# Patient Record
Sex: FEMALE | Race: AMERICAN INDIAN OR ALASKA NATIVE
[De-identification: names, ages, dates, MRNs, and addresses within clinical notes are randomized per-mention and may not be internally consistent; named-entity substitution may affect disease eponyms.]

---

## 2017-12-28 ENCOUNTER — HOSPITAL ENCOUNTER (INPATIENT)
Dept: HOSPITAL 5 - ED | Age: 37
LOS: 12 days | Discharge: NURSING, REHAB OR LONG-TERM CARE | DRG: 871 | End: 2018-01-09
Attending: INTERNAL MEDICINE | Admitting: INTERNAL MEDICINE
Payer: MEDICARE

## 2017-12-28 DIAGNOSIS — Z79.899: ICD-10-CM

## 2017-12-28 DIAGNOSIS — A41.9: Primary | ICD-10-CM

## 2017-12-28 DIAGNOSIS — Z79.4: ICD-10-CM

## 2017-12-28 DIAGNOSIS — Z79.82: ICD-10-CM

## 2017-12-28 DIAGNOSIS — H70.001: ICD-10-CM

## 2017-12-28 DIAGNOSIS — G40.909: ICD-10-CM

## 2017-12-28 DIAGNOSIS — E11.10: ICD-10-CM

## 2017-12-28 DIAGNOSIS — I63.9: ICD-10-CM

## 2017-12-28 DIAGNOSIS — F32.9: ICD-10-CM

## 2017-12-28 DIAGNOSIS — R13.12: ICD-10-CM

## 2017-12-28 DIAGNOSIS — E44.0: ICD-10-CM

## 2017-12-28 DIAGNOSIS — E87.5: ICD-10-CM

## 2017-12-28 DIAGNOSIS — I10: ICD-10-CM

## 2017-12-28 DIAGNOSIS — E87.0: ICD-10-CM

## 2017-12-28 DIAGNOSIS — G93.41: ICD-10-CM

## 2017-12-28 DIAGNOSIS — R47.01: ICD-10-CM

## 2017-12-28 LAB
ALBUMIN SERPL-MCNC: 3 G/DL (ref 3.9–5)
ALT SERPL-CCNC: 11 UNITS/L (ref 7–56)
BACTERIA #/AREA URNS HPF: (no result) /HPF
BAND NEUTROPHILS # (MANUAL): 0.8 K/MM3
BENZODIAZEPINES SCREEN,URINE: (no result)
BILIRUB DIRECT SERPL-MCNC: 0.2 MG/DL (ref 0–0.2)
BILIRUB UR QL STRIP: (no result)
BLOOD UR QL VISUAL: (no result)
BUN SERPL-MCNC: (no result) MG/DL (ref 7–17)
BUN SERPL-MCNC: 38 MG/DL (ref 7–17)
BUN SERPL-MCNC: 39 MG/DL (ref 7–17)
BUN SERPL-MCNC: 41 MG/DL (ref 7–17)
BUN SERPL-MCNC: 42 MG/DL (ref 7–17)
BUN SERPL-MCNC: 43 MG/DL (ref 7–17)
BUN SERPL-MCNC: 43 MG/DL (ref 7–17)
BUN/CREAT SERPL: (no result) %
BUN/CREAT SERPL: 29 %
BUN/CREAT SERPL: 29 %
BUN/CREAT SERPL: 30 %
BUN/CREAT SERPL: 32 %
BUN/CREAT SERPL: 33 %
BUN/CREAT SERPL: 36 %
CALCIUM SERPL-MCNC: (no result) MG/DL (ref 8.4–10.2)
CALCIUM SERPL-MCNC: 7.7 MG/DL (ref 8.4–10.2)
CALCIUM SERPL-MCNC: 7.7 MG/DL (ref 8.4–10.2)
CALCIUM SERPL-MCNC: 7.8 MG/DL (ref 8.4–10.2)
CALCIUM SERPL-MCNC: 7.9 MG/DL (ref 8.4–10.2)
CALCIUM SERPL-MCNC: 8.1 MG/DL (ref 8.4–10.2)
CALCIUM SERPL-MCNC: 8.3 MG/DL (ref 8.4–10.2)
HCT VFR BLD CALC: 41.3 % (ref 30.3–42.9)
HEMOLYSIS INDEX: 101
HEMOLYSIS INDEX: 102
HEMOLYSIS INDEX: 15
HEMOLYSIS INDEX: 39
HEMOLYSIS INDEX: 44
HEMOLYSIS INDEX: 48
HEMOLYSIS INDEX: 48
HGB BLD-MCNC: 12.4 GM/DL (ref 10.1–14.3)
INR PPP: 1.25 (ref 0.87–1.13)
MAGNESIUM SERPL-MCNC: 2.6 MG/DL (ref 1.7–2.3)
MAGNESIUM SERPL-MCNC: 2.7 MG/DL (ref 1.7–2.3)
MCH RBC QN AUTO: 30 PG (ref 28–32)
MCHC RBC AUTO-ENTMCNC: 30 % (ref 30–34)
MCV RBC AUTO: 101 FL (ref 79–97)
METHADONE SCREEN,URINE: (no result)
MYELOCYTES # (MANUAL): 0 K/MM3
NITRITE UR QL STRIP: (no result)
OPIATE SCREEN,URINE: (no result)
PH UR STRIP: 5 [PH] (ref 5–7)
PLATELET # BLD: 197 K/MM3 (ref 140–440)
PROMYELOCYTES # (MANUAL): 0 K/MM3
RBC # BLD AUTO: 4.1 M/MM3 (ref 3.65–5.03)
RBC #/AREA URNS HPF: < 1 /HPF (ref 0–6)
TOTAL CELLS COUNTED BLD: 100
UROBILINOGEN UR-MCNC: < 2 MG/DL (ref ?–2)
WBC #/AREA URNS HPF: 2 /HPF (ref 0–6)

## 2017-12-28 PROCEDURE — 82140 ASSAY OF AMMONIA: CPT

## 2017-12-28 PROCEDURE — 87040 BLOOD CULTURE FOR BACTERIA: CPT

## 2017-12-28 PROCEDURE — 81001 URINALYSIS AUTO W/SCOPE: CPT

## 2017-12-28 PROCEDURE — 36415 COLL VENOUS BLD VENIPUNCTURE: CPT

## 2017-12-28 PROCEDURE — 83036 HEMOGLOBIN GLYCOSYLATED A1C: CPT

## 2017-12-28 PROCEDURE — 82962 GLUCOSE BLOOD TEST: CPT

## 2017-12-28 PROCEDURE — 85025 COMPLETE CBC W/AUTO DIFF WBC: CPT

## 2017-12-28 PROCEDURE — 83735 ASSAY OF MAGNESIUM: CPT

## 2017-12-28 PROCEDURE — 96361 HYDRATE IV INFUSION ADD-ON: CPT

## 2017-12-28 PROCEDURE — 80074 ACUTE HEPATITIS PANEL: CPT

## 2017-12-28 PROCEDURE — 4A033R1 MEASUREMENT OF ARTERIAL SATURATION, PERIPHERAL, PERCUTANEOUS APPROACH: ICD-10-PCS | Performed by: INTERNAL MEDICINE

## 2017-12-28 PROCEDURE — 84100 ASSAY OF PHOSPHORUS: CPT

## 2017-12-28 PROCEDURE — 93005 ELECTROCARDIOGRAM TRACING: CPT

## 2017-12-28 PROCEDURE — 84703 CHORIONIC GONADOTROPIN ASSAY: CPT

## 2017-12-28 PROCEDURE — 70450 CT HEAD/BRAIN W/O DYE: CPT

## 2017-12-28 PROCEDURE — 96375 TX/PRO/DX INJ NEW DRUG ADDON: CPT

## 2017-12-28 PROCEDURE — 80048 BASIC METABOLIC PNL TOTAL CA: CPT

## 2017-12-28 PROCEDURE — 71010: CPT

## 2017-12-28 PROCEDURE — 85007 BL SMEAR W/DIFF WBC COUNT: CPT

## 2017-12-28 PROCEDURE — 96374 THER/PROPH/DIAG INJ IV PUSH: CPT

## 2017-12-28 PROCEDURE — 74230 X-RAY XM SWLNG FUNCJ C+: CPT

## 2017-12-28 PROCEDURE — 80307 DRUG TEST PRSMV CHEM ANLYZR: CPT

## 2017-12-28 PROCEDURE — 85027 COMPLETE CBC AUTOMATED: CPT

## 2017-12-28 PROCEDURE — 93306 TTE W/DOPPLER COMPLETE: CPT

## 2017-12-28 PROCEDURE — 36600 WITHDRAWAL OF ARTERIAL BLOOD: CPT

## 2017-12-28 PROCEDURE — 80061 LIPID PANEL: CPT

## 2017-12-28 PROCEDURE — 70551 MRI BRAIN STEM W/O DYE: CPT

## 2017-12-28 PROCEDURE — 93010 ELECTROCARDIOGRAM REPORT: CPT

## 2017-12-28 PROCEDURE — 82803 BLOOD GASES ANY COMBINATION: CPT

## 2017-12-28 PROCEDURE — 95819 EEG AWAKE AND ASLEEP: CPT

## 2017-12-28 PROCEDURE — 86140 C-REACTIVE PROTEIN: CPT

## 2017-12-28 PROCEDURE — 85610 PROTHROMBIN TIME: CPT

## 2017-12-28 PROCEDURE — 93880 EXTRACRANIAL BILAT STUDY: CPT

## 2017-12-28 PROCEDURE — 74018 RADEX ABDOMEN 1 VIEW: CPT

## 2017-12-28 RX ADMIN — PIPERACILLIN SODIUM AND TAZOBACTAM SODIUM SCH: 3; .375 INJECTION, POWDER, LYOPHILIZED, FOR SOLUTION INTRAVENOUS at 22:32

## 2017-12-28 RX ADMIN — ASPIRIN SCH: 81 TABLET, COATED ORAL at 18:43

## 2017-12-28 RX ADMIN — CLOPIDOGREL BISULFATE SCH: 75 TABLET ORAL at 18:44

## 2017-12-28 RX ADMIN — NIFEDIPINE SCH: 30 TABLET, FILM COATED, EXTENDED RELEASE ORAL at 18:44

## 2017-12-28 RX ADMIN — INSULIN HUMAN SCH MLS/HR: 100 INJECTION, SOLUTION PARENTERAL at 14:43

## 2017-12-28 NOTE — CAT SCAN REPORT
CT HEAD WITHOUT CONTRAST:



HISTORY: Altered mental status.



TECHNIQUE:

Sequential CT images without contrast.



FINDINGS:

No comparison. Slightly limited examination with motion and poor 

patient positioning by the technologist. The lateral ventricles appear 

prominent, right greater than left. Mild ventriculomegaly could be 

considered. The fourth ventricle is within normal limits.



The brain parenchyma demonstrates normal attenuation. There is no 

evidence for hemorrhage, mass or large or acute ischemia. No extra 

axial fluid collection. The basal cisterns are clear. The posterior 

fossa and contents are within normal limits.



The calvarium is intact. The visualized sinuses and mastoid air cells 

are aerated.



IMPRESSION:

Mild dilatation of the lateral ventricles is suspected. Is there 

concern for hydrocephalus? Consider further evaluation with MRI.

The brain parenchyma is within normal limits.

## 2017-12-28 NOTE — EMERGENCY DEPARTMENT REPORT
ED Altered Mental Status HPI





- General


Stated Complaint: UNRESPONSIVE


Time Seen by Provider: 12/28/17 12:25


Source: family, EMS





- History of Present Illness


Initial Comments: 





Patient is 37 years old female history of insulin-dependent diabetes and 

previous CVAs, presented via EMS after patient was found unresponsive.  Per 

family report, patient was last time seen normal yesterday afternoon.  Unable 

to obtain more history due to patient condition.  On arrival to the ER patient 

is breathing 20 breath/minutes and her oxygen saturation is 98% on 2 L.  Strong 

smell of acetone in the room.


MD Complaint: altered mental status, decreased responsiveness


-: unknown


Severity: moderate


Consistency of Symptoms: constant





- Related Data


 Home Medications











 Medication  Instructions  Recorded  Confirmed  Last Taken


 


Aspirin [Aspirin EC] 81 mg PO DAILY 12/28/17 12/28/17 Unknown


 


AtorvaSTATin [Lipitor] 80 mg PO QHS 12/28/17 12/28/17 Unknown


 


Citalopram [celeXA] 10 mg PO QDAY 12/28/17 12/28/17 Unknown


 


Clopidogrel [Plavix] 75 mg PO QDAY 12/28/17 12/28/17 Unknown


 


Cyclobenzaprine [Flexeril] 10 mg PO TID PRN 12/28/17 12/28/17 Unknown


 


Insulin Aspart Protam & Aspart 0 unit SQ TID 12/28/17 12/28/17 Unknown





[NovoLOG Mix 70-30 Flexpen]    


 


Insulin Glargine,Hum.rec.anlog 20 units SQ QAM 12/28/17 12/28/17 Unknown





[Lantus]    


 


Insulin Glargine,Hum.rec.anlog 50 units SQ QPM 12/28/17 12/28/17 Unknown





[Lantus]    


 


NIFEdipine [Nifedipine ER] 30 mg PO DAILY 12/28/17 12/28/17 Unknown











 Allergies











Allergy/AdvReac Type Severity Reaction Status Date / Time


 


No Known Allergies Allergy   Unverified 01/10/15 03:04














ED Review of Systems


ROS: 


Stated complaint: UNRESPONSIVE


Other details as noted in HPI





Comment: Unobtainable due to pts medical conditions





ED Past Medical Hx





- Past Medical History


Hx Hypertension: Yes


Hx CVA: Yes


Hx Diabetes: Yes


Hx Seizures: Yes





- Surgical History


Additional Surgical History: Spinal surgery, neck/artery surgery





- Social History


Smoking Status: Never Smoker


Substance Use Type: None





- Medications


Home Medications: 


 Home Medications











 Medication  Instructions  Recorded  Confirmed  Last Taken  Type


 


Aspirin [Aspirin EC] 81 mg PO DAILY 12/28/17 12/28/17 Unknown History


 


AtorvaSTATin [Lipitor] 80 mg PO QHS 12/28/17 12/28/17 Unknown History


 


Citalopram [celeXA] 10 mg PO QDAY 12/28/17 12/28/17 Unknown History


 


Clopidogrel [Plavix] 75 mg PO QDAY 12/28/17 12/28/17 Unknown History


 


Cyclobenzaprine [Flexeril] 10 mg PO TID PRN 12/28/17 12/28/17 Unknown History


 


Insulin Aspart Protam & Aspart 0 unit SQ TID 12/28/17 12/28/17 Unknown History





[NovoLOG Mix 70-30 Flexpen]     


 


Insulin Glargine,Hum.rec.anlog 20 units SQ QAM 12/28/17 12/28/17 Unknown History





[Lantus]     


 


Insulin Glargine,Hum.rec.anlog 50 units SQ QPM 12/28/17 12/28/17 Unknown History





[Lantus]     


 


NIFEdipine [Nifedipine ER] 30 mg PO DAILY 12/28/17 12/28/17 Unknown History














ED Physical Exam





- General


Limitations: Altered Mental Status


General appearance: in no apparent distress, obtunded





- Head


Head exam: Present: atraumatic, normocephalic, normal inspection





- Eye


Eye exam: Present: normal appearance


Pupils: Present: irregular, unequal





- ENT


ENT exam: Present: normal exam, mucous membranes dry





- Neck


Neck exam: Present: normal inspection, full ROM.  Absent: tenderness, 

meningismus, lymphadenopathy, thyromegaly





- Respiratory


Respiratory exam: Present: normal lung sounds bilaterally





- Cardiovascular


Cardiovascular Exam: Present: regular rate, normal rhythm, normal heart sounds





- GI/Abdominal


GI/Abdominal exam: Present: soft, normal bowel sounds.  Absent: distended, 

tenderness, guarding, rebound, rigid, organomegaly, mass, bruit, pulsatile mass





- Extremities Exam


Extremities exam: Present: normal inspection, full ROM, normal capillary refill





- Back Exam


Back exam: Present: normal inspection, full ROM.  Absent: tenderness, CVA 

tenderness (R), CVA tenderness (L), muscle spasm, paraspinal tenderness, 

vertebral tenderness





- Neurological Exam


Neurological exam: Present: altered





- Skin


Skin exam: Present: warm, dry, intact





ED Course


 Vital Signs











  12/28/17 12/28/17 12/28/17





  12:18 12:30 12:45


 


Pulse Rate  94 H 89


 


Respiratory  19 18





Rate   


 


Blood Pressure 115/67 111/67 107/62


 


O2 Sat by Pulse  100 100





Oximetry   














  12/28/17 12/28/17 12/28/17





  12:49 13:00 13:30


 


Pulse Rate 91 H 92 H 99 H


 


Respiratory 14 20 19





Rate   


 


Blood Pressure 107/62 113/59 100/53


 


O2 Sat by Pulse 99 100 96





Oximetry   














  12/28/17 12/28/17 12/28/17





  14:00 14:30 15:34


 


Pulse Rate 90 92 H 92 H


 


Respiratory 22 21 21





Rate   


 


Blood Pressure 108/54 108/54 109/62


 


O2 Sat by Pulse 100 100 100





Oximetry   














  12/28/17 12/28/17 12/28/17





  16:00 16:30 16:41


 


Pulse Rate 94 H 94 H 


 


Respiratory 21 20 





Rate   


 


Blood Pressure 117/69 104/62 


 


O2 Sat by Pulse 100 100 100





Oximetry   














  12/28/17 12/28/17 12/28/17





  17:00 17:05 17:30


 


Pulse Rate 92 H 92 H 94 H


 


Respiratory 21 20 22





Rate   


 


Blood Pressure 108/60 110/60 104/62


 


O2 Sat by Pulse 100 100 100





Oximetry   














  12/28/17





  18:00


 


Pulse Rate 97 H


 


Respiratory 22





Rate 


 


Blood Pressure 104/62


 


O2 Sat by Pulse 96





Oximetry 














- Reevaluation(s)


Reevaluation #1: 





12/28/17 13:10 patient still obtunded but started to respond to painful and 

voice stimuli.  She is still maintaining an oxygen saturation of 100% with a 

respiratory rate between 18-22 breaths/minutes.


12/28/17  14:32 patient's clinically looks better.  We'll continue IV fluids 

and insulin drip and plan to admit to the ICU.





12/28/17 18:56








- Lab Data


Result diagrams: 


 12/28/17 12:26





 12/28/17 16:57


 Lab Results











  12/28/17 12/28/17 12/28/17 Range/Units





  12:26 12:26 12:39 


 


WBC  20.1 H    (4.5-11.0)  K/mm3


 


RBC  4.10    (3.65-5.03)  M/mm3


 


Hgb  12.4    (10.1-14.3)  gm/dl


 


Hct  41.3    (30.3-42.9)  %


 


MCV  101 H    (79-97)  fl


 


MCH  30    (28-32)  pg


 


MCHC  30    (30-34)  %


 


RDW  13.7    (13.2-15.2)  %


 


Plt Count  197    (140-440)  K/mm3


 


Add Manual Diff  Complete    


 


Total Counted  100    


 


Seg Neutrophils %  Np    


 


Seg Neuts % (Manual)  90.0 H    (40.0-70.0)  %


 


Band Neutrophils %  4.0    %


 


Lymphocytes % (Manual)  2.0 L    (13.4-35.0)  %


 


Reactive Lymphs % (Man)  0    %


 


Monocytes % (Manual)  4.0    (0.0-7.3)  %


 


Eosinophils % (Manual)  0    (0.0-4.3)  %


 


Basophils % (Manual)  0    (0.0-1.8)  %


 


Metamyelocytes %  0    %


 


Myelocytes %  0    %


 


Promyelocytes %  0    %


 


Blast Cells %  0    %


 


Nucleated RBC %  Not Reportable    


 


Seg Neutrophils # Man  18.1 H    (1.8-7.7)  K/mm3


 


Band Neutrophils #  0.8    K/mm3


 


Lymphocytes # (Manual)  0.4 L    (1.2-5.4)  K/mm3


 


Abs React Lymphs (Man)  0.0    K/mm3


 


Monocytes # (Manual)  0.8    (0.0-0.8)  K/mm3


 


Eosinophils # (Manual)  0.0    (0.0-0.4)  K/mm3


 


Basophils # (Manual)  0.0    (0.0-0.1)  K/mm3


 


Metamyelocytes #  0.0    K/mm3


 


Myelocytes #  0.0    K/mm3


 


Promyelocytes #  0.0    K/mm3


 


Blast Cells #  0.0    K/mm3


 


WBC Morphology  Not Reportable    


 


Hypersegmented Neuts  Not Reportable    


 


Hyposegmented Neuts  Not Reportable    


 


Hypogranular Neuts  Not Reportable    


 


Smudge Cells  Not Reportable    


 


Toxic Granulation  Not Reportable    


 


Toxic Vacuolation  Not Reportable    


 


Dohle Bodies  Not Reportable    


 


Pelger-Huet Anomaly  Not Reportable    


 


Yuly Rods  Not Reportable    


 


Platelet Estimate  Not Reportable    


 


Clumped Platelets  Not Reportable    


 


Plt Clumps, EDTA  Not Reportable    


 


Large Platelets  Not Reportable    


 


Giant Platelets  Not Reportable    


 


Platelet Satelliting  Not Reportable    


 


Plt Morphology Comment  Not Reportable    


 


RBC Morphology  Normal    


 


Dimorphic RBCs  Not Reportable    


 


Polychromasia  Not Reportable    


 


Hypochromasia  Not Reportable    


 


Poikilocytosis  Not Reportable    


 


Anisocytosis  Not Reportable    


 


Microcytosis  Not Reportable    


 


Macrocytosis  Not Reportable    


 


Spherocytes  Not Reportable    


 


Pappenheimer Bodies  Not Reportable    


 


Sickle Cells  Not Reportable    


 


Target Cells  Not Reportable    


 


Tear Drop Cells  Not Reportable    


 


Ovalocytes  Not Reportable    


 


Helmet Cells  Not Reportable    


 


[place] Bodies  Not Reportable    


 


Cabot Rings  Not Reportable    


 


Mar[place] Cells  Not Reportable    


 


Bite Cells  Not Reportable    


 


Crenated Cell  Not Reportable    


 


Elliptocytes  Not Reportable    


 


Acanthocytes (Spur)  Not Reportable    


 


Rouleaux  Not Reportable    


 


Hemoglobin C Crystals  Not Reportable    


 


Schistocytes  Not Reportable    


 


Malaria parasites  Not Reportable    


 


Juliano Bodies  Not Reportable    


 


Hem Pathologist Commnt  No    


 


PT   16.4 H   (12.2-14.9)  Sec.


 


INR   1.25 H   (0.87-1.13)  


 


POC ABG pH    6.856 L  (7.35-7.45)  


 


POC ABG pCO2    12.4 L  (35-45)  


 


POC ABG pO2    288 H  ()  


 


POC ABG HCO3    2.2  


 


POC ABG Total CO2    < 5  


 


POC ABG O2 Sat    100  


 


POC ABG Base Excess    < -30  


 


FiO2    98  %


 


Sodium     


 


Potassium     


 


Chloride     


 


Carbon Dioxide     


 


Anion Gap     


 


BUN     


 


Creatinine     


 


Estimated GFR     


 


BUN/Creatinine Ratio     


 


Glucose     


 


POC Glucose     ()  


 


Lactic Acid     (0.7-2.0)  mmol/L


 


Calcium     


 


Phosphorus     (2.5-4.5)  mg/dL


 


Magnesium     (1.7-2.3)  mg/dL


 


Total Bilirubin     (0.1-1.2)  mg/dL


 


Direct Bilirubin     (0-0.2)  mg/dL


 


Indirect Bilirubin     mg/dL


 


AST     (5-40)  units/L


 


ALT     (7-56)  units/L


 


Alkaline Phosphatase     ()  units/L


 


Total Protein     (6.3-8.2)  g/dL


 


Albumin     (3.9-5)  g/dL


 


Albumin/Globulin Ratio     %


 


HCG, Qual     (Negative)  


 


Urine Color     (Yellow)  


 


Urine Turbidity     (Clear)  


 


Urine pH     (5.0-7.0)  


 


Ur Specific Gravity     (1.003-1.030)  


 


Urine Protein     (Negative)  mg/dL


 


Urine Glucose (UA)     (Negative)  mg/dL


 


Urine Ketones     (Negative)  mg/dL


 


Urine Blood     (Negative)  


 


Urine Nitrite     (Negative)  


 


Urine Bilirubin     (Negative)  


 


Urine Urobilinogen     (<2.0)  mg/dL


 


Ur Leukocyte Esterase     (Negative)  


 


Urine WBC (Auto)     (0.0-6.0)  /HPF


 


Urine RBC (Auto)     (0.0-6.0)  /HPF


 


U Epithel Cells (Auto)     (0-13.0)  /HPF


 


Urine Bacteria (Auto)     (Negative)  /HPF


 


Urine Opiates Screen     


 


Urine Methadone Screen     


 


Ur Barbiturates Screen     


 


Ur Phencyclidine Scrn     


 


Ur Amphetamines Screen     


 


U Benzodiazepines Scrn     


 


Urine Cocaine Screen     


 


U Marijuana (THC) Screen     


 


Drugs of Abuse Note     














  12/28/17 12/28/17 12/28/17 Range/Units





  13:44 13:44 13:44 


 


WBC     (4.5-11.0)  K/mm3


 


RBC     (3.65-5.03)  M/mm3


 


Hgb     (10.1-14.3)  gm/dl


 


Hct     (30.3-42.9)  %


 


MCV     (79-97)  fl


 


MCH     (28-32)  pg


 


MCHC     (30-34)  %


 


RDW     (13.2-15.2)  %


 


Plt Count     (140-440)  K/mm3


 


Add Manual Diff     


 


Total Counted     


 


Seg Neutrophils %     


 


Seg Neuts % (Manual)     (40.0-70.0)  %


 


Band Neutrophils %     %


 


Lymphocytes % (Manual)     (13.4-35.0)  %


 


Reactive Lymphs % (Man)     %


 


Monocytes % (Manual)     (0.0-7.3)  %


 


Eosinophils % (Manual)     (0.0-4.3)  %


 


Basophils % (Manual)     (0.0-1.8)  %


 


Metamyelocytes %     %


 


Myelocytes %     %


 


Promyelocytes %     %


 


Blast Cells %     %


 


Nucleated RBC %     


 


Seg Neutrophils # Man     (1.8-7.7)  K/mm3


 


Band Neutrophils #     K/mm3


 


Lymphocytes # (Manual)     (1.2-5.4)  K/mm3


 


Abs React Lymphs (Man)     K/mm3


 


Monocytes # (Manual)     (0.0-0.8)  K/mm3


 


Eosinophils # (Manual)     (0.0-0.4)  K/mm3


 


Basophils # (Manual)     (0.0-0.1)  K/mm3


 


Metamyelocytes #     K/mm3


 


Myelocytes #     K/mm3


 


Promyelocytes #     K/mm3


 


Blast Cells #     K/mm3


 


WBC Morphology     


 


Hypersegmented Neuts     


 


Hyposegmented Neuts     


 


Hypogranular Neuts     


 


Smudge Cells     


 


Toxic Granulation     


 


Toxic Vacuolation     


 


Dohle Bodies     


 


Pelger-Huet Anomaly     


 


Yuly Rods     


 


Platelet Estimate     


 


Clumped Platelets     


 


Plt Clumps, EDTA     


 


Large Platelets     


 


Giant Platelets     


 


Platelet Satelliting     


 


Plt Morphology Comment     


 


RBC Morphology     


 


Dimorphic RBCs     


 


Polychromasia     


 


Hypochromasia     


 


Poikilocytosis     


 


Anisocytosis     


 


Microcytosis     


 


Macrocytosis     


 


Spherocytes     


 


Pappenheimer Bodies     


 


Sickle Cells     


 


Target Cells     


 


Tear Drop Cells     


 


Ovalocytes     


 


Helmet Cells     


 


[place] Bodies     


 


Cabot Rings     


 


Marco Cells     


 


Bite Cells     


 


Crenated Cell     


 


Elliptocytes     


 


Acanthocytes (Spur)     


 


Rouleaux     


 


Hemoglobin C Crystals     


 


Schistocytes     


 


Malaria parasites     


 


Juliano Bodies     


 


Hem Pathologist Commnt     


 


PT     (12.2-14.9)  Sec.


 


INR     (0.87-1.13)  


 


POC ABG pH     (7.35-7.45)  


 


POC ABG pCO2     (35-45)  


 


POC ABG pO2     ()  


 


POC ABG HCO3     


 


POC ABG Total CO2     


 


POC ABG O2 Sat     


 


POC ABG Base Excess     


 


FiO2     %


 


Sodium  TNR    


 


Potassium  TNR    


 


Chloride  TNR    


 


Carbon Dioxide  TNR    


 


Anion Gap  TNR    


 


BUN  TNR    


 


Creatinine  TNR    


 


Estimated GFR  TNR    


 


BUN/Creatinine Ratio  TNR    


 


Glucose  TNR    


 


POC Glucose     ()  


 


Lactic Acid     (0.7-2.0)  mmol/L


 


Calcium  TNR    


 


Phosphorus    8.10 H  (2.5-4.5)  mg/dL


 


Magnesium    2.60 H  (1.7-2.3)  mg/dL


 


Total Bilirubin  0.30    (0.1-1.2)  mg/dL


 


Direct Bilirubin  0.2    (0-0.2)  mg/dL


 


Indirect Bilirubin  0.1    mg/dL


 


AST  15    (5-40)  units/L


 


ALT  11    (7-56)  units/L


 


Alkaline Phosphatase  89    ()  units/L


 


Total Protein  5.8 L    (6.3-8.2)  g/dL


 


Albumin  3.0 L    (3.9-5)  g/dL


 


Albumin/Globulin Ratio  1.1    %


 


HCG, Qual   Negative   (Negative)  


 


Urine Color     (Yellow)  


 


Urine Turbidity     (Clear)  


 


Urine pH     (5.0-7.0)  


 


Ur Specific Gravity     (1.003-1.030)  


 


Urine Protein     (Negative)  mg/dL


 


Urine Glucose (UA)     (Negative)  mg/dL


 


Urine Ketones     (Negative)  mg/dL


 


Urine Blood     (Negative)  


 


Urine Nitrite     (Negative)  


 


Urine Bilirubin     (Negative)  


 


Urine Urobilinogen     (<2.0)  mg/dL


 


Ur Leukocyte Esterase     (Negative)  


 


Urine WBC (Auto)     (0.0-6.0)  /HPF


 


Urine RBC (Auto)     (0.0-6.0)  /HPF


 


U Epithel Cells (Auto)     (0-13.0)  /HPF


 


Urine Bacteria (Auto)     (Negative)  /HPF


 


Urine Opiates Screen     


 


Urine Methadone Screen     


 


Ur Barbiturates Screen     


 


Ur Phencyclidine Scrn     


 


Ur Amphetamines Screen     


 


U Benzodiazepines Scrn     


 


Urine Cocaine Screen     


 


U Marijuana (THC) Screen     


 


Drugs of Abuse Note     














  12/28/17 12/28/17 12/28/17 Range/Units





  13:44 14:17 14:28 


 


WBC     (4.5-11.0)  K/mm3


 


RBC     (3.65-5.03)  M/mm3


 


Hgb     (10.1-14.3)  gm/dl


 


Hct     (30.3-42.9)  %


 


MCV     (79-97)  fl


 


MCH     (28-32)  pg


 


MCHC     (30-34)  %


 


RDW     (13.2-15.2)  %


 


Plt Count     (140-440)  K/mm3


 


Add Manual Diff     


 


Total Counted     


 


Seg Neutrophils %     


 


Seg Neuts % (Manual)     (40.0-70.0)  %


 


Band Neutrophils %     %


 


Lymphocytes % (Manual)     (13.4-35.0)  %


 


Reactive Lymphs % (Man)     %


 


Monocytes % (Manual)     (0.0-7.3)  %


 


Eosinophils % (Manual)     (0.0-4.3)  %


 


Basophils % (Manual)     (0.0-1.8)  %


 


Metamyelocytes %     %


 


Myelocytes %     %


 


Promyelocytes %     %


 


Blast Cells %     %


 


Nucleated RBC %     


 


Seg Neutrophils # Man     (1.8-7.7)  K/mm3


 


Band Neutrophils #     K/mm3


 


Lymphocytes # (Manual)     (1.2-5.4)  K/mm3


 


Abs React Lymphs (Man)     K/mm3


 


Monocytes # (Manual)     (0.0-0.8)  K/mm3


 


Eosinophils # (Manual)     (0.0-0.4)  K/mm3


 


Basophils # (Manual)     (0.0-0.1)  K/mm3


 


Metamyelocytes #     K/mm3


 


Myelocytes #     K/mm3


 


Promyelocytes #     K/mm3


 


Blast Cells #     K/mm3


 


WBC Morphology     


 


Hypersegmented Neuts     


 


Hyposegmented Neuts     


 


Hypogranular Neuts     


 


Smudge Cells     


 


Toxic Granulation     


 


Toxic Vacuolation     


 


Dohle Bodies     


 


Pelger-Huet Anomaly     


 


Yuly Rods     


 


Platelet Estimate     


 


Clumped Platelets     


 


Plt Clumps, EDTA     


 


Large Platelets     


 


Giant Platelets     


 


Platelet Satelliting     


 


Plt Morphology Comment     


 


RBC Morphology     


 


Dimorphic RBCs     


 


Polychromasia     


 


Hypochromasia     


 


Poikilocytosis     


 


Anisocytosis     


 


Microcytosis     


 


Macrocytosis     


 


Spherocytes     


 


Pappenheimer Bodies     


 


Sickle Cells     


 


Target Cells     


 


Tear Drop Cells     


 


Ovalocytes     


 


Helmet Cells     


 


[place] Bodies     


 


Cabot Rings     


 


[place] Cells     


 


Bite Cells     


 


Crenated Cell     


 


Elliptocytes     


 


Acanthocytes (Spur)     


 


Rouleaux     


 


Hemoglobin C Crystals     


 


Schistocytes     


 


Malaria parasites     


 


Juliano Bodies     


 


Hem Pathologist Commnt     


 


PT     (12.2-14.9)  Sec.


 


INR     (0.87-1.13)  


 


POC ABG pH     (7.35-7.45)  


 


POC ABG pCO2     (35-45)  


 


POC ABG pO2     ()  


 


POC ABG HCO3     


 


POC ABG Total CO2     


 


POC ABG O2 Sat     


 


POC ABG Base Excess     


 


FiO2     %


 


Sodium  141  141   


 


Potassium  6.5 H*  5.8 H   


 


Chloride  94.4 L  99.1   


 


Carbon Dioxide  5 L*  2 L*   


 


Anion Gap  48  46   


 


BUN  43 H  38 H   


 


Creatinine  1.2  1.3 H   


 


Estimated GFR  > 60  55   


 


BUN/Creatinine Ratio  36  29   


 


Glucose  629 H*  610 H*   


 


POC Glucose     ()  


 


Lactic Acid     (0.7-2.0)  mmol/L


 


Calcium  8.3 L  7.7 L   


 


Phosphorus     (2.5-4.5)  mg/dL


 


Magnesium     (1.7-2.3)  mg/dL


 


Total Bilirubin     (0.1-1.2)  mg/dL


 


Direct Bilirubin     (0-0.2)  mg/dL


 


Indirect Bilirubin     mg/dL


 


AST     (5-40)  units/L


 


ALT     (7-56)  units/L


 


Alkaline Phosphatase     ()  units/L


 


Total Protein     (6.3-8.2)  g/dL


 


Albumin     (3.9-5)  g/dL


 


Albumin/Globulin Ratio     %


 


HCG, Qual     (Negative)  


 


Urine Color    Yellow  (Yellow)  


 


Urine Turbidity    Clear  (Clear)  


 


Urine pH    5.0  (5.0-7.0)  


 


Ur Specific Gravity    1.016  (1.003-1.030)  


 


Urine Protein    100 mg/dl  (Negative)  mg/dL


 


Urine Glucose (UA)    >=500  (Negative)  mg/dL


 


Urine Ketones    80  (Negative)  mg/dL


 


Urine Blood    Mod  (Negative)  


 


Urine Nitrite    Neg  (Negative)  


 


Urine Bilirubin    Neg  (Negative)  


 


Urine Urobilinogen    < 2.0  (<2.0)  mg/dL


 


Ur Leukocyte Esterase    Neg  (Negative)  


 


Urine WBC (Auto)    2.0  (0.0-6.0)  /HPF


 


Urine RBC (Auto)    < 1.0  (0.0-6.0)  /HPF


 


U Epithel Cells (Auto)    < 1.0  (0-13.0)  /HPF


 


Urine Bacteria (Auto)    1+  (Negative)  /HPF


 


Urine Opiates Screen     


 


Urine Methadone Screen     


 


Ur Barbiturates Screen     


 


Ur Phencyclidine Scrn     


 


Ur Amphetamines Screen     


 


U Benzodiazepines Scrn     


 


Urine Cocaine Screen     


 


U Marijuana (THC) Screen     


 


Drugs of Abuse Note     














  12/28/17 12/28/17 12/28/17 Range/Units





  14:28 15:17 15:17 


 


WBC     (4.5-11.0)  K/mm3


 


RBC     (3.65-5.03)  M/mm3


 


Hgb     (10.1-14.3)  gm/dl


 


Hct     (30.3-42.9)  %


 


MCV     (79-97)  fl


 


MCH     (28-32)  pg


 


MCHC     (30-34)  %


 


RDW     (13.2-15.2)  %


 


Plt Count     (140-440)  K/mm3


 


Add Manual Diff     


 


Total Counted     


 


Seg Neutrophils %     


 


Seg Neuts % (Manual)     (40.0-70.0)  %


 


Band Neutrophils %     %


 


Lymphocytes % (Manual)     (13.4-35.0)  %


 


Reactive Lymphs % (Man)     %


 


Monocytes % (Manual)     (0.0-7.3)  %


 


Eosinophils % (Manual)     (0.0-4.3)  %


 


Basophils % (Manual)     (0.0-1.8)  %


 


Metamyelocytes %     %


 


Myelocytes %     %


 


Promyelocytes %     %


 


Blast Cells %     %


 


Nucleated RBC %     


 


Seg Neutrophils # Man     (1.8-7.7)  K/mm3


 


Band Neutrophils #     K/mm3


 


Lymphocytes # (Manual)     (1.2-5.4)  K/mm3


 


Abs React Lymphs (Man)     K/mm3


 


Monocytes # (Manual)     (0.0-0.8)  K/mm3


 


Eosinophils # (Manual)     (0.0-0.4)  K/mm3


 


Basophils # (Manual)     (0.0-0.1)  K/mm3


 


Metamyelocytes #     K/mm3


 


Myelocytes #     K/mm3


 


Promyelocytes #     K/mm3


 


Blast Cells #     K/mm3


 


WBC Morphology     


 


Hypersegmented Neuts     


 


Hyposegmented Neuts     


 


Hypogranular Neuts     


 


Smudge Cells     


 


Toxic Granulation     


 


Toxic Vacuolation     


 


Dohle Bodies     


 


Pelger-Huet Anomaly     


 


Yuly Rods     


 


Platelet Estimate     


 


Clumped Platelets     


 


Plt Clumps, EDTA     


 


Large Platelets     


 


Giant Platelets     


 


Platelet Satelliting     


 


Plt Morphology Comment     


 


RBC Morphology     


 


Dimorphic RBCs     


 


Polychromasia     


 


Hypochromasia     


 


Poikilocytosis     


 


Anisocytosis     


 


Microcytosis     


 


Macrocytosis     


 


Spherocytes     


 


Pappenheimer Bodies     


 


Sickle Cells     


 


Target Cells     


 


Tear Drop Cells     


 


Ovalocytes     


 


Helmet Cells     


 


[place] Bodies     


 


Cabot Rings     


 


[place] Cells     


 


Bite Cells     


 


Crenated Cell     


 


Elliptocytes     


 


Acanthocytes (Spur)     


 


Rouleaux     


 


Hemoglobin C Crystals     


 


Schistocytes     


 


Malaria parasites     


 


Juliano Bodies     


 


Hem Pathologist Commnt     


 


PT     (12.2-14.9)  Sec.


 


INR     (0.87-1.13)  


 


POC ABG pH     (7.35-7.45)  


 


POC ABG pCO2     (35-45)  


 


POC ABG pO2     ()  


 


POC ABG HCO3     


 


POC ABG Total CO2     


 


POC ABG O2 Sat     


 


POC ABG Base Excess     


 


FiO2     %


 


Sodium   143   


 


Potassium   5.7 H   


 


Chloride   100.3   


 


Carbon Dioxide   < 2.0 L*   


 


Anion Gap   46   


 


BUN   39 H   


 


Creatinine   1.3 H   


 


Estimated GFR   55   


 


BUN/Creatinine Ratio   30   


 


Glucose   585 H*   


 


POC Glucose     ()  


 


Lactic Acid    2.90 H*  (0.7-2.0)  mmol/L


 


Calcium   7.9 L   


 


Phosphorus     (2.5-4.5)  mg/dL


 


Magnesium     (1.7-2.3)  mg/dL


 


Total Bilirubin     (0.1-1.2)  mg/dL


 


Direct Bilirubin     (0-0.2)  mg/dL


 


Indirect Bilirubin     mg/dL


 


AST     (5-40)  units/L


 


ALT     (7-56)  units/L


 


Alkaline Phosphatase     ()  units/L


 


Total Protein     (6.3-8.2)  g/dL


 


Albumin     (3.9-5)  g/dL


 


Albumin/Globulin Ratio     %


 


HCG, Qual     (Negative)  


 


Urine Color     (Yellow)  


 


Urine Turbidity     (Clear)  


 


Urine pH     (5.0-7.0)  


 


Ur Specific Gravity     (1.003-1.030)  


 


Urine Protein     (Negative)  mg/dL


 


Urine Glucose (UA)     (Negative)  mg/dL


 


Urine Ketones     (Negative)  mg/dL


 


Urine Blood     (Negative)  


 


Urine Nitrite     (Negative)  


 


Urine Bilirubin     (Negative)  


 


Urine Urobilinogen     (<2.0)  mg/dL


 


Ur Leukocyte Esterase     (Negative)  


 


Urine WBC (Auto)     (0.0-6.0)  /HPF


 


Urine RBC (Auto)     (0.0-6.0)  /HPF


 


U Epithel Cells (Auto)     (0-13.0)  /HPF


 


Urine Bacteria (Auto)     (Negative)  /HPF


 


Urine Opiates Screen  Presumptive negative    


 


Urine Methadone Screen  Presumptive negative    


 


Ur Barbiturates Screen  Presumptive negative    


 


Ur Phencyclidine Scrn  Presumptive negative    


 


Ur Amphetamines Screen  Presumptive negative    


 


U Benzodiazepines Scrn  Presumptive negative    


 


Urine Cocaine Screen  Presumptive negative    


 


U Marijuana (THC) Screen  Presumptive negative    


 


Drugs of Abuse Note  Disclamer    














  12/28/17 Range/Units





  15:40 


 


WBC   (4.5-11.0)  K/mm3


 


RBC   (3.65-5.03)  M/mm3


 


Hgb   (10.1-14.3)  gm/dl


 


Hct   (30.3-42.9)  %


 


MCV   (79-97)  fl


 


MCH   (28-32)  pg


 


MCHC   (30-34)  %


 


RDW   (13.2-15.2)  %


 


Plt Count   (140-440)  K/mm3


 


Add Manual Diff   


 


Total Counted   


 


Seg Neutrophils %   


 


Seg Neuts % (Manual)   (40.0-70.0)  %


 


Band Neutrophils %   %


 


Lymphocytes % (Manual)   (13.4-35.0)  %


 


Reactive Lymphs % (Man)   %


 


Monocytes % (Manual)   (0.0-7.3)  %


 


Eosinophils % (Manual)   (0.0-4.3)  %


 


Basophils % (Manual)   (0.0-1.8)  %


 


Metamyelocytes %   %


 


Myelocytes %   %


 


Promyelocytes %   %


 


Blast Cells %   %


 


Nucleated RBC %   


 


Seg Neutrophils # Man   (1.8-7.7)  K/mm3


 


Band Neutrophils #   K/mm3


 


Lymphocytes # (Manual)   (1.2-5.4)  K/mm3


 


Abs React Lymphs (Man)   K/mm3


 


Monocytes # (Manual)   (0.0-0.8)  K/mm3


 


Eosinophils # (Manual)   (0.0-0.4)  K/mm3


 


Basophils # (Manual)   (0.0-0.1)  K/mm3


 


Metamyelocytes #   K/mm3


 


Myelocytes #   K/mm3


 


Promyelocytes #   K/mm3


 


Blast Cells #   K/mm3


 


WBC Morphology   


 


Hypersegmented Neuts   


 


Hyposegmented Neuts   


 


Hypogranular Neuts   


 


Smudge Cells   


 


Toxic Granulation   


 


Toxic Vacuolation   


 


Dohle Bodies   


 


Pelger-Huet Anomaly   


 


Yuly Rods   


 


Platelet Estimate   


 


Clumped Platelets   


 


Plt Clumps, EDTA   


 


Large Platelets   


 


Giant Platelets   


 


Platelet Satelliting   


 


Plt Morphology Comment   


 


RBC Morphology   


 


Dimorphic RBCs   


 


Polychromasia   


 


Hypochromasia   


 


Poikilocytosis   


 


Anisocytosis   


 


Microcytosis   


 


Macrocytosis   


 


Spherocytes   


 


Pappenheimer Bodies   


 


Sickle Cells   


 


Target Cells   


 


Tear Drop Cells   


 


Ovalocytes   


 


Helmet Cells   


 


[place] Bodies   


 


Cabot Rings   


 


[place] Cells   


 


Bite Cells   


 


Crenated Cell   


 


Elliptocytes   


 


Acanthocytes (Spur)   


 


Rouleaux   


 


Hemoglobin C Crystals   


 


Schistocytes   


 


Malaria parasites   


 


Juliano Bodies   


 


Hem Pathologist Commnt   


 


PT   (12.2-14.9)  Sec.


 


INR   (0.87-1.13)  


 


POC ABG pH   (7.35-7.45)  


 


POC ABG pCO2   (35-45)  


 


POC ABG pO2   ()  


 


POC ABG HCO3   


 


POC ABG Total CO2   


 


POC ABG O2 Sat   


 


POC ABG Base Excess   


 


FiO2   %


 


Sodium   


 


Potassium   


 


Chloride   


 


Carbon Dioxide   


 


Anion Gap   


 


BUN   


 


Creatinine   


 


Estimated GFR   


 


BUN/Creatinine Ratio   


 


Glucose   


 


POC Glucose  > 500 H  ()  


 


Lactic Acid   (0.7-2.0)  mmol/L


 


Calcium   


 


Phosphorus   (2.5-4.5)  mg/dL


 


Magnesium   (1.7-2.3)  mg/dL


 


Total Bilirubin   (0.1-1.2)  mg/dL


 


Direct Bilirubin   (0-0.2)  mg/dL


 


Indirect Bilirubin   mg/dL


 


AST   (5-40)  units/L


 


ALT   (7-56)  units/L


 


Alkaline Phosphatase   ()  units/L


 


Total Protein   (6.3-8.2)  g/dL


 


Albumin   (3.9-5)  g/dL


 


Albumin/Globulin Ratio   %


 


HCG, Qual   (Negative)  


 


Urine Color   (Yellow)  


 


Urine Turbidity   (Clear)  


 


Urine pH   (5.0-7.0)  


 


Ur Specific Gravity   (1.003-1.030)  


 


Urine Protein   (Negative)  mg/dL


 


Urine Glucose (UA)   (Negative)  mg/dL


 


Urine Ketones   (Negative)  mg/dL


 


Urine Blood   (Negative)  


 


Urine Nitrite   (Negative)  


 


Urine Bilirubin   (Negative)  


 


Urine Urobilinogen   (<2.0)  mg/dL


 


Ur Leukocyte Esterase   (Negative)  


 


Urine WBC (Auto)   (0.0-6.0)  /HPF


 


Urine RBC (Auto)   (0.0-6.0)  /HPF


 


U Epithel Cells (Auto)   (0-13.0)  /HPF


 


Urine Bacteria (Auto)   (Negative)  /HPF


 


Urine Opiates Screen   


 


Urine Methadone Screen   


 


Ur Barbiturates Screen   


 


Ur Phencyclidine Scrn   


 


Ur Amphetamines Screen   


 


U Benzodiazepines Scrn   


 


Urine Cocaine Screen   


 


U Marijuana (THC) Screen   


 


Drugs of Abuse Note   














- EKG Data


-: EKG Interpreted by Me


EKG shows normal: sinus rhythm


Rate: normal


Interpretation: no acute changes





- Radiology Data


Radiology results: report reviewed


Chest x-ray showed no acute abnormalities.





- Medical Decision Making





Discussed with Dr. Moulton, I presented the patient to him, he agreed to admit the 

patient to his service.


Critical Care Time: Yes


Critical care time in (mins) excluding proc time.: 45


Critical care attestation.: 


If time is entered above; I have spent that time in minutes in the direct care 

of this critically ill patient, excluding procedure time.








ED Disposition


Clinical Impression: 


 DKA (diabetic ketoacidoses), Altered mental status





Disposition: [id number] OP ADMIT IP TO THIS HOSP


Is pt being admited?: Yes


Condition: Stable

## 2017-12-28 NOTE — HISTORY AND PHYSICAL REPORT
History of Present Illness


Date of examination: 12/28/17


Date of admission: 





12/28/17


Chief complaint: 


CC Unresponsive





History of present illness: 


History of Present Illness


Patient is 37 years old female with history of insulin-dependent diabetes and 

previous CVAs, presented via EMS after patient was found unresponsive.  Per 

family report, patient was last time seen normal yesterday afternoon.  Unable 

to obtain more history due to patient condition.  On arrival to the ER patient 

is breathing 20 breath/minutes and her oxygen saturation is 98% on 2 L.  Strong 

smell of acetone in the room.


MD Complaint: altered mental status, decreased responsiveness


-: unknown


Severity: moderate


Consistency of Symptoms: constant








Past Medical History


Hx Hypertension: Yes


Hx CVA: Yes


Hx Diabetes: Yes


Hx Seizures: Yes





 Surgical History


Additional Surgical History: Spinal surgery, neck/artery surgery





-Social History


Smoking Status: Never Smoker


Substance Use Type: None





Medications


Home Medications: 


 Home Medications











 Medication  Instructions  Recorded  Confirmed  Last Taken  Type


 


Aspirin [Aspirin EC] 81 mg PO DAILY 12/28/17 12/28/17 Unknown History


 


AtorvaSTATin [Lipitor] 80 mg PO QHS 12/28/17 12/28/17 Unknown History


 


Citalopram [celeXA] 10 mg PO QDAY 12/28/17 12/28/17 Unknown History


 


Clopidogrel [Plavix] 75 mg PO QDAY 12/28/17 12/28/17 Unknown History


 


Cyclobenzaprine [Flexeril] 10 mg PO TID PRN 12/28/17 12/28/17 Unknown History


 


Insulin Aspart Protam & Aspart 0 unit SQ TID 12/28/17 12/28/17 Unknown History





[NovoLOG Mix 70-30 Flexpen]     


 


Insulin Glargine,Hum.rec.anlog 20 units SQ QAM 12/28/17 12/28/17 Unknown History





[Lantus]     


 


Insulin Glargine,Hum.rec.anlog 50 units SQ QPM 12/28/17 12/28/17 Unknown History





[Lantus]     


 


NIFEdipine [Nifedipine ER] 30 mg PO DAILY 12/28/17 12/28/17 Unknown History











 Review of Systems


ROS: 


Stated complaint: UNRESPONSIVE


Other details as noted in HPI





Comment: Unobtainable due to pts medical conditions


                                                         





Medications and Allergies


 Allergies











Allergy/AdvReac Type Severity Reaction Status Date / Time


 


No Known Allergies Allergy   Unverified 01/10/15 03:04











 Home Medications











 Medication  Instructions  Recorded  Confirmed  Last Taken  Type


 


Aspirin [Aspirin EC] 81 mg PO DAILY 12/28/17 12/28/17 Unknown History


 


AtorvaSTATin [Lipitor] 80 mg PO QHS 12/28/17 12/28/17 Unknown History


 


Citalopram [celeXA] 10 mg PO QDAY 12/28/17 12/28/17 Unknown History


 


Clopidogrel [Plavix] 75 mg PO QDAY 12/28/17 12/28/17 Unknown History


 


Cyclobenzaprine [Flexeril] 10 mg PO TID PRN 12/28/17 12/28/17 Unknown History


 


Insulin Aspart Protam & Aspart 0 unit SQ TID 12/28/17 12/28/17 Unknown History





[NovoLOG Mix 70-30 Flexpen]     


 


Insulin Glargine,Hum.rec.anlog 20 units SQ QAM 12/28/17 12/28/17 Unknown History





[Lantus]     


 


Insulin Glargine,Hum.rec.anlog 50 units SQ QPM 12/28/17 12/28/17 Unknown History





[Lantus]     


 


NIFEdipine [Nifedipine ER] 30 mg PO DAILY 12/28/17 12/28/17 Unknown History











Active Meds: 


Active Medications





Dextrose (D50w (25gm) Syringe)  0 ml IV ONCE PRN


   PRN Reason: Hypoglycemia


Insulin Human Regular 100 (units/ Sodium Chloride)  100 mls @ 1 mls/hr IV TITR 

VANDA; 1 UNITS/HR


   PRN Reason: Protocol


   Last Admin: 12/28/17 14:43 Dose:  4 units/hr, 4 mls/hr


Piperacillin Sod/Tazobactam Sod (Zosyn/Ns 3.375gm/50ml)  3.375 gm in 50 mls @ 

100 mls/hr IV Q6HR VANDA


Sodium Chloride (Nacl 0.9% 1000 Ml)  1,000 mls @ 250 mls/hr IV ONCE ONE


   Stop: 12/28/17 18:43











Exam





- Constitutional


Vitals: 


 











Temp Pulse Resp BP Pulse Ox


 


    92 H  20   113/59   100 


 


    12/28/17 13:00  12/28/17 13:00  12/28/17 13:00  12/28/17 13:00











General appearance: Present: mild distress, well-nourished





- EENT


Eyes: Present: PERRL


ENT: hearing intact, clear oral mucosa





- Neck


Neck: Present: supple, normal ROM





- Respiratory


Respiratory effort: normal


Respiratory: bilateral: CTA





- Cardiovascular


Heart rate: 80


Rhythm: regular


Heart Sounds: Present: S1 & S2.  Absent: rub, click





- Extremities


Extremities: no ischemia, pulses intact, pulses symmetrical, No edema


Peripheral Pulses: within normal limits





- Abdominal


General gastrointestinal: Present: soft, non-tender, non-distended, normal 

bowel sounds


Female genitourinary: Present: normal





- Rectal


Rectal Exam: deferred





- Integumentary


Integumentary: Present: clear, warm, dry





- Musculoskeletal


Musculoskeletal: generalized weakness





- Psychiatric


Psychiatric: appropriate mood/affect, intact judgment & insight





- Neurologic


Neurologic: CNII-XII intact, moves all extremities, other (Decreased 

responsiveness)





- Allied Health


Allied health notes reviewed: nursing, case management





Results





- Labs


CBC & Chem 7: 


 12/28/17 12:26





 12/29/17 04:20


Labs: 


 Laboratory Last Values











WBC  20.1 K/mm3 (4.5-11.0)  H  12/28/17  12:26    


 


RBC  4.10 M/mm3 (3.65-5.03)   12/28/17  12:26    


 


Hgb  12.4 gm/dl (10.1-14.3)   12/28/17  12:26    


 


Hct  41.3 % (30.3-42.9)   12/28/17  12:26    


 


MCV  101 fl (79-97)  H  12/28/17  12:26    


 


MCH  30 pg (28-32)   12/28/17  12:26    


 


MCHC  30 % (30-34)   12/28/17  12:26    


 


RDW  13.7 % (13.2-15.2)   12/28/17  12:26    


 


Plt Count  197 K/mm3 (140-440)   12/28/17  12:26    


 


Add Manual Diff  Complete   12/28/17  12:26    


 


Total Counted  100   12/28/17  12:26    


 


Seg Neutrophils %  Np   12/28/17  12:26    


 


Seg Neuts % (Manual)  90.0 % (40.0-70.0)  H  12/28/17  12:26    


 


Band Neutrophils %  4.0 %  12/28/17  12:26    


 


Lymphocytes % (Manual)  2.0 % (13.4-35.0)  L  12/28/17  12:26    


 


Reactive Lymphs % (Man)  0 %  12/28/17  12:26    


 


Monocytes % (Manual)  4.0 % (0.0-7.3)   12/28/17  12:26    


 


Eosinophils % (Manual)  0 % (0.0-4.3)   12/28/17  12:26    


 


Basophils % (Manual)  0 % (0.0-1.8)   12/28/17  12:26    


 


Metamyelocytes %  0 %  12/28/17  12:26    


 


Myelocytes %  0 %  12/28/17  12:26    


 


Promyelocytes %  0 %  12/28/17  12:26    


 


Blast Cells %  0 %  12/28/17  12:26    


 


Nucleated RBC %  Not Reportable   12/28/17  12:26    


 


Seg Neutrophils # Man  18.1 K/mm3 (1.8-7.7)  H  12/28/17  12:26    


 


Band Neutrophils #  0.8 K/mm3  12/28/17  12:26    


 


Lymphocytes # (Manual)  0.4 K/mm3 (1.2-5.4)  L  12/28/17  12:26    


 


Abs React Lymphs (Man)  0.0 K/mm3  12/28/17  12:26    


 


Monocytes # (Manual)  0.8 K/mm3 (0.0-0.8)   12/28/17  12:26    


 


Eosinophils # (Manual)  0.0 K/mm3 (0.0-0.4)   12/28/17  12:26    


 


Basophils # (Manual)  0.0 K/mm3 (0.0-0.1)   12/28/17  12:26    


 


Metamyelocytes #  0.0 K/mm3  12/28/17  12:26    


 


Myelocytes #  0.0 K/mm3  12/28/17  12:26    


 


Promyelocytes #  0.0 K/mm3  12/28/17  12:26    


 


Blast Cells #  0.0 K/mm3  12/28/17  12:26    


 


WBC Morphology  Not Reportable   12/28/17  12:26    


 


Hypersegmented Neuts  Not Reportable   12/28/17  12:26    


 


Hyposegmented Neuts  Not Reportable   12/28/17  12:26    


 


Hypogranular Neuts  Not Reportable   12/28/17  12:26    


 


Smudge Cells  Not Reportable   12/28/17  12:26    


 


Toxic Granulation  Not Reportable   12/28/17  12:26    


 


Toxic Vacuolation  Not Reportable   12/28/17  12:26    


 


Dohle Bodies  Not Reportable   12/28/17  12:26    


 


Pelger-Huet Anomaly  Not Reportable   12/28/17  12:26    


 


Yuly Rods  Not Reportable   12/28/17  12:26    


 


Platelet Estimate  Not Reportable   12/28/17  12:26    


 


Clumped Platelets  Not Reportable   12/28/17  12:26    


 


Plt Clumps, EDTA  Not Reportable   12/28/17  12:26    


 


Large Platelets  Not Reportable   12/28/17  12:26    


 


Giant Platelets  Not Reportable   12/28/17  12:26    


 


Platelet Satelliting  Not Reportable   12/28/17  12:26    


 


Plt Morphology Comment  Not Reportable   12/28/17  12:26    


 


RBC Morphology  Normal   12/28/17  12:26    


 


Dimorphic RBCs  Not Reportable   12/28/17  12:26    


 


Polychromasia  Not Reportable   12/28/17  12:26    


 


Hypochromasia  Not Reportable   12/28/17  12:26    


 


Poikilocytosis  Not Reportable   12/28/17  12:26    


 


Anisocytosis  Not Reportable   12/28/17  12:26    


 


Microcytosis  Not Reportable   12/28/17  12:26    


 


Macrocytosis  Not Reportable   12/28/17  12:26    


 


Spherocytes  Not Reportable   12/28/17  12:26    


 


Pappenheimer Bodies  Not Reportable   12/28/17  12:26    


 


Sickle Cells  Not Reportable   12/28/17  12:26    


 


Target Cells  Not Reportable   12/28/17  12:26    


 


Tear Drop Cells  Not Reportable   12/28/17  12:26    


 


Ovalocytes  Not Reportable   12/28/17  12:26    


 


Helmet Cells  Not Reportable   12/28/17  12:26    


 


[place] Bodies  Not Reportable   12/28/17  12:26    


 


Cabot Rings  Not Reportable   12/28/17  12:26    


 


[place] Cells  Not Reportable   12/28/17  12:26    


 


Bite Cells  Not Reportable   12/28/17  12:26    


 


Crenated Cell  Not Reportable   12/28/17  12:26    


 


Elliptocytes  Not Reportable   12/28/17  12:26    


 


Acanthocytes (Spur)  Not Reportable   12/28/17  12:26    


 


Rouleaux  Not Reportable   12/28/17  12:26    


 


Hemoglobin C Crystals  Not Reportable   12/28/17  12:26    


 


Schistocytes  Not Reportable   12/28/17  12:26    


 


Malaria parasites  Not Reportable   12/28/17  12:26    


 


Juliano Bodies  Not Reportable   12/28/17  12:26    


 


Hem Pathologist Commnt  No   12/28/17  12:26    


 


PT  16.4 Sec. (12.2-14.9)  H  12/28/17  12:26    


 


INR  1.25  (0.87-1.13)  H  12/28/17  12:26    


 


POC ABG pH  6.856  (7.35-7.45)  L  12/28/17  12:39    


 


POC ABG pCO2  12.4  (35-45)  L  12/28/17  12:39    


 


POC ABG pO2  288  ()  H  12/28/17  12:39    


 


POC ABG HCO3  2.2   12/28/17  12:39    


 


POC ABG Total CO2  < 5   12/28/17  12:39    


 


POC ABG O2 Sat  100   12/28/17  12:39    


 


POC ABG Base Excess  < -30   12/28/17  12:39    


 


FiO2  98 %  12/28/17  12:39    


 


Sodium  141 mmol/L (137-145)   12/28/17  14:17    


 


Potassium  5.8 mmol/L (3.6-5.0)  H  12/28/17  14:17    


 


Chloride  99.1 mmol/L ()   12/28/17  14:17    


 


Carbon Dioxide  2 mmol/L (22-30)  L*  12/28/17  14:17    


 


Anion Gap  46 mmol/L  12/28/17  14:17    


 


BUN  38 mg/dL (7-17)  H  12/28/17  14:17    


 


Creatinine  1.3 mg/dL (0.7-1.2)  H  12/28/17  14:17    


 


Estimated GFR  55 ml/min  12/28/17  14:17    


 


BUN/Creatinine Ratio  29 %  12/28/17  14:17    


 


Glucose  610 mg/dL ()  H*  12/28/17  14:17    


 


POC Glucose  > 500  ()  H  12/28/17  15:40    


 


Lactic Acid  2.90 mmol/L (0.7-2.0)  H*  12/28/17  15:17    


 


Calcium  7.7 mg/dL (8.4-10.2)  L  12/28/17  14:17    


 


Phosphorus  8.10 mg/dL (2.5-4.5)  H  12/28/17  13:44    


 


Magnesium  2.60 mg/dL (1.7-2.3)  H  12/28/17  13:44    


 


Total Bilirubin  0.30 mg/dL (0.1-1.2)   12/28/17  13:44    


 


Direct Bilirubin  0.2 mg/dL (0-0.2)   12/28/17  13:44    


 


Indirect Bilirubin  0.1 mg/dL  12/28/17  13:44    


 


AST  15 units/L (5-40)   12/28/17  13:44    


 


ALT  11 units/L (7-56)   12/28/17  13:44    


 


Alkaline Phosphatase  89 units/L ()   12/28/17  13:44    


 


Total Protein  5.8 g/dL (6.3-8.2)  L  12/28/17  13:44    


 


Albumin  3.0 g/dL (3.9-5)  L  12/28/17  13:44    


 


Albumin/Globulin Ratio  1.1 %  12/28/17  13:44    


 


HCG, Qual  Negative  (Negative)   12/28/17  13:44    


 


Urine Color  Yellow  (Yellow)   12/28/17  14:28    


 


Urine Turbidity  Clear  (Clear)   12/28/17  14:28    


 


Urine pH  5.0  (5.0-7.0)   12/28/17  14:28    


 


Ur Specific Gravity  1.016  (1.003-1.030)   12/28/17  14:28    


 


Urine Protein  100 mg/dl mg/dL (Negative)   12/28/17  14:28    


 


Urine Glucose (UA)  >=500 mg/dL (Negative)   12/28/17  14:28    


 


Urine Ketones  80 mg/dL (Negative)   12/28/17  14:28    


 


Urine Blood  Mod  (Negative)   12/28/17  14:28    


 


Urine Nitrite  Neg  (Negative)   12/28/17  14:28    


 


Urine Bilirubin  Neg  (Negative)   12/28/17  14:28    


 


Urine Urobilinogen  < 2.0 mg/dL (<2.0)   12/28/17  14:28    


 


Ur Leukocyte Esterase  Neg  (Negative)   12/28/17  14:28    


 


Urine WBC (Auto)  2.0 /HPF (0.0-6.0)   12/28/17  14:28    


 


Urine RBC (Auto)  < 1.0 /HPF (0.0-6.0)   12/28/17  14:28    


 


U Epithel Cells (Auto)  < 1.0 /HPF (0-13.0)   12/28/17  14:28    


 


Urine Bacteria (Auto)  1+ /HPF (Negative)   12/28/17  14:28    


 


Urine Opiates Screen  Presumptive negative   12/28/17  14:28    


 


Urine Methadone Screen  Presumptive negative   12/28/17  14:28    


 


Ur Barbiturates Screen  Presumptive negative   12/28/17  14:28    


 


Ur Phencyclidine Scrn  Presumptive negative   12/28/17  14:28    


 


Ur Amphetamines Screen  Presumptive negative   12/28/17  14:28    


 


U Benzodiazepines Scrn  Presumptive negative   12/28/17  14:28    


 


Urine Cocaine Screen  Presumptive negative   12/28/17  14:28    


 


U Marijuana (THC) Screen  Presumptive negative   12/28/17  14:28    


 


Drugs of Abuse Note  Disclamer   12/28/17  14:28    








 Short CBC











  12/28/17 Range/Units





  12:26 


 


WBC  20.1 H  (4.5-11.0)  K/mm3


 


Hgb  12.4  (10.1-14.3)  gm/dl


 


Hct  41.3  (30.3-42.9)  %


 


Plt Count  197  (140-440)  K/mm3








 BMP











  12/28/17 12/28/17 12/28/17





  13:44 13:44 14:17


 


Sodium  TNR  141  141


 


Potassium  TNR  6.5 H*  5.8 H


 


Chloride  TNR  94.4 L  99.1


 


Carbon Dioxide  TNR  5 L*  2 L*


 


BUN  TNR  43 H  38 H


 


Creatinine  TNR  1.2  1.3 H


 


Glucose  TNR  629 H*  610 H*


 


Calcium  TNR  8.3 L  7.7 L














  12/28/17 12/28/17 12/28/17





  15:17 16:57 18:52


 


Sodium  143  145  148 H


 


Potassium  5.7 H  4.9  4.3


 


Chloride  100.3  101.0  106.3


 


Carbon Dioxide  < 2.0 L*  3 L*  < 2.0 L*


 


BUN  39 H  42 H  41 H


 


Creatinine  1.3 H  1.3 H  1.4 H


 


Glucose  585 H*  557 H*  473 H


 


Calcium  7.9 L  8.1 L  7.8 L














  12/28/17 12/29/17 12/29/17





  23:01 04:20 04:20


 


Sodium  148 H  151 H  150 H


 


Potassium  4.2  4.2  4.4


 


Chloride  111.6 H  114.3 H  115.4 H


 


Carbon Dioxide  4 L*  10 L  10 L


 


BUN  43 H  48 H  48 H


 


Creatinine  1.3 H  1.2  1.3 H


 


Glucose  177 H  121 H  118 H


 


Calcium  7.7 L  7.8 L  7.8 L








 Liver Function











  12/28/17 Range/Units





  13:44 


 


Total Bilirubin  0.30  (0.1-1.2)  mg/dL


 


Direct Bilirubin  0.2  (0-0.2)  mg/dL


 


AST  15  (5-40)  units/L


 


ALT  11  (7-56)  units/L


 


Alkaline Phosphatase  89  ()  units/L


 


Albumin  3.0 L  (3.9-5)  g/dL








 Urine











  12/28/17 Range/Units





  14:28 


 


Urine Color  Yellow  (Yellow)  


 


Urine pH  5.0  (5.0-7.0)  


 


Ur Specific Gravity  1.016  (1.003-1.030)  


 


Urine Protein  100 mg/dl  (Negative)  mg/dL


 


Urine Glucose (UA)  >=500  (Negative)  mg/dL














- Imaging and Cardiology


EKG: report reviewed


Chest x-ray: report reviewed (NAF)


CT Scan - head: report reviewed (NAF)





Assessment and Plan


Assessment and plan: 





The high probability of a clinically significant, sudden or life threatening 

deterioration of the [Pulmonary, cadiac, renal] system(s) required my full and 

direct attention, intervention and personal management. The aggregate critical 

care time was [40] minutes. This time is in addition to time spent performing 

reported procedures but includes the following: 





[x] Data Review and interpretation 





[x] Patient assessment and monitoring of vital signs 





[x] Documentation 





[x] Medication orders and management





Advance Directives: Yes (FC)


VTE prophylaxis?: Chemical


Plan of care discussed with patient/family: Yes





- Patient Problems


(1) DKA (diabetic ketoacidoses)


Current Visit: Yes   Status: Acute   


Qualifiers: 


   Diabetes mellitus type: type 2 


Plan to address problem: 


DKA protocol initiated


Insulin drip initiated








(2) Encephalopathy acute


Current Visit: Yes   Status: Acute   


Plan to address problem: 


Sec to DKA.IV Insulin and IV fluids for now








(3) Sepsis


Current Visit: Yes   Status: Acute   


Qualifiers: 


   Sepsis type: sepsis due to unspecified organism   Qualified Code(s): A41.9 - 

Sepsis, unspecified organism   


Plan to address problem: 


High Lactic acid and Leukocytosis


IV abx








(4) Hyperkalemia


Current Visit: Yes   Status: Acute   


Plan to address problem: 


NaHCO3 given


Should correct with IV insulin








(5) HTN (hypertension)


Current Visit: Yes   Status: Chronic   


Qualifiers: 


   Hypertension type: essential hypertension   Qualified Code(s): I10 - 

Essential (primary) hypertension   


Plan to address problem: 


Cont Nifedipine








(6) History of CVA with residual deficit


Current Visit: Yes   Status: Chronic   


Plan to address problem: 


Cont Plavix








(7) DVT prophylaxis


Current Visit: Yes   Status: Acute   


Plan to address problem: 


on Lovenox

## 2017-12-28 NOTE — XRAY REPORT
AP CHEST:



HISTORY: Diabetic ketoacidosis



No comparison. AP view of the chest demonstrates a normal mediastinal 

and

cardiac contour with clear lungs and normal bony and soft tissue

structures.



IMPRESSION:

Unremarkable AP chest.

## 2017-12-29 LAB
BUN SERPL-MCNC: 48 MG/DL (ref 7–17)
BUN SERPL-MCNC: 50 MG/DL (ref 7–17)
BUN SERPL-MCNC: 52 MG/DL (ref 7–17)
BUN/CREAT SERPL: 37 %
BUN/CREAT SERPL: 37 %
BUN/CREAT SERPL: 38 %
BUN/CREAT SERPL: 40 %
BUN/CREAT SERPL: 40 %
CALCIUM SERPL-MCNC: 7.8 MG/DL (ref 8.4–10.2)
CALCIUM SERPL-MCNC: 7.8 MG/DL (ref 8.4–10.2)
CALCIUM SERPL-MCNC: 7.9 MG/DL (ref 8.4–10.2)
CALCIUM SERPL-MCNC: 8.1 MG/DL (ref 8.4–10.2)
CALCIUM SERPL-MCNC: 8.4 MG/DL (ref 8.4–10.2)
HEMOLYSIS INDEX: 10
HEMOLYSIS INDEX: 10
HEMOLYSIS INDEX: 11
HEMOLYSIS INDEX: 17
HEMOLYSIS INDEX: 29

## 2017-12-29 RX ADMIN — PIPERACILLIN SODIUM AND TAZOBACTAM SODIUM SCH MLS/HR: 3; .375 INJECTION, POWDER, LYOPHILIZED, FOR SOLUTION INTRAVENOUS at 06:39

## 2017-12-29 RX ADMIN — PIPERACILLIN SODIUM AND TAZOBACTAM SODIUM SCH MLS/HR: 3; .375 INJECTION, POWDER, LYOPHILIZED, FOR SOLUTION INTRAVENOUS at 00:01

## 2017-12-29 RX ADMIN — PIPERACILLIN SODIUM AND TAZOBACTAM SODIUM SCH MLS/HR: 3; .375 INJECTION, POWDER, LYOPHILIZED, FOR SOLUTION INTRAVENOUS at 17:47

## 2017-12-29 RX ADMIN — PIPERACILLIN SODIUM AND TAZOBACTAM SODIUM SCH MLS/HR: 3; .375 INJECTION, POWDER, LYOPHILIZED, FOR SOLUTION INTRAVENOUS at 15:32

## 2017-12-29 RX ADMIN — NIFEDIPINE SCH: 30 TABLET, FILM COATED, EXTENDED RELEASE ORAL at 19:15

## 2017-12-29 RX ADMIN — CLOPIDOGREL BISULFATE SCH: 75 TABLET ORAL at 15:37

## 2017-12-29 RX ADMIN — INSULIN HUMAN SCH MLS/HR: 100 INJECTION, SOLUTION PARENTERAL at 08:25

## 2017-12-29 RX ADMIN — ASPIRIN SCH: 81 TABLET, COATED ORAL at 10:30

## 2017-12-29 NOTE — CONSULTATION
History of Present Illness


Consult date: 12/29/17


Requesting physician: CARLA RICE


Reason for consult: other (DKA; Acute Encephalopathy)


History of present illness: 





PULMONARY/CCM CONSULT NOTE (Full dictation # [id number])


Please see dictated notes for full details





Medications and Allergies


 Allergies











Allergy/AdvReac Type Severity Reaction Status Date / Time


 


No Known Allergies Allergy   Unverified 01/10/15 03:04











 Home Medications











 Medication  Instructions  Recorded  Confirmed  Last Taken  Type


 


Aspirin [Aspirin EC] 81 mg PO DAILY 12/28/17 12/28/17 Unknown History


 


AtorvaSTATin [Lipitor] 80 mg PO QHS 12/28/17 12/28/17 Unknown History


 


Citalopram [celeXA] 10 mg PO QDAY 12/28/17 12/28/17 Unknown History


 


Clopidogrel [Plavix] 75 mg PO QDAY 12/28/17 12/28/17 Unknown History


 


Cyclobenzaprine [Flexeril] 10 mg PO TID PRN 12/28/17 12/28/17 Unknown History


 


Insulin Aspart Protam & Aspart 0 unit SQ TID 12/28/17 12/28/17 Unknown History





[NovoLOG Mix 70-30 Flexpen]     


 


Insulin Glargine,Hum.rec.anlog 20 units SQ QAM 12/28/17 12/28/17 Unknown History





[Lantus]     


 


Insulin Glargine,Hum.rec.anlog 50 units SQ QPM 12/28/17 12/28/17 Unknown History





[Lantus]     


 


NIFEdipine [Nifedipine ER] 30 mg PO DAILY 12/28/17 12/28/17 Unknown History











Active Meds: 


Active Medications





Acetaminophen (Tylenol)  650 mg PO Q4H PRN


   PRN Reason: Pain MILD(1-3)/Fever >100.5/HA


Aspirin (Halfprin Ec)  81 mg PO DAILY [facility]


   Last Admin: 12/28/17 18:43 Dose:  Not Given


Atorvastatin Calcium (Lipitor)  80 mg PO QHS [facility]


   Last Admin: 12/28/17 22:01 Dose:  Not Given


Bisacodyl (Dulcolax)  10 mg CO QDAY PRN


   PRN Reason: Constipation unrelieved by MOM


Citalopram Hydrobromide (Celexa)  10 mg PO QDAY [facility]


   Last Admin: 12/28/17 18:43 Dose:  Not Given


Clopidogrel Bisulfate (Plavix)  75 mg PO QDAY [facility]


   Last Admin: 12/28/17 18:44 Dose:  Not Given


Cyclobenzaprine HCl (Flexeril)  10 mg PO TID PRN


   PRN Reason: Muscle Spasm


Dextrose (D50w (25gm) Syringe)  0 ml IV ONCE PRN


   PRN Reason: Hypoglycemia


   Last Admin: 12/29/17 03:30 Dose:  20 ml


Dextrose (D50w (25gm) Syringe)  0 ml IV PRN PRN


   PRN Reason: Hypoglycemia


Potassium Chloride (Kcl 10meq/100ml)  10 meq in 100 mls @ 100 mls/hr IV Q1H [facility]


   Stop: 12/28/17 19:59


Potassium Chloride (Kcl 10meq/100ml)  10 meq in 100 mls @ 100 mls/hr IV Q1H [facility]AN[facility]


   Stop: 12/28/17 21:59


Insulin Human Regular 100 (units/ Sodium Chloride)  100 mls @ 1 mls/hr IV TITR 

VANDA; 1 UNITS/HR


   PRN Reason: Protocol


   Last Titration: 12/29/17 06:39 Dose:  3 units/hr, 3 mls/hr


Piperacillin Sod/Tazobactam Sod (Zosyn/Ns 3.375gm/50ml)  3.375 gm in 50 mls @ 

100 mls/hr IV Q6HR [facility]ANDA


   Last Admin: 12/29/17 06:39 Dose:  100 mls/hr


Dextrose/Sodium Chloride (D5/0.45ns)  1,000 mls @ 125 mls/hr IV AS DIRECT [facility]ANDA


   Last Admin: 12/29/17 00:44 Dose:  125 mls/hr


Insulin Human Isoph/Insulin Regular (Novolin 70/30)  45 unit SUB-Q BIDDIAB [facility]


   Last Admin: 12/28/17 18:17 Dose:  45 unit


Magnesium Hydroxide (Milk Of Magnesia)  30 ml PO Q4H PRN


   PRN Reason: Constipation


Nifedipine (Procardia Xl)  30 mg PO DAILY [facility]


   Last Admin: 12/28/17 18:44 Dose:  Not Given


Ondansetron HCl (Zofran)  4 mg IV Q8H PRN


   PRN Reason: N/V unrelieved by Reglan











Physical Examination


Vital signs: 


 Vital Signs











BP


 


 115/67 


 


 12/28/17 12:18














Results





- Laboratory Findings


CBC and BMP: 


 12/28/17 12:26





 12/30/17 07:07


ABG











POC ABG pH  6.856  (7.35-7.45)  L  12/28/17  12:39    


 


POC ABG pCO2  12.4  (35-45)  L  12/28/17  12:39    


 


POC ABG pO2  288  ()  H  12/28/17  12:39    


 


POC ABG HCO3  2.2   12/28/17  12:39    


 


POC ABG Total CO2  < 5   12/28/17  12:39    


 


POC ABG O2 Sat  100   12/28/17  12:39    





PT/INR, D-dimer











PT  16.4 Sec. (12.2-14.9)  H  12/28/17  12:26    


 


INR  1.25  (0.87-1.13)  H  12/28/17  12:26    








Abnormal lab findings: 


 Abnormal Labs











  12/28/17 12/28/17 12/28/17





  12:26 12:26 12:26


 


WBC  20.1 H  


 


MCV  101 H  


 


Seg Neuts % (Manual)  90.0 H  


 


Lymphocytes % (Manual)  2.0 L  


 


Seg Neutrophils # Man  18.1 H  


 


Lymphocytes # (Manual)  0.4 L  


 


PT   16.4 H 


 


INR   1.25 H 


 


POC ABG pH   


 


POC ABG pCO2   


 


POC ABG pO2   


 


Sodium   


 


Potassium   


 


Chloride   


 


Carbon Dioxide   


 


BUN   


 


Creatinine   


 


Glucose   


 


POC Glucose    445 H


 


Hemoglobin A1c   


 


Lactic Acid   


 


Calcium   


 


Phosphorus   


 


Magnesium   


 


Total Protein   


 


Albumin   














  12/28/17 12/28/17 12/28/17





  12:39 13:44 13:44


 


WBC   


 


MCV   


 


Seg Neuts % (Manual)   


 


Lymphocytes % (Manual)   


 


Seg Neutrophils # Man   


 


Lymphocytes # (Manual)   


 


PT   


 


INR   


 


POC ABG pH  6.856 L  


 


POC ABG pCO2  12.4 L  


 


POC ABG pO2  288 H  


 


Sodium   


 


Potassium   


 


Chloride   


 


Carbon Dioxide   


 


BUN   


 


Creatinine   


 


Glucose   


 


POC Glucose   


 


Hemoglobin A1c   


 


Lactic Acid   


 


Calcium   


 


Phosphorus    8.10 H


 


Magnesium    2.60 H


 


Total Protein   5.8 L 


 


Albumin   3.0 L 














  12/28/17 12/28/17 12/28/17





  13:44 14:17 15:17


 


WBC   


 


MCV   


 


Seg Neuts % (Manual)   


 


Lymphocytes % (Manual)   


 


Seg Neutrophils # Man   


 


Lymphocytes # (Manual)   


 


PT   


 


INR   


 


POC ABG pH   


 


POC ABG pCO2   


 


POC ABG pO2   


 


Sodium   


 


Potassium  6.5 H*  5.8 H  5.7 H


 


Chloride  94.4 L  


 


Carbon Dioxide  5 L*  2 L*  < 2.0 L*


 


BUN  43 H  38 H  39 H


 


Creatinine   1.3 H  1.3 H


 


Glucose  629 H*  610 H*  585 H*


 


POC Glucose   


 


Hemoglobin A1c   


 


Lactic Acid   


 


Calcium  8.3 L  7.7 L  7.9 L


 


Phosphorus   


 


Magnesium   


 


Total Protein   


 


Albumin   














  12/28/17 12/28/17 12/28/17





  15:17 15:40 16:57


 


WBC   


 


MCV   


 


Seg Neuts % (Manual)   


 


Lymphocytes % (Manual)   


 


Seg Neutrophils # Man   


 


Lymphocytes # (Manual)   


 


PT   


 


INR   


 


POC ABG pH   


 


POC ABG pCO2   


 


POC ABG pO2   


 


Sodium   


 


Potassium   


 


Chloride   


 


Carbon Dioxide    3 L*


 


BUN    42 H


 


Creatinine    1.3 H


 


Glucose    557 H*


 


POC Glucose   > 500 H 


 


Hemoglobin A1c   


 


Lactic Acid  2.90 H*  


 


Calcium    8.1 L


 


Phosphorus   


 


Magnesium   


 


Total Protein   


 


Albumin   














  12/28/17 12/28/17 12/28/17





  17:02 17:02 18:05


 


WBC   


 


MCV   


 


Seg Neuts % (Manual)   


 


Lymphocytes % (Manual)   


 


Seg Neutrophils # Man   


 


Lymphocytes # (Manual)   


 


PT   


 


INR   


 


POC ABG pH   


 


POC ABG pCO2   


 


POC ABG pO2   


 


Sodium   


 


Potassium   


 


Chloride   


 


Carbon Dioxide   


 


BUN   


 


Creatinine   


 


Glucose   


 


POC Glucose    485 H


 


Hemoglobin A1c  10.0 H  


 


Lactic Acid   


 


Calcium   


 


Phosphorus   6.60 H 


 


Magnesium   2.70 H 


 


Total Protein   


 


Albumin   














  12/28/17 12/28/17 12/28/17





  18:52 20:20 21:23


 


WBC   


 


MCV   


 


Seg Neuts % (Manual)   


 


Lymphocytes % (Manual)   


 


Seg Neutrophils # Man   


 


Lymphocytes # (Manual)   


 


PT   


 


INR   


 


POC ABG pH   


 


POC ABG pCO2   


 


POC ABG pO2   


 


Sodium  148 H  


 


Potassium   


 


Chloride   


 


Carbon Dioxide  < 2.0 L*  


 


BUN  41 H  


 


Creatinine  1.4 H  


 


Glucose  473 H  


 


POC Glucose   356 H  322 H


 


Hemoglobin A1c   


 


Lactic Acid   


 


Calcium  7.8 L  


 


Phosphorus   


 


Magnesium   


 


Total Protein   


 


Albumin   














  12/28/17 12/28/17 12/28/17





  22:28 23:01 23:31


 


WBC   


 


MCV   


 


Seg Neuts % (Manual)   


 


Lymphocytes % (Manual)   


 


Seg Neutrophils # Man   


 


Lymphocytes # (Manual)   


 


PT   


 


INR   


 


POC ABG pH   


 


POC ABG pCO2   


 


POC ABG pO2   


 


Sodium   148 H 


 


Potassium   


 


Chloride   111.6 H 


 


Carbon Dioxide   4 L* 


 


BUN   43 H 


 


Creatinine   1.3 H 


 


Glucose   177 H 


 


POC Glucose  242 H   204 H


 


Hemoglobin A1c   


 


Lactic Acid   


 


Calcium   7.7 L 


 


Phosphorus   


 


Magnesium   


 


Total Protein   


 


Albumin   














  12/29/17 12/29/17 12/29/17





  01:20 03:30 04:01


 


WBC   


 


MCV   


 


Seg Neuts % (Manual)   


 


Lymphocytes % (Manual)   


 


Seg Neutrophils # Man   


 


Lymphocytes # (Manual)   


 


PT   


 


INR   


 


POC ABG pH   


 


POC ABG pCO2   


 


POC ABG pO2   


 


Sodium   


 


Potassium   


 


Chloride   


 


Carbon Dioxide   


 


BUN   


 


Creatinine   


 


Glucose   


 


POC Glucose  120 H  59 L  132 H


 


Hemoglobin A1c   


 


Lactic Acid   


 


Calcium   


 


Phosphorus   


 


Magnesium   


 


Total Protein   


 


Albumin   














  12/29/17 12/29/17 12/29/17





  04:20 04:20 05:15


 


WBC   


 


MCV   


 


Seg Neuts % (Manual)   


 


Lymphocytes % (Manual)   


 


Seg Neutrophils # Man   


 


Lymphocytes # (Manual)   


 


PT   


 


INR   


 


POC ABG pH   


 


POC ABG pCO2   


 


POC ABG pO2   


 


Sodium  151 H  150 H 


 


Potassium   


 


Chloride  114.3 H  115.4 H 


 


Carbon Dioxide  10 L  10 L 


 


BUN  48 H  48 H 


 


Creatinine   1.3 H 


 


Glucose  121 H  118 H 


 


POC Glucose    134 H


 


Hemoglobin A1c   


 


Lactic Acid   


 


Calcium  7.8 L  7.8 L 


 


Phosphorus   


 


Magnesium   


 


Total Protein   


 


Albumin   














  12/29/17 12/29/17 12/29/17





  06:41 07:49 08:12


 


WBC   


 


MCV   


 


Seg Neuts % (Manual)   


 


Lymphocytes % (Manual)   


 


Seg Neutrophils # Man   


 


Lymphocytes # (Manual)   


 


PT   


 


INR   


 


POC ABG pH   


 


POC ABG pCO2   


 


POC ABG pO2   


 


Sodium   151 H 


 


Potassium   


 


Chloride   116.5 H 


 


Carbon Dioxide   13 L 


 


BUN   52 H 


 


Creatinine   1.3 H 


 


Glucose   213 H 


 


POC Glucose  172 H   228 H


 


Hemoglobin A1c   


 


Lactic Acid   


 


Calcium   7.9 L 


 


Phosphorus   


 


Magnesium   


 


Total Protein   


 


Albumin   














  12/29/17 12/29/17 12/29/17





  08:22 09:31 11:04


 


WBC   


 


MCV   


 


Seg Neuts % (Manual)   


 


Lymphocytes % (Manual)   


 


Seg Neutrophils # Man   


 


Lymphocytes # (Manual)   


 


PT   


 


INR   


 


POC ABG pH   


 


POC ABG pCO2   


 


POC ABG pO2   


 


Sodium   


 


Potassium   


 


Chloride   


 


Carbon Dioxide   


 


BUN   


 


Creatinine   


 


Glucose   


 


POC Glucose  140 H  140 H  136 H


 


Hemoglobin A1c   


 


Lactic Acid   


 


Calcium   


 


Phosphorus   


 


Magnesium   


 


Total Protein   


 


Albumin   














  12/29/17





  12:11


 


WBC 


 


MCV 


 


Seg Neuts % (Manual) 


 


Lymphocytes % (Manual) 


 


Seg Neutrophils # Man 


 


Lymphocytes # (Manual) 


 


PT 


 


INR 


 


POC ABG pH 


 


POC ABG pCO2 


 


POC ABG pO2 


 


Sodium 


 


Potassium 


 


Chloride 


 


Carbon Dioxide 


 


BUN 


 


Creatinine 


 


Glucose 


 


POC Glucose  111 H


 


Hemoglobin A1c 


 


Lactic Acid 


 


Calcium 


 


Phosphorus 


 


Magnesium 


 


Total Protein 


 


Albumin

## 2017-12-29 NOTE — PROGRESS NOTE
Assessment and Plan


Assessment and plan: 


Patient is 37-year-old woman with a history of insulin-dependent diabetes 

mellitus, CVA on aspirin and Plavix, hypertension, dyslipidemia and depression 

who presented with altered mental status and was admitted for DKA.  Do not know 

her baseline mental status, so I called person to notify in the chart, who 

happens to be her sister, Cindy Goodson: 





She was discharged from [facility] about 1.5 months ago and went 

to [facility]. Then, she went to her home. She lives with daughter 

Ej and brother, Gonzalo, who moved in with her for more support. Her 

baseline mental function: she walks with a walker, she feeds herself, needs 

help with clothes, left arm weaker than right and speech is usually slurred. "I 

wanna say she wasn't talking her insulin, because this 3rd stoke did alittle 

mentally and she more forgettable, that is what I am thinking, for example, she 

will say, I need to take my insulin but she had just took her insulin." They 

went to [place], on the way back Wednesday night 11pm, she was sleeping off and 

on, BG was 516 and she took her insulin. Her daughter, Ej, said next 

morning she got up Thursday morning to go to the bathroom and she fell and then 

was unresponsive, she was found on the floor in urine by daughter, Ej. 





-DKA with acute metabolic acidosis, high anion gap: Treat with IV fluids, 

insulin drip, serial BMP


-Acute metabolic encephalopathy/semiconscious state: treat the above


-Recurrent ischemic strokes. ?hydrocephalus on Ct head: consulted Neurology


-Leukocytosis with SIRs, non infectious, cxr no acute finding, more likely 

reactive


-Hypernatremia: needs more free water





The high probability of a clinically significant, sudden or life threatening 

deterioration of the [neurologic,cardiac] system(s) required my full and direct 

attention, intervention and personal management. The aggregate critical care 

time was [35 ] minutes. This time is in addition to time spent performing 

reported procedures but includes the following: [x] Data Review and 

interpretation [x] Patient assessment and monitoring of vital signs [x] 

Documentation [x] Medication orders and management








History


Interval history: 


Patient was seen and examined.  Follow-up on current diagnosis/altered mental 

status.  Overnight uneventful.  Patient not given any history; therefore,  

Imaging, nursing note, chart, labs and old chart reviewed.  Do not know her 

baseline mental status








Hospitalist Physical





- Physical exam


Narrative exam: 


GEN: Thin frail woman chronic a bit debilitating appearing, lethargic, nonverbal


HEENT: NCAT, EOMI, PERRL, OP Clear


NECK: supple, no adenopathy, no thyromegaly, no JVD


CVS/HEART: regular tachycardia NORMAL S1S2, NO JVD, pulses present bilaterally


CHEST/LUNGS: CTA B, Symmetrical chest expansion, good air entry bilaterally


GI/Abdomen: soft, NTND, good bowel sounds, no guarding or rebound


/Bladder: no suprapubic tenderness, no CVA or paraspinal tenderness


EXT/Skin: no c/c/e, no obvious rash


MSK: Moving both arms with contractures 


Neuro: CN 2-12 grossly intact except nonverbal, hemiparesis, facial asymmetry, 

no new focal deficits


Psych: calm but confused








- Constitutional


Vitals: 


 











Temp Pulse Resp BP Pulse Ox


 


    111 H  13   118/77   100 


 


    12/29/17 07:00  12/29/17 07:00  12/29/17 07:00  12/29/17 07:00














Results





- Labs


CBC & Chem 7: 


 12/28/17 12:26





 12/29/17 07:49


Labs: 


 Laboratory Last Values











WBC  20.1 K/mm3 (4.5-11.0)  H  12/28/17  12:26    


 


RBC  4.10 M/mm3 (3.65-5.03)   12/28/17  12:26    


 


Hgb  12.4 gm/dl (10.1-14.3)   12/28/17  12:26    


 


Hct  41.3 % (30.3-42.9)   12/28/17  12:26    


 


MCV  101 fl (79-97)  H  12/28/17  12:26    


 


MCH  30 pg (28-32)   12/28/17  12:26    


 


MCHC  30 % (30-34)   12/28/17  12:26    


 


RDW  13.7 % (13.2-15.2)   12/28/17  12:26    


 


Plt Count  197 K/mm3 (140-440)   12/28/17  12:26    


 


Add Manual Diff  Complete   12/28/17  12:26    


 


Total Counted  100   12/28/17  12:26    


 


Seg Neutrophils %  Np   12/28/17  12:26    


 


Seg Neuts % (Manual)  90.0 % (40.0-70.0)  H  12/28/17  12:26    


 


Band Neutrophils %  4.0 %  12/28/17  12:26    


 


Lymphocytes % (Manual)  2.0 % (13.4-35.0)  L  12/28/17  12:26    


 


Reactive Lymphs % (Man)  0 %  12/28/17  12:26    


 


Monocytes % (Manual)  4.0 % (0.0-7.3)   12/28/17  12:26    


 


Eosinophils % (Manual)  0 % (0.0-4.3)   12/28/17  12:26    


 


Basophils % (Manual)  0 % (0.0-1.8)   12/28/17  12:26    


 


Metamyelocytes %  0 %  12/28/17  12:26    


 


Myelocytes %  0 %  12/28/17  12:26    


 


Promyelocytes %  0 %  12/28/17  12:26    


 


Blast Cells %  0 %  12/28/17  12:26    


 


Nucleated RBC %  Not Reportable   12/28/17  12:26    


 


Seg Neutrophils # Man  18.1 K/mm3 (1.8-7.7)  H  12/28/17  12:26    


 


Band Neutrophils #  0.8 K/mm3  12/28/17  12:26    


 


Lymphocytes # (Manual)  0.4 K/mm3 (1.2-5.4)  L  12/28/17  12:26    


 


Abs React Lymphs (Man)  0.0 K/mm3  12/28/17  12:26    


 


Monocytes # (Manual)  0.8 K/mm3 (0.0-0.8)   12/28/17  12:26    


 


Eosinophils # (Manual)  0.0 K/mm3 (0.0-0.4)   12/28/17  12:26    


 


Basophils # (Manual)  0.0 K/mm3 (0.0-0.1)   12/28/17  12:26    


 


Metamyelocytes #  0.0 K/mm3  12/28/17  12:26    


 


Myelocytes #  0.0 K/mm3  12/28/17  12:26    


 


Promyelocytes #  0.0 K/mm3  12/28/17  12:26    


 


Blast Cells #  0.0 K/mm3  12/28/17  12:26    


 


WBC Morphology  Not Reportable   12/28/17  12:26    


 


Hypersegmented Neuts  Not Reportable   12/28/17  12:26    


 


Hyposegmented Neuts  Not Reportable   12/28/17  12:26    


 


Hypogranular Neuts  Not Reportable   12/28/17  12:26    


 


Smudge Cells  Not Reportable   12/28/17  12:26    


 


Toxic Granulation  Not Reportable   12/28/17  12:26    


 


Toxic Vacuolation  Not Reportable   12/28/17  12:26    


 


Dohle Bodies  Not Reportable   12/28/17  12:26    


 


Pelger-Huet Anomaly  Not Reportable   12/28/17  12:26    


 


Yuly Rods  Not Reportable   12/28/17  12:26    


 


Platelet Estimate  Not Reportable   12/28/17  12:26    


 


Clumped Platelets  Not Reportable   12/28/17  12:26    


 


Plt Clumps, EDTA  Not Reportable   12/28/17  12:26    


 


Large Platelets  Not Reportable   12/28/17  12:26    


 


Giant Platelets  Not Reportable   12/28/17  12:26    


 


Platelet Satelliting  Not Reportable   12/28/17  12:26    


 


Plt Morphology Comment  Not Reportable   12/28/17  12:26    


 


RBC Morphology  Normal   12/28/17  12:26    


 


Dimorphic RBCs  Not Reportable   12/28/17  12:26    


 


Polychromasia  Not Reportable   12/28/17  12:26    


 


Hypochromasia  Not Reportable   12/28/17  12:26    


 


Poikilocytosis  Not Reportable   12/28/17  12:26    


 


Anisocytosis  Not Reportable   12/28/17  12:26    


 


Microcytosis  Not Reportable   12/28/17  12:26    


 


Macrocytosis  Not Reportable   12/28/17  12:26    


 


Spherocytes  Not Reportable   12/28/17  12:26    


 


Pappenheimer Bodies  Not Reportable   12/28/17  12:26    


 


Sickle Cells  Not Reportable   12/28/17  12:26    


 


Target Cells  Not Reportable   12/28/17  12:26    


 


Tear Drop Cells  Not Reportable   12/28/17  12:26    


 


Ovalocytes  Not Reportable   12/28/17  12:26    


 


Helmet Cells  Not Reportable   12/28/17  12:26    


 


[place] Bodies  Not Reportable   12/28/17  12:26    


 


Cabot Rings  Not Reportable   12/28/17  12:26    


 


Marco Cells  Not Reportable   12/28/17  12:26    


 


Bite Cells  Not Reportable   12/28/17  12:26    


 


Crenated Cell  Not Reportable   12/28/17  12:26    


 


Elliptocytes  Not Reportable   12/28/17  12:26    


 


Acanthocytes (Spur)  Not Reportable   12/28/17  12:26    


 


Rouleaux  Not Reportable   12/28/17  12:26    


 


Hemoglobin C Crystals  Not Reportable   12/28/17  12:26    


 


Schistocytes  Not Reportable   12/28/17  12:26    


 


Malaria parasites  Not Reportable   12/28/17  12:26    


 


Juliano Bodies  Not Reportable   12/28/17  12:26    


 


Hem Pathologist Commnt  No   12/28/17  12:26    


 


PT  16.4 Sec. (12.2-14.9)  H  12/28/17  12:26    


 


INR  1.25  (0.87-1.13)  H  12/28/17  12:26    


 


POC ABG pH  6.856  (7.35-7.45)  L  12/28/17  12:39    


 


POC ABG pCO2  12.4  (35-45)  L  12/28/17  12:39    


 


POC ABG pO2  288  ()  H  12/28/17  12:39    


 


POC ABG HCO3  2.2   12/28/17  12:39    


 


POC ABG Total CO2  < 5   12/28/17  12:39    


 


POC ABG O2 Sat  100   12/28/17  12:39    


 


POC ABG Base Excess  < -30   12/28/17  12:39    


 


FiO2  98 %  12/28/17  12:39    


 


Sodium  151 mmol/L (137-145)  H  12/29/17  07:49    


 


Potassium  3.8 mmol/L (3.6-5.0)   12/29/17  07:49    


 


Chloride  116.5 mmol/L ()  H  12/29/17  07:49    


 


Carbon Dioxide  13 mmol/L (22-30)  L  12/29/17  07:49    


 


Anion Gap  25 mmol/L  12/29/17  07:49    


 


BUN  52 mg/dL (7-17)  H  12/29/17  07:49    


 


Creatinine  1.3 mg/dL (0.7-1.2)  H  12/29/17  07:49    


 


Estimated GFR  55 ml/min  12/29/17  07:49    


 


BUN/Creatinine Ratio  40 %  12/29/17  07:49    


 


Glucose  213 mg/dL ()  H  12/29/17  07:49    


 


POC Glucose  140  ()  H  12/29/17  08:22    


 


Hemoglobin A1c  10.0 % (4-6)  H  12/28/17  17:02    


 


Lactic Acid  2.90 mmol/L (0.7-2.0)  H*  12/28/17  15:17    


 


Calcium  7.9 mg/dL (8.4-10.2)  L  12/29/17  07:49    


 


Phosphorus  6.60 mg/dL (2.5-4.5)  H  12/28/17  17:02    


 


Magnesium  2.70 mg/dL (1.7-2.3)  H  12/28/17  17:02    


 


Total Bilirubin  0.30 mg/dL (0.1-1.2)   12/28/17  13:44    


 


Direct Bilirubin  0.2 mg/dL (0-0.2)   12/28/17  13:44    


 


Indirect Bilirubin  0.1 mg/dL  12/28/17  13:44    


 


AST  15 units/L (5-40)   12/28/17  13:44    


 


ALT  11 units/L (7-56)   12/28/17  13:44    


 


Alkaline Phosphatase  89 units/L ()   12/28/17  13:44    


 


Total Protein  5.8 g/dL (6.3-8.2)  L  12/28/17  13:44    


 


Albumin  3.0 g/dL (3.9-5)  L  12/28/17  13:44    


 


Albumin/Globulin Ratio  1.1 %  12/28/17  13:44    


 


HCG, Qual  Negative  (Negative)   12/28/17  13:44    


 


Urine Color  Yellow  (Yellow)   12/28/17  14:28    


 


Urine Turbidity  Clear  (Clear)   12/28/17  14:28    


 


Urine pH  5.0  (5.0-7.0)   12/28/17  14:28    


 


Ur Specific Gravity  1.016  (1.003-1.030)   12/28/17  14:28    


 


Urine Protein  100 mg/dl mg/dL (Negative)   12/28/17  14:28    


 


Urine Glucose (UA)  >=500 mg/dL (Negative)   12/28/17  14:28    


 


Urine Ketones  80 mg/dL (Negative)   12/28/17  14:28    


 


Urine Blood  Mod  (Negative)   12/28/17  14:28    


 


Urine Nitrite  Neg  (Negative)   12/28/17  14:28    


 


Urine Bilirubin  Neg  (Negative)   12/28/17  14:28    


 


Urine Urobilinogen  < 2.0 mg/dL (<2.0)   12/28/17  14:28    


 


Ur Leukocyte Esterase  Neg  (Negative)   12/28/17  14:28    


 


Urine WBC (Auto)  2.0 /HPF (0.0-6.0)   12/28/17  14:28    


 


Urine RBC (Auto)  < 1.0 /HPF (0.0-6.0)   12/28/17  14:28    


 


U Epithel Cells (Auto)  < 1.0 /HPF (0-13.0)   12/28/17  14:28    


 


Urine Bacteria (Auto)  1+ /HPF (Negative)   12/28/17  14:28    


 


Urine Opiates Screen  Presumptive negative   12/28/17  14:28    


 


Urine Methadone Screen  Presumptive negative   12/28/17  14:28    


 


Ur Barbiturates Screen  Presumptive negative   12/28/17  14:28    


 


Ur Phencyclidine Scrn  Presumptive negative   12/28/17  14:28    


 


Ur Amphetamines Screen  Presumptive negative   12/28/17  14:28    


 


U Benzodiazepines Scrn  Presumptive negative   12/28/17  14:28    


 


Urine Cocaine Screen  Presumptive negative   12/28/17  14:28    


 


U Marijuana (THC) Screen  Presumptive negative   12/28/17  14:28    


 


Drugs of Abuse Note  Disclamer   12/28/17  14:28

## 2017-12-30 LAB
BUN SERPL-MCNC: 44 MG/DL (ref 7–17)
BUN/CREAT SERPL: 44 %
CALCIUM SERPL-MCNC: 8.4 MG/DL (ref 8.4–10.2)
HEMOLYSIS INDEX: 26
MAGNESIUM SERPL-MCNC: 2 MG/DL (ref 1.7–2.3)

## 2017-12-30 RX ADMIN — INSULIN ASPART SCH: 100 INJECTION, SOLUTION INTRAVENOUS; SUBCUTANEOUS at 10:04

## 2017-12-30 RX ADMIN — INSULIN DETEMIR SCH: 100 INJECTION, SOLUTION SUBCUTANEOUS at 10:55

## 2017-12-30 RX ADMIN — PIPERACILLIN SODIUM AND TAZOBACTAM SODIUM SCH MLS/HR: 3; .375 INJECTION, POWDER, LYOPHILIZED, FOR SOLUTION INTRAVENOUS at 17:46

## 2017-12-30 RX ADMIN — MORPHINE SULFATE PRN MG: 4 INJECTION, SOLUTION INTRAMUSCULAR; INTRAVENOUS at 01:50

## 2017-12-30 RX ADMIN — PIPERACILLIN SODIUM AND TAZOBACTAM SODIUM SCH: 3; .375 INJECTION, POWDER, LYOPHILIZED, FOR SOLUTION INTRAVENOUS at 17:01

## 2017-12-30 RX ADMIN — PIPERACILLIN SODIUM AND TAZOBACTAM SODIUM SCH MLS/HR: 3; .375 INJECTION, POWDER, LYOPHILIZED, FOR SOLUTION INTRAVENOUS at 12:00

## 2017-12-30 RX ADMIN — NIFEDIPINE SCH: 30 TABLET, FILM COATED, EXTENDED RELEASE ORAL at 10:10

## 2017-12-30 RX ADMIN — ASPIRIN SCH: 81 TABLET, COATED ORAL at 10:08

## 2017-12-30 RX ADMIN — INSULIN ASPART SCH UNITS: 100 INJECTION, SOLUTION INTRAVENOUS; SUBCUTANEOUS at 23:13

## 2017-12-30 RX ADMIN — INSULIN ASPART SCH: 100 INJECTION, SOLUTION INTRAVENOUS; SUBCUTANEOUS at 16:09

## 2017-12-30 RX ADMIN — INSULIN ASPART SCH UNITS: 100 INJECTION, SOLUTION INTRAVENOUS; SUBCUTANEOUS at 17:08

## 2017-12-30 RX ADMIN — PIPERACILLIN SODIUM AND TAZOBACTAM SODIUM SCH MLS/HR: 3; .375 INJECTION, POWDER, LYOPHILIZED, FOR SOLUTION INTRAVENOUS at 23:20

## 2017-12-30 RX ADMIN — CLOPIDOGREL BISULFATE SCH: 75 TABLET ORAL at 10:09

## 2017-12-30 RX ADMIN — POTASSIUM CHLORIDE SCH MLS/HR: 2 INJECTION, SOLUTION, CONCENTRATE INTRAVENOUS at 23:35

## 2017-12-30 RX ADMIN — PIPERACILLIN SODIUM AND TAZOBACTAM SODIUM SCH MLS/HR: 3; .375 INJECTION, POWDER, LYOPHILIZED, FOR SOLUTION INTRAVENOUS at 00:38

## 2017-12-30 NOTE — EVENT NOTE
Date: 12/30/17


Event note:





Patient downgraded to medsurg, but Anion gap was 24, corrected 20. I ordered 

stat bmp. Re-start insulin drip, back to ICU, clean up current orders. D/W ED 

nursing, D/w Dr. Dawson, Nocturnist who says she did not downgrade patient, D/W 

3rd floor nursing, D/W ICU charge nurse.

## 2017-12-30 NOTE — CONSULTATION
History of Present Illness


Consult date: 12/30/17


History of present illness: 


I have spoken with the sisters of the patient... she has enlargement of the 

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 

but await the MRI plan further w/u for the AMS disorder





sisters tend to confirm hx of poor diabetic control  I believe she is post 

hypoglycemia





w/u ordered  and full note is dictated








Medications and Allergies


 Allergies











Allergy/AdvReac Type Severity Reaction Status Date / Time


 


No Known Allergies Allergy   Unverified 01/10/15 03:04











 Home Medications











 Medication  Instructions  Recorded  Confirmed  Last Taken  Type


 


Aspirin [Aspirin EC] 81 mg PO DAILY 12/28/17 12/28/17 Unknown History


 


AtorvaSTATin [Lipitor] 80 mg PO QHS 12/28/17 12/28/17 Unknown History


 


Citalopram [celeXA] 10 mg PO QDAY 12/28/17 12/28/17 Unknown History


 


Clopidogrel [Plavix] 75 mg PO QDAY 12/28/17 12/28/17 Unknown History


 


Cyclobenzaprine [Flexeril] 10 mg PO TID PRN 12/28/17 12/28/17 Unknown History


 


Insulin Aspart Protam & Aspart 0 unit SQ TID 12/28/17 12/28/17 Unknown History





[NovoLOG Mix 70-30 Flexpen]     


 


Insulin Glargine,Hum.rec.anlog 20 units SQ QAM 12/28/17 12/28/17 Unknown History





[Lantus]     


 


Insulin Glargine,Hum.rec.anlog 50 units SQ QPM 12/28/17 12/28/17 Unknown History





[Lantus]     


 


NIFEdipine [Nifedipine ER] 30 mg PO DAILY 12/28/17 12/28/17 Unknown History











Active Meds: 


Active Medications





Acetaminophen (Tylenol)  650 mg PO Q4H PRN


   PRN Reason: Pain MILD(1-3)/Fever >100.5/HA


Aspirin (Halfprin Ec)  81 mg PO DAILY [facility]


   Last Admin: 12/30/17 10:08 Dose:  Not Given


Atorvastatin Calcium (Lipitor)  80 mg PO QHS [facility]


   Last Admin: 12/29/17 22:21 Dose:  Not Given


Bisacodyl (Dulcolax)  10 mg CT QDAY PRN


   PRN Reason: Constipation unrelieved by MOM


Citalopram Hydrobromide (Celexa)  10 mg PO QDAY [facility]


   Last Admin: 12/30/17 10:09 Dose:  Not Given


Clopidogrel Bisulfate (Plavix)  75 mg PO QDAY [facility]


   Last Admin: 12/30/17 10:09 Dose:  Not Given


Cyclobenzaprine HCl (Flexeril)  10 mg PO TID PRN


   PRN Reason: Muscle Spasm


Dextrose (D50w (25gm) Syringe)  50 ml IV PRN PRN


   PRN Reason: Hypoglycemia


Piperacillin Sod/Tazobactam Sod (Zosyn/Ns 3.375gm/50ml)  3.375 gm in 50 mls @ 

100 mls/hr IV Q6HR [facility]


   Last Admin: 12/30/17 00:38 Dose:  100 mls/hr


Potassium Chloride 20 meq/ (Dextrose)  1,010 mls @ 100 mls/hr IV AS DIRECT [facility]


   Last Admin: 12/30/17 10:41 Dose:  100 mls/hr


Insulin Aspart (Novolog)  0 units SUB-Q Q4H VANDA


   PRN Reason: Protocol


   Last Admin: 12/30/17 10:04 Dose:  Not Given


Insulin Detemir (Levemir)  10 units SUB-Q QAMDIAB [facility]


   Last Admin: 12/30/17 10:55 Dose:  Not Given


Magnesium Hydroxide (Milk Of Magnesia)  30 ml PO Q4H PRN


   PRN Reason: Constipation


Morphine Sulfate (Morphine)  2 mg IV Q4H PRN


   PRN Reason: Pain , Severe (7-10)


   Last Admin: 12/30/17 01:50 Dose:  2 mg


Nifedipine (Procardia Xl)  30 mg PO DAILY [facility]


   Last Admin: 12/30/17 10:10 Dose:  Not Given


Ondansetron HCl (Zofran)  4 mg IV Q8H PRN


   PRN Reason: N/V unrelieved by [facility]











Physical Examination





- Vital Signs


Vital Signs: 


 Vital Signs











BP


 


 115/67 


 


 12/28/17 12:18














Results





- Laboratory Findings


CBC and BMP: 


 12/28/17 12:26





 12/30/17 07:07


Abnormal Lab Findings: 


 Abnormal Labs











  12/28/17 12/28/17 12/28/17





  12:26 12:26 12:26


 


WBC  20.1 H  


 


MCV  101 H  


 


Seg Neuts % (Manual)  90.0 H  


 


Lymphocytes % (Manual)  2.0 L  


 


Seg Neutrophils # Man  18.1 H  


 


Lymphocytes # (Manual)  0.4 L  


 


PT   16.4 H 


 


INR   1.25 H 


 


POC ABG pH   


 


POC ABG pCO2   


 


POC ABG pO2   


 


Sodium   


 


Potassium   


 


Chloride   


 


Carbon Dioxide   


 


BUN   


 


Creatinine   


 


Glucose   


 


POC Glucose    445 H


 


Hemoglobin A1c   


 


Lactic Acid   


 


Calcium   


 


Phosphorus   


 


Magnesium   


 


Total Protein   


 


Albumin   














  12/28/17 12/28/17 12/28/17





  12:39 13:44 13:44


 


WBC   


 


MCV   


 


Seg Neuts % (Manual)   


 


Lymphocytes % (Manual)   


 


Seg Neutrophils # Man   


 


Lymphocytes # (Manual)   


 


PT   


 


INR   


 


POC ABG pH  6.856 L  


 


POC ABG pCO2  12.4 L  


 


POC ABG pO2  288 H  


 


Sodium   


 


Potassium   


 


Chloride   


 


Carbon Dioxide   


 


BUN   


 


Creatinine   


 


Glucose   


 


POC Glucose   


 


Hemoglobin A1c   


 


Lactic Acid   


 


Calcium   


 


Phosphorus    8.10 H


 


Magnesium    2.60 H


 


Total Protein   5.8 L 


 


Albumin   3.0 L 














  12/28/17 12/28/17 12/28/17





  13:44 14:17 15:17


 


WBC   


 


MCV   


 


Seg Neuts % (Manual)   


 


Lymphocytes % (Manual)   


 


Seg Neutrophils # Man   


 


Lymphocytes # (Manual)   


 


PT   


 


INR   


 


POC ABG pH   


 


POC ABG pCO2   


 


POC ABG pO2   


 


Sodium   


 


Potassium  6.5 H*  5.8 H  5.7 H


 


Chloride  94.4 L  


 


Carbon Dioxide  5 L*  2 L*  < 2.0 L*


 


BUN  43 H  38 H  39 H


 


Creatinine   1.3 H  1.3 H


 


Glucose  629 H*  610 H*  585 H*


 


POC Glucose   


 


Hemoglobin A1c   


 


Lactic Acid   


 


Calcium  8.3 L  7.7 L  7.9 L


 


Phosphorus   


 


Magnesium   


 


Total Protein   


 


Albumin   














  12/28/17 12/28/17 12/28/17





  15:17 15:40 16:57


 


WBC   


 


MCV   


 


Seg Neuts % (Manual)   


 


Lymphocytes % (Manual)   


 


Seg Neutrophils # Man   


 


Lymphocytes # (Manual)   


 


PT   


 


INR   


 


POC ABG pH   


 


POC ABG pCO2   


 


POC ABG pO2   


 


Sodium   


 


Potassium   


 


Chloride   


 


Carbon Dioxide    3 L*


 


BUN    42 H


 


Creatinine    1.3 H


 


Glucose    557 H*


 


POC Glucose   > 500 H 


 


Hemoglobin A1c   


 


Lactic Acid  2.90 H*  


 


Calcium    8.1 L


 


Phosphorus   


 


Magnesium   


 


Total Protein   


 


Albumin   














  12/28/17 12/28/17 12/28/17





  17:02 17:02 18:05


 


WBC   


 


MCV   


 


Seg Neuts % (Manual)   


 


Lymphocytes % (Manual)   


 


Seg Neutrophils # Man   


 


Lymphocytes # (Manual)   


 


PT   


 


INR   


 


POC ABG pH   


 


POC ABG pCO2   


 


POC ABG pO2   


 


Sodium   


 


Potassium   


 


Chloride   


 


Carbon Dioxide   


 


BUN   


 


Creatinine   


 


Glucose   


 


POC Glucose    485 H


 


Hemoglobin A1c  10.0 H  


 


Lactic Acid   


 


Calcium   


 


Phosphorus   6.60 H 


 


Magnesium   2.70 H 


 


Total Protein   


 


Albumin   














  12/28/17 12/28/17 12/28/17





  18:52 20:20 21:23


 


WBC   


 


MCV   


 


Seg Neuts % (Manual)   


 


Lymphocytes % (Manual)   


 


Seg Neutrophils # Man   


 


Lymphocytes # (Manual)   


 


PT   


 


INR   


 


POC ABG pH   


 


POC ABG pCO2   


 


POC ABG pO2   


 


Sodium  148 H  


 


Potassium   


 


Chloride   


 


Carbon Dioxide  < 2.0 L*  


 


BUN  41 H  


 


Creatinine  1.4 H  


 


Glucose  473 H  


 


POC Glucose   356 H  322 H


 


Hemoglobin A1c   


 


Lactic Acid   


 


Calcium  7.8 L  


 


Phosphorus   


 


Magnesium   


 


Total Protein   


 


Albumin   














  12/28/17 12/28/17 12/28/17





  22:28 23:01 23:31


 


WBC   


 


MCV   


 


Seg Neuts % (Manual)   


 


Lymphocytes % (Manual)   


 


Seg Neutrophils # Man   


 


Lymphocytes # (Manual)   


 


PT   


 


INR   


 


POC ABG pH   


 


POC ABG pCO2   


 


POC ABG pO2   


 


Sodium   148 H 


 


Potassium   


 


Chloride   111.6 H 


 


Carbon Dioxide   4 L* 


 


BUN   43 H 


 


Creatinine   1.3 H 


 


Glucose   177 H 


 


POC Glucose  242 H   204 H


 


Hemoglobin A1c   


 


Lactic Acid   


 


Calcium   7.7 L 


 


Phosphorus   


 


Magnesium   


 


Total Protein   


 


Albumin   














  12/29/17 12/29/17 12/29/17





  01:20 03:30 04:01


 


WBC   


 


MCV   


 


Seg Neuts % (Manual)   


 


Lymphocytes % (Manual)   


 


Seg Neutrophils # Man   


 


Lymphocytes # (Manual)   


 


PT   


 


INR   


 


POC ABG pH   


 


POC ABG pCO2   


 


POC ABG pO2   


 


Sodium   


 


Potassium   


 


Chloride   


 


Carbon Dioxide   


 


BUN   


 


Creatinine   


 


Glucose   


 


POC Glucose  120 H  59 L  132 H


 


Hemoglobin A1c   


 


Lactic Acid   


 


Calcium   


 


Phosphorus   


 


Magnesium   


 


Total Protein   


 


Albumin   














  12/29/17 12/29/17 12/29/17





  04:20 04:20 05:15


 


WBC   


 


MCV   


 


Seg Neuts % (Manual)   


 


Lymphocytes % (Manual)   


 


Seg Neutrophils # Man   


 


Lymphocytes # (Manual)   


 


PT   


 


INR   


 


POC ABG pH   


 


POC ABG pCO2   


 


POC ABG pO2   


 


Sodium  151 H  150 H 


 


Potassium   


 


Chloride  114.3 H  115.4 H 


 


Carbon Dioxide  10 L  10 L 


 


BUN  48 H  48 H 


 


Creatinine   1.3 H 


 


Glucose  121 H  118 H 


 


POC Glucose    134 H


 


Hemoglobin A1c   


 


Lactic Acid   


 


Calcium  7.8 L  7.8 L 


 


Phosphorus   


 


Magnesium   


 


Total Protein   


 


Albumin   














  12/29/17 12/29/17 12/29/17





  06:41 07:49 08:12


 


WBC   


 


MCV   


 


Seg Neuts % (Manual)   


 


Lymphocytes % (Manual)   


 


Seg Neutrophils # Man   


 


Lymphocytes # (Manual)   


 


PT   


 


INR   


 


POC ABG pH   


 


POC ABG pCO2   


 


POC ABG pO2   


 


Sodium   151 H 


 


Potassium   


 


Chloride   116.5 H 


 


Carbon Dioxide   13 L 


 


BUN   52 H 


 


Creatinine   1.3 H 


 


Glucose   213 H 


 


POC Glucose  172 H   228 H


 


Hemoglobin A1c   


 


Lactic Acid   


 


Calcium   7.9 L 


 


Phosphorus   


 


Magnesium   


 


Total Protein   


 


Albumin   














  12/29/17 12/29/17 12/29/17





  08:22 09:31 11:04


 


WBC   


 


MCV   


 


Seg Neuts % (Manual)   


 


Lymphocytes % (Manual)   


 


Seg Neutrophils # Man   


 


Lymphocytes # (Manual)   


 


PT   


 


INR   


 


POC ABG pH   


 


POC ABG pCO2   


 


POC ABG pO2   


 


Sodium   


 


Potassium   


 


Chloride   


 


Carbon Dioxide   


 


BUN   


 


Creatinine   


 


Glucose   


 


POC Glucose  140 H  140 H  136 H


 


Hemoglobin A1c   


 


Lactic Acid   


 


Calcium   


 


Phosphorus   


 


Magnesium   


 


Total Protein   


 


Albumin   














  12/29/17 12/29/17 12/29/17





  12:11 13:40 17:21


 


WBC   


 


MCV   


 


Seg Neuts % (Manual)   


 


Lymphocytes % (Manual)   


 


Seg Neutrophils # Man   


 


Lymphocytes # (Manual)   


 


PT   


 


INR   


 


POC ABG pH   


 


POC ABG pCO2   


 


POC ABG pO2   


 


Sodium   152 H 


 


Potassium   


 


Chloride   117.6 H 


 


Carbon Dioxide   15 L 


 


BUN   50 H 


 


Creatinine   1.3 H 


 


Glucose   144 H 


 


POC Glucose  111 H   182 H


 


Hemoglobin A1c   


 


Lactic Acid   


 


Calcium   


 


Phosphorus   


 


Magnesium   


 


Total Protein   


 


Albumin   














  12/29/17 12/29/17 12/30/17





  17:45 23:06 00:39


 


WBC   


 


MCV   


 


Seg Neuts % (Manual)   


 


Lymphocytes % (Manual)   


 


Seg Neutrophils # Man   


 


Lymphocytes # (Manual)   


 


PT   


 


INR   


 


POC ABG pH   


 


POC ABG pCO2   


 


POC ABG pO2   


 


Sodium  149 H  


 


Potassium   


 


Chloride  116.2 H  


 


Carbon Dioxide  13 L  


 


BUN  48 H  


 


Creatinine  1.3 H  


 


Glucose  171 H  


 


POC Glucose   < 40 L  123 H


 


Hemoglobin A1c   


 


Lactic Acid   


 


Calcium  8.1 L  


 


Phosphorus   


 


Magnesium   


 


Total Protein   


 


Albumin   














  12/30/17 12/30/17 12/30/17





  07:07 07:07 11:35


 


WBC   


 


MCV   


 


Seg Neuts % (Manual)   


 


Lymphocytes % (Manual)   


 


Seg Neutrophils # Man   


 


Lymphocytes # (Manual)   


 


PT   


 


INR   


 


POC ABG pH   


 


POC ABG pCO2   


 


POC ABG pO2   


 


Sodium   152 H 


 


Potassium   3.0 L D 


 


Chloride   121.9 H 


 


Carbon Dioxide   18 L 


 


BUN   44 H 


 


Creatinine   


 


Glucose   


 


POC Glucose    118 H


 


Hemoglobin A1c   


 


Lactic Acid   


 


Calcium   


 


Phosphorus  1.50 L  


 


Magnesium   


 


Total Protein   


 


Albumin

## 2017-12-30 NOTE — PROGRESS NOTE
Assessment and Plan





Patient sleeping on room air. No acute respiratory distress. O2 saturation 99% 

on room air.





- Patient Problems


(1) Sepsis


Current Visit: Yes   Status: Acute   


Qualifiers: 


   Sepsis type: sepsis due to unspecified organism   Qualified Code(s): A41.9 - 

Sepsis, unspecified organism   


Plan to address problem: 


Patient radha zosyn








(2) Altered mental status


Current Visit: Yes   Status: Acute   


Plan to address problem: 


Managment as per primary care.








(3) DKA (diabetic ketoacidoses)


Current Visit: Yes   Status: Acute   


Qualifiers: 


   Diabetes mellitus type: type 2 


Plan to address problem: 


Improving. Management as per primary care.








(4) Encephalopathy acute


Current Visit: Yes   Status: Acute   


Plan to address problem: 


Management as per primary care.








(5) HTN (hypertension)


Current Visit: Yes   Status: Chronic   


Qualifiers: 


   Hypertension type: essential hypertension   Qualified Code(s): I10 - 

Essential (primary) hypertension   


Plan to address problem: 


Management as per primary care.








(6) History of CVA with residual deficit


Current Visit: Yes   Status: Chronic   


Plan to address problem: 


Management as per primary care and neurology.








Subjective


Date of service: 12/30/17


Interval history: 





Patient sleeping on room air. No acute respiratory distress. O2 saturation 99% 

on room air.





Objective


 Vital Signs - 12hr











  12/30/17 12/30/17 12/30/17





  08:41 08:51 09:00


 


Temperature   


 


Pulse Rate 95 H 111 H 


 


Respiratory 13 12 





Rate   


 


Blood Pressure   


 


Blood Pressure   





[Left]   


 


O2 Sat by Pulse 100 99 97





Oximetry   














  12/30/17 12/30/17 12/30/17





  09:01 09:11 09:21


 


Temperature   


 


Pulse Rate 90 100 H 97 H


 


Respiratory 13 12 12





Rate   


 


Blood Pressure   


 


Blood Pressure   





[Left]   


 


O2 Sat by Pulse 100 100 100





Oximetry   














  12/30/17 12/30/17 12/30/17





  09:31 09:41 09:51


 


Temperature   


 


Pulse Rate 92 H 95 H 113 H


 


Respiratory 13 12 13





Rate   


 


Blood Pressure  152/66 152/66


 


Blood Pressure   





[Left]   


 


O2 Sat by Pulse 100 100 99





Oximetry   














  12/30/17 12/30/17 12/30/17





  10:00 11:01 15:26


 


Temperature  98 F 99.0 F


 


Pulse Rate 110 H 100 H 105 H


 


Respiratory 15  16





Rate   


 


Blood Pressure 164/101  175/105


 


Blood Pressure  140/80 





[Left]   


 


O2 Sat by Pulse   98





Oximetry   














  12/30/17





  19:59


 


Temperature 99.1 F


 


Pulse Rate 103 H


 


Respiratory 20





Rate 


 


Blood Pressure 174/105


 


Blood Pressure 





[Left] 


 


O2 Sat by Pulse 80 L





Oximetry 











Constitutional: no acute distress, asleep


Eyes: non-icteric


Neck: supple, no lymphadenopathy


Ascultation: Bilateral: clear


Cardiovascular: regular rate and rhythm


Gastrointestinal: normoactive bowel sounds, soft, non-tender


Integumentary: normal


Extremities: no cyanosis, no edema


Neurologic: other (Patient sleeping at this time.)


Psychiatric: other (Patient sleeping at this time.)


CBC and BMP: 


 12/28/17 12:26





 12/30/17 07:07


ABG, PT/INR, D-dimer: 


ABG











POC ABG pH  7.389  (7.35-7.45)   12/30/17  19:49    


 


POC ABG pCO2  28.9  (35-45)  L  12/30/17  19:49    


 


POC ABG pO2  98  ()   12/30/17  19:49    


 


POC ABG HCO3  17.4   12/30/17  19:49    


 


POC ABG Total CO2  18   12/30/17  19:49    


 


POC ABG O2 Sat  98   12/30/17  19:49    





PT/INR, D-dimer











PT  16.4 Sec. (12.2-14.9)  H  12/28/17  12:26    


 


INR  1.25  (0.87-1.13)  H  12/28/17  12:26    








Abnormal lab findings: 


 Abnormal Labs











  12/28/17 12/28/17 12/28/17





  12:26 12:26 12:26


 


WBC  20.1 H  


 


MCV  101 H  


 


Seg Neuts % (Manual)  90.0 H  


 


Lymphocytes % (Manual)  2.0 L  


 


Seg Neutrophils # Man  18.1 H  


 


Lymphocytes # (Manual)  0.4 L  


 


PT   16.4 H 


 


INR   1.25 H 


 


POC ABG pH   


 


POC ABG pCO2   


 


POC ABG pO2   


 


Sodium   


 


Potassium   


 


Chloride   


 


Carbon Dioxide   


 


BUN   


 


Creatinine   


 


Glucose   


 


POC Glucose    445 H


 


Hemoglobin A1c   


 


Lactic Acid   


 


Calcium   


 


Phosphorus   


 


Magnesium   


 


Total Protein   


 


Albumin   














  12/28/17 12/28/17 12/28/17





  12:39 13:44 13:44


 


WBC   


 


MCV   


 


Seg Neuts % (Manual)   


 


Lymphocytes % (Manual)   


 


Seg Neutrophils # Man   


 


Lymphocytes # (Manual)   


 


PT   


 


INR   


 


POC ABG pH  6.856 L  


 


POC ABG pCO2  12.4 L  


 


POC ABG pO2  288 H  


 


Sodium   


 


Potassium   


 


Chloride   


 


Carbon Dioxide   


 


BUN   


 


Creatinine   


 


Glucose   


 


POC Glucose   


 


Hemoglobin A1c   


 


Lactic Acid   


 


Calcium   


 


Phosphorus    8.10 H


 


Magnesium    2.60 H


 


Total Protein   5.8 L 


 


Albumin   3.0 L 














  12/28/17 12/28/17 12/28/17





  13:44 14:17 15:17


 


WBC   


 


MCV   


 


Seg Neuts % (Manual)   


 


Lymphocytes % (Manual)   


 


Seg Neutrophils # Man   


 


Lymphocytes # (Manual)   


 


PT   


 


INR   


 


POC ABG pH   


 


POC ABG pCO2   


 


POC ABG pO2   


 


Sodium   


 


Potassium  6.5 H*  5.8 H  5.7 H


 


Chloride  94.4 L  


 


Carbon Dioxide  5 L*  2 L*  < 2.0 L*


 


BUN  43 H  38 H  39 H


 


Creatinine   1.3 H  1.3 H


 


Glucose  629 H*  610 H*  585 H*


 


POC Glucose   


 


Hemoglobin A1c   


 


Lactic Acid   


 


Calcium  8.3 L  7.7 L  7.9 L


 


Phosphorus   


 


Magnesium   


 


Total Protein   


 


Albumin   














  12/28/17 12/28/17 12/28/17





  15:17 15:40 16:57


 


WBC   


 


MCV   


 


Seg Neuts % (Manual)   


 


Lymphocytes % (Manual)   


 


Seg Neutrophils # Man   


 


Lymphocytes # (Manual)   


 


PT   


 


INR   


 


POC ABG pH   


 


POC ABG pCO2   


 


POC ABG pO2   


 


Sodium   


 


Potassium   


 


Chloride   


 


Carbon Dioxide    3 L*


 


BUN    42 H


 


Creatinine    1.3 H


 


Glucose    557 H*


 


POC Glucose   > 500 H 


 


Hemoglobin A1c   


 


Lactic Acid  2.90 H*  


 


Calcium    8.1 L


 


Phosphorus   


 


Magnesium   


 


Total Protein   


 


Albumin   














  12/28/17 12/28/17 12/28/17





  17:02 17:02 18:05


 


WBC   


 


MCV   


 


Seg Neuts % (Manual)   


 


Lymphocytes % (Manual)   


 


Seg Neutrophils # Man   


 


Lymphocytes # (Manual)   


 


PT   


 


INR   


 


POC ABG pH   


 


POC ABG pCO2   


 


POC ABG pO2   


 


Sodium   


 


Potassium   


 


Chloride   


 


Carbon Dioxide   


 


BUN   


 


Creatinine   


 


Glucose   


 


POC Glucose    485 H


 


Hemoglobin A1c  10.0 H  


 


Lactic Acid   


 


Calcium   


 


Phosphorus   6.60 H 


 


Magnesium   2.70 H 


 


Total Protein   


 


Albumin   














  12/28/17 12/28/17 12/28/17





  18:52 20:20 21:23


 


WBC   


 


MCV   


 


Seg Neuts % (Manual)   


 


Lymphocytes % (Manual)   


 


Seg Neutrophils # Man   


 


Lymphocytes # (Manual)   


 


PT   


 


INR   


 


POC ABG pH   


 


POC ABG pCO2   


 


POC ABG pO2   


 


Sodium  148 H  


 


Potassium   


 


Chloride   


 


Carbon Dioxide  < 2.0 L*  


 


BUN  41 H  


 


Creatinine  1.4 H  


 


Glucose  473 H  


 


POC Glucose   356 H  322 H


 


Hemoglobin A1c   


 


Lactic Acid   


 


Calcium  7.8 L  


 


Phosphorus   


 


Magnesium   


 


Total Protein   


 


Albumin   














  12/28/17 12/28/17 12/28/17





  22:28 23:01 23:31


 


WBC   


 


MCV   


 


Seg Neuts % (Manual)   


 


Lymphocytes % (Manual)   


 


Seg Neutrophils # Man   


 


Lymphocytes # (Manual)   


 


PT   


 


INR   


 


POC ABG pH   


 


POC ABG pCO2   


 


POC ABG pO2   


 


Sodium   148 H 


 


Potassium   


 


Chloride   111.6 H 


 


Carbon Dioxide   4 L* 


 


BUN   43 H 


 


Creatinine   1.3 H 


 


Glucose   177 H 


 


POC Glucose  242 H   204 H


 


Hemoglobin A1c   


 


Lactic Acid   


 


Calcium   7.7 L 


 


Phosphorus   


 


Magnesium   


 


Total Protein   


 


Albumin   














  12/29/17 12/29/17 12/29/17





  01:20 03:30 04:01


 


WBC   


 


MCV   


 


Seg Neuts % (Manual)   


 


Lymphocytes % (Manual)   


 


Seg Neutrophils # Man   


 


Lymphocytes # (Manual)   


 


PT   


 


INR   


 


POC ABG pH   


 


POC ABG pCO2   


 


POC ABG pO2   


 


Sodium   


 


Potassium   


 


Chloride   


 


Carbon Dioxide   


 


BUN   


 


Creatinine   


 


Glucose   


 


POC Glucose  120 H  59 L  132 H


 


Hemoglobin A1c   


 


Lactic Acid   


 


Calcium   


 


Phosphorus   


 


Magnesium   


 


Total Protein   


 


Albumin   














  12/29/17 12/29/17 12/29/17





  04:20 04:20 05:15


 


WBC   


 


MCV   


 


Seg Neuts % (Manual)   


 


Lymphocytes % (Manual)   


 


Seg Neutrophils # Man   


 


Lymphocytes # (Manual)   


 


PT   


 


INR   


 


POC ABG pH   


 


POC ABG pCO2   


 


POC ABG pO2   


 


Sodium  151 H  150 H 


 


Potassium   


 


Chloride  114.3 H  115.4 H 


 


Carbon Dioxide  10 L  10 L 


 


BUN  48 H  48 H 


 


Creatinine   1.3 H 


 


Glucose  121 H  118 H 


 


POC Glucose    134 H


 


Hemoglobin A1c   


 


Lactic Acid   


 


Calcium  7.8 L  7.8 L 


 


Phosphorus   


 


Magnesium   


 


Total Protein   


 


Albumin   














  12/29/17 12/29/17 12/29/17





  06:41 07:49 08:12


 


WBC   


 


MCV   


 


Seg Neuts % (Manual)   


 


Lymphocytes % (Manual)   


 


Seg Neutrophils # Man   


 


Lymphocytes # (Manual)   


 


PT   


 


INR   


 


POC ABG pH   


 


POC ABG pCO2   


 


POC ABG pO2   


 


Sodium   151 H 


 


Potassium   


 


Chloride   116.5 H 


 


Carbon Dioxide   13 L 


 


BUN   52 H 


 


Creatinine   1.3 H 


 


Glucose   213 H 


 


POC Glucose  172 H   228 H


 


Hemoglobin A1c   


 


Lactic Acid   


 


Calcium   7.9 L 


 


Phosphorus   


 


Magnesium   


 


Total Protein   


 


Albumin   














  12/29/17 12/29/17 12/29/17





  08:22 09:31 11:04


 


WBC   


 


MCV   


 


Seg Neuts % (Manual)   


 


Lymphocytes % (Manual)   


 


Seg Neutrophils # Man   


 


Lymphocytes # (Manual)   


 


PT   


 


INR   


 


POC ABG pH   


 


POC ABG pCO2   


 


POC ABG pO2   


 


Sodium   


 


Potassium   


 


Chloride   


 


Carbon Dioxide   


 


BUN   


 


Creatinine   


 


Glucose   


 


POC Glucose  140 H  140 H  136 H


 


Hemoglobin A1c   


 


Lactic Acid   


 


Calcium   


 


Phosphorus   


 


Magnesium   


 


Total Protein   


 


Albumin   














  12/29/17 12/29/17 12/29/17





  12:11 13:40 17:21


 


WBC   


 


MCV   


 


Seg Neuts % (Manual)   


 


Lymphocytes % (Manual)   


 


Seg Neutrophils # Man   


 


Lymphocytes # (Manual)   


 


PT   


 


INR   


 


POC ABG pH   


 


POC ABG pCO2   


 


POC ABG pO2   


 


Sodium   152 H 


 


Potassium   


 


Chloride   117.6 H 


 


Carbon Dioxide   15 L 


 


BUN   50 H 


 


Creatinine   1.3 H 


 


Glucose   144 H 


 


POC Glucose  111 H   182 H


 


Hemoglobin A1c   


 


Lactic Acid   


 


Calcium   


 


Phosphorus   


 


Magnesium   


 


Total Protein   


 


Albumin   














  12/29/17 12/29/17 12/30/17





  17:45 23:06 00:39


 


WBC   


 


MCV   


 


Seg Neuts % (Manual)   


 


Lymphocytes % (Manual)   


 


Seg Neutrophils # Man   


 


Lymphocytes # (Manual)   


 


PT   


 


INR   


 


POC ABG pH   


 


POC ABG pCO2   


 


POC ABG pO2   


 


Sodium  149 H  


 


Potassium   


 


Chloride  116.2 H  


 


Carbon Dioxide  13 L  


 


BUN  48 H  


 


Creatinine  1.3 H  


 


Glucose  171 H  


 


POC Glucose   < 40 L  123 H


 


Hemoglobin A1c   


 


Lactic Acid   


 


Calcium  8.1 L  


 


Phosphorus   


 


Magnesium   


 


Total Protein   


 


Albumin   














  12/30/17 12/30/17 12/30/17





  07:07 07:07 11:35


 


WBC   


 


MCV   


 


Seg Neuts % (Manual)   


 


Lymphocytes % (Manual)   


 


Seg Neutrophils # Man   


 


Lymphocytes # (Manual)   


 


PT   


 


INR   


 


POC ABG pH   


 


POC ABG pCO2   


 


POC ABG pO2   


 


Sodium   152 H 


 


Potassium   3.0 L D 


 


Chloride   121.9 H 


 


Carbon Dioxide   18 L 


 


BUN   44 H 


 


Creatinine   


 


Glucose   


 


POC Glucose    118 H


 


Hemoglobin A1c   


 


Lactic Acid   


 


Calcium   


 


Phosphorus  1.50 L  


 


Magnesium   


 


Total Protein   


 


Albumin   














  12/30/17 12/30/17





  16:54 19:49


 


WBC  


 


MCV  


 


Seg Neuts % (Manual)  


 


Lymphocytes % (Manual)  


 


Seg Neutrophils # Man  


 


Lymphocytes # (Manual)  


 


PT  


 


INR  


 


POC ABG pH  


 


POC ABG pCO2   28.9 L


 


POC ABG pO2  


 


Sodium  


 


Potassium  


 


Chloride  


 


Carbon Dioxide  


 


BUN  


 


Creatinine  


 


Glucose  


 


POC Glucose  231 H 


 


Hemoglobin A1c  


 


Lactic Acid  


 


Calcium  


 


Phosphorus  


 


Magnesium  


 


Total Protein  


 


Albumin  











Chest x-ray: report reviewed (Unremarkable AP chest.Appears skin fold on left 

side.), image reviewed

## 2017-12-30 NOTE — PROGRESS NOTE
Assessment and Plan


Assessment and plan: 


Patient is 37-year-old woman with a history of insulin-dependent diabetes 

mellitus, CVA on aspirin and Plavix, hypertension, dyslipidemia and depression 

who presented with altered mental status and was admitted for DKA.  Do not know 

her baseline mental status, so I called person to notify in the chart, who 

happens to be her sister, Cindy Goodson who reports that patient was 

discharged from [facility] about 1.5 months ago and went to [facility]. Then, she went to her home. She lives with daughter Ej and 

brother, Gonzalo, who moved in with her for more support. Her baseline mental 

function: she walks with a walker, she feeds herself, needs help with clothes, 

left arm weaker than right and speech is usually slurred. "I wanna say she wasn'

t talking her insulin, because this 3rd stoke did alittle mentally and she more 

forgettable, that is what I am thinking, for example, she will say, I need to 

take my insulin but she had just took her insulin." They went to [place], on 

the way back Wednesday night 11pm, she was sleeping off and on, BG was 516 and 

she took her insulin. Her daughter, Ej, said next morning she got up 

Thursday morning to go to the bathroom and she fell and then was unresponsive, 

she was found on the floor in urine by daughter, Ej. 





-DKA with acute metabolic acidosis, high anion gap: Treat with IV fluids, 

insulin drip, serial BMP


-Acute metabolic encephalopathy/semiconscious state: treat the above


-Recurrent ischemic strokes. ?hydrocephalus on Ct head: consulted Neurology


-Leukocytosis with SIRs, non infectious, cxr no acute finding, more likely 

reactive


-Hypernatremia: needs more free water





12/30/17: Insulin drip was stopped because of hypoglycemia and patient was sent 

from ED to medical/surg floor. Anion gap was still high so I sent ICU to start 

insulin drip. I also ordered bmp stat. By the time the stat bmp was resulted, 

patient was in the icu. Anion gap has closed, so i will downgrade back to 

medical surg floor. Replace her potassium, start diet, if she unable to eat 

continue D5W due to hypernatremia and not eating. Await neurology assessment 

because she is not at her mental baseline of slurred speech and ambulation with 

a walker. Order PT/OT





The high probability of a clinically significant, sudden or life threatening 

deterioration of the [neurologic,cardiac] system(s) required my full and direct 

attention, intervention and personal management. The aggregate critical care 

time was [38] minutes. This time is in addition to time spent performing 

reported procedures but includes the following: [x] Data Review and 

interpretation [x] Patient assessment and monitoring of vital signs [x] 

Documentation [x] Medication orders and management








History


Interval history: 


Patient was seen and examined.  Follow-up on current diagnosis/altered mental 

status.  Overnight uneventful.  Patient not given any history; therefore,  

Imaging, nursing note, chart, labs and old chart reviewed.  Responding more. 





Hospitalist Physical





- Physical exam


Narrative exam: 


GEN: Thin frail woman chronic a bit debilitating appearing, lethargic, nonverbal


HEENT: NCAT, EOMI, PERRL, OP Clear


NECK: supple, no adenopathy, no thyromegaly, no JVD


CVS/HEART: regular tachycardia NORMAL S1S2, NO JVD, pulses present bilaterally


CHEST/LUNGS: CTA B, Symmetrical chest expansion, good air entry bilaterally


GI/Abdomen: soft, NTND, good bowel sounds, no guarding or rebound


/Bladder: no suprapubic tenderness, no CVA or paraspinal tenderness


EXT/Skin: no c/c/e, no obvious rash


MSK: Moving both arms with contractures 


Neuro: CN 2-12 grossly intact except nonverbal, hemiparesis, facial asymmetry, 

no new focal deficits


Psych: calm but confused








- Constitutional


Vitals: 


 











Temp Pulse Resp BP Pulse Ox


 


 99.3 F   96 H  16   125/78   96 


 


 12/30/17 07:32  12/30/17 07:32  12/30/17 07:32  12/30/17 07:32  12/30/17 07:32











General appearance: Present: well-nourished.  Absent: mild distress





Results





- Labs


CBC & Chem 7: 


 12/28/17 12:26





 12/30/17 07:07


Labs: 


 Laboratory Last Values











WBC  20.1 K/mm3 (4.5-11.0)  H  12/28/17  12:26    


 


RBC  4.10 M/mm3 (3.65-5.03)   12/28/17  12:26    


 


Hgb  12.4 gm/dl (10.1-14.3)   12/28/17  12:26    


 


Hct  41.3 % (30.3-42.9)   12/28/17  12:26    


 


MCV  101 fl (79-97)  H  12/28/17  12:26    


 


MCH  30 pg (28-32)   12/28/17  12:26    


 


MCHC  30 % (30-34)   12/28/17  12:26    


 


RDW  13.7 % (13.2-15.2)   12/28/17  12:26    


 


Plt Count  197 K/mm3 (140-440)   12/28/17  12:26    


 


Add Manual Diff  Complete   12/28/17  12:26    


 


Total Counted  100   12/28/17  12:26    


 


Seg Neutrophils %  Np   12/28/17  12:26    


 


Seg Neuts % (Manual)  90.0 % (40.0-70.0)  H  12/28/17  12:26    


 


Band Neutrophils %  4.0 %  12/28/17  12:26    


 


Lymphocytes % (Manual)  2.0 % (13.4-35.0)  L  12/28/17  12:26    


 


Reactive Lymphs % (Man)  0 %  12/28/17  12:26    


 


Monocytes % (Manual)  4.0 % (0.0-7.3)   12/28/17  12:26    


 


Eosinophils % (Manual)  0 % (0.0-4.3)   12/28/17  12:26    


 


Basophils % (Manual)  0 % (0.0-1.8)   12/28/17  12:26    


 


Metamyelocytes %  0 %  12/28/17  12:26    


 


Myelocytes %  0 %  12/28/17  12:26    


 


Promyelocytes %  0 %  12/28/17  12:26    


 


Blast Cells %  0 %  12/28/17  12:26    


 


Nucleated RBC %  Not Reportable   12/28/17  12:26    


 


Seg Neutrophils # Man  18.1 K/mm3 (1.8-7.7)  H  12/28/17  12:26    


 


Band Neutrophils #  0.8 K/mm3  12/28/17  12:26    


 


Lymphocytes # (Manual)  0.4 K/mm3 (1.2-5.4)  L  12/28/17  12:26    


 


Abs React Lymphs (Man)  0.0 K/mm3  12/28/17  12:26    


 


Monocytes # (Manual)  0.8 K/mm3 (0.0-0.8)   12/28/17  12:26    


 


Eosinophils # (Manual)  0.0 K/mm3 (0.0-0.4)   12/28/17  12:26    


 


Basophils # (Manual)  0.0 K/mm3 (0.0-0.1)   12/28/17  12:26    


 


Metamyelocytes #  0.0 K/mm3  12/28/17  12:26    


 


Myelocytes #  0.0 K/mm3  12/28/17  12:26    


 


Promyelocytes #  0.0 K/mm3  12/28/17  12:26    


 


Blast Cells #  0.0 K/mm3  12/28/17  12:26    


 


WBC Morphology  Not Reportable   12/28/17  12:26    


 


Hypersegmented Neuts  Not Reportable   12/28/17  12:26    


 


Hyposegmented Neuts  Not Reportable   12/28/17  12:26    


 


Hypogranular Neuts  Not Reportable   12/28/17  12:26    


 


Smudge Cells  Not Reportable   12/28/17  12:26    


 


Toxic Granulation  Not Reportable   12/28/17  12:26    


 


Toxic Vacuolation  Not Reportable   12/28/17  12:26    


 


Dohle Bodies  Not Reportable   12/28/17  12:26    


 


Pelger-Huet Anomaly  Not Reportable   12/28/17  12:26    


 


Yuly Rods  Not Reportable   12/28/17  12:26    


 


Platelet Estimate  Not Reportable   12/28/17  12:26    


 


Clumped Platelets  Not Reportable   12/28/17  12:26    


 


Plt Clumps, EDTA  Not Reportable   12/28/17  12:26    


 


Large Platelets  Not Reportable   12/28/17  12:26    


 


Giant Platelets  Not Reportable   12/28/17  12:26    


 


Platelet Satelliting  Not Reportable   12/28/17  12:26    


 


Plt Morphology Comment  Not Reportable   12/28/17  12:26    


 


RBC Morphology  Normal   12/28/17  12:26    


 


Dimorphic RBCs  Not Reportable   12/28/17  12:26    


 


Polychromasia  Not Reportable   12/28/17  12:26    


 


Hypochromasia  Not Reportable   12/28/17  12:26    


 


Poikilocytosis  Not Reportable   12/28/17  12:26    


 


Anisocytosis  Not Reportable   12/28/17  12:26    


 


Microcytosis  Not Reportable   12/28/17  12:26    


 


Macrocytosis  Not Reportable   12/28/17  12:26    


 


Spherocytes  Not Reportable   12/28/17  12:26    


 


Pappenheimer Bodies  Not Reportable   12/28/17  12:26    


 


Sickle Cells  Not Reportable   12/28/17  12:26    


 


Target Cells  Not Reportable   12/28/17  12:26    


 


Tear Drop Cells  Not Reportable   12/28/17  12:26    


 


Ovalocytes  Not Reportable   12/28/17  12:26    


 


Helmet Cells  Not Reportable   12/28/17  12:26    


 


[place] Bodies  Not Reportable   12/28/17  12:26    


 


Cabot Rings  Not Reportable   12/28/17  12:26    


 


Marco Cells  Not Reportable   12/28/17  12:26    


 


Bite Cells  Not Reportable   12/28/17  12:26    


 


Crenated Cell  Not Reportable   12/28/17  12:26    


 


Elliptocytes  Not Reportable   12/28/17  12:26    


 


Acanthocytes (Spur)  Not Reportable   12/28/17  12:26    


 


Rouleaux  Not Reportable   12/28/17  12:26    


 


Hemoglobin C Crystals  Not Reportable   12/28/17  12:26    


 


Schistocytes  Not Reportable   12/28/17  12:26    


 


Malaria parasites  Not Reportable   12/28/17  12:26    


 


Juliano Bodies  Not Reportable   12/28/17  12:26    


 


Hem Pathologist Commnt  No   12/28/17  12:26    


 


PT  16.4 Sec. (12.2-14.9)  H  12/28/17  12:26    


 


INR  1.25  (0.87-1.13)  H  12/28/17  12:26    


 


POC ABG pH  6.856  (7.35-7.45)  L  12/28/17  12:39    


 


POC ABG pCO2  12.4  (35-45)  L  12/28/17  12:39    


 


POC ABG pO2  288  ()  H  12/28/17  12:39    


 


POC ABG HCO3  2.2   12/28/17  12:39    


 


POC ABG Total CO2  < 5   12/28/17  12:39    


 


POC ABG O2 Sat  100   12/28/17  12:39    


 


POC ABG Base Excess  < -30   12/28/17  12:39    


 


FiO2  98 %  12/28/17  12:39    


 


Sodium  152 mmol/L (137-145)  H  12/30/17  07:07    


 


Potassium  3.0 mmol/L (3.6-5.0)  L D 12/30/17  07:07    


 


Chloride  121.9 mmol/L ()  H  12/30/17  07:07    


 


Carbon Dioxide  18 mmol/L (22-30)  L  12/30/17  07:07    


 


Anion Gap  15 mmol/L  12/30/17  07:07    


 


BUN  44 mg/dL (7-17)  H  12/30/17  07:07    


 


Creatinine  1.0 mg/dL (0.7-1.2)   12/30/17  07:07    


 


Estimated GFR  > 60 ml/min  12/30/17  07:07    


 


BUN/Creatinine Ratio  44 %  12/30/17  07:07    


 


Glucose  94 mg/dL ()   12/30/17  07:07    


 


POC Glucose  82  ()   12/30/17  08:35    


 


Hemoglobin A1c  10.0 % (4-6)  H  12/28/17  17:02    


 


Lactic Acid  2.90 mmol/L (0.7-2.0)  H*  12/28/17  15:17    


 


Calcium  8.4 mg/dL (8.4-10.2)   12/30/17  07:07    


 


Phosphorus  1.50 mg/dL (2.5-4.5)  L  12/30/17  07:07    


 


Magnesium  2.00 mg/dL (1.7-2.3)   12/30/17  07:07    


 


Total Bilirubin  0.30 mg/dL (0.1-1.2)   12/28/17  13:44    


 


Direct Bilirubin  0.2 mg/dL (0-0.2)   12/28/17  13:44    


 


Indirect Bilirubin  0.1 mg/dL  12/28/17  13:44    


 


AST  15 units/L (5-40)   12/28/17  13:44    


 


ALT  11 units/L (7-56)   12/28/17  13:44    


 


Alkaline Phosphatase  89 units/L ()   12/28/17  13:44    


 


Total Protein  5.8 g/dL (6.3-8.2)  L  12/28/17  13:44    


 


Albumin  3.0 g/dL (3.9-5)  L  12/28/17  13:44    


 


Albumin/Globulin Ratio  1.1 %  12/28/17  13:44    


 


HCG, Qual  Negative  (Negative)   12/28/17  13:44    


 


Urine Color  Yellow  (Yellow)   12/28/17  14:28    


 


Urine Turbidity  Clear  (Clear)   12/28/17  14:28    


 


Urine pH  5.0  (5.0-7.0)   12/28/17  14:28    


 


Ur Specific Gravity  1.016  (1.003-1.030)   12/28/17  14:28    


 


Urine Protein  100 mg/dl mg/dL (Negative)   12/28/17  14:28    


 


Urine Glucose (UA)  >=500 mg/dL (Negative)   12/28/17  14:28    


 


Urine Ketones  80 mg/dL (Negative)   12/28/17  14:28    


 


Urine Blood  Mod  (Negative)   12/28/17  14:28    


 


Urine Nitrite  Neg  (Negative)   12/28/17  14:28    


 


Urine Bilirubin  Neg  (Negative)   12/28/17  14:28    


 


Urine Urobilinogen  < 2.0 mg/dL (<2.0)   12/28/17  14:28    


 


Ur Leukocyte Esterase  Neg  (Negative)   12/28/17  14:28    


 


Urine WBC (Auto)  2.0 /HPF (0.0-6.0)   12/28/17  14:28    


 


Urine RBC (Auto)  < 1.0 /HPF (0.0-6.0)   12/28/17  14:28    


 


U Epithel Cells (Auto)  < 1.0 /HPF (0-13.0)   12/28/17  14:28    


 


Urine Bacteria (Auto)  1+ /HPF (Negative)   12/28/17  14:28    


 


Urine Opiates Screen  Presumptive negative   12/28/17  14:28    


 


Urine Methadone Screen  Presumptive negative   12/28/17  14:28    


 


Ur Barbiturates Screen  Presumptive negative   12/28/17  14:28    


 


Ur Phencyclidine Scrn  Presumptive negative   12/28/17  14:28    


 


Ur Amphetamines Screen  Presumptive negative   12/28/17  14:28    


 


U Benzodiazepines Scrn  Presumptive negative   12/28/17  14:28    


 


Urine Cocaine Screen  Presumptive negative   12/28/17  14:28    


 


U Marijuana (THC) Screen  Presumptive negative   12/28/17  14:28    


 


Drugs of Abuse Note  Disclamer   12/28/17  14:28

## 2017-12-31 RX ADMIN — INSULIN ASPART SCH: 100 INJECTION, SOLUTION INTRAVENOUS; SUBCUTANEOUS at 10:00

## 2017-12-31 RX ADMIN — INSULIN DETEMIR SCH: 100 INJECTION, SOLUTION SUBCUTANEOUS at 10:42

## 2017-12-31 RX ADMIN — CEFTRIAXONE SODIUM SCH MLS/10 MIN: 10 INJECTION, POWDER, FOR SOLUTION INTRAVENOUS at 19:06

## 2017-12-31 RX ADMIN — ASPIRIN SCH: 81 TABLET, COATED ORAL at 11:21

## 2017-12-31 RX ADMIN — INSULIN ASPART SCH UNITS: 100 INJECTION, SOLUTION INTRAVENOUS; SUBCUTANEOUS at 22:42

## 2017-12-31 RX ADMIN — INSULIN ASPART SCH UNITS: 100 INJECTION, SOLUTION INTRAVENOUS; SUBCUTANEOUS at 08:01

## 2017-12-31 RX ADMIN — INSULIN ASPART SCH UNITS: 100 INJECTION, SOLUTION INTRAVENOUS; SUBCUTANEOUS at 17:08

## 2017-12-31 RX ADMIN — NIFEDIPINE SCH: 30 TABLET, FILM COATED, EXTENDED RELEASE ORAL at 11:20

## 2017-12-31 RX ADMIN — PIPERACILLIN SODIUM AND TAZOBACTAM SODIUM SCH MLS/HR: 3; .375 INJECTION, POWDER, LYOPHILIZED, FOR SOLUTION INTRAVENOUS at 17:48

## 2017-12-31 RX ADMIN — INSULIN ASPART SCH UNITS: 100 INJECTION, SOLUTION INTRAVENOUS; SUBCUTANEOUS at 13:17

## 2017-12-31 RX ADMIN — INSULIN ASPART SCH: 100 INJECTION, SOLUTION INTRAVENOUS; SUBCUTANEOUS at 03:11

## 2017-12-31 RX ADMIN — PIPERACILLIN SODIUM AND TAZOBACTAM SODIUM SCH MLS/HR: 3; .375 INJECTION, POWDER, LYOPHILIZED, FOR SOLUTION INTRAVENOUS at 08:01

## 2017-12-31 RX ADMIN — PIPERACILLIN SODIUM AND TAZOBACTAM SODIUM SCH MLS/HR: 3; .375 INJECTION, POWDER, LYOPHILIZED, FOR SOLUTION INTRAVENOUS at 13:24

## 2017-12-31 RX ADMIN — ASPIRIN SCH MG: 300 SUPPOSITORY RECTAL at 17:49

## 2017-12-31 RX ADMIN — FAMOTIDINE SCH MG: 10 INJECTION, SOLUTION INTRAVENOUS at 13:17

## 2017-12-31 RX ADMIN — CLOPIDOGREL BISULFATE SCH: 75 TABLET ORAL at 11:21

## 2017-12-31 NOTE — CONSULTATION
History of Present Illness


Consult date: 12/31/17


History of present illness: 


 evl of the MRI   there are two issues   present  first there is acute right 

MCA stroke  from shower of emboli into the right MCA territory  would recommend 

check ECHO this is major problem   multiple small vessel infarcts in the right 

parietal/ temporal   lobe... this my intrep. as the radiology opinion not yet 

provided





the hydrocephalus issue is that there is widespread communicating hydrocephalus 

which is chronic  not acute  etiology will have to get LP this can be delayed 

til tuesday check fungus  TB etc for inflammatory arachnoiditis  perhaps this 

was present on the previous work up's ?








Medications and Allergies


 Allergies











Allergy/AdvReac Type Severity Reaction Status Date / Time


 


No Known Allergies Allergy   Unverified 01/10/15 03:04











 Home Medications











 Medication  Instructions  Recorded  Confirmed  Last Taken  Type


 


Aspirin [Aspirin EC] 81 mg PO DAILY 12/28/17 12/28/17 Unknown History


 


AtorvaSTATin [Lipitor] 80 mg PO QHS 12/28/17 12/28/17 Unknown History


 


Citalopram [celeXA] 10 mg PO QDAY 12/28/17 12/28/17 Unknown History


 


Clopidogrel [Plavix] 75 mg PO QDAY 12/28/17 12/28/17 Unknown History


 


Cyclobenzaprine [Flexeril] 10 mg PO TID PRN 12/28/17 12/28/17 Unknown History


 


Insulin Aspart Protam & Aspart 0 unit SQ TID 12/28/17 12/28/17 Unknown History





[NovoLOG Mix 70-30 Flexpen]     


 


Insulin Glargine,Hum.rec.anlog 20 units SQ QAM 12/28/17 12/28/17 Unknown History





[Lantus]     


 


Insulin Glargine,Hum.rec.anlog 50 units SQ QPM 12/28/17 12/28/17 Unknown History





[Lantus]     


 


NIFEdipine [Nifedipine ER] 30 mg PO DAILY 12/28/17 12/28/17 Unknown History











Active Meds: 


Active Medications





Acetaminophen (Tylenol)  650 mg PO Q4H PRN


   PRN Reason: Pain MILD(1-3)/Fever >100.5/HA


Aspirin (Halfprin Ec)  81 mg PO DAILY [facility]


   Last Admin: 12/31/17 11:21 Dose:  Not Given


Atorvastatin Calcium (Lipitor)  80 mg PO QHS [facility]


   Last Admin: 12/30/17 23:05 Dose:  Not Given


Bisacodyl (Dulcolax)  10 mg VA QDAY PRN


   PRN Reason: Constipation unrelieved by MOM


Citalopram Hydrobromide (Celexa)  10 mg PO QDAY [facility]


   Last Admin: 12/31/17 11:21 Dose:  Not Given


Clopidogrel Bisulfate (Plavix)  75 mg PO QDAY [facility]


   Last Admin: 12/31/17 11:21 Dose:  Not Given


Cyclobenzaprine HCl (Flexeril)  10 mg PO TID PRN


   PRN Reason: Muscle Spasm


Dextrose (D50w (25gm) Syringe)  50 ml IV PRN PRN


   PRN Reason: Hypoglycemia


Famotidine (Pepcid)  20 mg IV QDAY [facility]


Hydrophilic Ointment (Vaseline Lip Therapy)  1 applic TP AS DIRECT PRN


   PRN Reason: Dry Lips


Piperacillin Sod/Tazobactam Sod (Zosyn/Ns 3.375gm/50ml)  3.375 gm in 50 mls @ 

100 mls/hr IV Q6HR [facility]


   Last Admin: 12/31/17 08:01 Dose:  100 mls/hr


Potassium Chloride 20 meq/ (Dextrose)  1,010 mls @ 100 mls/hr IV AS DIRECT [facility]


   Last Admin: 12/30/17 23:35 Dose:  100 mls/hr


Insulin Aspart (Novolog)  0 units SUB-Q Q4H [facility]


   PRN Reason: Protocol


   Last Admin: 12/31/17 08:01 Dose:  4 units


Insulin Detemir (Levemir)  10 units SUB-Q QAMDIAB [facility]


   Last Admin: 12/31/17 10:42 Dose:  Not Given


Labetalol HCl (Normodyne)  10 mg IV Q4H PRN


   PRN Reason: Blood Pressure


   Last Admin: 12/31/17 08:29 Dose:  10 mg


Magnesium Hydroxide (Milk Of Magnesia)  30 ml PO Q4H PRN


   PRN Reason: Constipation


Morphine Sulfate (Morphine)  2 mg IV Q4H PRN


   PRN Reason: Pain , Severe (7-10)


   Last Admin: 12/30/17 01:50 Dose:  2 mg


Nifedipine (Procardia Xl)  30 mg PO DAILY [facility]


   Last Admin: 12/31/17 11:20 Dose:  Not Given


Ondansetron HCl (Zofran)  4 mg IV Q8H PRN


   PRN Reason: N/V unrelieved by Reglan











Physical Examination





- Vital Signs


Vital Signs: 


 Vital Signs











BP


 


 115/67 


 


 12/28/17 12:18














Results





- Laboratory Findings


CBC and BMP: 


 12/28/17 12:26





 12/30/17 07:07


Abnormal Lab Findings: 


 Abnormal Labs











  12/28/17 12/28/17 12/28/17





  12:26 12:26 12:26


 


WBC  20.1 H  


 


MCV  101 H  


 


Seg Neuts % (Manual)  90.0 H  


 


Lymphocytes % (Manual)  2.0 L  


 


Seg Neutrophils # Man  18.1 H  


 


Lymphocytes # (Manual)  0.4 L  


 


PT   16.4 H 


 


INR   1.25 H 


 


POC ABG pH   


 


POC ABG pCO2   


 


POC ABG pO2   


 


Sodium   


 


Potassium   


 


Chloride   


 


Carbon Dioxide   


 


BUN   


 


Creatinine   


 


Glucose   


 


POC Glucose    445 H


 


Hemoglobin A1c   


 


Lactic Acid   


 


Calcium   


 


Phosphorus   


 


Magnesium   


 


C-Reactive Protein   


 


Total Protein   


 


Albumin   














  12/28/17 12/28/17 12/28/17





  12:39 13:44 13:44


 


WBC   


 


MCV   


 


Seg Neuts % (Manual)   


 


Lymphocytes % (Manual)   


 


Seg Neutrophils # Man   


 


Lymphocytes # (Manual)   


 


PT   


 


INR   


 


POC ABG pH  6.856 L  


 


POC ABG pCO2  12.4 L  


 


POC ABG pO2  288 H  


 


Sodium   


 


Potassium   


 


Chloride   


 


Carbon Dioxide   


 


BUN   


 


Creatinine   


 


Glucose   


 


POC Glucose   


 


Hemoglobin A1c   


 


Lactic Acid   


 


Calcium   


 


Phosphorus    8.10 H


 


Magnesium    2.60 H


 


C-Reactive Protein   


 


Total Protein   5.8 L 


 


Albumin   3.0 L 














  12/28/17 12/28/17 12/28/17





  13:44 14:17 15:17


 


WBC   


 


MCV   


 


Seg Neuts % (Manual)   


 


Lymphocytes % (Manual)   


 


Seg Neutrophils # Man   


 


Lymphocytes # (Manual)   


 


PT   


 


INR   


 


POC ABG pH   


 


POC ABG pCO2   


 


POC ABG pO2   


 


Sodium   


 


Potassium  6.5 H*  5.8 H  5.7 H


 


Chloride  94.4 L  


 


Carbon Dioxide  5 L*  2 L*  < 2.0 L*


 


BUN  43 H  38 H  39 H


 


Creatinine   1.3 H  1.3 H


 


Glucose  629 H*  610 H*  585 H*


 


POC Glucose   


 


Hemoglobin A1c   


 


Lactic Acid   


 


Calcium  8.3 L  7.7 L  7.9 L


 


Phosphorus   


 


Magnesium   


 


C-Reactive Protein   


 


Total Protein   


 


Albumin   














  12/28/17 12/28/17 12/28/17





  15:17 15:40 16:57


 


WBC   


 


MCV   


 


Seg Neuts % (Manual)   


 


Lymphocytes % (Manual)   


 


Seg Neutrophils # Man   


 


Lymphocytes # (Manual)   


 


PT   


 


INR   


 


POC ABG pH   


 


POC ABG pCO2   


 


POC ABG pO2   


 


Sodium   


 


Potassium   


 


Chloride   


 


Carbon Dioxide    3 L*


 


BUN    42 H


 


Creatinine    1.3 H


 


Glucose    557 H*


 


POC Glucose   > 500 H 


 


Hemoglobin A1c   


 


Lactic Acid  2.90 H*  


 


Calcium    8.1 L


 


Phosphorus   


 


Magnesium   


 


C-Reactive Protein   


 


Total Protein   


 


Albumin   














  12/28/17 12/28/17 12/28/17





  17:02 17:02 18:05


 


WBC   


 


MCV   


 


Seg Neuts % (Manual)   


 


Lymphocytes % (Manual)   


 


Seg Neutrophils # Man   


 


Lymphocytes # (Manual)   


 


PT   


 


INR   


 


POC ABG pH   


 


POC ABG pCO2   


 


POC ABG pO2   


 


Sodium   


 


Potassium   


 


Chloride   


 


Carbon Dioxide   


 


BUN   


 


Creatinine   


 


Glucose   


 


POC Glucose    485 H


 


Hemoglobin A1c  10.0 H  


 


Lactic Acid   


 


Calcium   


 


Phosphorus   6.60 H 


 


Magnesium   2.70 H 


 


C-Reactive Protein   


 


Total Protein   


 


Albumin   














  12/28/17 12/28/17 12/28/17





  18:52 20:20 21:23


 


WBC   


 


MCV   


 


Seg Neuts % (Manual)   


 


Lymphocytes % (Manual)   


 


Seg Neutrophils # Man   


 


Lymphocytes # (Manual)   


 


PT   


 


INR   


 


POC ABG pH   


 


POC ABG pCO2   


 


POC ABG pO2   


 


Sodium  148 H  


 


Potassium   


 


Chloride   


 


Carbon Dioxide  < 2.0 L*  


 


BUN  41 H  


 


Creatinine  1.4 H  


 


Glucose  473 H  


 


POC Glucose   356 H  322 H


 


Hemoglobin A1c   


 


Lactic Acid   


 


Calcium  7.8 L  


 


Phosphorus   


 


Magnesium   


 


C-Reactive Protein   


 


Total Protein   


 


Albumin   














  12/28/17 12/28/17 12/28/17





  22:28 23:01 23:31


 


WBC   


 


MCV   


 


Seg Neuts % (Manual)   


 


Lymphocytes % (Manual)   


 


Seg Neutrophils # Man   


 


Lymphocytes # (Manual)   


 


PT   


 


INR   


 


POC ABG pH   


 


POC ABG pCO2   


 


POC ABG pO2   


 


Sodium   148 H 


 


Potassium   


 


Chloride   111.6 H 


 


Carbon Dioxide   4 L* 


 


BUN   43 H 


 


Creatinine   1.3 H 


 


Glucose   177 H 


 


POC Glucose  242 H   204 H


 


Hemoglobin A1c   


 


Lactic Acid   


 


Calcium   7.7 L 


 


Phosphorus   


 


Magnesium   


 


C-Reactive Protein   


 


Total Protein   


 


Albumin   














  12/29/17 12/29/17 12/29/17





  01:20 03:30 04:01


 


WBC   


 


MCV   


 


Seg Neuts % (Manual)   


 


Lymphocytes % (Manual)   


 


Seg Neutrophils # Man   


 


Lymphocytes # (Manual)   


 


PT   


 


INR   


 


POC ABG pH   


 


POC ABG pCO2   


 


POC ABG pO2   


 


Sodium   


 


Potassium   


 


Chloride   


 


Carbon Dioxide   


 


BUN   


 


Creatinine   


 


Glucose   


 


POC Glucose  120 H  59 L  132 H


 


Hemoglobin A1c   


 


Lactic Acid   


 


Calcium   


 


Phosphorus   


 


Magnesium   


 


C-Reactive Protein   


 


Total Protein   


 


Albumin   














  12/29/17 12/29/17 12/29/17





  04:20 04:20 05:15


 


WBC   


 


MCV   


 


Seg Neuts % (Manual)   


 


Lymphocytes % (Manual)   


 


Seg Neutrophils # Man   


 


Lymphocytes # (Manual)   


 


PT   


 


INR   


 


POC ABG pH   


 


POC ABG pCO2   


 


POC ABG pO2   


 


Sodium  151 H  150 H 


 


Potassium   


 


Chloride  114.3 H  115.4 H 


 


Carbon Dioxide  10 L  10 L 


 


BUN  48 H  48 H 


 


Creatinine   1.3 H 


 


Glucose  121 H  118 H 


 


POC Glucose    134 H


 


Hemoglobin A1c   


 


Lactic Acid   


 


Calcium  7.8 L  7.8 L 


 


Phosphorus   


 


Magnesium   


 


C-Reactive Protein   


 


Total Protein   


 


Albumin   














  12/29/17 12/29/17 12/29/17





  06:41 07:49 08:12


 


WBC   


 


MCV   


 


Seg Neuts % (Manual)   


 


Lymphocytes % (Manual)   


 


Seg Neutrophils # Man   


 


Lymphocytes # (Manual)   


 


PT   


 


INR   


 


POC ABG pH   


 


POC ABG pCO2   


 


POC ABG pO2   


 


Sodium   151 H 


 


Potassium   


 


Chloride   116.5 H 


 


Carbon Dioxide   13 L 


 


BUN   52 H 


 


Creatinine   1.3 H 


 


Glucose   213 H 


 


POC Glucose  172 H   228 H


 


Hemoglobin A1c   


 


Lactic Acid   


 


Calcium   7.9 L 


 


Phosphorus   


 


Magnesium   


 


C-Reactive Protein   


 


Total Protein   


 


Albumin   














  12/29/17 12/29/17 12/29/17





  08:22 09:31 11:04


 


WBC   


 


MCV   


 


Seg Neuts % (Manual)   


 


Lymphocytes % (Manual)   


 


Seg Neutrophils # Man   


 


Lymphocytes # (Manual)   


 


PT   


 


INR   


 


POC ABG pH   


 


POC ABG pCO2   


 


POC ABG pO2   


 


Sodium   


 


Potassium   


 


Chloride   


 


Carbon Dioxide   


 


BUN   


 


Creatinine   


 


Glucose   


 


POC Glucose  140 H  140 H  136 H


 


Hemoglobin A1c   


 


Lactic Acid   


 


Calcium   


 


Phosphorus   


 


Magnesium   


 


C-Reactive Protein   


 


Total Protein   


 


Albumin   














  12/29/17 12/29/17 12/29/17





  12:11 13:40 17:21


 


WBC   


 


MCV   


 


Seg Neuts % (Manual)   


 


Lymphocytes % (Manual)   


 


Seg Neutrophils # Man   


 


Lymphocytes # (Manual)   


 


PT   


 


INR   


 


POC ABG pH   


 


POC ABG pCO2   


 


POC ABG pO2   


 


Sodium   152 H 


 


Potassium   


 


Chloride   117.6 H 


 


Carbon Dioxide   15 L 


 


BUN   50 H 


 


Creatinine   1.3 H 


 


Glucose   144 H 


 


POC Glucose  111 H   182 H


 


Hemoglobin A1c   


 


Lactic Acid   


 


Calcium   


 


Phosphorus   


 


Magnesium   


 


C-Reactive Protein   


 


Total Protein   


 


Albumin   














  12/29/17 12/29/17 12/30/17





  17:45 23:06 00:39


 


WBC   


 


MCV   


 


Seg Neuts % (Manual)   


 


Lymphocytes % (Manual)   


 


Seg Neutrophils # Man   


 


Lymphocytes # (Manual)   


 


PT   


 


INR   


 


POC ABG pH   


 


POC ABG pCO2   


 


POC ABG pO2   


 


Sodium  149 H  


 


Potassium   


 


Chloride  116.2 H  


 


Carbon Dioxide  13 L  


 


BUN  48 H  


 


Creatinine  1.3 H  


 


Glucose  171 H  


 


POC Glucose   < 40 L  123 H


 


Hemoglobin A1c   


 


Lactic Acid   


 


Calcium  8.1 L  


 


Phosphorus   


 


Magnesium   


 


C-Reactive Protein   


 


Total Protein   


 


Albumin   














  12/30/17 12/30/17 12/30/17





  07:07 07:07 11:35


 


WBC   


 


MCV   


 


Seg Neuts % (Manual)   


 


Lymphocytes % (Manual)   


 


Seg Neutrophils # Man   


 


Lymphocytes # (Manual)   


 


PT   


 


INR   


 


POC ABG pH   


 


POC ABG pCO2   


 


POC ABG pO2   


 


Sodium   152 H 


 


Potassium   3.0 L D 


 


Chloride   121.9 H 


 


Carbon Dioxide   18 L 


 


BUN   44 H 


 


Creatinine   


 


Glucose   


 


POC Glucose    118 H


 


Hemoglobin A1c   


 


Lactic Acid   


 


Calcium   


 


Phosphorus  1.50 L  


 


Magnesium   


 


C-Reactive Protein   


 


Total Protein   


 


Albumin   














  12/30/17 12/30/17 12/30/17





  16:54 19:44 19:49


 


WBC   


 


MCV   


 


Seg Neuts % (Manual)   


 


Lymphocytes % (Manual)   


 


Seg Neutrophils # Man   


 


Lymphocytes # (Manual)   


 


PT   


 


INR   


 


POC ABG pH   


 


POC ABG pCO2    28.9 L


 


POC ABG pO2   


 


Sodium   


 


Potassium   


 


Chloride   


 


Carbon Dioxide   


 


BUN   


 


Creatinine   


 


Glucose   


 


POC Glucose  231 H  


 


Hemoglobin A1c   


 


Lactic Acid   


 


Calcium   


 


Phosphorus   


 


Magnesium   


 


C-Reactive Protein   4.70 H 


 


Total Protein   


 


Albumin   














  12/30/17 12/31/17





  21:48 06:25


 


WBC  


 


MCV  


 


Seg Neuts % (Manual)  


 


Lymphocytes % (Manual)  


 


Seg Neutrophils # Man  


 


Lymphocytes # (Manual)  


 


PT  


 


INR  


 


POC ABG pH  


 


POC ABG pCO2  


 


POC ABG pO2  


 


Sodium  


 


Potassium  


 


Chloride  


 


Carbon Dioxide  


 


BUN  


 


Creatinine  


 


Glucose  


 


POC Glucose  176 H  299 H


 


Hemoglobin A1c  


 


Lactic Acid  


 


Calcium  


 


Phosphorus  


 


Magnesium  


 


C-Reactive Protein  


 


Total Protein  


 


Albumin

## 2017-12-31 NOTE — PROGRESS NOTE
Assessment and Plan





Patient sleeping on room air. No acute respiratory distress. O2 saturation 98% 

on room air.





- Patient Problems


(1) Sepsis


Current Visit: Yes   Status: Acute   


Qualifiers: 


   Sepsis type: sepsis due to unspecified organism   Qualified Code(s): A41.9 - 

Sepsis, unspecified organism   


Plan to address problem: 


Patient is on zosyn








(2) Altered mental status


Current Visit: Yes   Status: Acute   


Plan to address problem: 


Managment as per primary care.








(3) DKA (diabetic ketoacidoses)


Current Visit: Yes   Status: Acute   


Qualifiers: 


   Diabetes mellitus type: type 2 


Plan to address problem: 


Improving. Management as per primary care.








(4) Encephalopathy acute


Current Visit: Yes   Status: Acute   


Plan to address problem: 


Management as per primary care.








(5) HTN (hypertension)


Current Visit: Yes   Status: Chronic   


Qualifiers: 


   Hypertension type: essential hypertension   Qualified Code(s): I10 - 

Essential (primary) hypertension   


Plan to address problem: 


Management as per primary care.








(6) History of CVA with residual deficit


Current Visit: Yes   Status: Chronic   


Plan to address problem: 


Management as per primary care and neurology.








Subjective


Date of service: 12/31/17


Interval history: 





Patient sleeping on room air. No acute respiratory distress. O2 saturation 98% 

on room air.





Objective


 Vital Signs - 12hr











  12/31/17 12/31/17





  12:26 15:22


 


Temperature 99.7 F H 99.9 F H


 


Pulse Rate 94 H 114 H


 


Respiratory 18 18





Rate  


 


Blood Pressure 143/84 154/99


 


O2 Sat by Pulse 99 98





Oximetry  











Constitutional: no acute distress, asleep


Eyes: non-icteric


Neck: supple, no lymphadenopathy


Ascultation: Bilateral: clear


Cardiovascular: regular rate and rhythm


Gastrointestinal: normoactive bowel sounds, soft, non-tender


Integumentary: normal


Extremities: no cyanosis, no edema


Neurologic: other (Patient sleeping at this time.)


Psychiatric: other (Patient sleeping at this time.)


CBC and BMP: 


 01/01/18 07:27





 01/01/18 07:27


ABG, PT/INR, D-dimer: 


ABG











POC ABG pH  7.389  (7.35-7.45)   12/30/17  19:49    


 


POC ABG pCO2  28.9  (35-45)  L  12/30/17  19:49    


 


POC ABG pO2  98  ()   12/30/17  19:49    


 


POC ABG HCO3  17.4   12/30/17  19:49    


 


POC ABG Total CO2  18   12/30/17  19:49    


 


POC ABG O2 Sat  98   12/30/17  19:49    





PT/INR, D-dimer











PT  16.4 Sec. (12.2-14.9)  H  12/28/17  12:26    


 


INR  1.25  (0.87-1.13)  H  12/28/17  12:26    








Abnormal lab findings: 


 Abnormal Labs











  12/28/17 12/28/17 12/28/17





  12:26 12:26 12:26


 


WBC  20.1 H  


 


MCV  101 H  


 


Seg Neuts % (Manual)  90.0 H  


 


Lymphocytes % (Manual)  2.0 L  


 


Seg Neutrophils # Man  18.1 H  


 


Lymphocytes # (Manual)  0.4 L  


 


PT   16.4 H 


 


INR   1.25 H 


 


POC ABG pH   


 


POC ABG pCO2   


 


POC ABG pO2   


 


Sodium   


 


Potassium   


 


Chloride   


 


Carbon Dioxide   


 


BUN   


 


Creatinine   


 


Glucose   


 


POC Glucose    445 H


 


Hemoglobin A1c   


 


Lactic Acid   


 


Calcium   


 


Phosphorus   


 


Magnesium   


 


C-Reactive Protein   


 


Total Protein   


 


Albumin   














  12/28/17 12/28/17 12/28/17





  12:39 13:44 13:44


 


WBC   


 


MCV   


 


Seg Neuts % (Manual)   


 


Lymphocytes % (Manual)   


 


Seg Neutrophils # Man   


 


Lymphocytes # (Manual)   


 


PT   


 


INR   


 


POC ABG pH  6.856 L  


 


POC ABG pCO2  12.4 L  


 


POC ABG pO2  288 H  


 


Sodium   


 


Potassium   


 


Chloride   


 


Carbon Dioxide   


 


BUN   


 


Creatinine   


 


Glucose   


 


POC Glucose   


 


Hemoglobin A1c   


 


Lactic Acid   


 


Calcium   


 


Phosphorus    8.10 H


 


Magnesium    2.60 H


 


C-Reactive Protein   


 


Total Protein   5.8 L 


 


Albumin   3.0 L 














  12/28/17 12/28/17 12/28/17





  13:44 14:17 15:17


 


WBC   


 


MCV   


 


Seg Neuts % (Manual)   


 


Lymphocytes % (Manual)   


 


Seg Neutrophils # Man   


 


Lymphocytes # (Manual)   


 


PT   


 


INR   


 


POC ABG pH   


 


POC ABG pCO2   


 


POC ABG pO2   


 


Sodium   


 


Potassium  6.5 H*  5.8 H  5.7 H


 


Chloride  94.4 L  


 


Carbon Dioxide  5 L*  2 L*  < 2.0 L*


 


BUN  43 H  38 H  39 H


 


Creatinine   1.3 H  1.3 H


 


Glucose  629 H*  610 H*  585 H*


 


POC Glucose   


 


Hemoglobin A1c   


 


Lactic Acid   


 


Calcium  8.3 L  7.7 L  7.9 L


 


Phosphorus   


 


Magnesium   


 


C-Reactive Protein   


 


Total Protein   


 


Albumin   














  12/28/17 12/28/17 12/28/17





  15:17 15:40 16:57


 


WBC   


 


MCV   


 


Seg Neuts % (Manual)   


 


Lymphocytes % (Manual)   


 


Seg Neutrophils # Man   


 


Lymphocytes # (Manual)   


 


PT   


 


INR   


 


POC ABG pH   


 


POC ABG pCO2   


 


POC ABG pO2   


 


Sodium   


 


Potassium   


 


Chloride   


 


Carbon Dioxide    3 L*


 


BUN    42 H


 


Creatinine    1.3 H


 


Glucose    557 H*


 


POC Glucose   > 500 H 


 


Hemoglobin A1c   


 


Lactic Acid  2.90 H*  


 


Calcium    8.1 L


 


Phosphorus   


 


Magnesium   


 


C-Reactive Protein   


 


Total Protein   


 


Albumin   














  12/28/17 12/28/17 12/28/17





  17:02 17:02 18:05


 


WBC   


 


MCV   


 


Seg Neuts % (Manual)   


 


Lymphocytes % (Manual)   


 


Seg Neutrophils # Man   


 


Lymphocytes # (Manual)   


 


PT   


 


INR   


 


POC ABG pH   


 


POC ABG pCO2   


 


POC ABG pO2   


 


Sodium   


 


Potassium   


 


Chloride   


 


Carbon Dioxide   


 


BUN   


 


Creatinine   


 


Glucose   


 


POC Glucose    485 H


 


Hemoglobin A1c  10.0 H  


 


Lactic Acid   


 


Calcium   


 


Phosphorus   6.60 H 


 


Magnesium   2.70 H 


 


C-Reactive Protein   


 


Total Protein   


 


Albumin   














  12/28/17 12/28/17 12/28/17





  18:52 20:20 21:23


 


WBC   


 


MCV   


 


Seg Neuts % (Manual)   


 


Lymphocytes % (Manual)   


 


Seg Neutrophils # Man   


 


Lymphocytes # (Manual)   


 


PT   


 


INR   


 


POC ABG pH   


 


POC ABG pCO2   


 


POC ABG pO2   


 


Sodium  148 H  


 


Potassium   


 


Chloride   


 


Carbon Dioxide  < 2.0 L*  


 


BUN  41 H  


 


Creatinine  1.4 H  


 


Glucose  473 H  


 


POC Glucose   356 H  322 H


 


Hemoglobin A1c   


 


Lactic Acid   


 


Calcium  7.8 L  


 


Phosphorus   


 


Magnesium   


 


C-Reactive Protein   


 


Total Protein   


 


Albumin   














  12/28/17 12/28/17 12/28/17





  22:28 23:01 23:31


 


WBC   


 


MCV   


 


Seg Neuts % (Manual)   


 


Lymphocytes % (Manual)   


 


Seg Neutrophils # Man   


 


Lymphocytes # (Manual)   


 


PT   


 


INR   


 


POC ABG pH   


 


POC ABG pCO2   


 


POC ABG pO2   


 


Sodium   148 H 


 


Potassium   


 


Chloride   111.6 H 


 


Carbon Dioxide   4 L* 


 


BUN   43 H 


 


Creatinine   1.3 H 


 


Glucose   177 H 


 


POC Glucose  242 H   204 H


 


Hemoglobin A1c   


 


Lactic Acid   


 


Calcium   7.7 L 


 


Phosphorus   


 


Magnesium   


 


C-Reactive Protein   


 


Total Protein   


 


Albumin   














  12/29/17 12/29/17 12/29/17





  01:20 03:30 04:01


 


WBC   


 


MCV   


 


Seg Neuts % (Manual)   


 


Lymphocytes % (Manual)   


 


Seg Neutrophils # Man   


 


Lymphocytes # (Manual)   


 


PT   


 


INR   


 


POC ABG pH   


 


POC ABG pCO2   


 


POC ABG pO2   


 


Sodium   


 


Potassium   


 


Chloride   


 


Carbon Dioxide   


 


BUN   


 


Creatinine   


 


Glucose   


 


POC Glucose  120 H  59 L  132 H


 


Hemoglobin A1c   


 


Lactic Acid   


 


Calcium   


 


Phosphorus   


 


Magnesium   


 


C-Reactive Protein   


 


Total Protein   


 


Albumin   














  12/29/17 12/29/17 12/29/17





  04:20 04:20 05:15


 


WBC   


 


MCV   


 


Seg Neuts % (Manual)   


 


Lymphocytes % (Manual)   


 


Seg Neutrophils # Man   


 


Lymphocytes # (Manual)   


 


PT   


 


INR   


 


POC ABG pH   


 


POC ABG pCO2   


 


POC ABG pO2   


 


Sodium  151 H  150 H 


 


Potassium   


 


Chloride  114.3 H  115.4 H 


 


Carbon Dioxide  10 L  10 L 


 


BUN  48 H  48 H 


 


Creatinine   1.3 H 


 


Glucose  121 H  118 H 


 


POC Glucose    134 H


 


Hemoglobin A1c   


 


Lactic Acid   


 


Calcium  7.8 L  7.8 L 


 


Phosphorus   


 


Magnesium   


 


C-Reactive Protein   


 


Total Protein   


 


Albumin   














  12/29/17 12/29/17 12/29/17





  06:41 07:49 08:12


 


WBC   


 


MCV   


 


Seg Neuts % (Manual)   


 


Lymphocytes % (Manual)   


 


Seg Neutrophils # Man   


 


Lymphocytes # (Manual)   


 


PT   


 


INR   


 


POC ABG pH   


 


POC ABG pCO2   


 


POC ABG pO2   


 


Sodium   151 H 


 


Potassium   


 


Chloride   116.5 H 


 


Carbon Dioxide   13 L 


 


BUN   52 H 


 


Creatinine   1.3 H 


 


Glucose   213 H 


 


POC Glucose  172 H   228 H


 


Hemoglobin A1c   


 


Lactic Acid   


 


Calcium   7.9 L 


 


Phosphorus   


 


Magnesium   


 


C-Reactive Protein   


 


Total Protein   


 


Albumin   














  12/29/17 12/29/17 12/29/17





  08:22 09:31 11:04


 


WBC   


 


MCV   


 


Seg Neuts % (Manual)   


 


Lymphocytes % (Manual)   


 


Seg Neutrophils # Man   


 


Lymphocytes # (Manual)   


 


PT   


 


INR   


 


POC ABG pH   


 


POC ABG pCO2   


 


POC ABG pO2   


 


Sodium   


 


Potassium   


 


Chloride   


 


Carbon Dioxide   


 


BUN   


 


Creatinine   


 


Glucose   


 


POC Glucose  140 H  140 H  136 H


 


Hemoglobin A1c   


 


Lactic Acid   


 


Calcium   


 


Phosphorus   


 


Magnesium   


 


C-Reactive Protein   


 


Total Protein   


 


Albumin   














  12/29/17 12/29/17 12/29/17





  12:11 13:40 17:21


 


WBC   


 


MCV   


 


Seg Neuts % (Manual)   


 


Lymphocytes % (Manual)   


 


Seg Neutrophils # Man   


 


Lymphocytes # (Manual)   


 


PT   


 


INR   


 


POC ABG pH   


 


POC ABG pCO2   


 


POC ABG pO2   


 


Sodium   152 H 


 


Potassium   


 


Chloride   117.6 H 


 


Carbon Dioxide   15 L 


 


BUN   50 H 


 


Creatinine   1.3 H 


 


Glucose   144 H 


 


POC Glucose  111 H   182 H


 


Hemoglobin A1c   


 


Lactic Acid   


 


Calcium   


 


Phosphorus   


 


Magnesium   


 


C-Reactive Protein   


 


Total Protein   


 


Albumin   














  12/29/17 12/29/17 12/30/17





  17:45 23:06 00:39


 


WBC   


 


MCV   


 


Seg Neuts % (Manual)   


 


Lymphocytes % (Manual)   


 


Seg Neutrophils # Man   


 


Lymphocytes # (Manual)   


 


PT   


 


INR   


 


POC ABG pH   


 


POC ABG pCO2   


 


POC ABG pO2   


 


Sodium  149 H  


 


Potassium   


 


Chloride  116.2 H  


 


Carbon Dioxide  13 L  


 


BUN  48 H  


 


Creatinine  1.3 H  


 


Glucose  171 H  


 


POC Glucose   < 40 L  123 H


 


Hemoglobin A1c   


 


Lactic Acid   


 


Calcium  8.1 L  


 


Phosphorus   


 


Magnesium   


 


C-Reactive Protein   


 


Total Protein   


 


Albumin   














  12/30/17 12/30/17 12/30/17





  07:07 07:07 11:35


 


WBC   


 


MCV   


 


Seg Neuts % (Manual)   


 


Lymphocytes % (Manual)   


 


Seg Neutrophils # Man   


 


Lymphocytes # (Manual)   


 


PT   


 


INR   


 


POC ABG pH   


 


POC ABG pCO2   


 


POC ABG pO2   


 


Sodium   152 H 


 


Potassium   3.0 L D 


 


Chloride   121.9 H 


 


Carbon Dioxide   18 L 


 


BUN   44 H 


 


Creatinine   


 


Glucose   


 


POC Glucose    118 H


 


Hemoglobin A1c   


 


Lactic Acid   


 


Calcium   


 


Phosphorus  1.50 L  


 


Magnesium   


 


C-Reactive Protein   


 


Total Protein   


 


Albumin   














  12/30/17 12/30/17 12/30/17





  16:54 19:44 19:49


 


WBC   


 


MCV   


 


Seg Neuts % (Manual)   


 


Lymphocytes % (Manual)   


 


Seg Neutrophils # Man   


 


Lymphocytes # (Manual)   


 


PT   


 


INR   


 


POC ABG pH   


 


POC ABG pCO2    28.9 L


 


POC ABG pO2   


 


Sodium   


 


Potassium   


 


Chloride   


 


Carbon Dioxide   


 


BUN   


 


Creatinine   


 


Glucose   


 


POC Glucose  231 H  


 


Hemoglobin A1c   


 


Lactic Acid   


 


Calcium   


 


Phosphorus   


 


Magnesium   


 


C-Reactive Protein   4.70 H 


 


Total Protein   


 


Albumin   














  12/30/17 12/31/17 12/31/17





  21:48 06:25 12:35


 


WBC   


 


MCV   


 


Seg Neuts % (Manual)   


 


Lymphocytes % (Manual)   


 


Seg Neutrophils # Man   


 


Lymphocytes # (Manual)   


 


PT   


 


INR   


 


POC ABG pH   


 


POC ABG pCO2   


 


POC ABG pO2   


 


Sodium   


 


Potassium   


 


Chloride   


 


Carbon Dioxide   


 


BUN   


 


Creatinine   


 


Glucose   


 


POC Glucose  176 H  299 H  258 H


 


Hemoglobin A1c   


 


Lactic Acid   


 


Calcium   


 


Phosphorus   


 


Magnesium   


 


C-Reactive Protein   


 


Total Protein   


 


Albumin   














  12/31/17





  16:43


 


WBC 


 


MCV 


 


Seg Neuts % (Manual) 


 


Lymphocytes % (Manual) 


 


Seg Neutrophils # Man 


 


Lymphocytes # (Manual) 


 


PT 


 


INR 


 


POC ABG pH 


 


POC ABG pCO2 


 


POC ABG pO2 


 


Sodium 


 


Potassium 


 


Chloride 


 


Carbon Dioxide 


 


BUN 


 


Creatinine 


 


Glucose 


 


POC Glucose  252 H


 


Hemoglobin A1c 


 


Lactic Acid 


 


Calcium 


 


Phosphorus 


 


Magnesium 


 


C-Reactive Protein 


 


Total Protein 


 


Albumin

## 2017-12-31 NOTE — CONSULTATION
PULMONARY CRITICAL CARE CONSULT



CONSULTING PHYSICIAN:  Dr. Dawson.



REASON FOR CONSULTATION:  Altered mental status, DKA.



HISTORY OF PRESENT ILLNESS:  The patient is a 37-year-old -American

female with past medical history, I think significant in this context both for a

diagnosis of cerebrovascular accident and seizure disorder, but then on top of

diabetes, insulin-dependent, brought into the Emergency Room after being found

unresponsive.  She had last been seen the day before.  The patient was unable to

give more history.  She was saturating well, not in severe respiratory extremis.

 Further evaluation and testing revealed that she, I believe, was diagnosed with

diabetic ketoacidosis.  The ICU consult was for IV insulin therapy, but also for

altered mental status.  When I stopped by to see her, she was resting in bed,

unclear how far she was from her baseline.  She will appear to answer some

questions well by nodding, like for example question about chest pain and then

another time, she is staring into space, certainly with residual encephalopathy.

 I do not have any history of emesis or overt aspiration.  It is unclear if she

had been taking her medications at home.  This is unfortunately as much of the

history of presentation as I have.  She is a never smoker.



PAST MEDICAL HISTORY:  Significant for hypertension, cerebrovascular accident,

diabetes, seizure disorder.



PAST SURGICAL HISTORY:  She has had spinal surgery.  She has had neck surgery,

nature of this surgery is unknown.



MEDICATIONS:  She was on at the time I stopped by to see her were reviewed. 

Pertinent medications included aspirin 81 mg p.o. daily, Lipitor 80 mg p.o. at

bedtime, Celexa 10 mg p.o. daily, Plavix 75 mg p.o. daily, p.r.n. Flexeril.  She

had an insulin drip, I believe it was going at 4 units an hour, nifedipine 30 mg

p.o. daily and Zosyn 3.375 g IV q.6 h.



ALLERGIES:  No known drug allergies.



DIET:  Well-built lady on the thin side, acute weight loss or gain history is

unknown.



FAMILY AND SOCIAL HISTORY:  Apparently lives in the community, described as a

never smoker.  Alcohol, illicit drug use or abuse history is unknown.



REVIEW OF SYSTEMS:  Unobtainable secondary to patient's medical and mental

condition.  Since she has been in the hospital, no gross hematochezia or melena,

no gross hematuria, no hematemesis, no hemoptysis, no witnessed seizures.



PHYSICAL EXAMINATION:

VITAL SIGNS:  At presentation in the Emergency Room, the first vital sign I see

is a low-grade fever.  Actually, she was afebrile at the time, 98.9.  At

presentation, her pulse was 94, respiratory rate was 19, blood pressure 115/67,

oxygen sats were 100%.  Inspired oxygen concentration was not recorded.  She was

on room air at the time I saw her.

GENERAL:  She is a well-built, young -American female, looks her stated

age, normocephalic, atraumatic without overt respiratory distress.

HEAD, EYES, EARS, NOSE AND THROAT:  She is anicteric.  No conjunctival erythema.

 No gross jugular venous distention, no thyromegaly.  Oropharynx appears to be

moist.  It is difficult to get her to open it up.  Grossly, no palpable lymph

nodes in the supraclavicular or submandibular lymph node chains.

LUNGS:  Auscultation of both lung fields are unremarkable.  Lungs are clear

bilaterally with good bilateral air movement.

HEART:  Heart sounds 1 and 2 are heard.  They were regular in rate and rhythm at

the time of my evaluation.

ABDOMEN:  Soft, flat.  Bowel sounds are positive, nontender.  No palpable

hepatosplenomegaly.

EXTREMITIES:  Without overt digital clubbing, cyanosis, or pedal edema. 

Dorsalis pedis pulses are palpable bilaterally.

SKIN:  Normal turgor.  No cellulitis, no rash.

NEUROLOGIC:.  The right eye appears to have a cataract.  There is some slight

corneal emulsification.  The left pupil is about 4 mm, reactive to light. 

Extraocular muscle movements could not be assessed.  She has a left-sided

hemiparesis as far as I can tell, as she is moving only the right side.



LABORATORY DATA:  From my review are as follows:  Admission white count 20,100

with a hemoglobin of 12.4, hematocrit of 41.3 and a platelet count of 197.  INR

is 1.25, ABG showed a pH of 6.86 at presentation with a pCO2 of 12, pO2 of 288

and that was on a nonrebreather, it seems like.  Serum sodium was 143, potassium

5.7, chloride 100, bicarbonate was less than 2, BUN 39, creatinine 1.3, glucose

585.  Lactic acid level 2.9, mag and phos were elevated.  Liver function test

essentially within normal limits; otherwise, albumin was 3.0.  Urinalysis,

moderate blood, negative nitrites and leukocyte esterase.  Urine drug screen

negative.  Radiographic studies have been reviewed.  A chest x-ray was done.  It

is an unremarkable chest x-ray except for some mild element of scoliosis.  A CT

scan of the head was done.  I have reviewed the radiologist's report.  It

mentions mild dilatation of the lateral ventricles, possible hydrocephalus,

brain parenchyma itself was within normal limits.  Microbiology studies, no

growth to date.



ASSESSMENT AND PLAN:

1.  Acute encephalopathy, likely on chronic.

2.  Diabetic ketoacidosis with severe metabolic acidosis and lactic acidosis.

3.  Systemic inflammatory response syndrome.

4.  History of cerebrovascular accident.

5.  Hypertension.

6.  History of seizures.



PLAN:  She is already responding to IV insulin therapy.  We will continue the

DKA protocol.  Mental status is getting closer to baseline.  I did run into a

patient in the hospital who says that she is her sister and it seems like this

patient's mental status is getting close to the baseline.  Continue empiric

antibiotic therapy, deescalate based on results of microbiologic and clinical

data.  Continue volume resuscitation.  Follow electrolytes, correct as

necessary.  Neurology evaluation is ongoing.  Aspiration precautions should be

maintained.  I will repeat the arterial blood gas just to ensure that we are

indeed headed in the right direction.  She will be placed on GI prophylaxis as

well as DVT prophylaxis.  Flu and pneumonia vaccination will be per protocol.



Thank you very much for the consult.  If she does get off the IV insulin

therapy, she will be transferred to the telemetry floor.  If not, we will be

glad to accept her into the intensive care unit.  We will follow along and make

further recommendations as picture progresses/becomes clearer.  I should mention

she has been started on IV bicarbonate replacement therapy in the short term and

we will quickly transition to regular DKA protocol electrolytes.





DD: 12/30/2017 18:58

DT: 12/31/2017 18:41

JOB# [id number]  [id number]

AJM/SHELBY

## 2017-12-31 NOTE — MAGNETIC RESONANCE REPORT
MRI scan of brain:



History: Seizures.



Technique: Multisequence images were obtained without contrast 

injection.



Findings:



Multiple focal areas of restricted diffusion is identified in the right 

basal ganglia and in right temporoparietal region. There is dilated 

adjacent right lateral ventricle body however the anterior horn appears 

normal. The occipital horn appears dilated. The 3rd ventricle appears 

normal. No midline shift.



Corresponding to areas of restricted diffusion flair imaging reveals 

areas of increased signal intensity. Additionally periventricular areas 

of increased signal intensity are noted. No abnormalities are 

identified on T1-weighted images. No evidence of hemorrhage on gradient 

images.



Brainstem and cerebellum and sinuses and sinuses are normal. Increased 

signal intensity at right mastoid air cells suggestive of mastoiditis.



Impression: 



Multiple focal areas of restricted diffusion right cerebral hemisphere 

suggestive acute ischemia. No evidence of hemorrhage. Dilated right 

lateral ventricle without definite evidence of obstruction.

Acute right mastoiditis.

## 2017-12-31 NOTE — PROGRESS NOTE
Assessment and Plan


Assessment and plan: 


Patient is 37-year-old woman with a history of insulin-dependent diabetes 

mellitus, CVA on aspirin and Plavix, hypertension, dyslipidemia and depression 

who presented with altered mental status and was admitted for DKA.  Do not know 

her baseline mental status, so I called person to notify in the chart, who 

happens to be her sister, Cindy Goodson who reports that patient was 

discharged from [facility] about 1.5 months ago and went to [facility]. Then, she went to her home. She lives with daughter Ej and 

brother, Gonzalo, who moved in with her for more support. Her baseline mental 

function: she walks with a walker, she feeds herself, needs help with clothes, 

left arm weaker than right and speech is usually slurred. "I wanna say she wasn'

t talking her insulin, because this 3rd stoke did alittle mentally and she more 

forgettable, that is what I am thinking, for example, she will say, I need to 

take my insulin but she had just took her insulin." They went to [place], on 

the way back Wednesday night 11pm, she was sleeping off and on, BG was 516 and 

she took her insulin. Her daughter, Ej, said next morning she got up 

Thursday morning to go to the bathroom and she fell and then was unresponsive, 

she was found on the floor in urine by daughter, Ej. 





-DKA with acute metabolic acidosis, high anion gap: Treat with IV fluids, 

insulin drip, serial BMP


-Acute metabolic encephalopathy/semiconscious state: treat the above


-Recurrent ischemic strokes. ?hydrocephalus on Ct head: consulted Neurology


-Leukocytosis with SIRs, non infectious, cxr no acute finding, more likely 

reactive-->Sepsis due to acute right mastoiditis, poa, start iv abx 


-Hypernatremia: needs more free water, bmp


-Moderate malnutrition: dietician


-Acute ischemic stroke, poa: get ECHO, used stroke protocol





12/30/17: Insulin drip was stopped because of hypoglycemia and patient was sent 

from ED to medical/surg floor. Anion gap was still high so I sent ICU to start 

insulin drip. I also ordered bmp stat. By the time the stat bmp was resulted, 

patient was in the icu. Anion gap has closed, so i will downgrade back to 

medical surg floor. Replace her potassium, start diet, if she unable to eat 

continue D5W due to hypernatremia and not eating. Await neurology assessment 

because she is not at her mental baseline of slurred speech and ambulation with 

a walker. Order PT/OT





12/31/17: I gave pt 0.5mg iv ativan x 1 to complete mri brain that Neurology 

ordered, MRI brain reported as multiple focal areas of restricted diffusion 

right cerebral hemisphere suggestive excuse ischemia, no evidence of hemorrhage

, dilated right lateral ventricle without definite evidence of obstruction, 

acute right mastoiditis. I started abx, iv rocephin, used stroke protocol





The high probability of a clinically significant, sudden or life threatening 

deterioration of the [neurologic,cardiac] system(s) required my full and direct 

attention, intervention and personal management. The aggregate critical care 

time was [40] minutes. This time is in addition to time spent performing 

reported procedures but includes the following: [x] Data Review and 

interpretation [x] Patient assessment and monitoring of vital signs [x] 

Documentation [x] Medication orders and management








History


Interval history: 


Patient was seen and examined.  Follow-up on current diagnosis/altered mental 

status.  Overnight uneventful.  Patient not given any history; therefore,  

Imaging, nursing note, chart, labs and old chart reviewed.  Responding more. 





Hospitalist Physical





- Physical exam


Narrative exam: 


GEN: Thin frail woman chronic a bit debilitating appearing, lethargic, nonverbal


HEENT: NCAT, EOMI, PERRL, OP Clear


NECK: supple, no adenopathy, no thyromegaly, no JVD


CVS/HEART: regular tachycardia NORMAL S1S2, NO JVD, pulses present bilaterally


CHEST/LUNGS: CTA B, Symmetrical chest expansion, good air entry bilaterally


GI/Abdomen: soft, NTND, good bowel sounds, no guarding or rebound


/Bladder: no suprapubic tenderness, no CVA or paraspinal tenderness


EXT/Skin: no c/c/e, no obvious rash


MSK: Moving both arms with contractures 


Neuro: CN 2-12 grossly intact except nonverbal, hemiparesis, facial asymmetry, 

no new focal deficits


Psych: calm but confused








- Constitutional


Vitals: 


 











Temp Pulse Resp BP Pulse Ox


 


 99.9 F H  114 H  18   154/99   98 


 


 12/31/17 15:22  12/31/17 15:22  12/31/17 15:22  12/31/17 15:22  12/31/17 15:22











General appearance: Present: well-nourished.  Absent: mild distress





Results





- Labs


CBC & Chem 7: 


 12/28/17 12:26





 12/30/17 07:07


Labs: 


 Laboratory Last Values











WBC  20.1 K/mm3 (4.5-11.0)  H  12/28/17  12:26    


 


RBC  4.10 M/mm3 (3.65-5.03)   12/28/17  12:26    


 


Hgb  12.4 gm/dl (10.1-14.3)   12/28/17  12:26    


 


Hct  41.3 % (30.3-42.9)   12/28/17  12:26    


 


MCV  101 fl (79-97)  H  12/28/17  12:26    


 


MCH  30 pg (28-32)   12/28/17  12:26    


 


MCHC  30 % (30-34)   12/28/17  12:26    


 


RDW  13.7 % (13.2-15.2)   12/28/17  12:26    


 


Plt Count  197 K/mm3 (140-440)   12/28/17  12:26    


 


Add Manual Diff  Complete   12/28/17  12:26    


 


Total Counted  100   12/28/17  12:26    


 


Seg Neutrophils %  Np   12/28/17  12:26    


 


Seg Neuts % (Manual)  90.0 % (40.0-70.0)  H  12/28/17  12:26    


 


Band Neutrophils %  4.0 %  12/28/17  12:26    


 


Lymphocytes % (Manual)  2.0 % (13.4-35.0)  L  12/28/17  12:26    


 


Reactive Lymphs % (Man)  0 %  12/28/17  12:26    


 


Monocytes % (Manual)  4.0 % (0.0-7.3)   12/28/17  12:26    


 


Eosinophils % (Manual)  0 % (0.0-4.3)   12/28/17  12:26    


 


Basophils % (Manual)  0 % (0.0-1.8)   12/28/17  12:26    


 


Metamyelocytes %  0 %  12/28/17  12:26    


 


Myelocytes %  0 %  12/28/17  12:26    


 


Promyelocytes %  0 %  12/28/17  12:26    


 


Blast Cells %  0 %  12/28/17  12:26    


 


Nucleated RBC %  Not Reportable   12/28/17  12:26    


 


Seg Neutrophils # Man  18.1 K/mm3 (1.8-7.7)  H  12/28/17  12:26    


 


Band Neutrophils #  0.8 K/mm3  12/28/17  12:26    


 


Lymphocytes # (Manual)  0.4 K/mm3 (1.2-5.4)  L  12/28/17  12:26    


 


Abs React Lymphs (Man)  0.0 K/mm3  12/28/17  12:26    


 


Monocytes # (Manual)  0.8 K/mm3 (0.0-0.8)   12/28/17  12:26    


 


Eosinophils # (Manual)  0.0 K/mm3 (0.0-0.4)   12/28/17  12:26    


 


Basophils # (Manual)  0.0 K/mm3 (0.0-0.1)   12/28/17  12:26    


 


Metamyelocytes #  0.0 K/mm3  12/28/17  12:26    


 


Myelocytes #  0.0 K/mm3  12/28/17  12:26    


 


Promyelocytes #  0.0 K/mm3  12/28/17  12:26    


 


Blast Cells #  0.0 K/mm3  12/28/17  12:26    


 


WBC Morphology  Not Reportable   12/28/17  12:26    


 


Hypersegmented Neuts  Not Reportable   12/28/17  12:26    


 


Hyposegmented Neuts  Not Reportable   12/28/17  12:26    


 


Hypogranular Neuts  Not Reportable   12/28/17  12:26    


 


Smudge Cells  Not Reportable   12/28/17  12:26    


 


Toxic Granulation  Not Reportable   12/28/17  12:26    


 


Toxic Vacuolation  Not Reportable   12/28/17  12:26    


 


Dohle Bodies  Not Reportable   12/28/17  12:26    


 


Pelger-Huet Anomaly  Not Reportable   12/28/17  12:26    


 


Yuly Rods  Not Reportable   12/28/17  12:26    


 


Platelet Estimate  Not Reportable   12/28/17  12:26    


 


Clumped Platelets  Not Reportable   12/28/17  12:26    


 


Plt Clumps, EDTA  Not Reportable   12/28/17  12:26    


 


Large Platelets  Not Reportable   12/28/17  12:26    


 


Giant Platelets  Not Reportable   12/28/17  12:26    


 


Platelet Satelliting  Not Reportable   12/28/17  12:26    


 


Plt Morphology Comment  Not Reportable   12/28/17  12:26    


 


RBC Morphology  Normal   12/28/17  12:26    


 


Dimorphic RBCs  Not Reportable   12/28/17  12:26    


 


Polychromasia  Not Reportable   12/28/17  12:26    


 


Hypochromasia  Not Reportable   12/28/17  12:26    


 


Poikilocytosis  Not Reportable   12/28/17  12:26    


 


Anisocytosis  Not Reportable   12/28/17  12:26    


 


Microcytosis  Not Reportable   12/28/17  12:26    


 


Macrocytosis  Not Reportable   12/28/17  12:26    


 


Spherocytes  Not Reportable   12/28/17  12:26    


 


Pappenheimer Bodies  Not Reportable   12/28/17  12:26    


 


Sickle Cells  Not Reportable   12/28/17  12:26    


 


Target Cells  Not Reportable   12/28/17  12:26    


 


Tear Drop Cells  Not Reportable   12/28/17  12:26    


 


Ovalocytes  Not Reportable   12/28/17  12:26    


 


Helmet Cells  Not Reportable   12/28/17  12:26    


 


Acosta-Martha Bodies  Not Reportable   12/28/17  12:26    


 


Cabot Rings  Not Reportable   12/28/17  12:26    


 


[place] Cells  Not Reportable   12/28/17  12:26    


 


Bite Cells  Not Reportable   12/28/17  12:26    


 


Crenated Cell  Not Reportable   12/28/17  12:26    


 


Elliptocytes  Not Reportable   12/28/17  12:26    


 


Acanthocytes (Spur)  Not Reportable   12/28/17  12:26    


 


Rouleaux  Not Reportable   12/28/17  12:26    


 


Hemoglobin C Crystals  Not Reportable   12/28/17  12:26    


 


Schistocytes  Not Reportable   12/28/17  12:26    


 


Malaria parasites  Not Reportable   12/28/17  12:26    


 


Juliano Bodies  Not Reportable   12/28/17  12:26    


 


Hem Pathologist Commnt  No   12/28/17  12:26    


 


PT  16.4 Sec. (12.2-14.9)  H  12/28/17  12:26    


 


INR  1.25  (0.87-1.13)  H  12/28/17  12:26    


 


POC ABG pH  7.389  (7.35-7.45)   12/30/17  19:49    


 


POC ABG pCO2  28.9  (35-45)  L  12/30/17  19:49    


 


POC ABG pO2  98  ()   12/30/17  19:49    


 


POC ABG HCO3  17.4   12/30/17  19:49    


 


POC ABG Total CO2  18   12/30/17  19:49    


 


POC ABG O2 Sat  98   12/30/17  19:49    


 


POC ABG Base Excess  -8   12/30/17  19:49    


 


FiO2  21 %  12/30/17  19:49    


 


Sodium  152 mmol/L (137-145)  H  12/30/17  07:07    


 


Potassium  3.0 mmol/L (3.6-5.0)  L D 12/30/17  07:07    


 


Chloride  121.9 mmol/L ()  H  12/30/17  07:07    


 


Carbon Dioxide  18 mmol/L (22-30)  L  12/30/17  07:07    


 


Anion Gap  15 mmol/L  12/30/17  07:07    


 


BUN  44 mg/dL (7-17)  H  12/30/17  07:07    


 


Creatinine  1.0 mg/dL (0.7-1.2)   12/30/17  07:07    


 


Estimated GFR  > 60 ml/min  12/30/17  07:07    


 


BUN/Creatinine Ratio  44 %  12/30/17  07:07    


 


Glucose  94 mg/dL ()   12/30/17  07:07    


 


POC Glucose  252  ()  H  12/31/17  16:43    


 


Hemoglobin A1c  10.0 % (4-6)  H  12/28/17  17:02    


 


Lactic Acid  1.80 mmol/L (0.7-2.0)   12/30/17  19:44    


 


Calcium  8.4 mg/dL (8.4-10.2)   12/30/17  07:07    


 


Phosphorus  1.50 mg/dL (2.5-4.5)  L  12/30/17  07:07    


 


Magnesium  2.00 mg/dL (1.7-2.3)   12/30/17  07:07    


 


Total Bilirubin  0.30 mg/dL (0.1-1.2)   12/28/17  13:44    


 


Direct Bilirubin  0.2 mg/dL (0-0.2)   12/28/17  13:44    


 


Indirect Bilirubin  0.1 mg/dL  12/28/17  13:44    


 


AST  15 units/L (5-40)   12/28/17  13:44    


 


ALT  11 units/L (7-56)   12/28/17  13:44    


 


Alkaline Phosphatase  89 units/L ()   12/28/17  13:44    


 


C-Reactive Protein  4.70 mg/dL (0.00-1.30)  H  12/30/17  19:44    


 


Total Protein  5.8 g/dL (6.3-8.2)  L  12/28/17  13:44    


 


Albumin  3.0 g/dL (3.9-5)  L  12/28/17  13:44    


 


Albumin/Globulin Ratio  1.1 %  12/28/17  13:44    


 


HCG, Qual  Negative  (Negative)   12/30/17  15:05    


 


Urine Color  Yellow  (Yellow)   12/28/17  14:28    


 


Urine Turbidity  Clear  (Clear)   12/28/17  14:28    


 


Urine pH  5.0  (5.0-7.0)   12/28/17  14:28    


 


Ur Specific Gravity  1.016  (1.003-1.030)   12/28/17  14:28    


 


Urine Protein  100 mg/dl mg/dL (Negative)   12/28/17  14:28    


 


Urine Glucose (UA)  >=500 mg/dL (Negative)   12/28/17  14:28    


 


Urine Ketones  80 mg/dL (Negative)   12/28/17  14:28    


 


Urine Blood  Mod  (Negative)   12/28/17  14:28    


 


Urine Nitrite  Neg  (Negative)   12/28/17  14:28    


 


Urine Bilirubin  Neg  (Negative)   12/28/17  14:28    


 


Urine Urobilinogen  < 2.0 mg/dL (<2.0)   12/28/17  14:28    


 


Ur Leukocyte Esterase  Neg  (Negative)   12/28/17  14:28    


 


Urine WBC (Auto)  2.0 /HPF (0.0-6.0)   12/28/17  14:28    


 


Urine RBC (Auto)  < 1.0 /HPF (0.0-6.0)   12/28/17  14:28    


 


U Epithel Cells (Auto)  < 1.0 /HPF (0-13.0)   12/28/17  14:28    


 


Urine Bacteria (Auto)  1+ /HPF (Negative)   12/28/17  14:28    


 


Urine Opiates Screen  Presumptive negative   12/28/17  14:28    


 


Urine Methadone Screen  Presumptive negative   12/28/17  14:28    


 


Ur Barbiturates Screen  Presumptive negative   12/28/17  14:28    


 


Ur Phencyclidine Scrn  Presumptive negative   12/28/17  14:28    


 


Ur Amphetamines Screen  Presumptive negative   12/28/17  14:28    


 


U Benzodiazepines Scrn  Presumptive negative   12/28/17  14:28    


 


Urine Cocaine Screen  Presumptive negative   12/28/17  14:28    


 


U Marijuana (THC) Screen  Presumptive negative   12/28/17  14:28    


 


Drugs of Abuse Note  Disclamer   12/28/17  14:28

## 2018-01-01 LAB
BUN SERPL-MCNC: 24 MG/DL (ref 7–17)
BUN/CREAT SERPL: 34 %
CALCIUM SERPL-MCNC: 8.3 MG/DL (ref 8.4–10.2)
HCT VFR BLD CALC: 27 % (ref 30.3–42.9)
HDLC SERPL-MCNC: 88 MG/DL (ref 40–59)
HEMOLYSIS INDEX: 4
HGB BLD-MCNC: 9 GM/DL (ref 10.1–14.3)
MCH RBC QN AUTO: 31 PG (ref 28–32)
MCHC RBC AUTO-ENTMCNC: 34 % (ref 30–34)
MCV RBC AUTO: 93 FL (ref 79–97)
PLATELET # BLD: 225 K/MM3 (ref 140–440)
RBC # BLD AUTO: 2.9 M/MM3 (ref 3.65–5.03)

## 2018-01-01 RX ADMIN — PIPERACILLIN SODIUM AND TAZOBACTAM SODIUM SCH: 3; .375 INJECTION, POWDER, LYOPHILIZED, FOR SOLUTION INTRAVENOUS at 07:10

## 2018-01-01 RX ADMIN — POTASSIUM CHLORIDE SCH MLS/HR: 2 INJECTION, SOLUTION, CONCENTRATE INTRAVENOUS at 02:39

## 2018-01-01 RX ADMIN — PIPERACILLIN SODIUM AND TAZOBACTAM SODIUM SCH MLS/HR: 3; .375 INJECTION, POWDER, LYOPHILIZED, FOR SOLUTION INTRAVENOUS at 01:42

## 2018-01-01 RX ADMIN — CEFTRIAXONE SODIUM SCH: 10 INJECTION, POWDER, FOR SOLUTION INTRAVENOUS at 18:30

## 2018-01-01 RX ADMIN — INSULIN ASPART SCH UNITS: 100 INJECTION, SOLUTION INTRAVENOUS; SUBCUTANEOUS at 10:33

## 2018-01-01 RX ADMIN — FAMOTIDINE SCH: 10 INJECTION, SOLUTION INTRAVENOUS at 16:58

## 2018-01-01 RX ADMIN — INSULIN ASPART SCH UNITS: 100 INJECTION, SOLUTION INTRAVENOUS; SUBCUTANEOUS at 06:56

## 2018-01-01 RX ADMIN — INSULIN ASPART SCH UNITS: 100 INJECTION, SOLUTION INTRAVENOUS; SUBCUTANEOUS at 03:01

## 2018-01-01 RX ADMIN — CYCLOBENZAPRINE PRN MG: 10 TABLET, FILM COATED ORAL at 22:22

## 2018-01-01 RX ADMIN — ASPIRIN SCH: 300 SUPPOSITORY RECTAL at 10:31

## 2018-01-01 RX ADMIN — INSULIN ASPART SCH UNITS: 100 INJECTION, SOLUTION INTRAVENOUS; SUBCUTANEOUS at 18:35

## 2018-01-01 RX ADMIN — CLOPIDOGREL BISULFATE SCH: 75 TABLET ORAL at 10:35

## 2018-01-01 RX ADMIN — NIFEDIPINE SCH: 30 TABLET, FILM COATED, EXTENDED RELEASE ORAL at 11:35

## 2018-01-01 RX ADMIN — INSULIN ASPART SCH UNITS: 100 INJECTION, SOLUTION INTRAVENOUS; SUBCUTANEOUS at 14:34

## 2018-01-01 RX ADMIN — PIPERACILLIN SODIUM AND TAZOBACTAM SODIUM SCH: 3; .375 INJECTION, POWDER, LYOPHILIZED, FOR SOLUTION INTRAVENOUS at 18:30

## 2018-01-01 RX ADMIN — CYCLOBENZAPRINE PRN MG: 10 TABLET, FILM COATED ORAL at 17:36

## 2018-01-01 RX ADMIN — PIPERACILLIN SODIUM AND TAZOBACTAM SODIUM SCH: 3; .375 INJECTION, POWDER, LYOPHILIZED, FOR SOLUTION INTRAVENOUS at 12:57

## 2018-01-01 NOTE — CONSULTATION
HISTORY OF PRESENT ILLNESS:  This is a 37-year-old female that presents to

[facility] and she presents with altered mental status. 

She is a longstanding diabetic type 1, onset of disorder at age 8.  By history

from the family, she about a year ago had developed rather sudden onset of

blindness.  She underwent a workup at that point.  She for the last several days

has not been acting like herself, not speaking, not organizing her words.  There

has been no history of fever.  Her diabetes is managed by herself, although was

clear from her sisters that she is not under really a direct aided observation

by a qualified caregiver because the children are young ages 9 and 7.  We do not

exactly know when she initially stop being less talkative.  From my review of

the records, she was not responding.  She was last normal, the day before.  She

was breathing about 20 times a minute.  Her oxygen saturations were 98%.  She

had diminished responsiveness and altered mental status on exam.  She had been

taking a number of medications on admission, although I do not notice that she

was taking anything that was a strict sedative or antidepressant.  Her dose of

Celexa was 10 mg.  Her dose of Flexeril was 10 mg 2 or 3 times a day, which

would in and of themselves not be expected to produce this disorder.



PHYSICAL EXAMINATION:

VITAL SIGNS:  On my examination, present exam reveals the blood pressure to be

152/66, pulse rate is 113, respirations are 18, O2 saturations are 99%.

NEUROLOGIC:  The patient's cranial nerves were intact.  She has equally reactive

pupils.  She moves her left side less than the right.  She does not open her

eyes to voice command.  Her sisters are at the bedside and she has not opened

her eyes to their verbal stimulation either, but she does have occasionally

reposition her right arm only.  The patient has a supple neck.  No jerking or

tremors are present.



IMPRESSION:

1.  Altered mental state.  What is curious is her level of ventricular

enlargement.  This to me appears to be quite chronic.  I do not think there is

any acute nature of this in and of itself.  I would not expect this to produce

this level of diminished responsivity.  I would for the time being hold her

Celexa and hold her Flexeril.  I have reviewed her labs and her creatinine is

1.3.  We can exclude that as being a causation of the problem.  Her most recent

glucoses are 171, but I cannot rule out absolutely that she had hypoglycemia

because of noticing on a record that 1 blood sugar check was less than 40 and so

what we may be dealing with here is low blood sugar.  Her hematocrit is 43.  Her

white blood count is slightly elevated at 20,000, which may be indicative of

underlying infection.  I will get an MRI scan of the brain to be certain she

does not have any evidence of a more acuteness to this ventricular system

enlargement.  Getting old records would be helpful.  She did at least have some

workup done about a year ago when she went blind from the effect of diabetes.  I

suspect what we are dealing here with is a complication of the diabetes, but if

it needs further workup as to the etiology of enlargement of the ventricular

system.





DD: 12/30/2017 13:21

DT: 01/01/2018 13:38

JOB# [id number]  [id number]

DEVIN/SHELBY

## 2018-01-01 NOTE — PROGRESS NOTE
Assessment and Plan


Assessment and plan: 


Patient is 37-year-old woman with a history of insulin-dependent diabetes 

mellitus, CVA on aspirin and Plavix, hypertension, dyslipidemia and depression 

who presented with altered mental status and was admitted for DKA.  Her 

baseline mental function per sister Cindy: she walks with a walker, she 

feeds herself, needs help with clothes, left arm weaker than right and speech 

is usually slurred. Cindy reports that patient was discharged from [facility] about 1.5 months ago and went to [facility]. Then, she 

went to her home from there. She lives with daughter Ej and brother, 

Gonzalo, who moved in with her for more support.  





per Cindy: "I wanna say she wasn't talking her insulin, because this 3rd 

stoke did alittle mentally and she more forgettable, that is what I am thinking

, for example, she will say, I need to take my insulin but she had just took 

her insulin." They went to [place], on the way back Wednesday night 11pm, she 

was sleeping off and on, BG was 516 and she took her insulin. Her daughter, 

Ej, said next morning she got up Thursday morning to go to the bathroom 

and she fell and then was unresponsive, she was found on the floor in urine by 

daughter, Ej. 





-DKA with acute metabolic acidosis, high anion gap: Treated with IV fluids, 

insulin drip now resolved


-Acute metabolic encephalopathy/semiconscious state: treat the above


-Recurrent Acute ischemic strokes. ?hydrocephalus on Ct head: consulted 

Neurology


--->Sepsis due to acute right mastoiditis, poa, start iv abx 


-Hypernatremia: needs more free water, bmp


-Moderate malnutrition: dietician


-Acute ischemic stroke, poa: get ECHO, used stroke protocol





12/30/17: Insulin drip was stopped because of hypoglycemia and patient was sent 

from ED to medical/surg floor. Anion gap was still high so I sent ICU to start 

insulin drip. I also ordered bmp stat. By the time the stat bmp was resulted, 

patient was in the icu. Anion gap has closed, so i will downgrade back to 

medical surg floor. Replace her potassium, start diet, if she unable to eat 

continue D5W due to hypernatremia and not eating. Await neurology assessment 

because she is not at her mental baseline of slurred speech and ambulation with 

a walker. Order PT/OT





12/31/17: I gave pt 0.5mg iv ativan x 1 to complete mri brain that Neurology 

ordered, MRI brain reported as multiple focal areas of restricted diffusion 

right cerebral hemisphere suggestive excuse ischemia, no evidence of hemorrhage

, dilated right lateral ventricle without definite evidence of obstruction, 

acute right mastoiditis. I started abx, iv rocephin, used stroke protocol





1/1/2018: WBC went down, Still hypernatremic, more free water, repeat labs in 

am. ECHO pending. I called Cindy at [phone], no answer ?phone broken 

message. 








History


Interval history: 


Patient was seen and examined.  Follow-up on current diagnosis/altered mental 

status.  Overnight uneventful.  Patient not given any history; therefore,  

Imaging, nursing note, chart, labs and old chart reviewed.  Responding more. 





Hospitalist Physical





- Physical exam


Narrative exam: 


GEN: Thin frail woman chronic a bit debilitating appearing, lethargic, nonverbal


HEENT: NCAT, EOMI, PERRL, OP Clear


NECK: supple, no adenopathy, no thyromegaly, no JVD


CVS/HEART: regular tachycardia NORMAL S1S2, NO JVD, pulses present bilaterally


CHEST/LUNGS: CTA B, Symmetrical chest expansion, good air entry bilaterally


GI/Abdomen: soft, NTND, good bowel sounds, no guarding or rebound


/Bladder: no suprapubic tenderness, no CVA or paraspinal tenderness


EXT/Skin: no c/c/e, no obvious rash


MSK: Moving both arms with contractures 


Neuro: CN 2-12 grossly intact except nonverbal, hemiparesis, facial asymmetry, 

no new focal deficits


Psych: calm but confused








- Constitutional


Vitals: 


 











Temp Pulse Resp BP Pulse Ox


 


 97.7 F   98 H  20   169/95   100 


 


 01/01/18 11:52  01/01/18 11:52  01/01/18 11:52  01/01/18 11:52  01/01/18 11:52











General appearance: Present: well-nourished.  Absent: mild distress





Results





- Labs


CBC & Chem 7: 


 01/01/18 07:27





 01/01/18 07:27


Labs: 


 Laboratory Last Values











WBC  18.6 K/mm3 (4.5-11.0)  H  01/01/18  07:27    


 


RBC  2.90 M/mm3 (3.65-5.03)  L  01/01/18  07:27    


 


Hgb  9.0 gm/dl (10.1-14.3)  L  01/01/18  07:27    


 


Hct  27.0 % (30.3-42.9)  L  01/01/18  07:27    


 


MCV  93 fl (79-97)   01/01/18  07:27    


 


MCH  31 pg (28-32)   01/01/18  07:27    


 


MCHC  34 % (30-34)   01/01/18  07:27    


 


RDW  13.2 % (13.2-15.2)   01/01/18  07:27    


 


Plt Count  225 K/mm3 (140-440)   01/01/18  07:27    


 


Add Manual Diff  Complete   12/28/17  12:26    


 


Total Counted  100   12/28/17  12:26    


 


Seg Neutrophils %  Np   12/28/17  12:26    


 


Seg Neuts % (Manual)  90.0 % (40.0-70.0)  H  12/28/17  12:26    


 


Band Neutrophils %  4.0 %  12/28/17  12:26    


 


Lymphocytes % (Manual)  2.0 % (13.4-35.0)  L  12/28/17  12:26    


 


Reactive Lymphs % (Man)  0 %  12/28/17  12:26    


 


Monocytes % (Manual)  4.0 % (0.0-7.3)   12/28/17  12:26    


 


Eosinophils % (Manual)  0 % (0.0-4.3)   12/28/17  12:26    


 


Basophils % (Manual)  0 % (0.0-1.8)   12/28/17  12:26    


 


Metamyelocytes %  0 %  12/28/17  12:26    


 


Myelocytes %  0 %  12/28/17  12:26    


 


Promyelocytes %  0 %  12/28/17  12:26    


 


Blast Cells %  0 %  12/28/17  12:26    


 


Nucleated RBC %  Not Reportable   12/28/17  12:26    


 


Seg Neutrophils # Man  18.1 K/mm3 (1.8-7.7)  H  12/28/17  12:26    


 


Band Neutrophils #  0.8 K/mm3  12/28/17  12:26    


 


Lymphocytes # (Manual)  0.4 K/mm3 (1.2-5.4)  L  12/28/17  12:26    


 


Abs React Lymphs (Man)  0.0 K/mm3  12/28/17  12:26    


 


Monocytes # (Manual)  0.8 K/mm3 (0.0-0.8)   12/28/17  12:26    


 


Eosinophils # (Manual)  0.0 K/mm3 (0.0-0.4)   12/28/17  12:26    


 


Basophils # (Manual)  0.0 K/mm3 (0.0-0.1)   12/28/17  12:26    


 


Metamyelocytes #  0.0 K/mm3  12/28/17  12:26    


 


Myelocytes #  0.0 K/mm3  12/28/17  12:26    


 


Promyelocytes #  0.0 K/mm3  12/28/17  12:26    


 


Blast Cells #  0.0 K/mm3  12/28/17  12:26    


 


WBC Morphology  Not Reportable   12/28/17  12:26    


 


Hypersegmented Neuts  Not Reportable   12/28/17  12:26    


 


Hyposegmented Neuts  Not Reportable   12/28/17  12:26    


 


Hypogranular Neuts  Not Reportable   12/28/17  12:26    


 


Smudge Cells  Not Reportable   12/28/17  12:26    


 


Toxic Granulation  Not Reportable   12/28/17  12:26    


 


Toxic Vacuolation  Not Reportable   12/28/17  12:26    


 


Dohle Bodies  Not Reportable   12/28/17  12:26    


 


Pelger-Huet Anomaly  Not Reportable   12/28/17  12:26    


 


Yuly Rods  Not Reportable   12/28/17  12:26    


 


Platelet Estimate  Not Reportable   12/28/17  12:26    


 


Clumped Platelets  Not Reportable   12/28/17  12:26    


 


Plt Clumps, EDTA  Not Reportable   12/28/17  12:26    


 


Large Platelets  Not Reportable   12/28/17  12:26    


 


Giant Platelets  Not Reportable   12/28/17  12:26    


 


Platelet Satelliting  Not Reportable   12/28/17  12:26    


 


Plt Morphology Comment  Not Reportable   12/28/17  12:26    


 


RBC Morphology  Normal   12/28/17  12:26    


 


Dimorphic RBCs  Not Reportable   12/28/17  12:26    


 


Polychromasia  Not Reportable   12/28/17  12:26    


 


Hypochromasia  Not Reportable   12/28/17  12:26    


 


Poikilocytosis  Not Reportable   12/28/17  12:26    


 


Anisocytosis  Not Reportable   12/28/17  12:26    


 


Microcytosis  Not Reportable   12/28/17  12:26    


 


Macrocytosis  Not Reportable   12/28/17  12:26    


 


Spherocytes  Not Reportable   12/28/17  12:26    


 


Pappenheimer Bodies  Not Reportable   12/28/17  12:26    


 


Sickle Cells  Not Reportable   12/28/17  12:26    


 


Target Cells  Not Reportable   12/28/17  12:26    


 


Tear Drop Cells  Not Reportable   12/28/17  12:26    


 


Ovalocytes  Not Reportable   12/28/17  12:26    


 


Helmet Cells  Not Reportable   12/28/17  12:26    


 


[place] Walk Bodies  Not Reportable   12/28/17  12:26    


 


Cabot Rings  Not Reportable   12/28/17  12:26    


 


Marco Cells  Not Reportable   12/28/17  12:26    


 


Bite Cells  Not Reportable   12/28/17  12:26    


 


Crenated Cell  Not Reportable   12/28/17  12:26    


 


Elliptocytes  Not Reportable   12/28/17  12:26    


 


Acanthocytes (Spur)  Not Reportable   12/28/17  12:26    


 


Rouleaux  Not Reportable   12/28/17  12:26    


 


Hemoglobin C Crystals  Not Reportable   12/28/17  12:26    


 


Schistocytes  Not Reportable   12/28/17  12:26    


 


Malaria parasites  Not Reportable   12/28/17  12:26    


 


Juliano Bodies  Not Reportable   12/28/17  12:26    


 


Hem Pathologist Commnt  No   12/28/17  12:26    


 


PT  16.4 Sec. (12.2-14.9)  H  12/28/17  12:26    


 


INR  1.25  (0.87-1.13)  H  12/28/17  12:26    


 


POC ABG pH  7.389  (7.35-7.45)   12/30/17  19:49    


 


POC ABG pCO2  28.9  (35-45)  L  12/30/17  19:49    


 


POC ABG pO2  98  ()   12/30/17  19:49    


 


POC ABG HCO3  17.4   12/30/17  19:49    


 


POC ABG Total CO2  18   12/30/17  19:49    


 


POC ABG O2 Sat  98   12/30/17  19:49    


 


POC ABG Base Excess  -8   12/30/17  19:49    


 


FiO2  21 %  12/30/17  19:49    


 


Sodium  152 mmol/L (137-145)  H  01/01/18  07:27    


 


Potassium  3.6 mmol/L (3.6-5.0)   01/01/18  07:27    


 


Chloride  116.2 mmol/L ()  H  01/01/18  07:27    


 


Carbon Dioxide  19 mmol/L (22-30)  L  01/01/18  07:27    


 


Anion Gap  20 mmol/L  01/01/18  07:27    


 


BUN  24 mg/dL (7-17)  H  01/01/18  07:27    


 


Creatinine  0.7 mg/dL (0.7-1.2)   01/01/18  07:27    


 


Estimated GFR  > 60 ml/min  01/01/18  07:27    


 


BUN/Creatinine Ratio  34 %  01/01/18  07:27    


 


Glucose  288 mg/dL ()  H  01/01/18  07:27    


 


POC Glucose  328  ()  H  01/01/18  11:09    


 


Hemoglobin A1c  10.0 % (4-6)  H  12/28/17  17:02    


 


Lactic Acid  1.80 mmol/L (0.7-2.0)   12/30/17  19:44    


 


Calcium  8.3 mg/dL (8.4-10.2)  L  01/01/18  07:27    


 


Phosphorus  1.50 mg/dL (2.5-4.5)  L  12/30/17  07:07    


 


Magnesium  2.00 mg/dL (1.7-2.3)   12/30/17  07:07    


 


Total Bilirubin  0.30 mg/dL (0.1-1.2)   12/28/17  13:44    


 


Direct Bilirubin  0.2 mg/dL (0-0.2)   12/28/17  13:44    


 


Indirect Bilirubin  0.1 mg/dL  12/28/17  13:44    


 


AST  15 units/L (5-40)   12/28/17  13:44    


 


ALT  11 units/L (7-56)   12/28/17  13:44    


 


Alkaline Phosphatase  89 units/L ()   12/28/17  13:44    


 


C-Reactive Protein  4.70 mg/dL (0.00-1.30)  H  12/30/17  19:44    


 


Total Protein  5.8 g/dL (6.3-8.2)  L  12/28/17  13:44    


 


Albumin  3.0 g/dL (3.9-5)  L  12/28/17  13:44    


 


Albumin/Globulin Ratio  1.1 %  12/28/17  13:44    


 


Triglycerides  94 mg/dL (2-149)   01/01/18  07:27    


 


Cholesterol  148 mg/dL ()   01/01/18  07:27    


 


LDL Cholesterol Direct  42 mg/dL ()  L  01/01/18  07:27    


 


HDL Cholesterol  88 mg/dL (40-59)  H  01/01/18  07:27    


 


Cholesterol/HDL Ratio  1.68 %  01/01/18  07:27    


 


HCG, Qual  Negative  (Negative)   12/30/17  15:05    


 


Urine Color  Yellow  (Yellow)   12/28/17  14:28    


 


Urine Turbidity  Clear  (Clear)   12/28/17  14:28    


 


Urine pH  5.0  (5.0-7.0)   12/28/17  14:28    


 


Ur Specific Gravity  1.016  (1.003-1.030)   12/28/17  14:28    


 


Urine Protein  100 mg/dl mg/dL (Negative)   12/28/17  14:28    


 


Urine Glucose (UA)  >=500 mg/dL (Negative)   12/28/17  14:28    


 


Urine Ketones  80 mg/dL (Negative)   12/28/17  14:28    


 


Urine Blood  Mod  (Negative)   12/28/17  14:28    


 


Urine Nitrite  Neg  (Negative)   12/28/17  14:28    


 


Urine Bilirubin  Neg  (Negative)   12/28/17  14:28    


 


Urine Urobilinogen  < 2.0 mg/dL (<2.0)   12/28/17  14:28    


 


Ur Leukocyte Esterase  Neg  (Negative)   12/28/17  14:28    


 


Urine WBC (Auto)  2.0 /HPF (0.0-6.0)   12/28/17  14:28    


 


Urine RBC (Auto)  < 1.0 /HPF (0.0-6.0)   12/28/17  14:28    


 


U Epithel Cells (Auto)  < 1.0 /HPF (0-13.0)   12/28/17  14:28    


 


Urine Bacteria (Auto)  1+ /HPF (Negative)   12/28/17  14:28    


 


Urine Opiates Screen  Presumptive negative   12/28/17  14:28    


 


Urine Methadone Screen  Presumptive negative   12/28/17  14:28    


 


Ur Barbiturates Screen  Presumptive negative   12/28/17  14:28    


 


Ur Phencyclidine Scrn  Presumptive negative   12/28/17  14:28    


 


Ur Amphetamines Screen  Presumptive negative   12/28/17  14:28    


 


U Benzodiazepines Scrn  Presumptive negative   12/28/17  14:28    


 


Urine Cocaine Screen  Presumptive negative   12/28/17  14:28    


 


U Marijuana (THC) Screen  Presumptive negative   12/28/17  14:28    


 


Drugs of Abuse Note  Disclamer   12/28/17  14:28

## 2018-01-02 LAB
BUN SERPL-MCNC: 18 MG/DL (ref 7–17)
BUN/CREAT SERPL: 23 %
CALCIUM SERPL-MCNC: 8.3 MG/DL (ref 8.4–10.2)
HCT VFR BLD CALC: 26.2 % (ref 30.3–42.9)
HEMOLYSIS INDEX: 0
HGB BLD-MCNC: 9 GM/DL (ref 10.1–14.3)
MAGNESIUM SERPL-MCNC: 1.8 MG/DL (ref 1.7–2.3)
MCH RBC QN AUTO: 31 PG (ref 28–32)
MCHC RBC AUTO-ENTMCNC: 35 % (ref 30–34)
MCV RBC AUTO: 91 FL (ref 79–97)
PLATELET # BLD: 267 K/MM3 (ref 140–440)
RBC # BLD AUTO: 2.88 M/MM3 (ref 3.65–5.03)

## 2018-01-02 RX ADMIN — PIPERACILLIN SODIUM AND TAZOBACTAM SODIUM SCH MLS/HR: 3; .375 INJECTION, POWDER, LYOPHILIZED, FOR SOLUTION INTRAVENOUS at 14:06

## 2018-01-02 RX ADMIN — POTASSIUM CHLORIDE SCH MLS/HR: 10 INJECTION, SOLUTION INTRAVENOUS at 16:04

## 2018-01-02 RX ADMIN — PIPERACILLIN SODIUM AND TAZOBACTAM SODIUM SCH MLS/HR: 3; .375 INJECTION, POWDER, LYOPHILIZED, FOR SOLUTION INTRAVENOUS at 00:58

## 2018-01-02 RX ADMIN — ASPIRIN SCH: 300 SUPPOSITORY RECTAL at 09:39

## 2018-01-02 RX ADMIN — INSULIN ASPART SCH UNITS: 100 INJECTION, SOLUTION INTRAVENOUS; SUBCUTANEOUS at 14:32

## 2018-01-02 RX ADMIN — POTASSIUM CHLORIDE SCH MLS/HR: 10 INJECTION, SOLUTION INTRAVENOUS at 14:54

## 2018-01-02 RX ADMIN — INSULIN ASPART SCH UNITS: 100 INJECTION, SOLUTION INTRAVENOUS; SUBCUTANEOUS at 09:42

## 2018-01-02 RX ADMIN — CEFTRIAXONE SODIUM SCH: 10 INJECTION, POWDER, FOR SOLUTION INTRAVENOUS at 18:29

## 2018-01-02 RX ADMIN — INSULIN ASPART SCH UNITS: 100 INJECTION, SOLUTION INTRAVENOUS; SUBCUTANEOUS at 01:54

## 2018-01-02 RX ADMIN — PIPERACILLIN SODIUM AND TAZOBACTAM SODIUM SCH MLS/HR: 3; .375 INJECTION, POWDER, LYOPHILIZED, FOR SOLUTION INTRAVENOUS at 05:35

## 2018-01-02 RX ADMIN — INSULIN ASPART SCH: 100 INJECTION, SOLUTION INTRAVENOUS; SUBCUTANEOUS at 03:32

## 2018-01-02 RX ADMIN — MORPHINE SULFATE PRN MG: 4 INJECTION, SOLUTION INTRAMUSCULAR; INTRAVENOUS at 06:51

## 2018-01-02 RX ADMIN — NIFEDIPINE SCH: 30 TABLET, FILM COATED, EXTENDED RELEASE ORAL at 09:40

## 2018-01-02 RX ADMIN — MORPHINE SULFATE PRN MG: 4 INJECTION, SOLUTION INTRAMUSCULAR; INTRAVENOUS at 15:09

## 2018-01-02 RX ADMIN — INSULIN ASPART SCH UNITS: 100 INJECTION, SOLUTION INTRAVENOUS; SUBCUTANEOUS at 18:06

## 2018-01-02 RX ADMIN — PIPERACILLIN SODIUM AND TAZOBACTAM SODIUM SCH: 3; .375 INJECTION, POWDER, LYOPHILIZED, FOR SOLUTION INTRAVENOUS at 18:27

## 2018-01-02 RX ADMIN — FAMOTIDINE SCH MG: 10 INJECTION, SOLUTION INTRAVENOUS at 09:40

## 2018-01-02 RX ADMIN — CYCLOBENZAPRINE PRN MG: 10 TABLET, FILM COATED ORAL at 18:06

## 2018-01-02 RX ADMIN — CLOPIDOGREL BISULFATE SCH: 75 TABLET ORAL at 09:40

## 2018-01-02 RX ADMIN — INSULIN ASPART SCH UNITS: 100 INJECTION, SOLUTION INTRAVENOUS; SUBCUTANEOUS at 06:43

## 2018-01-02 NOTE — PROGRESS NOTE
Assessment and Plan





Patient resting  on room air. No acute respiratory distress. O2 saturation 99% 

on room air.





- Patient Problems


(1) Sepsis


Current Visit: Yes   Status: Acute   


Qualifiers: 


   Sepsis type: sepsis due to unspecified organism   Qualified Code(s): A41.9 - 

Sepsis, unspecified organism   


Plan to address problem: 


Patient is on zosyn








(2) Altered mental status


Current Visit: Yes   Status: Acute   


Plan to address problem: 


Managment as per primary care.








(3) DKA (diabetic ketoacidoses)


Current Visit: Yes   Status: Acute   


Qualifiers: 


   Diabetes mellitus type: type 2 


Plan to address problem: 


Improving. Management as per primary care.








(4) Encephalopathy acute


Current Visit: Yes   Status: Acute   


Plan to address problem: 


Management as per primary care.








(5) HTN (hypertension)


Current Visit: Yes   Status: Chronic   


Qualifiers: 


   Hypertension type: essential hypertension   Qualified Code(s): I10 - 

Essential (primary) hypertension   


Plan to address problem: 


Management as per primary care.








(6) History of CVA with residual deficit


Current Visit: Yes   Status: Chronic   


Plan to address problem: 


Management as per primary care and neurology.








Subjective


Date of service: 01/02/18


Interval history: 





Patient resting  on room air. No acute respiratory distress. O2 saturation 99% 

on room air.





Objective


 Vital Signs - 12hr











  01/02/18 01/02/18 01/02/18





  08:18 15:09 16:10


 


Temperature 98.1 F  98.3 F


 


Pulse Rate 95 H  91 H


 


Respiratory 18 20 20





Rate   


 


Blood Pressure 133/86  147/97


 


O2 Sat by Pulse 100  99





Oximetry   











Constitutional: no acute distress, asleep


Eyes: non-icteric


Neck: supple, no lymphadenopathy


Ascultation: Bilateral: clear


Cardiovascular: regular rate and rhythm


Gastrointestinal: normoactive bowel sounds, soft, non-tender


Integumentary: normal


Extremities: no cyanosis, no edema


Neurologic: other (Patient sleeping at this time.)


Psychiatric: other (Patient sleeping at this time.)


CBC and BMP: 


 01/02/18 04:40





 01/02/18 04:40


ABG, PT/INR, D-dimer: 


ABG











POC ABG pH  7.389  (7.35-7.45)   12/30/17  19:49    


 


POC ABG pCO2  28.9  (35-45)  L  12/30/17  19:49    


 


POC ABG pO2  98  ()   12/30/17  19:49    


 


POC ABG HCO3  17.4   12/30/17  19:49    


 


POC ABG Total CO2  18   12/30/17  19:49    


 


POC ABG O2 Sat  98   12/30/17  19:49    





PT/INR, D-dimer











PT  16.4 Sec. (12.2-14.9)  H  12/28/17  12:26    


 


INR  1.25  (0.87-1.13)  H  12/28/17  12:26    








Abnormal lab findings: 


 Abnormal Labs











  12/28/17 12/28/17 12/28/17





  12:26 12:26 12:26


 


WBC  20.1 H  


 


RBC   


 


Hgb   


 


Hct   


 


MCV  101 H  


 


MCHC   


 


RDW   


 


Seg Neuts % (Manual)  90.0 H  


 


Lymphocytes % (Manual)  2.0 L  


 


Seg Neutrophils # Man  18.1 H  


 


Lymphocytes # (Manual)  0.4 L  


 


PT   16.4 H 


 


INR   1.25 H 


 


POC ABG pH   


 


POC ABG pCO2   


 


POC ABG pO2   


 


Sodium   


 


Potassium   


 


Chloride   


 


Carbon Dioxide   


 


BUN   


 


Creatinine   


 


Glucose   


 


POC Glucose    445 H


 


Hemoglobin A1c   


 


Lactic Acid   


 


Calcium   


 


Phosphorus   


 


Magnesium   


 


C-Reactive Protein   


 


Total Protein   


 


Albumin   


 


LDL Cholesterol Direct   


 


HDL Cholesterol   














  12/28/17 12/28/17 12/28/17





  12:39 13:44 13:44


 


WBC   


 


RBC   


 


Hgb   


 


Hct   


 


MCV   


 


MCHC   


 


RDW   


 


Seg Neuts % (Manual)   


 


Lymphocytes % (Manual)   


 


Seg Neutrophils # Man   


 


Lymphocytes # (Manual)   


 


PT   


 


INR   


 


POC ABG pH  6.856 L  


 


POC ABG pCO2  12.4 L  


 


POC ABG pO2  288 H  


 


Sodium   


 


Potassium   


 


Chloride   


 


Carbon Dioxide   


 


BUN   


 


Creatinine   


 


Glucose   


 


POC Glucose   


 


Hemoglobin A1c   


 


Lactic Acid   


 


Calcium   


 


Phosphorus    8.10 H


 


Magnesium    2.60 H


 


C-Reactive Protein   


 


Total Protein   5.8 L 


 


Albumin   3.0 L 


 


LDL Cholesterol Direct   


 


HDL Cholesterol   














  12/28/17 12/28/17 12/28/17





  13:44 14:17 15:17


 


WBC   


 


RBC   


 


Hgb   


 


Hct   


 


MCV   


 


MCHC   


 


RDW   


 


Seg Neuts % (Manual)   


 


Lymphocytes % (Manual)   


 


Seg Neutrophils # Man   


 


Lymphocytes # (Manual)   


 


PT   


 


INR   


 


POC ABG pH   


 


POC ABG pCO2   


 


POC ABG pO2   


 


Sodium   


 


Potassium  6.5 H*  5.8 H  5.7 H


 


Chloride  94.4 L  


 


Carbon Dioxide  5 L*  2 L*  < 2.0 L*


 


BUN  43 H  38 H  39 H


 


Creatinine   1.3 H  1.3 H


 


Glucose  629 H*  610 H*  585 H*


 


POC Glucose   


 


Hemoglobin A1c   


 


Lactic Acid   


 


Calcium  8.3 L  7.7 L  7.9 L


 


Phosphorus   


 


Magnesium   


 


C-Reactive Protein   


 


Total Protein   


 


Albumin   


 


LDL Cholesterol Direct   


 


HDL Cholesterol   














  12/28/17 12/28/17 12/28/17





  15:17 15:40 16:57


 


WBC   


 


RBC   


 


Hgb   


 


Hct   


 


MCV   


 


MCHC   


 


RDW   


 


Seg Neuts % (Manual)   


 


Lymphocytes % (Manual)   


 


Seg Neutrophils # Man   


 


Lymphocytes # (Manual)   


 


PT   


 


INR   


 


POC ABG pH   


 


POC ABG pCO2   


 


POC ABG pO2   


 


Sodium   


 


Potassium   


 


Chloride   


 


Carbon Dioxide    3 L*


 


BUN    42 H


 


Creatinine    1.3 H


 


Glucose    557 H*


 


POC Glucose   > 500 H 


 


Hemoglobin A1c   


 


Lactic Acid  2.90 H*  


 


Calcium    8.1 L


 


Phosphorus   


 


Magnesium   


 


C-Reactive Protein   


 


Total Protein   


 


Albumin   


 


LDL Cholesterol Direct   


 


HDL Cholesterol   














  12/28/17 12/28/17 12/28/17





  17:02 17:02 18:05


 


WBC   


 


RBC   


 


Hgb   


 


Hct   


 


MCV   


 


MCHC   


 


RDW   


 


Seg Neuts % (Manual)   


 


Lymphocytes % (Manual)   


 


Seg Neutrophils # Man   


 


Lymphocytes # (Manual)   


 


PT   


 


INR   


 


POC ABG pH   


 


POC ABG pCO2   


 


POC ABG pO2   


 


Sodium   


 


Potassium   


 


Chloride   


 


Carbon Dioxide   


 


BUN   


 


Creatinine   


 


Glucose   


 


POC Glucose    485 H


 


Hemoglobin A1c  10.0 H  


 


Lactic Acid   


 


Calcium   


 


Phosphorus   6.60 H 


 


Magnesium   2.70 H 


 


C-Reactive Protein   


 


Total Protein   


 


Albumin   


 


LDL Cholesterol Direct   


 


HDL Cholesterol   














  12/28/17 12/28/17 12/28/17





  18:52 20:20 21:23


 


WBC   


 


RBC   


 


Hgb   


 


Hct   


 


MCV   


 


MCHC   


 


RDW   


 


Seg Neuts % (Manual)   


 


Lymphocytes % (Manual)   


 


Seg Neutrophils # Man   


 


Lymphocytes # (Manual)   


 


PT   


 


INR   


 


POC ABG pH   


 


POC ABG pCO2   


 


POC ABG pO2   


 


Sodium  148 H  


 


Potassium   


 


Chloride   


 


Carbon Dioxide  < 2.0 L*  


 


BUN  41 H  


 


Creatinine  1.4 H  


 


Glucose  473 H  


 


POC Glucose   356 H  322 H


 


Hemoglobin A1c   


 


Lactic Acid   


 


Calcium  7.8 L  


 


Phosphorus   


 


Magnesium   


 


C-Reactive Protein   


 


Total Protein   


 


Albumin   


 


LDL Cholesterol Direct   


 


HDL Cholesterol   














  12/28/17 12/28/17 12/28/17





  22:28 23:01 23:31


 


WBC   


 


RBC   


 


Hgb   


 


Hct   


 


MCV   


 


MCHC   


 


RDW   


 


Seg Neuts % (Manual)   


 


Lymphocytes % (Manual)   


 


Seg Neutrophils # Man   


 


Lymphocytes # (Manual)   


 


PT   


 


INR   


 


POC ABG pH   


 


POC ABG pCO2   


 


POC ABG pO2   


 


Sodium   148 H 


 


Potassium   


 


Chloride   111.6 H 


 


Carbon Dioxide   4 L* 


 


BUN   43 H 


 


Creatinine   1.3 H 


 


Glucose   177 H 


 


POC Glucose  242 H   204 H


 


Hemoglobin A1c   


 


Lactic Acid   


 


Calcium   7.7 L 


 


Phosphorus   


 


Magnesium   


 


C-Reactive Protein   


 


Total Protein   


 


Albumin   


 


LDL Cholesterol Direct   


 


HDL Cholesterol   














  12/29/17 12/29/17 12/29/17





  01:20 03:30 04:01


 


WBC   


 


RBC   


 


Hgb   


 


Hct   


 


MCV   


 


MCHC   


 


RDW   


 


Seg Neuts % (Manual)   


 


Lymphocytes % (Manual)   


 


Seg Neutrophils # Man   


 


Lymphocytes # (Manual)   


 


PT   


 


INR   


 


POC ABG pH   


 


POC ABG pCO2   


 


POC ABG pO2   


 


Sodium   


 


Potassium   


 


Chloride   


 


Carbon Dioxide   


 


BUN   


 


Creatinine   


 


Glucose   


 


POC Glucose  120 H  59 L  132 H


 


Hemoglobin A1c   


 


Lactic Acid   


 


Calcium   


 


Phosphorus   


 


Magnesium   


 


C-Reactive Protein   


 


Total Protein   


 


Albumin   


 


LDL Cholesterol Direct   


 


HDL Cholesterol   














  12/29/17 12/29/17 12/29/17





  04:20 04:20 05:15


 


WBC   


 


RBC   


 


Hgb   


 


Hct   


 


MCV   


 


MCHC   


 


RDW   


 


Seg Neuts % (Manual)   


 


Lymphocytes % (Manual)   


 


Seg Neutrophils # Man   


 


Lymphocytes # (Manual)   


 


PT   


 


INR   


 


POC ABG pH   


 


POC ABG pCO2   


 


POC ABG pO2   


 


Sodium  151 H  150 H 


 


Potassium   


 


Chloride  114.3 H  115.4 H 


 


Carbon Dioxide  10 L  10 L 


 


BUN  48 H  48 H 


 


Creatinine   1.3 H 


 


Glucose  121 H  118 H 


 


POC Glucose    134 H


 


Hemoglobin A1c   


 


Lactic Acid   


 


Calcium  7.8 L  7.8 L 


 


Phosphorus   


 


Magnesium   


 


C-Reactive Protein   


 


Total Protein   


 


Albumin   


 


LDL Cholesterol Direct   


 


HDL Cholesterol   














  12/29/17 12/29/17 12/29/17





  06:41 07:49 08:12


 


WBC   


 


RBC   


 


Hgb   


 


Hct   


 


MCV   


 


MCHC   


 


RDW   


 


Seg Neuts % (Manual)   


 


Lymphocytes % (Manual)   


 


Seg Neutrophils # Man   


 


Lymphocytes # (Manual)   


 


PT   


 


INR   


 


POC ABG pH   


 


POC ABG pCO2   


 


POC ABG pO2   


 


Sodium   151 H 


 


Potassium   


 


Chloride   116.5 H 


 


Carbon Dioxide   13 L 


 


BUN   52 H 


 


Creatinine   1.3 H 


 


Glucose   213 H 


 


POC Glucose  172 H   228 H


 


Hemoglobin A1c   


 


Lactic Acid   


 


Calcium   7.9 L 


 


Phosphorus   


 


Magnesium   


 


C-Reactive Protein   


 


Total Protein   


 


Albumin   


 


LDL Cholesterol Direct   


 


HDL Cholesterol   














  12/29/17 12/29/17 12/29/17





  08:22 09:31 11:04


 


WBC   


 


RBC   


 


Hgb   


 


Hct   


 


MCV   


 


MCHC   


 


RDW   


 


Seg Neuts % (Manual)   


 


Lymphocytes % (Manual)   


 


Seg Neutrophils # Man   


 


Lymphocytes # (Manual)   


 


PT   


 


INR   


 


POC ABG pH   


 


POC ABG pCO2   


 


POC ABG pO2   


 


Sodium   


 


Potassium   


 


Chloride   


 


Carbon Dioxide   


 


BUN   


 


Creatinine   


 


Glucose   


 


POC Glucose  140 H  140 H  136 H


 


Hemoglobin A1c   


 


Lactic Acid   


 


Calcium   


 


Phosphorus   


 


Magnesium   


 


C-Reactive Protein   


 


Total Protein   


 


Albumin   


 


LDL Cholesterol Direct   


 


HDL Cholesterol   














  12/29/17 12/29/17 12/29/17





  12:11 13:40 17:21


 


WBC   


 


RBC   


 


Hgb   


 


Hct   


 


MCV   


 


MCHC   


 


RDW   


 


Seg Neuts % (Manual)   


 


Lymphocytes % (Manual)   


 


Seg Neutrophils # Man   


 


Lymphocytes # (Manual)   


 


PT   


 


INR   


 


POC ABG pH   


 


POC ABG pCO2   


 


POC ABG pO2   


 


Sodium   152 H 


 


Potassium   


 


Chloride   117.6 H 


 


Carbon Dioxide   15 L 


 


BUN   50 H 


 


Creatinine   1.3 H 


 


Glucose   144 H 


 


POC Glucose  111 H   182 H


 


Hemoglobin A1c   


 


Lactic Acid   


 


Calcium   


 


Phosphorus   


 


Magnesium   


 


C-Reactive Protein   


 


Total Protein   


 


Albumin   


 


LDL Cholesterol Direct   


 


HDL Cholesterol   














  12/29/17 12/29/17 12/30/17





  17:45 23:06 00:39


 


WBC   


 


RBC   


 


Hgb   


 


Hct   


 


MCV   


 


MCHC   


 


RDW   


 


Seg Neuts % (Manual)   


 


Lymphocytes % (Manual)   


 


Seg Neutrophils # Man   


 


Lymphocytes # (Manual)   


 


PT   


 


INR   


 


POC ABG pH   


 


POC ABG pCO2   


 


POC ABG pO2   


 


Sodium  149 H  


 


Potassium   


 


Chloride  116.2 H  


 


Carbon Dioxide  13 L  


 


BUN  48 H  


 


Creatinine  1.3 H  


 


Glucose  171 H  


 


POC Glucose   < 40 L  123 H


 


Hemoglobin A1c   


 


Lactic Acid   


 


Calcium  8.1 L  


 


Phosphorus   


 


Magnesium   


 


C-Reactive Protein   


 


Total Protein   


 


Albumin   


 


LDL Cholesterol Direct   


 


HDL Cholesterol   














  12/30/17 12/30/17 12/30/17





  07:07 07:07 11:35


 


WBC   


 


RBC   


 


Hgb   


 


Hct   


 


MCV   


 


MCHC   


 


RDW   


 


Seg Neuts % (Manual)   


 


Lymphocytes % (Manual)   


 


Seg Neutrophils # Man   


 


Lymphocytes # (Manual)   


 


PT   


 


INR   


 


POC ABG pH   


 


POC ABG pCO2   


 


POC ABG pO2   


 


Sodium   152 H 


 


Potassium   3.0 L D 


 


Chloride   121.9 H 


 


Carbon Dioxide   18 L 


 


BUN   44 H 


 


Creatinine   


 


Glucose   


 


POC Glucose    118 H


 


Hemoglobin A1c   


 


Lactic Acid   


 


Calcium   


 


Phosphorus  1.50 L  


 


Magnesium   


 


C-Reactive Protein   


 


Total Protein   


 


Albumin   


 


LDL Cholesterol Direct   


 


HDL Cholesterol   














  12/30/17 12/30/17 12/30/17





  16:54 19:44 19:49


 


WBC   


 


RBC   


 


Hgb   


 


Hct   


 


MCV   


 


MCHC   


 


RDW   


 


Seg Neuts % (Manual)   


 


Lymphocytes % (Manual)   


 


Seg Neutrophils # Man   


 


Lymphocytes # (Manual)   


 


PT   


 


INR   


 


POC ABG pH   


 


POC ABG pCO2    28.9 L


 


POC ABG pO2   


 


Sodium   


 


Potassium   


 


Chloride   


 


Carbon Dioxide   


 


BUN   


 


Creatinine   


 


Glucose   


 


POC Glucose  231 H  


 


Hemoglobin A1c   


 


Lactic Acid   


 


Calcium   


 


Phosphorus   


 


Magnesium   


 


C-Reactive Protein   4.70 H 


 


Total Protein   


 


Albumin   


 


LDL Cholesterol Direct   


 


HDL Cholesterol   














  12/30/17 12/31/17 12/31/17





  21:48 06:25 12:35


 


WBC   


 


RBC   


 


Hgb   


 


Hct   


 


MCV   


 


MCHC   


 


RDW   


 


Seg Neuts % (Manual)   


 


Lymphocytes % (Manual)   


 


Seg Neutrophils # Man   


 


Lymphocytes # (Manual)   


 


PT   


 


INR   


 


POC ABG pH   


 


POC ABG pCO2   


 


POC ABG pO2   


 


Sodium   


 


Potassium   


 


Chloride   


 


Carbon Dioxide   


 


BUN   


 


Creatinine   


 


Glucose   


 


POC Glucose  176 H  299 H  258 H


 


Hemoglobin A1c   


 


Lactic Acid   


 


Calcium   


 


Phosphorus   


 


Magnesium   


 


C-Reactive Protein   


 


Total Protein   


 


Albumin   


 


LDL Cholesterol Direct   


 


HDL Cholesterol   














  12/31/17 12/31/17 01/01/18





  16:43 22:07 02:59


 


WBC   


 


RBC   


 


Hgb   


 


Hct   


 


MCV   


 


MCHC   


 


RDW   


 


Seg Neuts % (Manual)   


 


Lymphocytes % (Manual)   


 


Seg Neutrophils # Man   


 


Lymphocytes # (Manual)   


 


PT   


 


INR   


 


POC ABG pH   


 


POC ABG pCO2   


 


POC ABG pO2   


 


Sodium   


 


Potassium   


 


Chloride   


 


Carbon Dioxide   


 


BUN   


 


Creatinine   


 


Glucose   


 


POC Glucose  252 H  248 H  193 H


 


Hemoglobin A1c   


 


Lactic Acid   


 


Calcium   


 


Phosphorus   


 


Magnesium   


 


C-Reactive Protein   


 


Total Protein   


 


Albumin   


 


LDL Cholesterol Direct   


 


HDL Cholesterol   














  01/01/18 01/01/18 01/01/18





  06:20 07:27 07:27


 


WBC   18.6 H 


 


RBC   2.90 L 


 


Hgb   9.0 L 


 


Hct   27.0 L 


 


MCV   


 


MCHC   


 


RDW   


 


Seg Neuts % (Manual)   


 


Lymphocytes % (Manual)   


 


Seg Neutrophils # Man   


 


Lymphocytes # (Manual)   


 


PT   


 


INR   


 


POC ABG pH   


 


POC ABG pCO2   


 


POC ABG pO2   


 


Sodium    152 H


 


Potassium   


 


Chloride    116.2 H


 


Carbon Dioxide    19 L


 


BUN    24 H


 


Creatinine   


 


Glucose    288 H


 


POC Glucose  280 H  


 


Hemoglobin A1c   


 


Lactic Acid   


 


Calcium    8.3 L


 


Phosphorus   


 


Magnesium   


 


C-Reactive Protein   


 


Total Protein   


 


Albumin   


 


LDL Cholesterol Direct    42 L


 


HDL Cholesterol    88 H














  01/01/18 01/01/18 01/01/18





  11:09 17:12 20:59


 


WBC   


 


RBC   


 


Hgb   


 


Hct   


 


MCV   


 


MCHC   


 


RDW   


 


Seg Neuts % (Manual)   


 


Lymphocytes % (Manual)   


 


Seg Neutrophils # Man   


 


Lymphocytes # (Manual)   


 


PT   


 


INR   


 


POC ABG pH   


 


POC ABG pCO2   


 


POC ABG pO2   


 


Sodium   


 


Potassium   


 


Chloride   


 


Carbon Dioxide   


 


BUN   


 


Creatinine   


 


Glucose   


 


POC Glucose  328 H  306 H  318 H


 


Hemoglobin A1c   


 


Lactic Acid   


 


Calcium   


 


Phosphorus   


 


Magnesium   


 


C-Reactive Protein   


 


Total Protein   


 


Albumin   


 


LDL Cholesterol Direct   


 


HDL Cholesterol   














  01/02/18 01/02/18 01/02/18





  01:55 04:40 04:40


 


WBC   13.0 H 


 


RBC   2.88 L 


 


Hgb   9.0 L 


 


Hct   26.2 L 


 


MCV   


 


MCHC   35 H 


 


RDW   12.6 L 


 


Seg Neuts % (Manual)   


 


Lymphocytes % (Manual)   


 


Seg Neutrophils # Man   


 


Lymphocytes # (Manual)   


 


PT   


 


INR   


 


POC ABG pH   


 


POC ABG pCO2   


 


POC ABG pO2   


 


Sodium    148 H


 


Potassium    3.3 L


 


Chloride    111.8 H


 


Carbon Dioxide   


 


BUN    18 H


 


Creatinine   


 


Glucose    212 H


 


POC Glucose  274 H  


 


Hemoglobin A1c   


 


Lactic Acid   


 


Calcium    8.3 L


 


Phosphorus   


 


Magnesium   


 


C-Reactive Protein   


 


Total Protein   


 


Albumin   


 


LDL Cholesterol Direct   


 


HDL Cholesterol   














  01/02/18 01/02/18 01/02/18





  06:30 12:14 16:50


 


WBC   


 


RBC   


 


Hgb   


 


Hct   


 


MCV   


 


MCHC   


 


RDW   


 


Seg Neuts % (Manual)   


 


Lymphocytes % (Manual)   


 


Seg Neutrophils # Man   


 


Lymphocytes # (Manual)   


 


PT   


 


INR   


 


POC ABG pH   


 


POC ABG pCO2   


 


POC ABG pO2   


 


Sodium   


 


Potassium   


 


Chloride   


 


Carbon Dioxide   


 


BUN   


 


Creatinine   


 


Glucose   


 


POC Glucose  285 H  166 H  178 H


 


Hemoglobin A1c   


 


Lactic Acid   


 


Calcium   


 


Phosphorus   


 


Magnesium   


 


C-Reactive Protein   


 


Total Protein   


 


Albumin   


 


LDL Cholesterol Direct   


 


HDL Cholesterol

## 2018-01-02 NOTE — PROGRESS NOTE
Assessment and Plan


Assessment and plan: 


Patient is 37-year-old woman with a history of insulin-dependent diabetes 

mellitus, CVA on aspirin and Plavix, hypertension, dyslipidemia and depression 

who presented with altered mental status and was admitted for DKA.  Her 

baseline mental function per sister Cindy: she walks with a walker, she 

feeds herself, needs help with clothes, left arm weaker than right and speech 

is usually slurred. Cindy reports that patient was discharged from [facility] about 1.5 months ago and went to [facility]. Then, she 

went to her home from there. She lives with daughter Ej and brother, 

Gonzalo, who moved in with her for more support.  





per Cindy: "I wanna say she wasn't talking her insulin, because this 3rd 

stoke did alittle mentally and she more forgettable, that is what I am thinking

, for example, she will say, I need to take my insulin but she had just took 

her insulin." They went to [place], on the way back Wednesday night 11pm, she 

was sleeping off and on, BG was 516 and she took her insulin. Her daughter, 

Ej, said next morning she got up Thursday morning to go to the bathroom 

and she fell and then was unresponsive, she was found on the floor in urine by 

daughter, Ej. 





-DKA with acute metabolic acidosis, high anion gap: Treated with IV fluids, 

insulin drip now resolved


-Acute metabolic encephalopathy/semiconscious state: treat the above


-Recurrent Acute ischemic strokes. ?hydrocephalus on Ct head: consulted 

Neurology


--->Sepsis due to acute right mastoiditis, poa, start iv abx 


-Hypernatremia: needs more free water, bmp


-Moderate malnutrition: dietician


-Acute ischemic stroke, poa: get ECHO, used stroke protocol





12/30/17: Insulin drip was stopped because of hypoglycemia and patient was sent 

from ED to medical/surg floor. Anion gap was still high so I sent ICU to start 

insulin drip. I also ordered bmp stat. By the time the stat bmp was resulted, 

patient was in the icu. Anion gap has closed, so i will downgrade back to 

medical surg floor. Replace her potassium, start diet, if she unable to eat 

continue D5W due to hypernatremia and not eating. Await neurology assessment 

because she is not at her mental baseline of slurred speech and ambulation with 

a walker. Order PT/OT





12/31/17: I gave pt 0.5mg iv ativan x 1 to complete mri brain that Neurology 

ordered, MRI brain reported as multiple focal areas of restricted diffusion 

right cerebral hemisphere suggestive excuse ischemia, no evidence of hemorrhage

, dilated right lateral ventricle without definite evidence of obstruction, 

acute right mastoiditis. I started abx, iv rocephin, used stroke protocol





1/1/2018: WBC went down, Still hypernatremic, more free water, repeat labs in 

am. ECHO pending. I called Cidny at [phone], no answer ?phone broken 

message. 


1/2/18: echo pending, replace potassium, hyperglycemia on D5W for the 

hypernatremia, continue ssi,  if she tolerates a diet today, then d/c ivf, 

change ssi to achs instead of q4hr,  urine culture still pending. Hopefully SNF 

placement in 1-2 days





History


Interval history: 


Patient was seen and examined.  Follow-up on current diagnosis/altered mental 

status.  Overnight uneventful.  Patient not given any history; therefore,  

Imaging, nursing note, chart, labs and old chart reviewed.  Responding more. 





Hospitalist Physical





- Physical exam


Narrative exam: 


GEN: Thin frail woman chronic a bit debilitating appearing, lethargic, nonverbal


HEENT: NCAT, EOMI, PERRL, OP Clear


NECK: supple, no adenopathy, no thyromegaly, no JVD


CVS/HEART: regular tachycardia NORMAL S1S2, NO JVD, pulses present bilaterally


CHEST/LUNGS: CTA B, Symmetrical chest expansion, good air entry bilaterally


GI/Abdomen: soft, NTND, good bowel sounds, no guarding or rebound


/Bladder: no suprapubic tenderness, no CVA or paraspinal tenderness


EXT/Skin: no c/c/e, no obvious rash


MSK: Moving both arms with contractures 


Neuro: CN 2-12 grossly intact except nonverbal, hemiparesis, facial asymmetry, 

no new focal deficits


Psych: calm but confused








- Constitutional


Vitals: 


 











Temp Pulse Resp BP Pulse Ox


 


 98.1 F   95 H  18   133/86   100 


 


 01/02/18 08:18  01/02/18 08:18  01/02/18 08:18  01/02/18 08:18  01/02/18 08:18











General appearance: Present: well-nourished.  Absent: mild distress





Results





- Labs


CBC & Chem 7: 


 01/02/18 04:40





 01/02/18 04:40


Labs: 


 Laboratory Last Values











WBC  13.0 K/mm3 (4.5-11.0)  H  01/02/18  04:40    


 


RBC  2.88 M/mm3 (3.65-5.03)  L  01/02/18  04:40    


 


Hgb  9.0 gm/dl (10.1-14.3)  L  01/02/18  04:40    


 


Hct  26.2 % (30.3-42.9)  L  01/02/18  04:40    


 


MCV  91 fl (79-97)   01/02/18  04:40    


 


MCH  31 pg (28-32)   01/02/18  04:40    


 


MCHC  35 % (30-34)  H  01/02/18  04:40    


 


RDW  12.6 % (13.2-15.2)  L  01/02/18  04:40    


 


Plt Count  267 K/mm3 (140-440)   01/02/18  04:40    


 


Add Manual Diff  Complete   12/28/17  12:26    


 


Total Counted  100   12/28/17  12:26    


 


Seg Neutrophils %  Np   12/28/17  12:26    


 


Seg Neuts % (Manual)  90.0 % (40.0-70.0)  H  12/28/17  12:26    


 


Band Neutrophils %  4.0 %  12/28/17  12:26    


 


Lymphocytes % (Manual)  2.0 % (13.4-35.0)  L  12/28/17  12:26    


 


Reactive Lymphs % (Man)  0 %  12/28/17  12:26    


 


Monocytes % (Manual)  4.0 % (0.0-7.3)   12/28/17  12:26    


 


Eosinophils % (Manual)  0 % (0.0-4.3)   12/28/17  12:26    


 


Basophils % (Manual)  0 % (0.0-1.8)   12/28/17  12:26    


 


Metamyelocytes %  0 %  12/28/17  12:26    


 


Myelocytes %  0 %  12/28/17  12:26    


 


Promyelocytes %  0 %  12/28/17  12:26    


 


Blast Cells %  0 %  12/28/17  12:26    


 


Nucleated RBC %  Not Reportable   12/28/17  12:26    


 


Seg Neutrophils # Man  18.1 K/mm3 (1.8-7.7)  H  12/28/17  12:26    


 


Band Neutrophils #  0.8 K/mm3  12/28/17  12:26    


 


Lymphocytes # (Manual)  0.4 K/mm3 (1.2-5.4)  L  12/28/17  12:26    


 


Abs React Lymphs (Man)  0.0 K/mm3  12/28/17  12:26    


 


Monocytes # (Manual)  0.8 K/mm3 (0.0-0.8)   12/28/17  12:26    


 


Eosinophils # (Manual)  0.0 K/mm3 (0.0-0.4)   12/28/17  12:26    


 


Basophils # (Manual)  0.0 K/mm3 (0.0-0.1)   12/28/17  12:26    


 


Metamyelocytes #  0.0 K/mm3  12/28/17  12:26    


 


Myelocytes #  0.0 K/mm3  12/28/17  12:26    


 


Promyelocytes #  0.0 K/mm3  12/28/17  12:26    


 


Blast Cells #  0.0 K/mm3  12/28/17  12:26    


 


WBC Morphology  Not Reportable   12/28/17  12:26    


 


Hypersegmented Neuts  Not Reportable   12/28/17  12:26    


 


Hyposegmented Neuts  Not Reportable   12/28/17  12:26    


 


Hypogranular Neuts  Not Reportable   12/28/17  12:26    


 


Smudge Cells  Not Reportable   12/28/17  12:26    


 


Toxic Granulation  Not Reportable   12/28/17  12:26    


 


Toxic Vacuolation  Not Reportable   12/28/17  12:26    


 


Dohle Bodies  Not Reportable   12/28/17  12:26    


 


Pelger-Huet Anomaly  Not Reportable   12/28/17  12:26    


 


Yuly Rods  Not Reportable   12/28/17  12:26    


 


Platelet Estimate  Not Reportable   12/28/17  12:26    


 


Clumped Platelets  Not Reportable   12/28/17  12:26    


 


Plt Clumps, EDTA  Not Reportable   12/28/17  12:26    


 


Large Platelets  Not Reportable   12/28/17  12:26    


 


Giant Platelets  Not Reportable   12/28/17  12:26    


 


Platelet Satelliting  Not Reportable   12/28/17  12:26    


 


Plt Morphology Comment  Not Reportable   12/28/17  12:26    


 


RBC Morphology  Normal   12/28/17  12:26    


 


Dimorphic RBCs  Not Reportable   12/28/17  12:26    


 


Polychromasia  Not Reportable   12/28/17  12:26    


 


Hypochromasia  Not Reportable   12/28/17  12:26    


 


Poikilocytosis  Not Reportable   12/28/17  12:26    


 


Anisocytosis  Not Reportable   12/28/17  12:26    


 


Microcytosis  Not Reportable   12/28/17  12:26    


 


Macrocytosis  Not Reportable   12/28/17  12:26    


 


Spherocytes  Not Reportable   12/28/17  12:26    


 


Pappenheimer Bodies  Not Reportable   12/28/17  12:26    


 


Sickle Cells  Not Reportable   12/28/17  12:26    


 


Target Cells  Not Reportable   12/28/17  12:26    


 


Tear Drop Cells  Not Reportable   12/28/17  12:26    


 


Ovalocytes  Not Reportable   12/28/17  12:26    


 


Helmet Cells  Not Reportable   12/28/17  12:26    


 


[place] Bodies  Not Reportable   12/28/17  12:26    


 


Cabot Rings  Not Reportable   12/28/17  12:26    


 


Marco Cells  Not Reportable   12/28/17  12:26    


 


Bite Cells  Not Reportable   12/28/17  12:26    


 


Crenated Cell  Not Reportable   12/28/17  12:26    


 


Elliptocytes  Not Reportable   12/28/17  12:26    


 


Acanthocytes (Spur)  Not Reportable   12/28/17  12:26    


 


Rouleaux  Not Reportable   12/28/17  12:26    


 


Hemoglobin C Crystals  Not Reportable   12/28/17  12:26    


 


Schistocytes  Not Reportable   12/28/17  12:26    


 


Malaria parasites  Not Reportable   12/28/17  12:26    


 


Juliano Bodies  Not Reportable   12/28/17  12:26    


 


Hem Pathologist Commnt  No   12/28/17  12:26    


 


PT  16.4 Sec. (12.2-14.9)  H  12/28/17  12:26    


 


INR  1.25  (0.87-1.13)  H  12/28/17  12:26    


 


POC ABG pH  7.389  (7.35-7.45)   12/30/17  19:49    


 


POC ABG pCO2  28.9  (35-45)  L  12/30/17  19:49    


 


POC ABG pO2  98  ()   12/30/17  19:49    


 


POC ABG HCO3  17.4   12/30/17  19:49    


 


POC ABG Total CO2  18   12/30/17  19:49    


 


POC ABG O2 Sat  98   12/30/17  19:49    


 


POC ABG Base Excess  -8   12/30/17  19:49    


 


FiO2  21 %  12/30/17  19:49    


 


Sodium  148 mmol/L (137-145)  H  01/02/18  04:40    


 


Potassium  3.3 mmol/L (3.6-5.0)  L  01/02/18  04:40    


 


Chloride  111.8 mmol/L ()  H  01/02/18  04:40    


 


Carbon Dioxide  22 mmol/L (22-30)   01/02/18  04:40    


 


Anion Gap  18 mmol/L  01/02/18  04:40    


 


BUN  18 mg/dL (7-17)  H  01/02/18  04:40    


 


Creatinine  0.8 mg/dL (0.7-1.2)   01/02/18  04:40    


 


Estimated GFR  > 60 ml/min  01/02/18  04:40    


 


BUN/Creatinine Ratio  23 %  01/02/18  04:40    


 


Glucose  212 mg/dL ()  H  01/02/18  04:40    


 


POC Glucose  285  ()  H  01/02/18  06:30    


 


Hemoglobin A1c  10.0 % (4-6)  H  12/28/17  17:02    


 


Lactic Acid  1.80 mmol/L (0.7-2.0)   12/30/17  19:44    


 


Calcium  8.3 mg/dL (8.4-10.2)  L  01/02/18  04:40    


 


Phosphorus  1.50 mg/dL (2.5-4.5)  L  12/30/17  07:07    


 


Magnesium  1.80 mg/dL (1.7-2.3)   01/02/18  04:40    


 


Total Bilirubin  0.30 mg/dL (0.1-1.2)   12/28/17  13:44    


 


Direct Bilirubin  0.2 mg/dL (0-0.2)   12/28/17  13:44    


 


Indirect Bilirubin  0.1 mg/dL  12/28/17  13:44    


 


AST  15 units/L (5-40)   12/28/17  13:44    


 


ALT  11 units/L (7-56)   12/28/17  13:44    


 


Alkaline Phosphatase  89 units/L ()   12/28/17  13:44    


 


C-Reactive Protein  4.70 mg/dL (0.00-1.30)  H  12/30/17  19:44    


 


Total Protein  5.8 g/dL (6.3-8.2)  L  12/28/17  13:44    


 


Albumin  3.0 g/dL (3.9-5)  L  12/28/17  13:44    


 


Albumin/Globulin Ratio  1.1 %  12/28/17  13:44    


 


Triglycerides  94 mg/dL (2-149)   01/01/18  07:27    


 


Cholesterol  148 mg/dL ()   01/01/18  07:27    


 


LDL Cholesterol Direct  42 mg/dL ()  L  01/01/18  07:27    


 


HDL Cholesterol  88 mg/dL (40-59)  H  01/01/18  07:27    


 


Cholesterol/HDL Ratio  1.68 %  01/01/18  07:27    


 


HCG, Qual  Negative  (Negative)   12/30/17  15:05    


 


Urine Color  Yellow  (Yellow)   12/28/17  14:28    


 


Urine Turbidity  Clear  (Clear)   12/28/17  14:28    


 


Urine pH  5.0  (5.0-7.0)   12/28/17  14:28    


 


Ur Specific Gravity  1.016  (1.003-1.030)   12/28/17  14:28    


 


Urine Protein  100 mg/dl mg/dL (Negative)   12/28/17  14:28    


 


Urine Glucose (UA)  >=500 mg/dL (Negative)   12/28/17  14:28    


 


Urine Ketones  80 mg/dL (Negative)   12/28/17  14:28    


 


Urine Blood  Mod  (Negative)   12/28/17  14:28    


 


Urine Nitrite  Neg  (Negative)   12/28/17  14:28    


 


Urine Bilirubin  Neg  (Negative)   12/28/17  14:28    


 


Urine Urobilinogen  < 2.0 mg/dL (<2.0)   12/28/17  14:28    


 


Ur Leukocyte Esterase  Neg  (Negative)   12/28/17  14:28    


 


Urine WBC (Auto)  2.0 /HPF (0.0-6.0)   12/28/17  14:28    


 


Urine RBC (Auto)  < 1.0 /HPF (0.0-6.0)   12/28/17  14:28    


 


U Epithel Cells (Auto)  < 1.0 /HPF (0-13.0)   12/28/17  14:28    


 


Urine Bacteria (Auto)  1+ /HPF (Negative)   12/28/17  14:28    


 


Urine Opiates Screen  Presumptive negative   12/28/17  14:28    


 


Urine Methadone Screen  Presumptive negative   12/28/17  14:28    


 


Ur Barbiturates Screen  Presumptive negative   12/28/17  14:28    


 


Ur Phencyclidine Scrn  Presumptive negative   12/28/17  14:28    


 


Ur Amphetamines Screen  Presumptive negative   12/28/17  14:28    


 


U Benzodiazepines Scrn  Presumptive negative   12/28/17  14:28    


 


Urine Cocaine Screen  Presumptive negative   12/28/17  14:28    


 


U Marijuana (THC) Screen  Presumptive negative   12/28/17  14:28    


 


Drugs of Abuse Note  Disclamer   12/28/17  14:28

## 2018-01-03 LAB
BUN SERPL-MCNC: 12 MG/DL (ref 7–17)
BUN/CREAT SERPL: 17 %
CALCIUM SERPL-MCNC: 8.1 MG/DL (ref 8.4–10.2)
HCT VFR BLD CALC: 26.1 % (ref 30.3–42.9)
HEMOLYSIS INDEX: 38
HGB BLD-MCNC: 8.8 GM/DL (ref 10.1–14.3)
MCH RBC QN AUTO: 30 PG (ref 28–32)
MCHC RBC AUTO-ENTMCNC: 34 % (ref 30–34)
MCV RBC AUTO: 89 FL (ref 79–97)
PLATELET # BLD: 249 K/MM3 (ref 140–440)
RBC # BLD AUTO: 2.92 M/MM3 (ref 3.65–5.03)

## 2018-01-03 RX ADMIN — NIFEDIPINE SCH: 30 TABLET, FILM COATED, EXTENDED RELEASE ORAL at 20:02

## 2018-01-03 RX ADMIN — CLOPIDOGREL BISULFATE SCH MG: 75 TABLET ORAL at 08:46

## 2018-01-03 RX ADMIN — ASPIRIN SCH MG: 300 SUPPOSITORY RECTAL at 11:49

## 2018-01-03 RX ADMIN — PIPERACILLIN SODIUM AND TAZOBACTAM SODIUM SCH MLS/HR: 3; .375 INJECTION, POWDER, LYOPHILIZED, FOR SOLUTION INTRAVENOUS at 12:17

## 2018-01-03 RX ADMIN — PIPERACILLIN SODIUM AND TAZOBACTAM SODIUM SCH MLS/HR: 3; .375 INJECTION, POWDER, LYOPHILIZED, FOR SOLUTION INTRAVENOUS at 06:06

## 2018-01-03 RX ADMIN — CEFTRIAXONE SODIUM SCH MLS/10 MIN: 10 INJECTION, POWDER, FOR SOLUTION INTRAVENOUS at 17:30

## 2018-01-03 RX ADMIN — FAMOTIDINE SCH MG: 10 INJECTION, SOLUTION INTRAVENOUS at 08:46

## 2018-01-03 RX ADMIN — CLOPIDOGREL BISULFATE SCH: 75 TABLET ORAL at 20:02

## 2018-01-03 RX ADMIN — INSULIN ASPART SCH: 100 INJECTION, SOLUTION INTRAVENOUS; SUBCUTANEOUS at 05:59

## 2018-01-03 RX ADMIN — INSULIN ASPART SCH UNITS: 100 INJECTION, SOLUTION INTRAVENOUS; SUBCUTANEOUS at 17:12

## 2018-01-03 RX ADMIN — PIPERACILLIN SODIUM AND TAZOBACTAM SODIUM SCH MLS/HR: 3; .375 INJECTION, POWDER, LYOPHILIZED, FOR SOLUTION INTRAVENOUS at 17:30

## 2018-01-03 RX ADMIN — INSULIN ASPART SCH UNITS: 100 INJECTION, SOLUTION INTRAVENOUS; SUBCUTANEOUS at 17:14

## 2018-01-03 RX ADMIN — NIFEDIPINE SCH MG: 30 TABLET, FILM COATED, EXTENDED RELEASE ORAL at 08:46

## 2018-01-03 RX ADMIN — PIPERACILLIN SODIUM AND TAZOBACTAM SODIUM SCH MLS/HR: 3; .375 INJECTION, POWDER, LYOPHILIZED, FOR SOLUTION INTRAVENOUS at 00:35

## 2018-01-03 RX ADMIN — INSULIN ASPART SCH UNITS: 100 INJECTION, SOLUTION INTRAVENOUS; SUBCUTANEOUS at 06:13

## 2018-01-03 RX ADMIN — INSULIN ASPART SCH: 100 INJECTION, SOLUTION INTRAVENOUS; SUBCUTANEOUS at 14:58

## 2018-01-03 RX ADMIN — CYCLOBENZAPRINE PRN MG: 10 TABLET, FILM COATED ORAL at 22:01

## 2018-01-03 RX ADMIN — INSULIN ASPART SCH: 100 INJECTION, SOLUTION INTRAVENOUS; SUBCUTANEOUS at 05:53

## 2018-01-03 NOTE — PROGRESS NOTE
Assessment and Plan


Assessment and plan: 


Patient is 37-year-old woman with a history of insulin-dependent diabetes 

mellitus, CVA on aspirin and Plavix, hypertension, dyslipidemia and depression 

who presented with altered mental status and was admitted for DKA.  Her 

baseline mental function per sister Cindy: she walks with a walker, she 

feeds herself, needs help with clothes, left arm weaker than right and speech 

is usually slurred. Cindy reports that patient was discharged from [facility] about 1.5 months ago and went to [facility]. Then, she 

went to her home from there. She lives with daughter Ej and brother, 

Gonzalo, who moved in with her for more support.  





per Cindy: "I wanna say she wasn't talking her insulin, because this 3rd 

stoke did alittle mentally and she more forgettable, that is what I am thinking

, for example, she will say, I need to take my insulin but she had just took 

her insulin." They went to [place], on the way back Wednesday night 11pm, she 

was sleeping off and on, BG was 516 and she took her insulin. Her daughter, 

Ej, said next morning she got up Thursday morning to go to the bathroom 

and she fell and then was unresponsive, she was found on the floor in urine by 

daughter, Ej. 





-DKA with acute metabolic acidosis, high anion gap: Treated with IV fluids, 

insulin drip now resolved


-Acute metabolic encephalopathy/semiconscious state: treat the above


-Recurrent Acute ischemic strokes. ?hydrocephalus on Ct head: consulted 

Neurology


--->Sepsis due to acute right mastoiditis, poa, start iv abx 


-Hypernatremia: needs more free water, bmp


-Moderate malnutrition: dietician


-Acute ischemic stroke, poa: get ECHO, used stroke protocol





12/30/17: Insulin drip was stopped because of hypoglycemia and patient was sent 

from ED to medical/surg floor. Anion gap was still high so I sent ICU to start 

insulin drip. I also ordered bmp stat. By the time the stat bmp was resulted, 

patient was in the icu. Anion gap has closed, so i will downgrade back to 

medical surg floor. Replace her potassium, start diet, if she unable to eat 

continue D5W due to hypernatremia and not eating. Await neurology assessment 

because she is not at her mental baseline of slurred speech and ambulation with 

a walker. Order PT/OT





12/31/17: I gave pt 0.5mg iv ativan x 1 to complete mri brain that Neurology 

ordered, MRI brain reported as multiple focal areas of restricted diffusion 

right cerebral hemisphere suggestive excuse ischemia, no evidence of hemorrhage

, dilated right lateral ventricle without definite evidence of obstruction, 

acute right mastoiditis. I started abx, iv rocephin, used stroke protocol





1/1/2018: WBC went down, Still hypernatremic, more free water, repeat labs in 

am. ECHO pending. I called Cindy at [phone], no answer ?phone broken 

message. 


1/2/18: echo pending, replace potassium, hyperglycemia on D5W for the 

hypernatremia, continue ssi,  if she tolerates a diet today, then d/c ivf, 

change ssi to achs instead of q4hr,  urine culture still pending. Hopefully SNF 

placement in 1-2 days


1/3/18: Echo reviewed, hypernatremia/hypokalemia resolved, stop ivf. Waiting on 

placement





History


Interval history: 


Patient was seen and examined.  Follow-up on current diagnosis/altered mental 

status.  Overnight uneventful.  Patient not given any history; therefore,  

Imaging, nursing note, chart, labs and old chart reviewed.  Responding more. 





Hospitalist Physical





- Physical exam


Narrative exam: 


GEN: Thin frail woman chronic a bit debilitating appearing, lethargic, nonverbal


HEENT: NCAT, EOMI, PERRL, OP Clear


NECK: supple, no adenopathy, no thyromegaly, no JVD


CVS/HEART: regular tachycardia NORMAL S1S2, NO JVD, pulses present bilaterally


CHEST/LUNGS: CTA B, Symmetrical chest expansion, good air entry bilaterally


GI/Abdomen: soft, NTND, good bowel sounds, no guarding or rebound


/Bladder: no suprapubic tenderness, no CVA or paraspinal tenderness


EXT/Skin: no c/c/e, no obvious rash


MSK: Moving both arms with contractures 


Neuro: CN 2-12 grossly intact except nonverbal, hemiparesis, facial asymmetry, 

no new focal deficits


Psych: calm but confused








- Constitutional


Vitals: 


 











Temp Pulse Resp BP Pulse Ox


 


 98.7 F   102 H  18   131/87   99 


 


 01/03/18 07:29  01/03/18 07:29  01/03/18 07:29  01/03/18 07:29  01/03/18 07:29











General appearance: Present: well-nourished.  Absent: mild distress





Results





- Labs


CBC & Chem 7: 


 01/03/18 05:55





 01/03/18 05:55


Labs: 


 Laboratory Last Values











WBC  12.4 K/mm3 (4.5-11.0)  H  01/03/18  05:55    


 


RBC  2.92 M/mm3 (3.65-5.03)  L  01/03/18  05:55    


 


Hgb  8.8 gm/dl (10.1-14.3)  L  01/03/18  05:55    


 


Hct  26.1 % (30.3-42.9)  L  01/03/18  05:55    


 


MCV  89 fl (79-97)   01/03/18  05:55    


 


MCH  30 pg (28-32)   01/03/18  05:55    


 


MCHC  34 % (30-34)   01/03/18  05:55    


 


RDW  12.6 % (13.2-15.2)  L  01/03/18  05:55    


 


Plt Count  249 K/mm3 (140-440)   01/03/18  05:55    


 


Add Manual Diff  Complete   12/28/17  12:26    


 


Total Counted  100   12/28/17  12:26    


 


Seg Neutrophils %  Np   12/28/17  12:26    


 


Seg Neuts % (Manual)  90.0 % (40.0-70.0)  H  12/28/17  12:26    


 


Band Neutrophils %  4.0 %  12/28/17  12:26    


 


Lymphocytes % (Manual)  2.0 % (13.4-35.0)  L  12/28/17  12:26    


 


Reactive Lymphs % (Man)  0 %  12/28/17  12:26    


 


Monocytes % (Manual)  4.0 % (0.0-7.3)   12/28/17  12:26    


 


Eosinophils % (Manual)  0 % (0.0-4.3)   12/28/17  12:26    


 


Basophils % (Manual)  0 % (0.0-1.8)   12/28/17  12:26    


 


Metamyelocytes %  0 %  12/28/17  12:26    


 


Myelocytes %  0 %  12/28/17  12:26    


 


Promyelocytes %  0 %  12/28/17  12:26    


 


Blast Cells %  0 %  12/28/17  12:26    


 


Nucleated RBC %  Not Reportable   12/28/17  12:26    


 


Seg Neutrophils # Man  18.1 K/mm3 (1.8-7.7)  H  12/28/17  12:26    


 


Band Neutrophils #  0.8 K/mm3  12/28/17  12:26    


 


Lymphocytes # (Manual)  0.4 K/mm3 (1.2-5.4)  L  12/28/17  12:26    


 


Abs React Lymphs (Man)  0.0 K/mm3  12/28/17  12:26    


 


Monocytes # (Manual)  0.8 K/mm3 (0.0-0.8)   12/28/17  12:26    


 


Eosinophils # (Manual)  0.0 K/mm3 (0.0-0.4)   12/28/17  12:26    


 


Basophils # (Manual)  0.0 K/mm3 (0.0-0.1)   12/28/17  12:26    


 


Metamyelocytes #  0.0 K/mm3  12/28/17  12:26    


 


Myelocytes #  0.0 K/mm3  12/28/17  12:26    


 


Promyelocytes #  0.0 K/mm3  12/28/17  12:26    


 


Blast Cells #  0.0 K/mm3  12/28/17  12:26    


 


WBC Morphology  Not Reportable   12/28/17  12:26    


 


Hypersegmented Neuts  Not Reportable   12/28/17  12:26    


 


Hyposegmented Neuts  Not Reportable   12/28/17  12:26    


 


Hypogranular Neuts  Not Reportable   12/28/17  12:26    


 


Smudge Cells  Not Reportable   12/28/17  12:26    


 


Toxic Granulation  Not Reportable   12/28/17  12:26    


 


Toxic Vacuolation  Not Reportable   12/28/17  12:26    


 


Dohle Bodies  Not Reportable   12/28/17  12:26    


 


Pelger-Huet Anomaly  Not Reportable   12/28/17  12:26    


 


Yuly Rods  Not Reportable   12/28/17  12:26    


 


Platelet Estimate  Not Reportable   12/28/17  12:26    


 


Clumped Platelets  Not Reportable   12/28/17  12:26    


 


Plt Clumps, EDTA  Not Reportable   12/28/17  12:26    


 


Large Platelets  Not Reportable   12/28/17  12:26    


 


Giant Platelets  Not Reportable   12/28/17  12:26    


 


Platelet Satelliting  Not Reportable   12/28/17  12:26    


 


Plt Morphology Comment  Not Reportable   12/28/17  12:26    


 


RBC Morphology  Normal   12/28/17  12:26    


 


Dimorphic RBCs  Not Reportable   12/28/17  12:26    


 


Polychromasia  Not Reportable   12/28/17  12:26    


 


Hypochromasia  Not Reportable   12/28/17  12:26    


 


Poikilocytosis  Not Reportable   12/28/17  12:26    


 


Anisocytosis  Not Reportable   12/28/17  12:26    


 


Microcytosis  Not Reportable   12/28/17  12:26    


 


Macrocytosis  Not Reportable   12/28/17  12:26    


 


Spherocytes  Not Reportable   12/28/17  12:26    


 


Pappenheimer Bodies  Not Reportable   12/28/17  12:26    


 


Sickle Cells  Not Reportable   12/28/17  12:26    


 


Target Cells  Not Reportable   12/28/17  12:26    


 


Tear Drop Cells  Not Reportable   12/28/17  12:26    


 


Ovalocytes  Not Reportable   12/28/17  12:26    


 


Helmet Cells  Not Reportable   12/28/17  12:26    


 


Acosta-Leupp Bodies  Not Reportable   12/28/17  12:26    


 


Cabot Rings  Not Reportable   12/28/17  12:26    


 


[place] Cells  Not Reportable   12/28/17  12:26    


 


Bite Cells  Not Reportable   12/28/17  12:26    


 


Crenated Cell  Not Reportable   12/28/17  12:26    


 


Elliptocytes  Not Reportable   12/28/17  12:26    


 


Acanthocytes (Spur)  Not Reportable   12/28/17  12:26    


 


Rouleaux  Not Reportable   12/28/17  12:26    


 


Hemoglobin C Crystals  Not Reportable   12/28/17  12:26    


 


Schistocytes  Not Reportable   12/28/17  12:26    


 


Malaria parasites  Not Reportable   12/28/17  12:26    


 


Juliano Bodies  Not Reportable   12/28/17  12:26    


 


Hem Pathologist Commnt  No   12/28/17  12:26    


 


PT  16.4 Sec. (12.2-14.9)  H  12/28/17  12:26    


 


INR  1.25  (0.87-1.13)  H  12/28/17  12:26    


 


POC ABG pH  7.389  (7.35-7.45)   12/30/17  19:49    


 


POC ABG pCO2  28.9  (35-45)  L  12/30/17  19:49    


 


POC ABG pO2  98  ()   12/30/17  19:49    


 


POC ABG HCO3  17.4   12/30/17  19:49    


 


POC ABG Total CO2  18   12/30/17  19:49    


 


POC ABG O2 Sat  98   12/30/17  19:49    


 


POC ABG Base Excess  -8   12/30/17  19:49    


 


FiO2  21 %  12/30/17  19:49    


 


Sodium  144 mmol/L (137-145)   01/03/18  05:55    


 


Potassium  4.1 mmol/L (3.6-5.0)  D 01/03/18  05:55    


 


Chloride  108.2 mmol/L ()  H  01/03/18  05:55    


 


Carbon Dioxide  21 mmol/L (22-30)  L  01/03/18  05:55    


 


Anion Gap  19 mmol/L  01/03/18  05:55    


 


BUN  12 mg/dL (7-17)   01/03/18  05:55    


 


Creatinine  0.7 mg/dL (0.7-1.2)   01/03/18  05:55    


 


Estimated GFR  > 60 ml/min  01/03/18  05:55    


 


BUN/Creatinine Ratio  17 %  01/03/18  05:55    


 


Glucose  228 mg/dL ()  H  01/03/18  05:55    


 


POC Glucose  291  ()  H  01/03/18  05:47    


 


Hemoglobin A1c  10.0 % (4-6)  H  12/28/17  17:02    


 


Lactic Acid  1.80 mmol/L (0.7-2.0)   12/30/17  19:44    


 


Calcium  8.1 mg/dL (8.4-10.2)  L  01/03/18  05:55    


 


Phosphorus  1.50 mg/dL (2.5-4.5)  L  12/30/17  07:07    


 


Magnesium  1.80 mg/dL (1.7-2.3)   01/02/18  04:40    


 


Total Bilirubin  0.30 mg/dL (0.1-1.2)   12/28/17  13:44    


 


Direct Bilirubin  0.2 mg/dL (0-0.2)   12/28/17  13:44    


 


Indirect Bilirubin  0.1 mg/dL  12/28/17  13:44    


 


AST  15 units/L (5-40)   12/28/17  13:44    


 


ALT  11 units/L (7-56)   12/28/17  13:44    


 


Alkaline Phosphatase  89 units/L ()   12/28/17  13:44    


 


C-Reactive Protein  4.70 mg/dL (0.00-1.30)  H  12/30/17  19:44    


 


Total Protein  5.8 g/dL (6.3-8.2)  L  12/28/17  13:44    


 


Albumin  3.0 g/dL (3.9-5)  L  12/28/17  13:44    


 


Albumin/Globulin Ratio  1.1 %  12/28/17  13:44    


 


Triglycerides  94 mg/dL (2-149)   01/01/18  07:27    


 


Cholesterol  148 mg/dL ()   01/01/18  07:27    


 


LDL Cholesterol Direct  42 mg/dL ()  L  01/01/18  07:27    


 


HDL Cholesterol  88 mg/dL (40-59)  H  01/01/18  07:27    


 


Cholesterol/HDL Ratio  1.68 %  01/01/18  07:27    


 


HCG, Qual  Negative  (Negative)   12/30/17  15:05    


 


Urine Color  Yellow  (Yellow)   12/28/17  14:28    


 


Urine Turbidity  Clear  (Clear)   12/28/17  14:28    


 


Urine pH  5.0  (5.0-7.0)   12/28/17  14:28    


 


Ur Specific Gravity  1.016  (1.003-1.030)   12/28/17  14:28    


 


Urine Protein  100 mg/dl mg/dL (Negative)   12/28/17  14:28    


 


Urine Glucose (UA)  >=500 mg/dL (Negative)   12/28/17  14:28    


 


Urine Ketones  80 mg/dL (Negative)   12/28/17  14:28    


 


Urine Blood  Mod  (Negative)   12/28/17  14:28    


 


Urine Nitrite  Neg  (Negative)   12/28/17  14:28    


 


Urine Bilirubin  Neg  (Negative)   12/28/17  14:28    


 


Urine Urobilinogen  < 2.0 mg/dL (<2.0)   12/28/17  14:28    


 


Ur Leukocyte Esterase  Neg  (Negative)   12/28/17  14:28    


 


Urine WBC (Auto)  2.0 /HPF (0.0-6.0)   12/28/17  14:28    


 


Urine RBC (Auto)  < 1.0 /HPF (0.0-6.0)   12/28/17  14:28    


 


U Epithel Cells (Auto)  < 1.0 /HPF (0-13.0)   12/28/17  14:28    


 


Urine Bacteria (Auto)  1+ /HPF (Negative)   12/28/17  14:28    


 


Urine Opiates Screen  Presumptive negative   12/28/17  14:28    


 


Urine Methadone Screen  Presumptive negative   12/28/17  14:28    


 


Ur Barbiturates Screen  Presumptive negative   12/28/17  14:28    


 


Ur Phencyclidine Scrn  Presumptive negative   12/28/17  14:28    


 


Ur Amphetamines Screen  Presumptive negative   12/28/17  14:28    


 


U Benzodiazepines Scrn  Presumptive negative   12/28/17  14:28    


 


Urine Cocaine Screen  Presumptive negative   12/28/17  14:28    


 


U Marijuana (THC) Screen  Presumptive negative   12/28/17  14:28    


 


Drugs of Abuse Note  Disclamer   12/28/17  14:28

## 2018-01-03 NOTE — PROGRESS NOTE
Assessment and Plan





Acute ischemic stroke


Sepsis secondary to acute mastoiditis


DKA with acute metabolic acidosis, high anion gap-improving


Acute metabolic encephalopathy (improved)


Recurrent Acute ischemic strokes


Expressive aphasia


Hypernatremia


Moderate malnutrition


Oropharyngeal dysphagia





- continue to monitor hemodynamics


- Complete antibiotics per ID


- Continue with supplemental oxygen for O2 sats >92%


- continue aspiration precautions


- Speech language pathologist to continue to treat- swallow and speech


- Enteric nutrition


- Free water flushes


- PT/OT/mobility


- Secondary stroke prophylaxis


- Evaluate for other secondary causes of stroke.


- Accuchecks with glycemic control








Subjective


Date of service: 01/03/18


Principal diagnosis: Acute on Chronic Encephalopathy; SIRS; DKA; Seizures


Interval history: 





Patient is seen today for: Acute on Chronic Encephalopathy; SIRS; DKA; Seizures





Seen and examined at bedside; 24hour events reviewed; nursing and respiratory 

care staff consulted; no adverse overnight events reported to me; resting in bed

; aphasic; No emesis or overt aspiration; on 2L NC








Objective


 Vital Signs - 12hr











  01/03/18





  07:29


 


Temperature 98.7 F


 


Pulse Rate 102 H


 


Respiratory 18





Rate 


 


Blood Pressure 131/87


 


O2 Sat by Pulse 99





Oximetry 











Constitutional: no acute distress, asleep


Eyes: non-icteric


ENT: oropharynx moist, other (No goiter)


Neck: supple, no lymphadenopathy, no JVD


Effort: mildly labored


Ascultation: Bilateral: diminished breath sounds, rhonchi


Percussion: Bilateral: not dull


Cardiovascular: regular rate and rhythm, other (No rubs or murmurs)


Gastrointestinal: normoactive bowel sounds, soft, non-tender


Integumentary: normal


Extremities: no cyanosis, no edema, pulses normal, no ischemia or petechiae


Neurologic: unable to assess


Psychiatric: other (unable to asses)


CBC and BMP: 


 01/03/18 05:55





 01/03/18 05:55


ABG, PT/INR, D-dimer: 


ABG











POC ABG pH  7.389  (7.35-7.45)   12/30/17  19:49    


 


POC ABG pCO2  28.9  (35-45)  L  12/30/17  19:49    


 


POC ABG pO2  98  ()   12/30/17  19:49    


 


POC ABG HCO3  17.4   12/30/17  19:49    


 


POC ABG Total CO2  18   12/30/17  19:49    


 


POC ABG O2 Sat  98   12/30/17  19:49    





PT/INR, D-dimer











PT  16.4 Sec. (12.2-14.9)  H  12/28/17  12:26    


 


INR  1.25  (0.87-1.13)  H  12/28/17  12:26    








Abnormal lab findings: 


 Abnormal Labs











  12/28/17 12/28/17 12/28/17





  12:26 12:26 12:26


 


WBC  20.1 H  


 


RBC   


 


Hgb   


 


Hct   


 


MCV  101 H  


 


MCHC   


 


RDW   


 


Seg Neuts % (Manual)  90.0 H  


 


Lymphocytes % (Manual)  2.0 L  


 


Seg Neutrophils # Man  18.1 H  


 


Lymphocytes # (Manual)  0.4 L  


 


PT   16.4 H 


 


INR   1.25 H 


 


POC ABG pH   


 


POC ABG pCO2   


 


POC ABG pO2   


 


Sodium   


 


Potassium   


 


Chloride   


 


Carbon Dioxide   


 


BUN   


 


Creatinine   


 


Glucose   


 


POC Glucose    445 H


 


Hemoglobin A1c   


 


Lactic Acid   


 


Calcium   


 


Phosphorus   


 


Magnesium   


 


C-Reactive Protein   


 


Total Protein   


 


Albumin   


 


LDL Cholesterol Direct   


 


HDL Cholesterol   














  12/28/17 12/28/17 12/28/17





  12:39 13:44 13:44


 


WBC   


 


RBC   


 


Hgb   


 


Hct   


 


MCV   


 


MCHC   


 


RDW   


 


Seg Neuts % (Manual)   


 


Lymphocytes % (Manual)   


 


Seg Neutrophils # Man   


 


Lymphocytes # (Manual)   


 


PT   


 


INR   


 


POC ABG pH  6.856 L  


 


POC ABG pCO2  12.4 L  


 


POC ABG pO2  288 H  


 


Sodium   


 


Potassium   


 


Chloride   


 


Carbon Dioxide   


 


BUN   


 


Creatinine   


 


Glucose   


 


POC Glucose   


 


Hemoglobin A1c   


 


Lactic Acid   


 


Calcium   


 


Phosphorus    8.10 H


 


Magnesium    2.60 H


 


C-Reactive Protein   


 


Total Protein   5.8 L 


 


Albumin   3.0 L 


 


LDL Cholesterol Direct   


 


HDL Cholesterol   














  12/28/17 12/28/17 12/28/17





  13:44 14:17 15:17


 


WBC   


 


RBC   


 


Hgb   


 


Hct   


 


MCV   


 


MCHC   


 


RDW   


 


Seg Neuts % (Manual)   


 


Lymphocytes % (Manual)   


 


Seg Neutrophils # Man   


 


Lymphocytes # (Manual)   


 


PT   


 


INR   


 


POC ABG pH   


 


POC ABG pCO2   


 


POC ABG pO2   


 


Sodium   


 


Potassium  6.5 H*  5.8 H  5.7 H


 


Chloride  94.4 L  


 


Carbon Dioxide  5 L*  2 L*  < 2.0 L*


 


BUN  43 H  38 H  39 H


 


Creatinine   1.3 H  1.3 H


 


Glucose  629 H*  610 H*  585 H*


 


POC Glucose   


 


Hemoglobin A1c   


 


Lactic Acid   


 


Calcium  8.3 L  7.7 L  7.9 L


 


Phosphorus   


 


Magnesium   


 


C-Reactive Protein   


 


Total Protein   


 


Albumin   


 


LDL Cholesterol Direct   


 


HDL Cholesterol   














  12/28/17 12/28/17 12/28/17





  15:17 15:40 16:57


 


WBC   


 


RBC   


 


Hgb   


 


Hct   


 


MCV   


 


MCHC   


 


RDW   


 


Seg Neuts % (Manual)   


 


Lymphocytes % (Manual)   


 


Seg Neutrophils # Man   


 


Lymphocytes # (Manual)   


 


PT   


 


INR   


 


POC ABG pH   


 


POC ABG pCO2   


 


POC ABG pO2   


 


Sodium   


 


Potassium   


 


Chloride   


 


Carbon Dioxide    3 L*


 


BUN    42 H


 


Creatinine    1.3 H


 


Glucose    557 H*


 


POC Glucose   > 500 H 


 


Hemoglobin A1c   


 


Lactic Acid  2.90 H*  


 


Calcium    8.1 L


 


Phosphorus   


 


Magnesium   


 


C-Reactive Protein   


 


Total Protein   


 


Albumin   


 


LDL Cholesterol Direct   


 


HDL Cholesterol   














  12/28/17 12/28/17 12/28/17





  17:02 17:02 18:05


 


WBC   


 


RBC   


 


Hgb   


 


Hct   


 


MCV   


 


MCHC   


 


RDW   


 


Seg Neuts % (Manual)   


 


Lymphocytes % (Manual)   


 


Seg Neutrophils # Man   


 


Lymphocytes # (Manual)   


 


PT   


 


INR   


 


POC ABG pH   


 


POC ABG pCO2   


 


POC ABG pO2   


 


Sodium   


 


Potassium   


 


Chloride   


 


Carbon Dioxide   


 


BUN   


 


Creatinine   


 


Glucose   


 


POC Glucose    485 H


 


Hemoglobin A1c  10.0 H  


 


Lactic Acid   


 


Calcium   


 


Phosphorus   6.60 H 


 


Magnesium   2.70 H 


 


C-Reactive Protein   


 


Total Protein   


 


Albumin   


 


LDL Cholesterol Direct   


 


HDL Cholesterol   














  12/28/17 12/28/17 12/28/17





  18:52 20:20 21:23


 


WBC   


 


RBC   


 


Hgb   


 


Hct   


 


MCV   


 


MCHC   


 


RDW   


 


Seg Neuts % (Manual)   


 


Lymphocytes % (Manual)   


 


Seg Neutrophils # Man   


 


Lymphocytes # (Manual)   


 


PT   


 


INR   


 


POC ABG pH   


 


POC ABG pCO2   


 


POC ABG pO2   


 


Sodium  148 H  


 


Potassium   


 


Chloride   


 


Carbon Dioxide  < 2.0 L*  


 


BUN  41 H  


 


Creatinine  1.4 H  


 


Glucose  473 H  


 


POC Glucose   356 H  322 H


 


Hemoglobin A1c   


 


Lactic Acid   


 


Calcium  7.8 L  


 


Phosphorus   


 


Magnesium   


 


C-Reactive Protein   


 


Total Protein   


 


Albumin   


 


LDL Cholesterol Direct   


 


HDL Cholesterol   














  12/28/17 12/28/17 12/28/17





  22:28 23:01 23:31


 


WBC   


 


RBC   


 


Hgb   


 


Hct   


 


MCV   


 


MCHC   


 


RDW   


 


Seg Neuts % (Manual)   


 


Lymphocytes % (Manual)   


 


Seg Neutrophils # Man   


 


Lymphocytes # (Manual)   


 


PT   


 


INR   


 


POC ABG pH   


 


POC ABG pCO2   


 


POC ABG pO2   


 


Sodium   148 H 


 


Potassium   


 


Chloride   111.6 H 


 


Carbon Dioxide   4 L* 


 


BUN   43 H 


 


Creatinine   1.3 H 


 


Glucose   177 H 


 


POC Glucose  242 H   204 H


 


Hemoglobin A1c   


 


Lactic Acid   


 


Calcium   7.7 L 


 


Phosphorus   


 


Magnesium   


 


C-Reactive Protein   


 


Total Protein   


 


Albumin   


 


LDL Cholesterol Direct   


 


HDL Cholesterol   














  12/29/17 12/29/17 12/29/17





  01:20 03:30 04:01


 


WBC   


 


RBC   


 


Hgb   


 


Hct   


 


MCV   


 


MCHC   


 


RDW   


 


Seg Neuts % (Manual)   


 


Lymphocytes % (Manual)   


 


Seg Neutrophils # Man   


 


Lymphocytes # (Manual)   


 


PT   


 


INR   


 


POC ABG pH   


 


POC ABG pCO2   


 


POC ABG pO2   


 


Sodium   


 


Potassium   


 


Chloride   


 


Carbon Dioxide   


 


BUN   


 


Creatinine   


 


Glucose   


 


POC Glucose  120 H  59 L  132 H


 


Hemoglobin A1c   


 


Lactic Acid   


 


Calcium   


 


Phosphorus   


 


Magnesium   


 


C-Reactive Protein   


 


Total Protein   


 


Albumin   


 


LDL Cholesterol Direct   


 


HDL Cholesterol   














  12/29/17 12/29/17 12/29/17





  04:20 04:20 05:15


 


WBC   


 


RBC   


 


Hgb   


 


Hct   


 


MCV   


 


MCHC   


 


RDW   


 


Seg Neuts % (Manual)   


 


Lymphocytes % (Manual)   


 


Seg Neutrophils # Man   


 


Lymphocytes # (Manual)   


 


PT   


 


INR   


 


POC ABG pH   


 


POC ABG pCO2   


 


POC ABG pO2   


 


Sodium  151 H  150 H 


 


Potassium   


 


Chloride  114.3 H  115.4 H 


 


Carbon Dioxide  10 L  10 L 


 


BUN  48 H  48 H 


 


Creatinine   1.3 H 


 


Glucose  121 H  118 H 


 


POC Glucose    134 H


 


Hemoglobin A1c   


 


Lactic Acid   


 


Calcium  7.8 L  7.8 L 


 


Phosphorus   


 


Magnesium   


 


C-Reactive Protein   


 


Total Protein   


 


Albumin   


 


LDL Cholesterol Direct   


 


HDL Cholesterol   














  12/29/17 12/29/17 12/29/17





  06:41 07:49 08:12


 


WBC   


 


RBC   


 


Hgb   


 


Hct   


 


MCV   


 


MCHC   


 


RDW   


 


Seg Neuts % (Manual)   


 


Lymphocytes % (Manual)   


 


Seg Neutrophils # Man   


 


Lymphocytes # (Manual)   


 


PT   


 


INR   


 


POC ABG pH   


 


POC ABG pCO2   


 


POC ABG pO2   


 


Sodium   151 H 


 


Potassium   


 


Chloride   116.5 H 


 


Carbon Dioxide   13 L 


 


BUN   52 H 


 


Creatinine   1.3 H 


 


Glucose   213 H 


 


POC Glucose  172 H   228 H


 


Hemoglobin A1c   


 


Lactic Acid   


 


Calcium   7.9 L 


 


Phosphorus   


 


Magnesium   


 


C-Reactive Protein   


 


Total Protein   


 


Albumin   


 


LDL Cholesterol Direct   


 


HDL Cholesterol   














  12/29/17 12/29/17 12/29/17





  08:22 09:31 11:04


 


WBC   


 


RBC   


 


Hgb   


 


Hct   


 


MCV   


 


MCHC   


 


RDW   


 


Seg Neuts % (Manual)   


 


Lymphocytes % (Manual)   


 


Seg Neutrophils # Man   


 


Lymphocytes # (Manual)   


 


PT   


 


INR   


 


POC ABG pH   


 


POC ABG pCO2   


 


POC ABG pO2   


 


Sodium   


 


Potassium   


 


Chloride   


 


Carbon Dioxide   


 


BUN   


 


Creatinine   


 


Glucose   


 


POC Glucose  140 H  140 H  136 H


 


Hemoglobin A1c   


 


Lactic Acid   


 


Calcium   


 


Phosphorus   


 


Magnesium   


 


C-Reactive Protein   


 


Total Protein   


 


Albumin   


 


LDL Cholesterol Direct   


 


HDL Cholesterol   














  12/29/17 12/29/17 12/29/17





  12:11 13:40 17:21


 


WBC   


 


RBC   


 


Hgb   


 


Hct   


 


MCV   


 


MCHC   


 


RDW   


 


Seg Neuts % (Manual)   


 


Lymphocytes % (Manual)   


 


Seg Neutrophils # Man   


 


Lymphocytes # (Manual)   


 


PT   


 


INR   


 


POC ABG pH   


 


POC ABG pCO2   


 


POC ABG pO2   


 


Sodium   152 H 


 


Potassium   


 


Chloride   117.6 H 


 


Carbon Dioxide   15 L 


 


BUN   50 H 


 


Creatinine   1.3 H 


 


Glucose   144 H 


 


POC Glucose  111 H   182 H


 


Hemoglobin A1c   


 


Lactic Acid   


 


Calcium   


 


Phosphorus   


 


Magnesium   


 


C-Reactive Protein   


 


Total Protein   


 


Albumin   


 


LDL Cholesterol Direct   


 


HDL Cholesterol   














  12/29/17 12/29/17 12/30/17





  17:45 23:06 00:39


 


WBC   


 


RBC   


 


Hgb   


 


Hct   


 


MCV   


 


MCHC   


 


RDW   


 


Seg Neuts % (Manual)   


 


Lymphocytes % (Manual)   


 


Seg Neutrophils # Man   


 


Lymphocytes # (Manual)   


 


PT   


 


INR   


 


POC ABG pH   


 


POC ABG pCO2   


 


POC ABG pO2   


 


Sodium  149 H  


 


Potassium   


 


Chloride  116.2 H  


 


Carbon Dioxide  13 L  


 


BUN  48 H  


 


Creatinine  1.3 H  


 


Glucose  171 H  


 


POC Glucose   < 40 L  123 H


 


Hemoglobin A1c   


 


Lactic Acid   


 


Calcium  8.1 L  


 


Phosphorus   


 


Magnesium   


 


C-Reactive Protein   


 


Total Protein   


 


Albumin   


 


LDL Cholesterol Direct   


 


HDL Cholesterol   














  12/30/17 12/30/17 12/30/17





  07:07 07:07 11:35


 


WBC   


 


RBC   


 


Hgb   


 


Hct   


 


MCV   


 


MCHC   


 


RDW   


 


Seg Neuts % (Manual)   


 


Lymphocytes % (Manual)   


 


Seg Neutrophils # Man   


 


Lymphocytes # (Manual)   


 


PT   


 


INR   


 


POC ABG pH   


 


POC ABG pCO2   


 


POC ABG pO2   


 


Sodium   152 H 


 


Potassium   3.0 L D 


 


Chloride   121.9 H 


 


Carbon Dioxide   18 L 


 


BUN   44 H 


 


Creatinine   


 


Glucose   


 


POC Glucose    118 H


 


Hemoglobin A1c   


 


Lactic Acid   


 


Calcium   


 


Phosphorus  1.50 L  


 


Magnesium   


 


C-Reactive Protein   


 


Total Protein   


 


Albumin   


 


LDL Cholesterol Direct   


 


HDL Cholesterol   














  12/30/17 12/30/17 12/30/17





  16:54 19:44 19:49


 


WBC   


 


RBC   


 


Hgb   


 


Hct   


 


MCV   


 


MCHC   


 


RDW   


 


Seg Neuts % (Manual)   


 


Lymphocytes % (Manual)   


 


Seg Neutrophils # Man   


 


Lymphocytes # (Manual)   


 


PT   


 


INR   


 


POC ABG pH   


 


POC ABG pCO2    28.9 L


 


POC ABG pO2   


 


Sodium   


 


Potassium   


 


Chloride   


 


Carbon Dioxide   


 


BUN   


 


Creatinine   


 


Glucose   


 


POC Glucose  231 H  


 


Hemoglobin A1c   


 


Lactic Acid   


 


Calcium   


 


Phosphorus   


 


Magnesium   


 


C-Reactive Protein   4.70 H 


 


Total Protein   


 


Albumin   


 


LDL Cholesterol Direct   


 


HDL Cholesterol   














  12/30/17 12/31/17 12/31/17





  21:48 06:25 12:35


 


WBC   


 


RBC   


 


Hgb   


 


Hct   


 


MCV   


 


MCHC   


 


RDW   


 


Seg Neuts % (Manual)   


 


Lymphocytes % (Manual)   


 


Seg Neutrophils # Man   


 


Lymphocytes # (Manual)   


 


PT   


 


INR   


 


POC ABG pH   


 


POC ABG pCO2   


 


POC ABG pO2   


 


Sodium   


 


Potassium   


 


Chloride   


 


Carbon Dioxide   


 


BUN   


 


Creatinine   


 


Glucose   


 


POC Glucose  176 H  299 H  258 H


 


Hemoglobin A1c   


 


Lactic Acid   


 


Calcium   


 


Phosphorus   


 


Magnesium   


 


C-Reactive Protein   


 


Total Protein   


 


Albumin   


 


LDL Cholesterol Direct   


 


HDL Cholesterol   














  12/31/17 12/31/17 01/01/18





  16:43 22:07 02:59


 


WBC   


 


RBC   


 


Hgb   


 


Hct   


 


MCV   


 


MCHC   


 


RDW   


 


Seg Neuts % (Manual)   


 


Lymphocytes % (Manual)   


 


Seg Neutrophils # Man   


 


Lymphocytes # (Manual)   


 


PT   


 


INR   


 


POC ABG pH   


 


POC ABG pCO2   


 


POC ABG pO2   


 


Sodium   


 


Potassium   


 


Chloride   


 


Carbon Dioxide   


 


BUN   


 


Creatinine   


 


Glucose   


 


POC Glucose  252 H  248 H  193 H


 


Hemoglobin A1c   


 


Lactic Acid   


 


Calcium   


 


Phosphorus   


 


Magnesium   


 


C-Reactive Protein   


 


Total Protein   


 


Albumin   


 


LDL Cholesterol Direct   


 


HDL Cholesterol   














  01/01/18 01/01/18 01/01/18





  06:20 07:27 07:27


 


WBC   18.6 H 


 


RBC   2.90 L 


 


Hgb   9.0 L 


 


Hct   27.0 L 


 


MCV   


 


MCHC   


 


RDW   


 


Seg Neuts % (Manual)   


 


Lymphocytes % (Manual)   


 


Seg Neutrophils # Man   


 


Lymphocytes # (Manual)   


 


PT   


 


INR   


 


POC ABG pH   


 


POC ABG pCO2   


 


POC ABG pO2   


 


Sodium    152 H


 


Potassium   


 


Chloride    116.2 H


 


Carbon Dioxide    19 L


 


BUN    24 H


 


Creatinine   


 


Glucose    288 H


 


POC Glucose  280 H  


 


Hemoglobin A1c   


 


Lactic Acid   


 


Calcium    8.3 L


 


Phosphorus   


 


Magnesium   


 


C-Reactive Protein   


 


Total Protein   


 


Albumin   


 


LDL Cholesterol Direct    42 L


 


HDL Cholesterol    88 H














  01/01/18 01/01/18 01/01/18





  11:09 17:12 20:59


 


WBC   


 


RBC   


 


Hgb   


 


Hct   


 


MCV   


 


MCHC   


 


RDW   


 


Seg Neuts % (Manual)   


 


Lymphocytes % (Manual)   


 


Seg Neutrophils # Man   


 


Lymphocytes # (Manual)   


 


PT   


 


INR   


 


POC ABG pH   


 


POC ABG pCO2   


 


POC ABG pO2   


 


Sodium   


 


Potassium   


 


Chloride   


 


Carbon Dioxide   


 


BUN   


 


Creatinine   


 


Glucose   


 


POC Glucose  328 H  306 H  318 H


 


Hemoglobin A1c   


 


Lactic Acid   


 


Calcium   


 


Phosphorus   


 


Magnesium   


 


C-Reactive Protein   


 


Total Protein   


 


Albumin   


 


LDL Cholesterol Direct   


 


HDL Cholesterol   














  01/02/18 01/02/18 01/02/18





  01:55 04:40 04:40


 


WBC   13.0 H 


 


RBC   2.88 L 


 


Hgb   9.0 L 


 


Hct   26.2 L 


 


MCV   


 


MCHC   35 H 


 


RDW   12.6 L 


 


Seg Neuts % (Manual)   


 


Lymphocytes % (Manual)   


 


Seg Neutrophils # Man   


 


Lymphocytes # (Manual)   


 


PT   


 


INR   


 


POC ABG pH   


 


POC ABG pCO2   


 


POC ABG pO2   


 


Sodium    148 H


 


Potassium    3.3 L


 


Chloride    111.8 H


 


Carbon Dioxide   


 


BUN    18 H


 


Creatinine   


 


Glucose    212 H


 


POC Glucose  274 H  


 


Hemoglobin A1c   


 


Lactic Acid   


 


Calcium    8.3 L


 


Phosphorus   


 


Magnesium   


 


C-Reactive Protein   


 


Total Protein   


 


Albumin   


 


LDL Cholesterol Direct   


 


HDL Cholesterol   














  01/02/18 01/02/18 01/02/18





  06:30 12:14 16:50


 


WBC   


 


RBC   


 


Hgb   


 


Hct   


 


MCV   


 


MCHC   


 


RDW   


 


Seg Neuts % (Manual)   


 


Lymphocytes % (Manual)   


 


Seg Neutrophils # Man   


 


Lymphocytes # (Manual)   


 


PT   


 


INR   


 


POC ABG pH   


 


POC ABG pCO2   


 


POC ABG pO2   


 


Sodium   


 


Potassium   


 


Chloride   


 


Carbon Dioxide   


 


BUN   


 


Creatinine   


 


Glucose   


 


POC Glucose  285 H  166 H  178 H


 


Hemoglobin A1c   


 


Lactic Acid   


 


Calcium   


 


Phosphorus   


 


Magnesium   


 


C-Reactive Protein   


 


Total Protein   


 


Albumin   


 


LDL Cholesterol Direct   


 


HDL Cholesterol   














  01/02/18 01/03/18 01/03/18





  21:24 05:47 05:55


 


WBC    12.4 H


 


RBC    2.92 L


 


Hgb    8.8 L


 


Hct    26.1 L


 


MCV   


 


MCHC   


 


RDW    12.6 L


 


Seg Neuts % (Manual)   


 


Lymphocytes % (Manual)   


 


Seg Neutrophils # Man   


 


Lymphocytes # (Manual)   


 


PT   


 


INR   


 


POC ABG pH   


 


POC ABG pCO2   


 


POC ABG pO2   


 


Sodium   


 


Potassium   


 


Chloride   


 


Carbon Dioxide   


 


BUN   


 


Creatinine   


 


Glucose   


 


POC Glucose  197 H  291 H 


 


Hemoglobin A1c   


 


Lactic Acid   


 


Calcium   


 


Phosphorus   


 


Magnesium   


 


C-Reactive Protein   


 


Total Protein   


 


Albumin   


 


LDL Cholesterol Direct   


 


HDL Cholesterol   














  01/03/18 01/03/18





  05:55 12:24


 


WBC  


 


RBC  


 


Hgb  


 


Hct  


 


MCV  


 


MCHC  


 


RDW  


 


Seg Neuts % (Manual)  


 


Lymphocytes % (Manual)  


 


Seg Neutrophils # Man  


 


Lymphocytes # (Manual)  


 


PT  


 


INR  


 


POC ABG pH  


 


POC ABG pCO2  


 


POC ABG pO2  


 


Sodium  


 


Potassium  


 


Chloride  108.2 H 


 


Carbon Dioxide  21 L 


 


BUN  


 


Creatinine  


 


Glucose  228 H 


 


POC Glucose   215 H


 


Hemoglobin A1c  


 


Lactic Acid  


 


Calcium  8.1 L 


 


Phosphorus  


 


Magnesium  


 


C-Reactive Protein  


 


Total Protein  


 


Albumin  


 


LDL Cholesterol Direct  


 


HDL Cholesterol

## 2018-01-04 RX ADMIN — INSULIN ASPART SCH UNITS: 100 INJECTION, SOLUTION INTRAVENOUS; SUBCUTANEOUS at 17:55

## 2018-01-04 RX ADMIN — ASPIRIN SCH: 300 SUPPOSITORY RECTAL at 11:42

## 2018-01-04 RX ADMIN — PIPERACILLIN SODIUM AND TAZOBACTAM SODIUM SCH MLS/HR: 3; .375 INJECTION, POWDER, LYOPHILIZED, FOR SOLUTION INTRAVENOUS at 18:00

## 2018-01-04 RX ADMIN — INSULIN ASPART SCH UNITS: 100 INJECTION, SOLUTION INTRAVENOUS; SUBCUTANEOUS at 08:36

## 2018-01-04 RX ADMIN — CEFTRIAXONE SODIUM SCH MLS/10 MIN: 10 INJECTION, POWDER, FOR SOLUTION INTRAVENOUS at 18:43

## 2018-01-04 RX ADMIN — INSULIN ASPART SCH: 100 INJECTION, SOLUTION INTRAVENOUS; SUBCUTANEOUS at 07:30

## 2018-01-04 RX ADMIN — INSULIN ASPART SCH UNITS: 100 INJECTION, SOLUTION INTRAVENOUS; SUBCUTANEOUS at 16:30

## 2018-01-04 RX ADMIN — PIPERACILLIN SODIUM AND TAZOBACTAM SODIUM SCH MLS/HR: 3; .375 INJECTION, POWDER, LYOPHILIZED, FOR SOLUTION INTRAVENOUS at 12:00

## 2018-01-04 RX ADMIN — FAMOTIDINE SCH MG: 20 TABLET ORAL at 11:41

## 2018-01-04 RX ADMIN — INSULIN ASPART SCH UNITS: 100 INJECTION, SOLUTION INTRAVENOUS; SUBCUTANEOUS at 11:30

## 2018-01-04 RX ADMIN — INSULIN ASPART SCH UNITS: 100 INJECTION, SOLUTION INTRAVENOUS; SUBCUTANEOUS at 07:30

## 2018-01-04 RX ADMIN — PIPERACILLIN SODIUM AND TAZOBACTAM SODIUM SCH MLS/HR: 3; .375 INJECTION, POWDER, LYOPHILIZED, FOR SOLUTION INTRAVENOUS at 00:04

## 2018-01-04 RX ADMIN — PIPERACILLIN SODIUM AND TAZOBACTAM SODIUM SCH MLS/HR: 3; .375 INJECTION, POWDER, LYOPHILIZED, FOR SOLUTION INTRAVENOUS at 05:40

## 2018-01-04 RX ADMIN — INSULIN ASPART SCH: 100 INJECTION, SOLUTION INTRAVENOUS; SUBCUTANEOUS at 17:56

## 2018-01-04 RX ADMIN — NIFEDIPINE SCH MG: 30 TABLET, FILM COATED, EXTENDED RELEASE ORAL at 11:42

## 2018-01-04 RX ADMIN — CLOPIDOGREL BISULFATE SCH MG: 75 TABLET ORAL at 11:42

## 2018-01-04 RX ADMIN — INSULIN ASPART SCH UNITS: 100 INJECTION, SOLUTION INTRAVENOUS; SUBCUTANEOUS at 00:04

## 2018-01-04 RX ADMIN — BRIMONIDINE TARTRATE, TIMOLOL MALEATE SCH DROPS: 2; 5 SOLUTION/ DROPS OPHTHALMIC at 21:16

## 2018-01-04 NOTE — FLUOROSCOPY REPORT
Modified barium swallow:



Dysphasia.



Examination performed in conjunction with speech therapy. The patient 

was given several consistencies of opaque material. There was 

aspiration with high volume thin liquid. Markedly compromised oral 

transit with thicker materials. Generally had difficulty swallowing all 

thicknesses. Of note is a cervical plate bridging C3 and C4.

## 2018-01-04 NOTE — PROGRESS NOTE
Assessment and Plan


-Sepsis secondary to acute mastoiditis


-DKA with acute metabolic acidosis, high anion gap(resolved)


-Acute metabolic encephalopathy( improved)


-Recurrent Acute ischemic strokes


-Expressive aphasia


-Hypernatremia


-Moderate malnutrition


-Oropharyngeal dysphagia





Monitor hemodynamics


Complete antibiotics per ID


Continue with supplemental oxygen for O2 sats >92%


Aspiration precautions


Speech language pathologist to evaluate and treat- swallow and speech


Enteric nutrition


Free water flushes


PT/OT/mobility


Secondary stroke prophylaxis


Evaluate for other secondary causes of stroke. Get MANUELITO with reflex


Accuchecks with glycemic control











Subjective


Date of service: 01/04/18


Principal diagnosis: Acute on Chronic Encephalopathy; SIRS; DKA; Seizures


Interval history: 





Patient is seen today for: Acute on Chronic Encephalopathy; SIRS; DKA; Seizures





Seen and examined at bedside; 24hour events reviewed; nursing and respiratory 

care staff consulted; no adverse overnight events reported to me;





Objective





- Exam


Narrative Exam: 


GEN: Thin frail woman chronic a bit debilitating appearing, lethargic, nonverbal


HEENT: NCAT, EOMI, PERRL, OP Clear


NECK: supple, no adenopathy, no thyromegaly, no JVD


CVS/HEART: regular tachycardia , S1S2, NO JVD, pulses present bilaterally


CHEST/LUNGS: CTA B, Symmetrical chest expansion, good air entry bilaterally


GI/Abdomen: soft, NTND, good bowel sounds, no guarding or rebound


/Bladder: no suprapubic tenderness, no CVA or paraspinal tenderness


EXT/Skin: no c/c/e, no obvious rash


MSK: Moving right arm, left hand/forearm in a splint/immobilizer


Neuro:Expressive aphasia, but mouths words


Psych: calm, good affect





 Vital Signs - 12hr











  01/04/18





  07:38


 


Temperature 98.3 F


 


Pulse Rate 93 H


 


Respiratory 18





Rate 


 


Blood Pressure 135/85


 


O2 Sat by Pulse 99





Oximetry 











Constitutional: no acute distress, asleep


Eyes: non-icteric


Neck: supple, no lymphadenopathy


Ascultation: Bilateral: clear


Cardiovascular: regular rate and rhythm


Gastrointestinal: normoactive bowel sounds, soft, non-tender


Integumentary: normal


Extremities: no cyanosis, no edema


Neurologic: other (Patient sleeping at this time.)


Psychiatric: other (Patient sleeping at this time.)


CBC and BMP: 


 01/03/18 05:55





 01/03/18 05:55


ABG, PT/INR, D-dimer: 


ABG











POC ABG pH  7.389  (7.35-7.45)   12/30/17  19:49    


 


POC ABG pCO2  28.9  (35-45)  L  12/30/17  19:49    


 


POC ABG pO2  98  ()   12/30/17  19:49    


 


POC ABG HCO3  17.4   12/30/17  19:49    


 


POC ABG Total CO2  18   12/30/17  19:49    


 


POC ABG O2 Sat  98   12/30/17  19:49    





PT/INR, D-dimer











PT  16.4 Sec. (12.2-14.9)  H  12/28/17  12:26    


 


INR  1.25  (0.87-1.13)  H  12/28/17  12:26    








Abnormal lab findings: 


 Abnormal Labs











  12/28/17 12/28/17 12/28/17





  12:26 12:26 12:26


 


WBC  20.1 H  


 


RBC   


 


Hgb   


 


Hct   


 


MCV  101 H  


 


MCHC   


 


RDW   


 


Seg Neuts % (Manual)  90.0 H  


 


Lymphocytes % (Manual)  2.0 L  


 


Seg Neutrophils # Man  18.1 H  


 


Lymphocytes # (Manual)  0.4 L  


 


PT   16.4 H 


 


INR   1.25 H 


 


POC ABG pH   


 


POC ABG pCO2   


 


POC ABG pO2   


 


Sodium   


 


Potassium   


 


Chloride   


 


Carbon Dioxide   


 


BUN   


 


Creatinine   


 


Glucose   


 


POC Glucose    445 H


 


Hemoglobin A1c   


 


Lactic Acid   


 


Calcium   


 


Phosphorus   


 


Magnesium   


 


C-Reactive Protein   


 


Total Protein   


 


Albumin   


 


LDL Cholesterol Direct   


 


HDL Cholesterol   














  12/28/17 12/28/17 12/28/17





  12:39 13:44 13:44


 


WBC   


 


RBC   


 


Hgb   


 


Hct   


 


MCV   


 


MCHC   


 


RDW   


 


Seg Neuts % (Manual)   


 


Lymphocytes % (Manual)   


 


Seg Neutrophils # Man   


 


Lymphocytes # (Manual)   


 


PT   


 


INR   


 


POC ABG pH  6.856 L  


 


POC ABG pCO2  12.4 L  


 


POC ABG pO2  288 H  


 


Sodium   


 


Potassium   


 


Chloride   


 


Carbon Dioxide   


 


BUN   


 


Creatinine   


 


Glucose   


 


POC Glucose   


 


Hemoglobin A1c   


 


Lactic Acid   


 


Calcium   


 


Phosphorus    8.10 H


 


Magnesium    2.60 H


 


C-Reactive Protein   


 


Total Protein   5.8 L 


 


Albumin   3.0 L 


 


LDL Cholesterol Direct   


 


HDL Cholesterol   














  12/28/17 12/28/17 12/28/17





  13:44 14:17 15:17


 


WBC   


 


RBC   


 


Hgb   


 


Hct   


 


MCV   


 


MCHC   


 


RDW   


 


Seg Neuts % (Manual)   


 


Lymphocytes % (Manual)   


 


Seg Neutrophils # Man   


 


Lymphocytes # (Manual)   


 


PT   


 


INR   


 


POC ABG pH   


 


POC ABG pCO2   


 


POC ABG pO2   


 


Sodium   


 


Potassium  6.5 H*  5.8 H  5.7 H


 


Chloride  94.4 L  


 


Carbon Dioxide  5 L*  2 L*  < 2.0 L*


 


BUN  43 H  38 H  39 H


 


Creatinine   1.3 H  1.3 H


 


Glucose  629 H*  610 H*  585 H*


 


POC Glucose   


 


Hemoglobin A1c   


 


Lactic Acid   


 


Calcium  8.3 L  7.7 L  7.9 L


 


Phosphorus   


 


Magnesium   


 


C-Reactive Protein   


 


Total Protein   


 


Albumin   


 


LDL Cholesterol Direct   


 


HDL Cholesterol   














  12/28/17 12/28/17 12/28/17





  15:17 15:40 16:57


 


WBC   


 


RBC   


 


Hgb   


 


Hct   


 


MCV   


 


MCHC   


 


RDW   


 


Seg Neuts % (Manual)   


 


Lymphocytes % (Manual)   


 


Seg Neutrophils # Man   


 


Lymphocytes # (Manual)   


 


PT   


 


INR   


 


POC ABG pH   


 


POC ABG pCO2   


 


POC ABG pO2   


 


Sodium   


 


Potassium   


 


Chloride   


 


Carbon Dioxide    3 L*


 


BUN    42 H


 


Creatinine    1.3 H


 


Glucose    557 H*


 


POC Glucose   > 500 H 


 


Hemoglobin A1c   


 


Lactic Acid  2.90 H*  


 


Calcium    8.1 L


 


Phosphorus   


 


Magnesium   


 


C-Reactive Protein   


 


Total Protein   


 


Albumin   


 


LDL Cholesterol Direct   


 


HDL Cholesterol   














  12/28/17 12/28/17 12/28/17





  17:02 17:02 18:05


 


WBC   


 


RBC   


 


Hgb   


 


Hct   


 


MCV   


 


MCHC   


 


RDW   


 


Seg Neuts % (Manual)   


 


Lymphocytes % (Manual)   


 


Seg Neutrophils # Man   


 


Lymphocytes # (Manual)   


 


PT   


 


INR   


 


POC ABG pH   


 


POC ABG pCO2   


 


POC ABG pO2   


 


Sodium   


 


Potassium   


 


Chloride   


 


Carbon Dioxide   


 


BUN   


 


Creatinine   


 


Glucose   


 


POC Glucose    485 H


 


Hemoglobin A1c  10.0 H  


 


Lactic Acid   


 


Calcium   


 


Phosphorus   6.60 H 


 


Magnesium   2.70 H 


 


C-Reactive Protein   


 


Total Protein   


 


Albumin   


 


LDL Cholesterol Direct   


 


HDL Cholesterol   














  12/28/17 12/28/17 12/28/17





  18:52 20:20 21:23


 


WBC   


 


RBC   


 


Hgb   


 


Hct   


 


MCV   


 


MCHC   


 


RDW   


 


Seg Neuts % (Manual)   


 


Lymphocytes % (Manual)   


 


Seg Neutrophils # Man   


 


Lymphocytes # (Manual)   


 


PT   


 


INR   


 


POC ABG pH   


 


POC ABG pCO2   


 


POC ABG pO2   


 


Sodium  148 H  


 


Potassium   


 


Chloride   


 


Carbon Dioxide  < 2.0 L*  


 


BUN  41 H  


 


Creatinine  1.4 H  


 


Glucose  473 H  


 


POC Glucose   356 H  322 H


 


Hemoglobin A1c   


 


Lactic Acid   


 


Calcium  7.8 L  


 


Phosphorus   


 


Magnesium   


 


C-Reactive Protein   


 


Total Protein   


 


Albumin   


 


LDL Cholesterol Direct   


 


HDL Cholesterol   














  12/28/17 12/28/17 12/28/17





  22:28 23:01 23:31


 


WBC   


 


RBC   


 


Hgb   


 


Hct   


 


MCV   


 


MCHC   


 


RDW   


 


Seg Neuts % (Manual)   


 


Lymphocytes % (Manual)   


 


Seg Neutrophils # Man   


 


Lymphocytes # (Manual)   


 


PT   


 


INR   


 


POC ABG pH   


 


POC ABG pCO2   


 


POC ABG pO2   


 


Sodium   148 H 


 


Potassium   


 


Chloride   111.6 H 


 


Carbon Dioxide   4 L* 


 


BUN   43 H 


 


Creatinine   1.3 H 


 


Glucose   177 H 


 


POC Glucose  242 H   204 H


 


Hemoglobin A1c   


 


Lactic Acid   


 


Calcium   7.7 L 


 


Phosphorus   


 


Magnesium   


 


C-Reactive Protein   


 


Total Protein   


 


Albumin   


 


LDL Cholesterol Direct   


 


HDL Cholesterol   














  12/29/17 12/29/17 12/29/17





  01:20 03:30 04:01


 


WBC   


 


RBC   


 


Hgb   


 


Hct   


 


MCV   


 


MCHC   


 


RDW   


 


Seg Neuts % (Manual)   


 


Lymphocytes % (Manual)   


 


Seg Neutrophils # Man   


 


Lymphocytes # (Manual)   


 


PT   


 


INR   


 


POC ABG pH   


 


POC ABG pCO2   


 


POC ABG pO2   


 


Sodium   


 


Potassium   


 


Chloride   


 


Carbon Dioxide   


 


BUN   


 


Creatinine   


 


Glucose   


 


POC Glucose  120 H  59 L  132 H


 


Hemoglobin A1c   


 


Lactic Acid   


 


Calcium   


 


Phosphorus   


 


Magnesium   


 


C-Reactive Protein   


 


Total Protein   


 


Albumin   


 


LDL Cholesterol Direct   


 


HDL Cholesterol   














  12/29/17 12/29/17 12/29/17





  04:20 04:20 05:15


 


WBC   


 


RBC   


 


Hgb   


 


Hct   


 


MCV   


 


MCHC   


 


RDW   


 


Seg Neuts % (Manual)   


 


Lymphocytes % (Manual)   


 


Seg Neutrophils # Man   


 


Lymphocytes # (Manual)   


 


PT   


 


INR   


 


POC ABG pH   


 


POC ABG pCO2   


 


POC ABG pO2   


 


Sodium  151 H  150 H 


 


Potassium   


 


Chloride  114.3 H  115.4 H 


 


Carbon Dioxide  10 L  10 L 


 


BUN  48 H  48 H 


 


Creatinine   1.3 H 


 


Glucose  121 H  118 H 


 


POC Glucose    134 H


 


Hemoglobin A1c   


 


Lactic Acid   


 


Calcium  7.8 L  7.8 L 


 


Phosphorus   


 


Magnesium   


 


C-Reactive Protein   


 


Total Protein   


 


Albumin   


 


LDL Cholesterol Direct   


 


HDL Cholesterol   














  12/29/17 12/29/17 12/29/17





  06:41 07:49 08:12


 


WBC   


 


RBC   


 


Hgb   


 


Hct   


 


MCV   


 


MCHC   


 


RDW   


 


Seg Neuts % (Manual)   


 


Lymphocytes % (Manual)   


 


Seg Neutrophils # Man   


 


Lymphocytes # (Manual)   


 


PT   


 


INR   


 


POC ABG pH   


 


POC ABG pCO2   


 


POC ABG pO2   


 


Sodium   151 H 


 


Potassium   


 


Chloride   116.5 H 


 


Carbon Dioxide   13 L 


 


BUN   52 H 


 


Creatinine   1.3 H 


 


Glucose   213 H 


 


POC Glucose  172 H   228 H


 


Hemoglobin A1c   


 


Lactic Acid   


 


Calcium   7.9 L 


 


Phosphorus   


 


Magnesium   


 


C-Reactive Protein   


 


Total Protein   


 


Albumin   


 


LDL Cholesterol Direct   


 


HDL Cholesterol   














  12/29/17 12/29/17 12/29/17





  08:22 09:31 11:04


 


WBC   


 


RBC   


 


Hgb   


 


Hct   


 


MCV   


 


MCHC   


 


RDW   


 


Seg Neuts % (Manual)   


 


Lymphocytes % (Manual)   


 


Seg Neutrophils # Man   


 


Lymphocytes # (Manual)   


 


PT   


 


INR   


 


POC ABG pH   


 


POC ABG pCO2   


 


POC ABG pO2   


 


Sodium   


 


Potassium   


 


Chloride   


 


Carbon Dioxide   


 


BUN   


 


Creatinine   


 


Glucose   


 


POC Glucose  140 H  140 H  136 H


 


Hemoglobin A1c   


 


Lactic Acid   


 


Calcium   


 


Phosphorus   


 


Magnesium   


 


C-Reactive Protein   


 


Total Protein   


 


Albumin   


 


LDL Cholesterol Direct   


 


HDL Cholesterol   














  12/29/17 12/29/17 12/29/17





  12:11 13:40 17:21


 


WBC   


 


RBC   


 


Hgb   


 


Hct   


 


MCV   


 


MCHC   


 


RDW   


 


Seg Neuts % (Manual)   


 


Lymphocytes % (Manual)   


 


Seg Neutrophils # Man   


 


Lymphocytes # (Manual)   


 


PT   


 


INR   


 


POC ABG pH   


 


POC ABG pCO2   


 


POC ABG pO2   


 


Sodium   152 H 


 


Potassium   


 


Chloride   117.6 H 


 


Carbon Dioxide   15 L 


 


BUN   50 H 


 


Creatinine   1.3 H 


 


Glucose   144 H 


 


POC Glucose  111 H   182 H


 


Hemoglobin A1c   


 


Lactic Acid   


 


Calcium   


 


Phosphorus   


 


Magnesium   


 


C-Reactive Protein   


 


Total Protein   


 


Albumin   


 


LDL Cholesterol Direct   


 


HDL Cholesterol   














  12/29/17 12/29/17 12/30/17





  17:45 23:06 00:39


 


WBC   


 


RBC   


 


Hgb   


 


Hct   


 


MCV   


 


MCHC   


 


RDW   


 


Seg Neuts % (Manual)   


 


Lymphocytes % (Manual)   


 


Seg Neutrophils # Man   


 


Lymphocytes # (Manual)   


 


PT   


 


INR   


 


POC ABG pH   


 


POC ABG pCO2   


 


POC ABG pO2   


 


Sodium  149 H  


 


Potassium   


 


Chloride  116.2 H  


 


Carbon Dioxide  13 L  


 


BUN  48 H  


 


Creatinine  1.3 H  


 


Glucose  171 H  


 


POC Glucose   < 40 L  123 H


 


Hemoglobin A1c   


 


Lactic Acid   


 


Calcium  8.1 L  


 


Phosphorus   


 


Magnesium   


 


C-Reactive Protein   


 


Total Protein   


 


Albumin   


 


LDL Cholesterol Direct   


 


HDL Cholesterol   














  12/30/17 12/30/17 12/30/17





  07:07 07:07 11:35


 


WBC   


 


RBC   


 


Hgb   


 


Hct   


 


MCV   


 


MCHC   


 


RDW   


 


Seg Neuts % (Manual)   


 


Lymphocytes % (Manual)   


 


Seg Neutrophils # Man   


 


Lymphocytes # (Manual)   


 


PT   


 


INR   


 


POC ABG pH   


 


POC ABG pCO2   


 


POC ABG pO2   


 


Sodium   152 H 


 


Potassium   3.0 L D 


 


Chloride   121.9 H 


 


Carbon Dioxide   18 L 


 


BUN   44 H 


 


Creatinine   


 


Glucose   


 


POC Glucose    118 H


 


Hemoglobin A1c   


 


Lactic Acid   


 


Calcium   


 


Phosphorus  1.50 L  


 


Magnesium   


 


C-Reactive Protein   


 


Total Protein   


 


Albumin   


 


LDL Cholesterol Direct   


 


HDL Cholesterol   














  12/30/17 12/30/17 12/30/17





  16:54 19:44 19:49


 


WBC   


 


RBC   


 


Hgb   


 


Hct   


 


MCV   


 


MCHC   


 


RDW   


 


Seg Neuts % (Manual)   


 


Lymphocytes % (Manual)   


 


Seg Neutrophils # Man   


 


Lymphocytes # (Manual)   


 


PT   


 


INR   


 


POC ABG pH   


 


POC ABG pCO2    28.9 L


 


POC ABG pO2   


 


Sodium   


 


Potassium   


 


Chloride   


 


Carbon Dioxide   


 


BUN   


 


Creatinine   


 


Glucose   


 


POC Glucose  231 H  


 


Hemoglobin A1c   


 


Lactic Acid   


 


Calcium   


 


Phosphorus   


 


Magnesium   


 


C-Reactive Protein   4.70 H 


 


Total Protein   


 


Albumin   


 


LDL Cholesterol Direct   


 


HDL Cholesterol   














  12/30/17 12/31/17 12/31/17





  21:48 06:25 12:35


 


WBC   


 


RBC   


 


Hgb   


 


Hct   


 


MCV   


 


MCHC   


 


RDW   


 


Seg Neuts % (Manual)   


 


Lymphocytes % (Manual)   


 


Seg Neutrophils # Man   


 


Lymphocytes # (Manual)   


 


PT   


 


INR   


 


POC ABG pH   


 


POC ABG pCO2   


 


POC ABG pO2   


 


Sodium   


 


Potassium   


 


Chloride   


 


Carbon Dioxide   


 


BUN   


 


Creatinine   


 


Glucose   


 


POC Glucose  176 H  299 H  258 H


 


Hemoglobin A1c   


 


Lactic Acid   


 


Calcium   


 


Phosphorus   


 


Magnesium   


 


C-Reactive Protein   


 


Total Protein   


 


Albumin   


 


LDL Cholesterol Direct   


 


HDL Cholesterol   














  12/31/17 12/31/17 01/01/18





  16:43 22:07 02:59


 


WBC   


 


RBC   


 


Hgb   


 


Hct   


 


MCV   


 


MCHC   


 


RDW   


 


Seg Neuts % (Manual)   


 


Lymphocytes % (Manual)   


 


Seg Neutrophils # Man   


 


Lymphocytes # (Manual)   


 


PT   


 


INR   


 


POC ABG pH   


 


POC ABG pCO2   


 


POC ABG pO2   


 


Sodium   


 


Potassium   


 


Chloride   


 


Carbon Dioxide   


 


BUN   


 


Creatinine   


 


Glucose   


 


POC Glucose  252 H  248 H  193 H


 


Hemoglobin A1c   


 


Lactic Acid   


 


Calcium   


 


Phosphorus   


 


Magnesium   


 


C-Reactive Protein   


 


Total Protein   


 


Albumin   


 


LDL Cholesterol Direct   


 


HDL Cholesterol   














  01/01/18 01/01/18 01/01/18





  06:20 07:27 07:27


 


WBC   18.6 H 


 


RBC   2.90 L 


 


Hgb   9.0 L 


 


Hct   27.0 L 


 


MCV   


 


MCHC   


 


RDW   


 


Seg Neuts % (Manual)   


 


Lymphocytes % (Manual)   


 


Seg Neutrophils # Man   


 


Lymphocytes # (Manual)   


 


PT   


 


INR   


 


POC ABG pH   


 


POC ABG pCO2   


 


POC ABG pO2   


 


Sodium    152 H


 


Potassium   


 


Chloride    116.2 H


 


Carbon Dioxide    19 L


 


BUN    24 H


 


Creatinine   


 


Glucose    288 H


 


POC Glucose  280 H  


 


Hemoglobin A1c   


 


Lactic Acid   


 


Calcium    8.3 L


 


Phosphorus   


 


Magnesium   


 


C-Reactive Protein   


 


Total Protein   


 


Albumin   


 


LDL Cholesterol Direct    42 L


 


HDL Cholesterol    88 H














  01/01/18 01/01/18 01/01/18





  11:09 17:12 20:59


 


WBC   


 


RBC   


 


Hgb   


 


Hct   


 


MCV   


 


MCHC   


 


RDW   


 


Seg Neuts % (Manual)   


 


Lymphocytes % (Manual)   


 


Seg Neutrophils # Man   


 


Lymphocytes # (Manual)   


 


PT   


 


INR   


 


POC ABG pH   


 


POC ABG pCO2   


 


POC ABG pO2   


 


Sodium   


 


Potassium   


 


Chloride   


 


Carbon Dioxide   


 


BUN   


 


Creatinine   


 


Glucose   


 


POC Glucose  328 H  306 H  318 H


 


Hemoglobin A1c   


 


Lactic Acid   


 


Calcium   


 


Phosphorus   


 


Magnesium   


 


C-Reactive Protein   


 


Total Protein   


 


Albumin   


 


LDL Cholesterol Direct   


 


HDL Cholesterol   














  01/02/18 01/02/18 01/02/18





  01:55 04:40 04:40


 


WBC   13.0 H 


 


RBC   2.88 L 


 


Hgb   9.0 L 


 


Hct   26.2 L 


 


MCV   


 


MCHC   35 H 


 


RDW   12.6 L 


 


Seg Neuts % (Manual)   


 


Lymphocytes % (Manual)   


 


Seg Neutrophils # Man   


 


Lymphocytes # (Manual)   


 


PT   


 


INR   


 


POC ABG pH   


 


POC ABG pCO2   


 


POC ABG pO2   


 


Sodium    148 H


 


Potassium    3.3 L


 


Chloride    111.8 H


 


Carbon Dioxide   


 


BUN    18 H


 


Creatinine   


 


Glucose    212 H


 


POC Glucose  274 H  


 


Hemoglobin A1c   


 


Lactic Acid   


 


Calcium    8.3 L


 


Phosphorus   


 


Magnesium   


 


C-Reactive Protein   


 


Total Protein   


 


Albumin   


 


LDL Cholesterol Direct   


 


HDL Cholesterol   














  01/02/18 01/02/18 01/02/18





  06:30 12:14 16:50


 


WBC   


 


RBC   


 


Hgb   


 


Hct   


 


MCV   


 


MCHC   


 


RDW   


 


Seg Neuts % (Manual)   


 


Lymphocytes % (Manual)   


 


Seg Neutrophils # Man   


 


Lymphocytes # (Manual)   


 


PT   


 


INR   


 


POC ABG pH   


 


POC ABG pCO2   


 


POC ABG pO2   


 


Sodium   


 


Potassium   


 


Chloride   


 


Carbon Dioxide   


 


BUN   


 


Creatinine   


 


Glucose   


 


POC Glucose  285 H  166 H  178 H


 


Hemoglobin A1c   


 


Lactic Acid   


 


Calcium   


 


Phosphorus   


 


Magnesium   


 


C-Reactive Protein   


 


Total Protein   


 


Albumin   


 


LDL Cholesterol Direct   


 


HDL Cholesterol   














  01/02/18 01/03/18 01/03/18





  21:24 05:47 05:55


 


WBC    12.4 H


 


RBC    2.92 L


 


Hgb    8.8 L


 


Hct    26.1 L


 


MCV   


 


MCHC   


 


RDW    12.6 L


 


Seg Neuts % (Manual)   


 


Lymphocytes % (Manual)   


 


Seg Neutrophils # Man   


 


Lymphocytes # (Manual)   


 


PT   


 


INR   


 


POC ABG pH   


 


POC ABG pCO2   


 


POC ABG pO2   


 


Sodium   


 


Potassium   


 


Chloride   


 


Carbon Dioxide   


 


BUN   


 


Creatinine   


 


Glucose   


 


POC Glucose  197 H  291 H 


 


Hemoglobin A1c   


 


Lactic Acid   


 


Calcium   


 


Phosphorus   


 


Magnesium   


 


C-Reactive Protein   


 


Total Protein   


 


Albumin   


 


LDL Cholesterol Direct   


 


HDL Cholesterol   














  01/03/18 01/03/18 01/03/18





  05:55 12:24 16:33


 


WBC   


 


RBC   


 


Hgb   


 


Hct   


 


MCV   


 


MCHC   


 


RDW   


 


Seg Neuts % (Manual)   


 


Lymphocytes % (Manual)   


 


Seg Neutrophils # Man   


 


Lymphocytes # (Manual)   


 


PT   


 


INR   


 


POC ABG pH   


 


POC ABG pCO2   


 


POC ABG pO2   


 


Sodium   


 


Potassium   


 


Chloride  108.2 H  


 


Carbon Dioxide  21 L  


 


BUN   


 


Creatinine   


 


Glucose  228 H  


 


POC Glucose   215 H  280 H


 


Hemoglobin A1c   


 


Lactic Acid   


 


Calcium  8.1 L  


 


Phosphorus   


 


Magnesium   


 


C-Reactive Protein   


 


Total Protein   


 


Albumin   


 


LDL Cholesterol Direct   


 


HDL Cholesterol   














  01/03/18 01/04/18 01/04/18





  22:12 05:27 12:47


 


WBC   


 


RBC   


 


Hgb   


 


Hct   


 


MCV   


 


MCHC   


 


RDW   


 


Seg Neuts % (Manual)   


 


Lymphocytes % (Manual)   


 


Seg Neutrophils # Man   


 


Lymphocytes # (Manual)   


 


PT   


 


INR   


 


POC ABG pH   


 


POC ABG pCO2   


 


POC ABG pO2   


 


Sodium   


 


Potassium   


 


Chloride   


 


Carbon Dioxide   


 


BUN   


 


Creatinine   


 


Glucose   


 


POC Glucose  236 H  186 H  206 H


 


Hemoglobin A1c   


 


Lactic Acid   


 


Calcium   


 


Phosphorus   


 


Magnesium   


 


C-Reactive Protein   


 


Total Protein   


 


Albumin   


 


LDL Cholesterol Direct   


 


HDL Cholesterol

## 2018-01-04 NOTE — PROGRESS NOTE
Assessment and Plan


Assessment and plan: 


Patient is 37-year-old woman with a history of insulin-dependent diabetes 

mellitus, CVA on aspirin and Plavix, hypertension, dyslipidemia and depression 

who presented with altered mental status and was admitted for DKA.  Her 

baseline mental function per sister Cindy: she walks with a walker, she 

feeds herself, needs help with clothes, left arm weaker than right and speech 

is usually slurred. Cindy reports that patient was discharged from [facility] about 1.5 months ago and went to [facility]. Then, she 

went to her home from there. She lives with daughter Ej and brother, 

Gonzalo, who moved in with her for more support.  





per Cindy: "I wanna say she wasn't talking her insulin, because this 3rd 

stoke did alittle mentally and she more forgettable, that is what I am thinking

, for example, she will say, I need to take my insulin but she had just took 

her insulin." They went to [place], on the way back Wednesday night 11pm, she 

was sleeping off and on, BG was 516 and she took her insulin. Her daughter, 

Ej, said next morning she got up Thursday morning to go to the bathroom 

and she fell and then was unresponsive, she was found on the floor in urine by 

daughter, Ej. 





-DKA with acute metabolic acidosis, high anion gap: Treated with IV fluids, 

insulin drip now resolved


-Acute metabolic encephalopathy/semiconscious state: treat the above


-Recurrent Acute ischemic strokes. ?hydrocephalus on Ct head: consulted 

Neurology


--->Sepsis due to acute right mastoiditis, poa, start iv abx 


-Hypernatremia: needs more free water, bmp


-Moderate malnutrition: dietician


-Acute ischemic stroke, poa: get ECHO, used stroke protocol





12/30/17: Insulin drip was stopped because of hypoglycemia and patient was sent 

from ED to medical/surg floor. Anion gap was still high so I sent ICU to start 

insulin drip. I also ordered bmp stat. By the time the stat bmp was resulted, 

patient was in the icu. Anion gap has closed, so i will downgrade back to 

medical surg floor. Replace her potassium, start diet, if she unable to eat 

continue D5W due to hypernatremia and not eating. Await neurology assessment 

because she is not at her mental baseline of slurred speech and ambulation with 

a walker. Order PT/OT





12/31/17: I gave pt 0.5mg iv ativan x 1 to complete mri brain that Neurology 

ordered, MRI brain reported as multiple focal areas of restricted diffusion 

right cerebral hemisphere suggestive excuse ischemia, no evidence of hemorrhage

, dilated right lateral ventricle without definite evidence of obstruction, 

acute right mastoiditis. I started abx, iv rocephin, used stroke protocol





1/1/2018: WBC went down, Still hypernatremic, more free water, repeat labs in 

am. ECHO pending. I called Cindy at [phone], no answer ?phone broken 

message. 


1/2/18: echo pending, replace potassium, hyperglycemia on D5W for the 

hypernatremia, continue ssi,  if she tolerates a diet today, then d/c ivf, 

change ssi to achs instead of q4hr,  urine culture still pending. Hopefully SNF 

placement in 1-2 days


1/3/18: Echo reviewed, hypernatremia/hypokalemia resolved, stop ivf. Waiting on 

placement


1/4/18: [facility] in [place], [place], waiting on insurance pre-auth





History


Interval history: 


Patient was seen and examined.  Follow-up on current diagnosis/altered mental 

status.  Overnight uneventful.  Patient not given any history; therefore,  

Imaging, nursing note, chart, labs and old chart reviewed.  Responding more. 





Hospitalist Physical





- Physical exam


Narrative exam: 


GEN: Thin frail woman chronic a bit debilitating appearing, lethargic, nonverbal


HEENT: NCAT, EOMI, PERRL, OP Clear


NECK: supple, no adenopathy, no thyromegaly, no JVD


CVS/HEART: regular tachycardia NORMAL S1S2, NO JVD, pulses present bilaterally


CHEST/LUNGS: CTA B, Symmetrical chest expansion, good air entry bilaterally


GI/Abdomen: soft, NTND, good bowel sounds, no guarding or rebound


/Bladder: no suprapubic tenderness, no CVA or paraspinal tenderness


EXT/Skin: no c/c/e, no obvious rash


MSK: Moving both arms with contractures 


Neuro: CN 2-12 grossly intact except nonverbal, hemiparesis, facial asymmetry, 

no new focal deficits


Psych: calm but confused








- Constitutional


Vitals: 


 











Temp Pulse Resp BP Pulse Ox


 


 98.3 F   93 H  18   135/85   99 


 


 01/04/18 07:38  01/04/18 07:38  01/04/18 07:38  01/04/18 07:38  01/04/18 07:38











General appearance: Present: well-nourished.  Absent: mild distress





Results





- Labs


CBC & Chem 7: 


 01/03/18 05:55





 01/03/18 05:55


Labs: 


 Laboratory Last Values











WBC  12.4 K/mm3 (4.5-11.0)  H  01/03/18  05:55    


 


RBC  2.92 M/mm3 (3.65-5.03)  L  01/03/18  05:55    


 


Hgb  8.8 gm/dl (10.1-14.3)  L  01/03/18  05:55    


 


Hct  26.1 % (30.3-42.9)  L  01/03/18  05:55    


 


MCV  89 fl (79-97)   01/03/18  05:55    


 


MCH  30 pg (28-32)   01/03/18  05:55    


 


MCHC  34 % (30-34)   01/03/18  05:55    


 


RDW  12.6 % (13.2-15.2)  L  01/03/18  05:55    


 


Plt Count  249 K/mm3 (140-440)   01/03/18  05:55    


 


Add Manual Diff  Complete   12/28/17  12:26    


 


Total Counted  100   12/28/17  12:26    


 


Seg Neutrophils %  Np   12/28/17  12:26    


 


Seg Neuts % (Manual)  90.0 % (40.0-70.0)  H  12/28/17  12:26    


 


Band Neutrophils %  4.0 %  12/28/17  12:26    


 


Lymphocytes % (Manual)  2.0 % (13.4-35.0)  L  12/28/17  12:26    


 


Reactive Lymphs % (Man)  0 %  12/28/17  12:26    


 


Monocytes % (Manual)  4.0 % (0.0-7.3)   12/28/17  12:26    


 


Eosinophils % (Manual)  0 % (0.0-4.3)   12/28/17  12:26    


 


Basophils % (Manual)  0 % (0.0-1.8)   12/28/17  12:26    


 


Metamyelocytes %  0 %  12/28/17  12:26    


 


Myelocytes %  0 %  12/28/17  12:26    


 


Promyelocytes %  0 %  12/28/17  12:26    


 


Blast Cells %  0 %  12/28/17  12:26    


 


Nucleated RBC %  Not Reportable   12/28/17  12:26    


 


Seg Neutrophils # Man  18.1 K/mm3 (1.8-7.7)  H  12/28/17  12:26    


 


Band Neutrophils #  0.8 K/mm3  12/28/17  12:26    


 


Lymphocytes # (Manual)  0.4 K/mm3 (1.2-5.4)  L  12/28/17  12:26    


 


Abs React Lymphs (Man)  0.0 K/mm3  12/28/17  12:26    


 


Monocytes # (Manual)  0.8 K/mm3 (0.0-0.8)   12/28/17  12:26    


 


Eosinophils # (Manual)  0.0 K/mm3 (0.0-0.4)   12/28/17  12:26    


 


Basophils # (Manual)  0.0 K/mm3 (0.0-0.1)   12/28/17  12:26    


 


Metamyelocytes #  0.0 K/mm3  12/28/17  12:26    


 


Myelocytes #  0.0 K/mm3  12/28/17  12:26    


 


Promyelocytes #  0.0 K/mm3  12/28/17  12:26    


 


Blast Cells #  0.0 K/mm3  12/28/17  12:26    


 


WBC Morphology  Not Reportable   12/28/17  12:26    


 


Hypersegmented Neuts  Not Reportable   12/28/17  12:26    


 


Hyposegmented Neuts  Not Reportable   12/28/17  12:26    


 


Hypogranular Neuts  Not Reportable   12/28/17  12:26    


 


Smudge Cells  Not Reportable   12/28/17  12:26    


 


Toxic Granulation  Not Reportable   12/28/17  12:26    


 


Toxic Vacuolation  Not Reportable   12/28/17  12:26    


 


Dohle Bodies  Not Reportable   12/28/17  12:26    


 


Pelger-Huet Anomaly  Not Reportable   12/28/17  12:26    


 


Yuly Rods  Not Reportable   12/28/17  12:26    


 


Platelet Estimate  Not Reportable   12/28/17  12:26    


 


Clumped Platelets  Not Reportable   12/28/17  12:26    


 


Plt Clumps, EDTA  Not Reportable   12/28/17  12:26    


 


Large Platelets  Not Reportable   12/28/17  12:26    


 


Giant Platelets  Not Reportable   12/28/17  12:26    


 


Platelet Satelliting  Not Reportable   12/28/17  12:26    


 


Plt Morphology Comment  Not Reportable   12/28/17  12:26    


 


RBC Morphology  Normal   12/28/17  12:26    


 


Dimorphic RBCs  Not Reportable   12/28/17  12:26    


 


Polychromasia  Not Reportable   12/28/17  12:26    


 


Hypochromasia  Not Reportable   12/28/17  12:26    


 


Poikilocytosis  Not Reportable   12/28/17  12:26    


 


Anisocytosis  Not Reportable   12/28/17  12:26    


 


Microcytosis  Not Reportable   12/28/17  12:26    


 


Macrocytosis  Not Reportable   12/28/17  12:26    


 


Spherocytes  Not Reportable   12/28/17  12:26    


 


Pappenheimer Bodies  Not Reportable   12/28/17  12:26    


 


Sickle Cells  Not Reportable   12/28/17  12:26    


 


Target Cells  Not Reportable   12/28/17  12:26    


 


Tear Drop Cells  Not Reportable   12/28/17  12:26    


 


Ovalocytes  Not Reportable   12/28/17  12:26    


 


Helmet Cells  Not Reportable   12/28/17  12:26    


 


[place] Bodies  Not Reportable   12/28/17  12:26    


 


Cabot Rings  Not Reportable   12/28/17  12:26    


 


[place] Cells  Not Reportable   12/28/17  12:26    


 


Bite Cells  Not Reportable   12/28/17  12:26    


 


Crenated Cell  Not Reportable   12/28/17  12:26    


 


Elliptocytes  Not Reportable   12/28/17  12:26    


 


Acanthocytes (Spur)  Not Reportable   12/28/17  12:26    


 


Rouleaux  Not Reportable   12/28/17  12:26    


 


Hemoglobin C Crystals  Not Reportable   12/28/17  12:26    


 


Schistocytes  Not Reportable   12/28/17  12:26    


 


Malaria parasites  Not Reportable   12/28/17  12:26    


 


Juliano Bodies  Not Reportable   12/28/17  12:26    


 


Hem Pathologist Commnt  No   12/28/17  12:26    


 


PT  16.4 Sec. (12.2-14.9)  H  12/28/17  12:26    


 


INR  1.25  (0.87-1.13)  H  12/28/17  12:26    


 


POC ABG pH  7.389  (7.35-7.45)   12/30/17  19:49    


 


POC ABG pCO2  28.9  (35-45)  L  12/30/17  19:49    


 


POC ABG pO2  98  ()   12/30/17  19:49    


 


POC ABG HCO3  17.4   12/30/17  19:49    


 


POC ABG Total CO2  18   12/30/17  19:49    


 


POC ABG O2 Sat  98   12/30/17  19:49    


 


POC ABG Base Excess  -8   12/30/17  19:49    


 


FiO2  21 %  12/30/17  19:49    


 


Sodium  144 mmol/L (137-145)   01/03/18  05:55    


 


Potassium  4.1 mmol/L (3.6-5.0)  D 01/03/18  05:55    


 


Chloride  108.2 mmol/L ()  H  01/03/18  05:55    


 


Carbon Dioxide  21 mmol/L (22-30)  L  01/03/18  05:55    


 


Anion Gap  19 mmol/L  01/03/18  05:55    


 


BUN  12 mg/dL (7-17)   01/03/18  05:55    


 


Creatinine  0.7 mg/dL (0.7-1.2)   01/03/18  05:55    


 


Estimated GFR  > 60 ml/min  01/03/18  05:55    


 


BUN/Creatinine Ratio  17 %  01/03/18  05:55    


 


Glucose  228 mg/dL ()  H  01/03/18  05:55    


 


POC Glucose  206  ()  H  01/04/18  12:47    


 


Hemoglobin A1c  10.0 % (4-6)  H  12/28/17  17:02    


 


Lactic Acid  1.80 mmol/L (0.7-2.0)   12/30/17  19:44    


 


Calcium  8.1 mg/dL (8.4-10.2)  L  01/03/18  05:55    


 


Phosphorus  1.50 mg/dL (2.5-4.5)  L  12/30/17  07:07    


 


Magnesium  1.80 mg/dL (1.7-2.3)   01/02/18  04:40    


 


Total Bilirubin  0.30 mg/dL (0.1-1.2)   12/28/17  13:44    


 


Direct Bilirubin  0.2 mg/dL (0-0.2)   12/28/17  13:44    


 


Indirect Bilirubin  0.1 mg/dL  12/28/17  13:44    


 


AST  15 units/L (5-40)   12/28/17  13:44    


 


ALT  11 units/L (7-56)   12/28/17  13:44    


 


Alkaline Phosphatase  89 units/L ()   12/28/17  13:44    


 


C-Reactive Protein  4.70 mg/dL (0.00-1.30)  H  12/30/17  19:44    


 


Total Protein  5.8 g/dL (6.3-8.2)  L  12/28/17  13:44    


 


Albumin  3.0 g/dL (3.9-5)  L  12/28/17  13:44    


 


Albumin/Globulin Ratio  1.1 %  12/28/17  13:44    


 


Triglycerides  94 mg/dL (2-149)   01/01/18  07:27    


 


Cholesterol  148 mg/dL ()   01/01/18  07:27    


 


LDL Cholesterol Direct  42 mg/dL ()  L  01/01/18  07:27    


 


HDL Cholesterol  88 mg/dL (40-59)  H  01/01/18  07:27    


 


Cholesterol/HDL Ratio  1.68 %  01/01/18  07:27    


 


HCG, Qual  Negative  (Negative)   12/30/17  15:05    


 


Urine Color  Yellow  (Yellow)   12/28/17  14:28    


 


Urine Turbidity  Clear  (Clear)   12/28/17  14:28    


 


Urine pH  5.0  (5.0-7.0)   12/28/17  14:28    


 


Ur Specific Gravity  1.016  (1.003-1.030)   12/28/17  14:28    


 


Urine Protein  100 mg/dl mg/dL (Negative)   12/28/17  14:28    


 


Urine Glucose (UA)  >=500 mg/dL (Negative)   12/28/17  14:28    


 


Urine Ketones  80 mg/dL (Negative)   12/28/17  14:28    


 


Urine Blood  Mod  (Negative)   12/28/17  14:28    


 


Urine Nitrite  Neg  (Negative)   12/28/17  14:28    


 


Urine Bilirubin  Neg  (Negative)   12/28/17  14:28    


 


Urine Urobilinogen  < 2.0 mg/dL (<2.0)   12/28/17  14:28    


 


Ur Leukocyte Esterase  Neg  (Negative)   12/28/17  14:28    


 


Urine WBC (Auto)  2.0 /HPF (0.0-6.0)   12/28/17  14:28    


 


Urine RBC (Auto)  < 1.0 /HPF (0.0-6.0)   12/28/17  14:28    


 


U Epithel Cells (Auto)  < 1.0 /HPF (0-13.0)   12/28/17  14:28    


 


Urine Bacteria (Auto)  1+ /HPF (Negative)   12/28/17  14:28    


 


Urine Opiates Screen  Presumptive negative   12/28/17  14:28    


 


Urine Methadone Screen  Presumptive negative   12/28/17  14:28    


 


Ur Barbiturates Screen  Presumptive negative   12/28/17  14:28    


 


Ur Phencyclidine Scrn  Presumptive negative   12/28/17  14:28    


 


Ur Amphetamines Screen  Presumptive negative   12/28/17  14:28    


 


U Benzodiazepines Scrn  Presumptive negative   12/28/17  14:28    


 


Urine Cocaine Screen  Presumptive negative   12/28/17  14:28    


 


U Marijuana (THC) Screen  Presumptive negative   12/28/17  14:28    


 


Drugs of Abuse Note  Disclamer   12/28/17  14:28

## 2018-01-05 LAB — INR PPP: 0.95 (ref 0.87–1.13)

## 2018-01-05 RX ADMIN — INSULIN ASPART SCH: 100 INJECTION, SOLUTION INTRAVENOUS; SUBCUTANEOUS at 13:56

## 2018-01-05 RX ADMIN — PIPERACILLIN SODIUM AND TAZOBACTAM SODIUM SCH MLS/HR: 3; .375 INJECTION, POWDER, LYOPHILIZED, FOR SOLUTION INTRAVENOUS at 05:22

## 2018-01-05 RX ADMIN — PIPERACILLIN SODIUM AND TAZOBACTAM SODIUM SCH MLS/HR: 3; .375 INJECTION, POWDER, LYOPHILIZED, FOR SOLUTION INTRAVENOUS at 13:30

## 2018-01-05 RX ADMIN — INSULIN ASPART SCH: 100 INJECTION, SOLUTION INTRAVENOUS; SUBCUTANEOUS at 08:51

## 2018-01-05 RX ADMIN — BRIMONIDINE TARTRATE, TIMOLOL MALEATE SCH DROPS: 2; 5 SOLUTION/ DROPS OPHTHALMIC at 13:55

## 2018-01-05 RX ADMIN — PIPERACILLIN SODIUM AND TAZOBACTAM SODIUM SCH MLS/HR: 3; .375 INJECTION, POWDER, LYOPHILIZED, FOR SOLUTION INTRAVENOUS at 17:20

## 2018-01-05 RX ADMIN — FAMOTIDINE SCH MG: 20 TABLET ORAL at 13:55

## 2018-01-05 RX ADMIN — NIFEDIPINE SCH MG: 30 TABLET, FILM COATED, EXTENDED RELEASE ORAL at 13:56

## 2018-01-05 RX ADMIN — PIPERACILLIN SODIUM AND TAZOBACTAM SODIUM SCH MLS/HR: 3; .375 INJECTION, POWDER, LYOPHILIZED, FOR SOLUTION INTRAVENOUS at 00:21

## 2018-01-05 RX ADMIN — BRIMONIDINE TARTRATE, TIMOLOL MALEATE SCH DROPS: 2; 5 SOLUTION/ DROPS OPHTHALMIC at 23:09

## 2018-01-05 RX ADMIN — MORPHINE SULFATE PRN MG: 4 INJECTION, SOLUTION INTRAMUSCULAR; INTRAVENOUS at 23:10

## 2018-01-05 RX ADMIN — INSULIN ASPART SCH UNITS: 100 INJECTION, SOLUTION INTRAVENOUS; SUBCUTANEOUS at 17:21

## 2018-01-05 RX ADMIN — CYCLOBENZAPRINE PRN MG: 10 TABLET, FILM COATED ORAL at 13:56

## 2018-01-05 RX ADMIN — CLOPIDOGREL BISULFATE SCH: 75 TABLET ORAL at 13:57

## 2018-01-05 RX ADMIN — MORPHINE SULFATE PRN MG: 4 INJECTION, SOLUTION INTRAMUSCULAR; INTRAVENOUS at 01:02

## 2018-01-05 RX ADMIN — INSULIN ASPART SCH: 100 INJECTION, SOLUTION INTRAVENOUS; SUBCUTANEOUS at 00:30

## 2018-01-05 RX ADMIN — CEFTRIAXONE SODIUM SCH MLS/10 MIN: 10 INJECTION, POWDER, FOR SOLUTION INTRAVENOUS at 17:31

## 2018-01-05 RX ADMIN — MORPHINE SULFATE PRN MG: 4 INJECTION, SOLUTION INTRAMUSCULAR; INTRAVENOUS at 11:12

## 2018-01-05 RX ADMIN — ASPIRIN SCH: 300 SUPPOSITORY RECTAL at 14:08

## 2018-01-05 RX ADMIN — PIPERACILLIN SODIUM AND TAZOBACTAM SODIUM SCH MLS/HR: 3; .375 INJECTION, POWDER, LYOPHILIZED, FOR SOLUTION INTRAVENOUS at 23:10

## 2018-01-05 NOTE — PROGRESS NOTE
Assessment and Plan





-Acute ischemic stroke-Sepsis secondary to acute mastoiditis


-DKA with acute metabolic acidosis, high anion gap-improving


-Acute metabolic encephalopathy( improved)


-Recurrent Acute ischemic strokes


-Expressive aphasia


-Hypernatremia


-Moderate malnutrition


-Oropharyngeal dysphagia





Monitor hemodynamics


Complete antibiotics per ID


Continue with supplemental oxygen for O2 sats >92%


Aspiration precautions


Speech language pathologist to evaluate and treat- swallow and speech


Enteric nutrition


Free water flushes


PT/OT/mobility


Secondary stroke prophylaxis


Evaluate for other secondary causes of stroke. Get MANUELITO with reflex


Accuchecks with glycemic control








Subjective


Date of service: 01/05/18


Principal diagnosis: Acute on Chronic Encephalopathy; SIRS; DKA; Seizures


Interval history: 





Patient is seen today for: Acute on Chronic Encephalopathy; SIRS; DKA; Seizures





Seen and examined at bedside; 24hour events reviewed; nursing and respiratory 

care staff consulted; no adverse overnight events reported to me;


She wants to eat.





Objective





- Exam


Narrative Exam: 


GEN: Thin frail woman chronic a bit debilitating appearing, lethargic, nonverbal


HEENT: NCAT, EOMI, PERRL, OP Clear


NECK: supple, no adenopathy, no thyromegaly, no JVD


CVS/HEART: regular tachycardia NORMAL S1S2, NO JVD, pulses present bilaterally


CHEST/LUNGS: CTA B, Symmetrical chest expansion, good air entry bilaterally


GI/Abdomen: soft, NTND, good bowel sounds, no guarding or rebound


/Bladder: no suprapubic tenderness, no CVA or paraspinal tenderness


EXT/Skin: no c/c/e, no obvious rash


MSK: Moving right upper extremity. Left hand in a splint


Neuro: Expressive aphasia


Psych: calm, good affect





 Vital Signs - 12hr











  01/05/18 01/05/18





  07:57 16:44


 


Temperature 98.4 F 97.8 F


 


Pulse Rate 78 93 H


 


Respiratory 19 20





Rate  


 


Blood Pressure 151/92 124/75


 


O2 Sat by Pulse 99 97





Oximetry  











Constitutional: no acute distress, asleep


Eyes: non-icteric


Neck: supple, no lymphadenopathy


Ascultation: Bilateral: clear


Cardiovascular: regular rate and rhythm


Gastrointestinal: normoactive bowel sounds, soft, non-tender


Integumentary: normal


Extremities: no cyanosis, no edema


Neurologic: other (Patient sleeping at this time.)


Psychiatric: other (Patient sleeping at this time.)


CBC and BMP: 


 01/03/18 05:55





 01/03/18 05:55


ABG, PT/INR, D-dimer: 


ABG











POC ABG pH  7.389  (7.35-7.45)   12/30/17  19:49    


 


POC ABG pCO2  28.9  (35-45)  L  12/30/17  19:49    


 


POC ABG pO2  98  ()   12/30/17  19:49    


 


POC ABG HCO3  17.4   12/30/17  19:49    


 


POC ABG Total CO2  18   12/30/17  19:49    


 


POC ABG O2 Sat  98   12/30/17  19:49    





PT/INR, D-dimer











PT  13.2 Sec. (12.2-14.9)   01/05/18  08:39    


 


INR  0.95  (0.87-1.13)   01/05/18  08:39    








Abnormal lab findings: 


 Abnormal Labs











  12/28/17 12/28/17 12/28/17





  12:26 12:26 12:26


 


WBC  20.1 H  


 


RBC   


 


Hgb   


 


Hct   


 


MCV  101 H  


 


MCHC   


 


RDW   


 


Seg Neuts % (Manual)  90.0 H  


 


Lymphocytes % (Manual)  2.0 L  


 


Seg Neutrophils # Man  18.1 H  


 


Lymphocytes # (Manual)  0.4 L  


 


PT   16.4 H 


 


INR   1.25 H 


 


POC ABG pH   


 


POC ABG pCO2   


 


POC ABG pO2   


 


Sodium   


 


Potassium   


 


Chloride   


 


Carbon Dioxide   


 


BUN   


 


Creatinine   


 


Glucose   


 


POC Glucose    445 H


 


Hemoglobin A1c   


 


Lactic Acid   


 


Calcium   


 


Phosphorus   


 


Magnesium   


 


C-Reactive Protein   


 


Total Protein   


 


Albumin   


 


LDL Cholesterol Direct   


 


HDL Cholesterol   














  12/28/17 12/28/17 12/28/17





  12:39 13:44 13:44


 


WBC   


 


RBC   


 


Hgb   


 


Hct   


 


MCV   


 


MCHC   


 


RDW   


 


Seg Neuts % (Manual)   


 


Lymphocytes % (Manual)   


 


Seg Neutrophils # Man   


 


Lymphocytes # (Manual)   


 


PT   


 


INR   


 


POC ABG pH  6.856 L  


 


POC ABG pCO2  12.4 L  


 


POC ABG pO2  288 H  


 


Sodium   


 


Potassium   


 


Chloride   


 


Carbon Dioxide   


 


BUN   


 


Creatinine   


 


Glucose   


 


POC Glucose   


 


Hemoglobin A1c   


 


Lactic Acid   


 


Calcium   


 


Phosphorus    8.10 H


 


Magnesium    2.60 H


 


C-Reactive Protein   


 


Total Protein   5.8 L 


 


Albumin   3.0 L 


 


LDL Cholesterol Direct   


 


HDL Cholesterol   














  12/28/17 12/28/17 12/28/17





  13:44 14:17 15:17


 


WBC   


 


RBC   


 


Hgb   


 


Hct   


 


MCV   


 


MCHC   


 


RDW   


 


Seg Neuts % (Manual)   


 


Lymphocytes % (Manual)   


 


Seg Neutrophils # Man   


 


Lymphocytes # (Manual)   


 


PT   


 


INR   


 


POC ABG pH   


 


POC ABG pCO2   


 


POC ABG pO2   


 


Sodium   


 


Potassium  6.5 H*  5.8 H  5.7 H


 


Chloride  94.4 L  


 


Carbon Dioxide  5 L*  2 L*  < 2.0 L*


 


BUN  43 H  38 H  39 H


 


Creatinine   1.3 H  1.3 H


 


Glucose  629 H*  610 H*  585 H*


 


POC Glucose   


 


Hemoglobin A1c   


 


Lactic Acid   


 


Calcium  8.3 L  7.7 L  7.9 L


 


Phosphorus   


 


Magnesium   


 


C-Reactive Protein   


 


Total Protein   


 


Albumin   


 


LDL Cholesterol Direct   


 


HDL Cholesterol   














  12/28/17 12/28/17 12/28/17





  15:17 15:40 16:57


 


WBC   


 


RBC   


 


Hgb   


 


Hct   


 


MCV   


 


MCHC   


 


RDW   


 


Seg Neuts % (Manual)   


 


Lymphocytes % (Manual)   


 


Seg Neutrophils # Man   


 


Lymphocytes # (Manual)   


 


PT   


 


INR   


 


POC ABG pH   


 


POC ABG pCO2   


 


POC ABG pO2   


 


Sodium   


 


Potassium   


 


Chloride   


 


Carbon Dioxide    3 L*


 


BUN    42 H


 


Creatinine    1.3 H


 


Glucose    557 H*


 


POC Glucose   > 500 H 


 


Hemoglobin A1c   


 


Lactic Acid  2.90 H*  


 


Calcium    8.1 L


 


Phosphorus   


 


Magnesium   


 


C-Reactive Protein   


 


Total Protein   


 


Albumin   


 


LDL Cholesterol Direct   


 


HDL Cholesterol   














  12/28/17 12/28/17 12/28/17





  17:02 17:02 18:05


 


WBC   


 


RBC   


 


Hgb   


 


Hct   


 


MCV   


 


MCHC   


 


RDW   


 


Seg Neuts % (Manual)   


 


Lymphocytes % (Manual)   


 


Seg Neutrophils # Man   


 


Lymphocytes # (Manual)   


 


PT   


 


INR   


 


POC ABG pH   


 


POC ABG pCO2   


 


POC ABG pO2   


 


Sodium   


 


Potassium   


 


Chloride   


 


Carbon Dioxide   


 


BUN   


 


Creatinine   


 


Glucose   


 


POC Glucose    485 H


 


Hemoglobin A1c  10.0 H  


 


Lactic Acid   


 


Calcium   


 


Phosphorus   6.60 H 


 


Magnesium   2.70 H 


 


C-Reactive Protein   


 


Total Protein   


 


Albumin   


 


LDL Cholesterol Direct   


 


HDL Cholesterol   














  12/28/17 12/28/17 12/28/17





  18:52 20:20 21:23


 


WBC   


 


RBC   


 


Hgb   


 


Hct   


 


MCV   


 


MCHC   


 


RDW   


 


Seg Neuts % (Manual)   


 


Lymphocytes % (Manual)   


 


Seg Neutrophils # Man   


 


Lymphocytes # (Manual)   


 


PT   


 


INR   


 


POC ABG pH   


 


POC ABG pCO2   


 


POC ABG pO2   


 


Sodium  148 H  


 


Potassium   


 


Chloride   


 


Carbon Dioxide  < 2.0 L*  


 


BUN  41 H  


 


Creatinine  1.4 H  


 


Glucose  473 H  


 


POC Glucose   356 H  322 H


 


Hemoglobin A1c   


 


Lactic Acid   


 


Calcium  7.8 L  


 


Phosphorus   


 


Magnesium   


 


C-Reactive Protein   


 


Total Protein   


 


Albumin   


 


LDL Cholesterol Direct   


 


HDL Cholesterol   














  12/28/17 12/28/17 12/28/17





  22:28 23:01 23:31


 


WBC   


 


RBC   


 


Hgb   


 


Hct   


 


MCV   


 


MCHC   


 


RDW   


 


Seg Neuts % (Manual)   


 


Lymphocytes % (Manual)   


 


Seg Neutrophils # Man   


 


Lymphocytes # (Manual)   


 


PT   


 


INR   


 


POC ABG pH   


 


POC ABG pCO2   


 


POC ABG pO2   


 


Sodium   148 H 


 


Potassium   


 


Chloride   111.6 H 


 


Carbon Dioxide   4 L* 


 


BUN   43 H 


 


Creatinine   1.3 H 


 


Glucose   177 H 


 


POC Glucose  242 H   204 H


 


Hemoglobin A1c   


 


Lactic Acid   


 


Calcium   7.7 L 


 


Phosphorus   


 


Magnesium   


 


C-Reactive Protein   


 


Total Protein   


 


Albumin   


 


LDL Cholesterol Direct   


 


HDL Cholesterol   














  12/29/17 12/29/17 12/29/17





  01:20 03:30 04:01


 


WBC   


 


RBC   


 


Hgb   


 


Hct   


 


MCV   


 


MCHC   


 


RDW   


 


Seg Neuts % (Manual)   


 


Lymphocytes % (Manual)   


 


Seg Neutrophils # Man   


 


Lymphocytes # (Manual)   


 


PT   


 


INR   


 


POC ABG pH   


 


POC ABG pCO2   


 


POC ABG pO2   


 


Sodium   


 


Potassium   


 


Chloride   


 


Carbon Dioxide   


 


BUN   


 


Creatinine   


 


Glucose   


 


POC Glucose  120 H  59 L  132 H


 


Hemoglobin A1c   


 


Lactic Acid   


 


Calcium   


 


Phosphorus   


 


Magnesium   


 


C-Reactive Protein   


 


Total Protein   


 


Albumin   


 


LDL Cholesterol Direct   


 


HDL Cholesterol   














  12/29/17 12/29/17 12/29/17





  04:20 04:20 05:15


 


WBC   


 


RBC   


 


Hgb   


 


Hct   


 


MCV   


 


MCHC   


 


RDW   


 


Seg Neuts % (Manual)   


 


Lymphocytes % (Manual)   


 


Seg Neutrophils # Man   


 


Lymphocytes # (Manual)   


 


PT   


 


INR   


 


POC ABG pH   


 


POC ABG pCO2   


 


POC ABG pO2   


 


Sodium  151 H  150 H 


 


Potassium   


 


Chloride  114.3 H  115.4 H 


 


Carbon Dioxide  10 L  10 L 


 


BUN  48 H  48 H 


 


Creatinine   1.3 H 


 


Glucose  121 H  118 H 


 


POC Glucose    134 H


 


Hemoglobin A1c   


 


Lactic Acid   


 


Calcium  7.8 L  7.8 L 


 


Phosphorus   


 


Magnesium   


 


C-Reactive Protein   


 


Total Protein   


 


Albumin   


 


LDL Cholesterol Direct   


 


HDL Cholesterol   














  12/29/17 12/29/17 12/29/17





  06:41 07:49 08:12


 


WBC   


 


RBC   


 


Hgb   


 


Hct   


 


MCV   


 


MCHC   


 


RDW   


 


Seg Neuts % (Manual)   


 


Lymphocytes % (Manual)   


 


Seg Neutrophils # Man   


 


Lymphocytes # (Manual)   


 


PT   


 


INR   


 


POC ABG pH   


 


POC ABG pCO2   


 


POC ABG pO2   


 


Sodium   151 H 


 


Potassium   


 


Chloride   116.5 H 


 


Carbon Dioxide   13 L 


 


BUN   52 H 


 


Creatinine   1.3 H 


 


Glucose   213 H 


 


POC Glucose  172 H   228 H


 


Hemoglobin A1c   


 


Lactic Acid   


 


Calcium   7.9 L 


 


Phosphorus   


 


Magnesium   


 


C-Reactive Protein   


 


Total Protein   


 


Albumin   


 


LDL Cholesterol Direct   


 


HDL Cholesterol   














  12/29/17 12/29/17 12/29/17





  08:22 09:31 11:04


 


WBC   


 


RBC   


 


Hgb   


 


Hct   


 


MCV   


 


MCHC   


 


RDW   


 


Seg Neuts % (Manual)   


 


Lymphocytes % (Manual)   


 


Seg Neutrophils # Man   


 


Lymphocytes # (Manual)   


 


PT   


 


INR   


 


POC ABG pH   


 


POC ABG pCO2   


 


POC ABG pO2   


 


Sodium   


 


Potassium   


 


Chloride   


 


Carbon Dioxide   


 


BUN   


 


Creatinine   


 


Glucose   


 


POC Glucose  140 H  140 H  136 H


 


Hemoglobin A1c   


 


Lactic Acid   


 


Calcium   


 


Phosphorus   


 


Magnesium   


 


C-Reactive Protein   


 


Total Protein   


 


Albumin   


 


LDL Cholesterol Direct   


 


HDL Cholesterol   














  12/29/17 12/29/17 12/29/17





  12:11 13:40 17:21


 


WBC   


 


RBC   


 


Hgb   


 


Hct   


 


MCV   


 


MCHC   


 


RDW   


 


Seg Neuts % (Manual)   


 


Lymphocytes % (Manual)   


 


Seg Neutrophils # Man   


 


Lymphocytes # (Manual)   


 


PT   


 


INR   


 


POC ABG pH   


 


POC ABG pCO2   


 


POC ABG pO2   


 


Sodium   152 H 


 


Potassium   


 


Chloride   117.6 H 


 


Carbon Dioxide   15 L 


 


BUN   50 H 


 


Creatinine   1.3 H 


 


Glucose   144 H 


 


POC Glucose  111 H   182 H


 


Hemoglobin A1c   


 


Lactic Acid   


 


Calcium   


 


Phosphorus   


 


Magnesium   


 


C-Reactive Protein   


 


Total Protein   


 


Albumin   


 


LDL Cholesterol Direct   


 


HDL Cholesterol   














  12/29/17 12/29/17 12/30/17





  17:45 23:06 00:39


 


WBC   


 


RBC   


 


Hgb   


 


Hct   


 


MCV   


 


MCHC   


 


RDW   


 


Seg Neuts % (Manual)   


 


Lymphocytes % (Manual)   


 


Seg Neutrophils # Man   


 


Lymphocytes # (Manual)   


 


PT   


 


INR   


 


POC ABG pH   


 


POC ABG pCO2   


 


POC ABG pO2   


 


Sodium  149 H  


 


Potassium   


 


Chloride  116.2 H  


 


Carbon Dioxide  13 L  


 


BUN  48 H  


 


Creatinine  1.3 H  


 


Glucose  171 H  


 


POC Glucose   < 40 L  123 H


 


Hemoglobin A1c   


 


Lactic Acid   


 


Calcium  8.1 L  


 


Phosphorus   


 


Magnesium   


 


C-Reactive Protein   


 


Total Protein   


 


Albumin   


 


LDL Cholesterol Direct   


 


HDL Cholesterol   














  12/30/17 12/30/17 12/30/17





  07:07 07:07 11:35


 


WBC   


 


RBC   


 


Hgb   


 


Hct   


 


MCV   


 


MCHC   


 


RDW   


 


Seg Neuts % (Manual)   


 


Lymphocytes % (Manual)   


 


Seg Neutrophils # Man   


 


Lymphocytes # (Manual)   


 


PT   


 


INR   


 


POC ABG pH   


 


POC ABG pCO2   


 


POC ABG pO2   


 


Sodium   152 H 


 


Potassium   3.0 L D 


 


Chloride   121.9 H 


 


Carbon Dioxide   18 L 


 


BUN   44 H 


 


Creatinine   


 


Glucose   


 


POC Glucose    118 H


 


Hemoglobin A1c   


 


Lactic Acid   


 


Calcium   


 


Phosphorus  1.50 L  


 


Magnesium   


 


C-Reactive Protein   


 


Total Protein   


 


Albumin   


 


LDL Cholesterol Direct   


 


HDL Cholesterol   














  12/30/17 12/30/17 12/30/17





  16:54 19:44 19:49


 


WBC   


 


RBC   


 


Hgb   


 


Hct   


 


MCV   


 


MCHC   


 


RDW   


 


Seg Neuts % (Manual)   


 


Lymphocytes % (Manual)   


 


Seg Neutrophils # Man   


 


Lymphocytes # (Manual)   


 


PT   


 


INR   


 


POC ABG pH   


 


POC ABG pCO2    28.9 L


 


POC ABG pO2   


 


Sodium   


 


Potassium   


 


Chloride   


 


Carbon Dioxide   


 


BUN   


 


Creatinine   


 


Glucose   


 


POC Glucose  231 H  


 


Hemoglobin A1c   


 


Lactic Acid   


 


Calcium   


 


Phosphorus   


 


Magnesium   


 


C-Reactive Protein   4.70 H 


 


Total Protein   


 


Albumin   


 


LDL Cholesterol Direct   


 


HDL Cholesterol   














  12/30/17 12/31/17 12/31/17





  21:48 06:25 12:35


 


WBC   


 


RBC   


 


Hgb   


 


Hct   


 


MCV   


 


MCHC   


 


RDW   


 


Seg Neuts % (Manual)   


 


Lymphocytes % (Manual)   


 


Seg Neutrophils # Man   


 


Lymphocytes # (Manual)   


 


PT   


 


INR   


 


POC ABG pH   


 


POC ABG pCO2   


 


POC ABG pO2   


 


Sodium   


 


Potassium   


 


Chloride   


 


Carbon Dioxide   


 


BUN   


 


Creatinine   


 


Glucose   


 


POC Glucose  176 H  299 H  258 H


 


Hemoglobin A1c   


 


Lactic Acid   


 


Calcium   


 


Phosphorus   


 


Magnesium   


 


C-Reactive Protein   


 


Total Protein   


 


Albumin   


 


LDL Cholesterol Direct   


 


HDL Cholesterol   














  12/31/17 12/31/17 01/01/18





  16:43 22:07 02:59


 


WBC   


 


RBC   


 


Hgb   


 


Hct   


 


MCV   


 


MCHC   


 


RDW   


 


Seg Neuts % (Manual)   


 


Lymphocytes % (Manual)   


 


Seg Neutrophils # Man   


 


Lymphocytes # (Manual)   


 


PT   


 


INR   


 


POC ABG pH   


 


POC ABG pCO2   


 


POC ABG pO2   


 


Sodium   


 


Potassium   


 


Chloride   


 


Carbon Dioxide   


 


BUN   


 


Creatinine   


 


Glucose   


 


POC Glucose  252 H  248 H  193 H


 


Hemoglobin A1c   


 


Lactic Acid   


 


Calcium   


 


Phosphorus   


 


Magnesium   


 


C-Reactive Protein   


 


Total Protein   


 


Albumin   


 


LDL Cholesterol Direct   


 


HDL Cholesterol   














  01/01/18 01/01/18 01/01/18





  06:20 07:27 07:27


 


WBC   18.6 H 


 


RBC   2.90 L 


 


Hgb   9.0 L 


 


Hct   27.0 L 


 


MCV   


 


MCHC   


 


RDW   


 


Seg Neuts % (Manual)   


 


Lymphocytes % (Manual)   


 


Seg Neutrophils # Man   


 


Lymphocytes # (Manual)   


 


PT   


 


INR   


 


POC ABG pH   


 


POC ABG pCO2   


 


POC ABG pO2   


 


Sodium    152 H


 


Potassium   


 


Chloride    116.2 H


 


Carbon Dioxide    19 L


 


BUN    24 H


 


Creatinine   


 


Glucose    288 H


 


POC Glucose  280 H  


 


Hemoglobin A1c   


 


Lactic Acid   


 


Calcium    8.3 L


 


Phosphorus   


 


Magnesium   


 


C-Reactive Protein   


 


Total Protein   


 


Albumin   


 


LDL Cholesterol Direct    42 L


 


HDL Cholesterol    88 H














  01/01/18 01/01/18 01/01/18





  11:09 17:12 20:59


 


WBC   


 


RBC   


 


Hgb   


 


Hct   


 


MCV   


 


MCHC   


 


RDW   


 


Seg Neuts % (Manual)   


 


Lymphocytes % (Manual)   


 


Seg Neutrophils # Man   


 


Lymphocytes # (Manual)   


 


PT   


 


INR   


 


POC ABG pH   


 


POC ABG pCO2   


 


POC ABG pO2   


 


Sodium   


 


Potassium   


 


Chloride   


 


Carbon Dioxide   


 


BUN   


 


Creatinine   


 


Glucose   


 


POC Glucose  328 H  306 H  318 H


 


Hemoglobin A1c   


 


Lactic Acid   


 


Calcium   


 


Phosphorus   


 


Magnesium   


 


C-Reactive Protein   


 


Total Protein   


 


Albumin   


 


LDL Cholesterol Direct   


 


HDL Cholesterol   














  01/02/18 01/02/18 01/02/18





  01:55 04:40 04:40


 


WBC   13.0 H 


 


RBC   2.88 L 


 


Hgb   9.0 L 


 


Hct   26.2 L 


 


MCV   


 


MCHC   35 H 


 


RDW   12.6 L 


 


Seg Neuts % (Manual)   


 


Lymphocytes % (Manual)   


 


Seg Neutrophils # Man   


 


Lymphocytes # (Manual)   


 


PT   


 


INR   


 


POC ABG pH   


 


POC ABG pCO2   


 


POC ABG pO2   


 


Sodium    148 H


 


Potassium    3.3 L


 


Chloride    111.8 H


 


Carbon Dioxide   


 


BUN    18 H


 


Creatinine   


 


Glucose    212 H


 


POC Glucose  274 H  


 


Hemoglobin A1c   


 


Lactic Acid   


 


Calcium    8.3 L


 


Phosphorus   


 


Magnesium   


 


C-Reactive Protein   


 


Total Protein   


 


Albumin   


 


LDL Cholesterol Direct   


 


HDL Cholesterol   














  01/02/18 01/02/18 01/02/18





  06:30 12:14 16:50


 


WBC   


 


RBC   


 


Hgb   


 


Hct   


 


MCV   


 


MCHC   


 


RDW   


 


Seg Neuts % (Manual)   


 


Lymphocytes % (Manual)   


 


Seg Neutrophils # Man   


 


Lymphocytes # (Manual)   


 


PT   


 


INR   


 


POC ABG pH   


 


POC ABG pCO2   


 


POC ABG pO2   


 


Sodium   


 


Potassium   


 


Chloride   


 


Carbon Dioxide   


 


BUN   


 


Creatinine   


 


Glucose   


 


POC Glucose  285 H  166 H  178 H


 


Hemoglobin A1c   


 


Lactic Acid   


 


Calcium   


 


Phosphorus   


 


Magnesium   


 


C-Reactive Protein   


 


Total Protein   


 


Albumin   


 


LDL Cholesterol Direct   


 


HDL Cholesterol   














  01/02/18 01/03/18 01/03/18





  21:24 05:47 05:55


 


WBC    12.4 H


 


RBC    2.92 L


 


Hgb    8.8 L


 


Hct    26.1 L


 


MCV   


 


MCHC   


 


RDW    12.6 L


 


Seg Neuts % (Manual)   


 


Lymphocytes % (Manual)   


 


Seg Neutrophils # Man   


 


Lymphocytes # (Manual)   


 


PT   


 


INR   


 


POC ABG pH   


 


POC ABG pCO2   


 


POC ABG pO2   


 


Sodium   


 


Potassium   


 


Chloride   


 


Carbon Dioxide   


 


BUN   


 


Creatinine   


 


Glucose   


 


POC Glucose  197 H  291 H 


 


Hemoglobin A1c   


 


Lactic Acid   


 


Calcium   


 


Phosphorus   


 


Magnesium   


 


C-Reactive Protein   


 


Total Protein   


 


Albumin   


 


LDL Cholesterol Direct   


 


HDL Cholesterol   














  01/03/18 01/03/18 01/03/18





  05:55 12:24 16:33


 


WBC   


 


RBC   


 


Hgb   


 


Hct   


 


MCV   


 


MCHC   


 


RDW   


 


Seg Neuts % (Manual)   


 


Lymphocytes % (Manual)   


 


Seg Neutrophils # Man   


 


Lymphocytes # (Manual)   


 


PT   


 


INR   


 


POC ABG pH   


 


POC ABG pCO2   


 


POC ABG pO2   


 


Sodium   


 


Potassium   


 


Chloride  108.2 H  


 


Carbon Dioxide  21 L  


 


BUN   


 


Creatinine   


 


Glucose  228 H  


 


POC Glucose   215 H  280 H


 


Hemoglobin A1c   


 


Lactic Acid   


 


Calcium  8.1 L  


 


Phosphorus   


 


Magnesium   


 


C-Reactive Protein   


 


Total Protein   


 


Albumin   


 


LDL Cholesterol Direct   


 


HDL Cholesterol   














  01/03/18 01/04/18 01/04/18





  22:12 05:27 12:47


 


WBC   


 


RBC   


 


Hgb   


 


Hct   


 


MCV   


 


MCHC   


 


RDW   


 


Seg Neuts % (Manual)   


 


Lymphocytes % (Manual)   


 


Seg Neutrophils # Man   


 


Lymphocytes # (Manual)   


 


PT   


 


INR   


 


POC ABG pH   


 


POC ABG pCO2   


 


POC ABG pO2   


 


Sodium   


 


Potassium   


 


Chloride   


 


Carbon Dioxide   


 


BUN   


 


Creatinine   


 


Glucose   


 


POC Glucose  236 H  186 H  206 H


 


Hemoglobin A1c   


 


Lactic Acid   


 


Calcium   


 


Phosphorus   


 


Magnesium   


 


C-Reactive Protein   


 


Total Protein   


 


Albumin   


 


LDL Cholesterol Direct   


 


HDL Cholesterol   














  01/04/18 01/04/18 01/05/18





  17:27 22:19 05:48


 


WBC   


 


RBC   


 


Hgb   


 


Hct   


 


MCV   


 


MCHC   


 


RDW   


 


Seg Neuts % (Manual)   


 


Lymphocytes % (Manual)   


 


Seg Neutrophils # Man   


 


Lymphocytes # (Manual)   


 


PT   


 


INR   


 


POC ABG pH   


 


POC ABG pCO2   


 


POC ABG pO2   


 


Sodium   


 


Potassium   


 


Chloride   


 


Carbon Dioxide   


 


BUN   


 


Creatinine   


 


Glucose   


 


POC Glucose  213 H  123 H  203 H


 


Hemoglobin A1c   


 


Lactic Acid   


 


Calcium   


 


Phosphorus   


 


Magnesium   


 


C-Reactive Protein   


 


Total Protein   


 


Albumin   


 


LDL Cholesterol Direct   


 


HDL Cholesterol   














  01/05/18 01/05/18





  11:20 16:53


 


WBC  


 


RBC  


 


Hgb  


 


Hct  


 


MCV  


 


MCHC  


 


RDW  


 


Seg Neuts % (Manual)  


 


Lymphocytes % (Manual)  


 


Seg Neutrophils # Man  


 


Lymphocytes # (Manual)  


 


PT  


 


INR  


 


POC ABG pH  


 


POC ABG pCO2  


 


POC ABG pO2  


 


Sodium  


 


Potassium  


 


Chloride  


 


Carbon Dioxide  


 


BUN  


 


Creatinine  


 


Glucose  


 


POC Glucose  223 H  264 H


 


Hemoglobin A1c  


 


Lactic Acid  


 


Calcium  


 


Phosphorus  


 


Magnesium  


 


C-Reactive Protein  


 


Total Protein  


 


Albumin  


 


LDL Cholesterol Direct  


 


HDL Cholesterol

## 2018-01-05 NOTE — GASTROENTEROLOGY CONSULTATION
<RAJAN KAUR - Last Filed: 01/05/18 11:11>





History of Present Illness





- Reason for Consult


Consult date: 01/05/18


PEG placement


Requesting physician: LESLEY TUBBS





- History of Present Illness


Patient is a 36 y/o female with PMH of DM, CVA (on ASA and Plavix), HTN, HLD, 

and depression who presented to ED with AMS and was admitted for DKA, acute 

metabolic encephalopathy, and acute ischemic stroke. Patient failed a speech 

eval with modified barium swallow yesterday. GI has been consulted for a PEG 

placement. This morning pt was resting in bed w/o acute distress and family at 

bedside. She is alert but with noted aphasia. History obtained via chart 

review. No means of nutrition currently.











Past History


Past Medical History: diabetes, hypertension, hyperlipidemia, stroke, other (

depression)


Past Surgical History: Other (spinal surgery, neck/artery surgery)


Social history: denies: smoking





Medications and Allergies


 Allergies











Allergy/AdvReac Type Severity Reaction Status Date / Time


 


No Known Allergies Allergy   Unverified 01/10/15 03:04











 Home Medications











 Medication  Instructions  Recorded  Confirmed  Last Taken  Type


 


Aspirin [Aspirin EC] 81 mg PO DAILY 12/28/17 12/28/17 Unknown History


 


AtorvaSTATin [Lipitor] 80 mg PO QHS 12/28/17 12/28/17 Unknown History


 


Citalopram [celeXA] 10 mg PO QDAY 12/28/17 12/28/17 Unknown History


 


Clopidogrel [Plavix] 75 mg PO QDAY 12/28/17 12/28/17 Unknown History


 


Cyclobenzaprine [Flexeril] 10 mg PO TID PRN 12/28/17 12/28/17 Unknown History


 


Insulin Aspart Protam & Aspart 0 unit SQ TID 12/28/17 12/28/17 Unknown History





[NovoLOG Mix 70-30 Flexpen]     


 


Insulin Glargine,Hum.rec.anlog 20 units SQ QAM 12/28/17 12/28/17 Unknown History





[Lantus]     


 


Insulin Glargine,Hum.rec.anlog 50 units SQ QPM 12/28/17 12/28/17 Unknown History





[Lantus]     


 


NIFEdipine [Nifedipine ER] 30 mg PO DAILY 12/28/17 12/28/17 Unknown History











Active Meds: 


Active Medications





Acetaminophen (Tylenol)  650 mg PO Q4H PRN


   PRN Reason: Pain MILD(1-3)/Fever >100.5/HA


Aspirin (Aspirin)  300 mg MS QDAY [facility]


   Last Admin: 01/04/18 11:42 Dose:  Not Given


Atorvastatin Calcium (Lipitor)  80 mg PO QHS [facility]


   Last Admin: 01/05/18 04:40 Dose:  Not Given


Bisacodyl (Dulcolax)  10 mg MS QDAY PRN


   PRN Reason: Constipation unrelieved by MOM


Brimonidine/Timolol (Combigan 0.2-0.5%)  1 drops OU Q12HR [facility]


   Last Admin: 01/04/18 21:16 Dose:  1 drops


Citalopram Hydrobromide (Celexa)  10 mg PO QDAY [facility]


   Last Admin: 01/04/18 11:41 Dose:  10 mg


Clopidogrel Bisulfate (Plavix)  75 mg PO QDAY [facility]


   Last Admin: 01/04/18 11:42 Dose:  75 mg


Cyclobenzaprine HCl (Flexeril)  10 mg PO TID PRN


   PRN Reason: Muscle Spasm


   Last Admin: 01/03/18 22:01 Dose:  10 mg


Dextrose (D50w (25gm) Syringe)  50 ml IV PRN PRN


   PRN Reason: Hypoglycemia


Famotidine (Pepcid)  20 mg PO DAILY [facility]


   Last Admin: 01/04/18 11:41 Dose:  20 mg


Hydrophilic Ointment (Vaseline Lip Therapy)  1 applic TP AS DIRECT PRN


   PRN Reason: Dry Lips


   Last Admin: 01/02/18 05:35 Dose:  1 applic


Piperacillin Sod/Tazobactam Sod (Zosyn/Ns 3.375gm/50ml)  3.375 gm in 50 mls @ 

100 mls/hr IV Q6HR [facility]


   Last Admin: 01/05/18 05:22 Dose:  100 mls/hr


Ceftriaxone Sodium 1 gm/ (Sodium Chloride)  20 mls @ 20 mls/10 min IV Q24H [facility]


   PRN Reason: Protocol


   Last Admin: 01/04/18 18:43 Dose:  20 mls/10 min


Insulin Aspart (Novolog)  0 units SUB-Q ACHS [facility]


   PRN Reason: Protocol


   Last Admin: 01/05/18 08:51 Dose:  Not Given


Labetalol HCl (Normodyne)  10 mg IV Q4H PRN


   PRN Reason: Blood Pressure


   Last Admin: 12/31/17 08:29 Dose:  10 mg


Magnesium Hydroxide (Milk Of Magnesia)  30 ml PO Q4H PRN


   PRN Reason: Constipation


   Last Admin: 01/03/18 11:49 Dose:  30 ml


Morphine Sulfate (Morphine)  2 mg IV Q4H PRN


   PRN Reason: Pain , Severe (7-10)


   Last Admin: 01/05/18 01:02 Dose:  2 mg


Nifedipine (Procardia Xl)  30 mg PO DAILY [facility]


   Last Admin: 01/04/18 11:42 Dose:  30 mg


Ondansetron HCl (Zofran)  4 mg IV Q8H PRN


   PRN Reason: N/V unrelieved by Reglan











Review of Systems





- Review of Systems


ROS unobtainable: due to mental status





Exam





- Constitutional


Vital Signs: 


 











Temp Pulse Resp BP Pulse Ox


 


 98.4 F   78   19   151/92   99 


 


 01/05/18 07:57  01/05/18 07:57  01/05/18 07:57  01/05/18 07:57  01/05/18 07:57











General appearance: no acute distress, other (alert, aphasia)





- Respiratory


Respiratory: bilateral: CTA





- Cardiovascular


Rhythm: regular


Heart Sounds: Present: S1 & S2





- Gastrointestinal


General gastrointestinal: Present: soft, non-distended, normal bowel sounds





- Integumentary


Integumentary: Present: warm, dry





- Labs


CBC & Chem 7: 


 01/03/18 05:55





 01/03/18 05:55


Lab Results: 


 Laboratory Results - last 24 hr











  01/04/18 01/04/18 01/04/18





  12:47 17:27 22:19


 


PT   


 


INR   


 


POC Glucose  206 H  213 H  123 H














  01/05/18 01/05/18





  05:48 08:39


 


PT   13.2


 


INR   0.95


 


POC Glucose  203 H 














Assessment and Plan


1.PEG placement


 2.acute ischemic stroke


 3.acute metabolic encephalopathy


 4.DKA


 


-failed speech eval with modified barium swallow yesterday


-currently no means of nutrition


-last dose of Plavix yesterday-hold plavix


-will order Dobhoff tube placement and nutrition consult today


-will schedule for EGD/PEG placement on Monday


-will follow








<FEDE PHILLIPS - Last Filed: 01/05/18 11:45>





Medications and Allergies


Active Meds: 


Active Medications





Acetaminophen (Tylenol)  650 mg PO Q4H PRN


   PRN Reason: Pain MILD(1-3)/Fever >100.5/HA


Lipase/Protease/Amylase (Pancreaze Dr 10,500 Unit)  1 each FEEDTUBE PRN PRN


   PRN Reason: For Clogged Feeding Tube


Lipase/Protease/Amylase (Pancreaze Dr 10,500 Unit)  1 each FEEDTUBE PRN PRN


   PRN Reason: For Clogged Feeding Tube


Aspirin (Aspirin)  300 mg MS QDAY [facility]


   Last Admin: 01/04/18 11:42 Dose:  Not Given


Atorvastatin Calcium (Lipitor)  80 mg PO QHS [facility]


   Last Admin: 01/05/18 04:40 Dose:  Not Given


Bisacodyl (Dulcolax)  10 mg MS QDAY PRN


   PRN Reason: Constipation unrelieved by MOM


Brimonidine/Timolol (Combigan 0.2-0.5%)  1 drops OU Q12HR [facility]


   Last Admin: 01/04/18 21:16 Dose:  1 drops


Citalopram Hydrobromide (Celexa)  10 mg PO QDAY [facility]


   Last Admin: 01/04/18 11:41 Dose:  10 mg


Cyclobenzaprine HCl (Flexeril)  10 mg PO TID PRN


   PRN Reason: Muscle Spasm


   Last Admin: 01/03/18 22:01 Dose:  10 mg


Dextrose (D50w (25gm) Syringe)  50 ml IV PRN PRN


   PRN Reason: Hypoglycemia


Famotidine (Pepcid)  20 mg PO DAILY [facility]


   Last Admin: 01/04/18 11:41 Dose:  20 mg


Hydrophilic Ointment (Vaseline Lip Therapy)  1 applic TP AS DIRECT PRN


   PRN Reason: Dry Lips


   Last Admin: 01/02/18 05:35 Dose:  1 applic


Piperacillin Sod/Tazobactam Sod (Zosyn/Ns 3.375gm/50ml)  3.375 gm in 50 mls @ 

100 mls/hr IV Q6HR [facility]


   Last Admin: 01/05/18 05:22 Dose:  100 mls/hr


Ceftriaxone Sodium 1 gm/ (Sodium Chloride)  20 mls @ 20 mls/10 min IV Q24H [facility]


   PRN Reason: Protocol


   Last Admin: 01/04/18 18:43 Dose:  20 mls/10 min


Insulin Aspart (Novolog)  0 units SUB-Q ACHS [facility]


   PRN Reason: Protocol


   Last Admin: 01/05/18 08:51 Dose:  Not Given


Labetalol HCl (Normodyne)  10 mg IV Q4H PRN


   PRN Reason: Blood Pressure


   Last Admin: 12/31/17 08:29 Dose:  10 mg


Magnesium Hydroxide (Milk Of Magnesia)  30 ml PO Q4H PRN


   PRN Reason: Constipation


   Last Admin: 01/03/18 11:49 Dose:  30 ml


Morphine Sulfate (Morphine)  2 mg IV Q4H PRN


   PRN Reason: Pain , Severe (7-10)


   Last Admin: 01/05/18 11:12 Dose:  2 mg


Nifedipine (Procardia Xl)  30 mg PO DAILY [facility]


   Last Admin: 01/04/18 11:42 Dose:  30 mg


Ondansetron HCl (Zofran)  4 mg IV Q8H PRN


   PRN Reason: N/V unrelieved by Reglan


Simple Syrup (Simple Syrup)  15 ml FEEDTUBE PRN PRN


   PRN Reason: Hypoglycemia


Simple Syrup (Simple Syrup)  30 ml FEEDTUBE PRN PRN


   PRN Reason: Hypoglycemia


Simple Syrup (Simple Syrup)  15 ml FEEDTUBE PRN PRN


   PRN Reason: Hypoglycemia


Simple Syrup (Simple Syrup)  30 ml FEEDTUBE PRN PRN


   PRN Reason: Hypoglycemia


Sodium Bicarbonate (Sodium Bicarbonate)  325 mg FEEDTUBE PRN PRN


   PRN Reason: For Clogged Feeding Tube


Sodium Bicarbonate (Sodium Bicarbonate)  325 mg FEEDTUBE PRN PRN


   PRN Reason: For Clogged Feeding Tube











Exam





- Constitutional


Vital Signs: 


 











Temp Pulse Resp BP Pulse Ox


 


 98.4 F   78   19   151/92   99 


 


 01/05/18 07:57  01/05/18 07:57  01/05/18 07:57  01/05/18 07:57  01/05/18 07:57














- Labs


CBC & Chem 7: 


 01/03/18 05:55





 01/03/18 05:55


Lab Results: 


 Laboratory Results - last 24 hr











  01/04/18 01/04/18 01/04/18





  12:47 17:27 22:19


 


PT   


 


INR   


 


POC Glucose  206 H  213 H  123 H














  01/05/18 01/05/18 01/05/18





  05:48 08:39 11:20


 


PT   13.2 


 


INR   0.95 


 


POC Glucose  203 H   223 H














Assessment and Plan


Pt seen and examined.  She nods appropriately.  Denies discomfort.


Discussed with RN.  Pt to get Dobhoff tube feeds now, and do EGD/PEG in near 

future.

## 2018-01-05 NOTE — XRAY REPORT
Flatplate of abdomen:



History: Dobbhoff placement.



Findings:



Tip of Dobbhoff feeding tube is noted in fundus of the stomach. No 

bowel distention.



Impression:



Tip of Dobbhoff feeding tube is noted in fundus of stomach.

## 2018-01-05 NOTE — PROGRESS NOTE
Assessment and Plan


Assessment and plan: 


Patient is 37-year-old woman with a history of insulin-dependent diabetes 

mellitus, CVA on aspirin and Plavix, hypertension, dyslipidemia and depression 

who presented with altered mental status and was admitted for DKA.  Her 

baseline mental function per sister Cindy: she walks with a walker, she 

feeds herself, needs help with clothes, left arm weaker than right and speech 

is usually slurred. Cindy reports that patient was discharged from [facility] about 1.5 months ago and went to [facility]. Then, she 

went to her home from there. She lives with daughter Ej and brother, 

Gonzalo, who moved in with her for more support.  





per Cindy: "I wanna say she wasn't talking her insulin, because this 3rd 

stoke did alittle mentally and she more forgettable, that is what I am thinking

, for example, she will say, I need to take my insulin but she had just took 

her insulin." They went to [place], on the way back Wednesday night 11pm, she 

was sleeping off and on, BG was 516 and she took her insulin. Her daughter, 

Ej, said next morning she got up Thursday morning to go to the bathroom 

and she fell and then was unresponsive, she was found on the floor in urine by 

daughter, Ej. 





-DKA with acute metabolic acidosis, high anion gap: Treated with IV fluids, 

insulin drip now resolved


-Acute metabolic encephalopathy/semiconscious state: treat the above


-Recurrent Acute ischemic strokes. ?hydrocephalus on Ct head: consulted 

Neurology


--->Sepsis due to acute right mastoiditis, poa, start iv abx 


-Hypernatremia: needs more free water, bmp


-Moderate malnutrition: dietician


-Acute ischemic stroke, poa: get ECHO, used stroke protocol





12/30/17: Insulin drip was stopped because of hypoglycemia and patient was sent 

from ED to medical/surg floor. Anion gap was still high so I sent ICU to start 

insulin drip. I also ordered bmp stat. By the time the stat bmp was resulted, 

patient was in the icu. Anion gap has closed, so i will downgrade back to 

medical surg floor. Replace her potassium, start diet, if she unable to eat 

continue D5W due to hypernatremia and not eating. Await neurology assessment 

because she is not at her mental baseline of slurred speech and ambulation with 

a walker. Order PT/OT





12/31/17: I gave pt 0.5mg iv ativan x 1 to complete mri brain that Neurology 

ordered, MRI brain reported as multiple focal areas of restricted diffusion 

right cerebral hemisphere suggestive excuse ischemia, no evidence of hemorrhage

, dilated right lateral ventricle without definite evidence of obstruction, 

acute right mastoiditis. I started abx, iv rocephin, used stroke protocol





1/1/2018: WBC went down, Still hypernatremic, more free water, repeat labs in 

am. ECHO pending. I called Cindy at [phone], no answer ?phone broken 

message. 


1/2/18: echo pending, replace potassium, hyperglycemia on D5W for the 

hypernatremia, continue ssi,  if she tolerates a diet today, then d/c ivf, 

change ssi to achs instead of q4hr,  urine culture still pending. Hopefully SNF 

placement in 1-2 days


1/3/18: Echo reviewed, hypernatremia/hypokalemia resolved, stop ivf. Waiting on 

placement


1/4/18: [facility] in [place], [place], waiting on insurance pre-auth


1/5/18: failed swallow, going for peg on monday because she is on Plavix. 

Nutrition: Patient refusing NGT but she will consider. she is comminunicating 

via paper and pencil. 





History


Interval history: 


Patient was seen and examined.  Follow-up on current diagnosis/altered mental 

status.  Overnight uneventful.  Patient not given any history; therefore,  

Imaging, nursing note, chart, labs and old chart reviewed.  Responding more. 





Hospitalist Physical





- Physical exam


Narrative exam: 


GEN: Thin frail woman chronic a bit debilitating appearing, lethargic, nonverbal


HEENT: NCAT, EOMI, PERRL, OP Clear


NECK: supple, no adenopathy, no thyromegaly, no JVD


CVS/HEART: regular tachycardia NORMAL S1S2, NO JVD, pulses present bilaterally


CHEST/LUNGS: CTA B, Symmetrical chest expansion, good air entry bilaterally


GI/Abdomen: soft, NTND, good bowel sounds, no guarding or rebound


/Bladder: no suprapubic tenderness, no CVA or paraspinal tenderness


EXT/Skin: no c/c/e, no obvious rash


MSK: Moving both arms with contractures 


Neuro: CN 2-12 grossly intact except nonverbal, hemiparesis, facial asymmetry, 

no new focal deficits


Psych: calm but confused








- Constitutional


Vitals: 


 











Temp Pulse Resp BP Pulse Ox


 


 98.4 F   78   19   151/92   99 


 


 01/05/18 07:57  01/05/18 07:57  01/05/18 07:57  01/05/18 07:57  01/05/18 07:57











General appearance: Present: well-nourished.  Absent: mild distress





Results





- Labs


CBC & Chem 7: 


 01/03/18 05:55





 01/03/18 05:55


Labs: 


 Laboratory Last Values











WBC  12.4 K/mm3 (4.5-11.0)  H  01/03/18  05:55    


 


RBC  2.92 M/mm3 (3.65-5.03)  L  01/03/18  05:55    


 


Hgb  8.8 gm/dl (10.1-14.3)  L  01/03/18  05:55    


 


Hct  26.1 % (30.3-42.9)  L  01/03/18  05:55    


 


MCV  89 fl (79-97)   01/03/18  05:55    


 


MCH  30 pg (28-32)   01/03/18  05:55    


 


MCHC  34 % (30-34)   01/03/18  05:55    


 


RDW  12.6 % (13.2-15.2)  L  01/03/18  05:55    


 


Plt Count  249 K/mm3 (140-440)   01/03/18  05:55    


 


Add Manual Diff  Complete   12/28/17  12:26    


 


Total Counted  100   12/28/17  12:26    


 


Seg Neutrophils %  Np   12/28/17  12:26    


 


Seg Neuts % (Manual)  90.0 % (40.0-70.0)  H  12/28/17  12:26    


 


Band Neutrophils %  4.0 %  12/28/17  12:26    


 


Lymphocytes % (Manual)  2.0 % (13.4-35.0)  L  12/28/17  12:26    


 


Reactive Lymphs % (Man)  0 %  12/28/17  12:26    


 


Monocytes % (Manual)  4.0 % (0.0-7.3)   12/28/17  12:26    


 


Eosinophils % (Manual)  0 % (0.0-4.3)   12/28/17  12:26    


 


Basophils % (Manual)  0 % (0.0-1.8)   12/28/17  12:26    


 


Metamyelocytes %  0 %  12/28/17  12:26    


 


Myelocytes %  0 %  12/28/17  12:26    


 


Promyelocytes %  0 %  12/28/17  12:26    


 


Blast Cells %  0 %  12/28/17  12:26    


 


Nucleated RBC %  Not Reportable   12/28/17  12:26    


 


Seg Neutrophils # Man  18.1 K/mm3 (1.8-7.7)  H  12/28/17  12:26    


 


Band Neutrophils #  0.8 K/mm3  12/28/17  12:26    


 


Lymphocytes # (Manual)  0.4 K/mm3 (1.2-5.4)  L  12/28/17  12:26    


 


Abs React Lymphs (Man)  0.0 K/mm3  12/28/17  12:26    


 


Monocytes # (Manual)  0.8 K/mm3 (0.0-0.8)   12/28/17  12:26    


 


Eosinophils # (Manual)  0.0 K/mm3 (0.0-0.4)   12/28/17  12:26    


 


Basophils # (Manual)  0.0 K/mm3 (0.0-0.1)   12/28/17  12:26    


 


Metamyelocytes #  0.0 K/mm3  12/28/17  12:26    


 


Myelocytes #  0.0 K/mm3  12/28/17  12:26    


 


Promyelocytes #  0.0 K/mm3  12/28/17  12:26    


 


Blast Cells #  0.0 K/mm3  12/28/17  12:26    


 


WBC Morphology  Not Reportable   12/28/17  12:26    


 


Hypersegmented Neuts  Not Reportable   12/28/17  12:26    


 


Hyposegmented Neuts  Not Reportable   12/28/17  12:26    


 


Hypogranular Neuts  Not Reportable   12/28/17  12:26    


 


Smudge Cells  Not Reportable   12/28/17  12:26    


 


Toxic Granulation  Not Reportable   12/28/17  12:26    


 


Toxic Vacuolation  Not Reportable   12/28/17  12:26    


 


Dohle Bodies  Not Reportable   12/28/17  12:26    


 


Pelger-Huet Anomaly  Not Reportable   12/28/17  12:26    


 


Yuly Rods  Not Reportable   12/28/17  12:26    


 


Platelet Estimate  Not Reportable   12/28/17  12:26    


 


Clumped Platelets  Not Reportable   12/28/17  12:26    


 


Plt Clumps, EDTA  Not Reportable   12/28/17  12:26    


 


Large Platelets  Not Reportable   12/28/17  12:26    


 


Giant Platelets  Not Reportable   12/28/17  12:26    


 


Platelet Satelliting  Not Reportable   12/28/17  12:26    


 


Plt Morphology Comment  Not Reportable   12/28/17  12:26    


 


RBC Morphology  Normal   12/28/17  12:26    


 


Dimorphic RBCs  Not Reportable   12/28/17  12:26    


 


Polychromasia  Not Reportable   12/28/17  12:26    


 


Hypochromasia  Not Reportable   12/28/17  12:26    


 


Poikilocytosis  Not Reportable   12/28/17  12:26    


 


Anisocytosis  Not Reportable   12/28/17  12:26    


 


Microcytosis  Not Reportable   12/28/17  12:26    


 


Macrocytosis  Not Reportable   12/28/17  12:26    


 


Spherocytes  Not Reportable   12/28/17  12:26    


 


Pappenheimer Bodies  Not Reportable   12/28/17  12:26    


 


Sickle Cells  Not Reportable   12/28/17  12:26    


 


Target Cells  Not Reportable   12/28/17  12:26    


 


Tear Drop Cells  Not Reportable   12/28/17  12:26    


 


Ovalocytes  Not Reportable   12/28/17  12:26    


 


Helmet Cells  Not Reportable   12/28/17  12:26    


 


[place] Bodies  Not Reportable   12/28/17  12:26    


 


Cabot Rings  Not Reportable   12/28/17  12:26    


 


Zenia Cells  Not Reportable   12/28/17  12:26    


 


Bite Cells  Not Reportable   12/28/17  12:26    


 


Crenated Cell  Not Reportable   12/28/17  12:26    


 


Elliptocytes  Not Reportable   12/28/17  12:26    


 


Acanthocytes (Spur)  Not Reportable   12/28/17  12:26    


 


Rouleaux  Not Reportable   12/28/17  12:26    


 


Hemoglobin C Crystals  Not Reportable   12/28/17  12:26    


 


Schistocytes  Not Reportable   12/28/17  12:26    


 


Malaria parasites  Not Reportable   12/28/17  12:26    


 


Juliano Bodies  Not Reportable   12/28/17  12:26    


 


Hem Pathologist Commnt  No   12/28/17  12:26    


 


PT  13.2 Sec. (12.2-14.9)   01/05/18  08:39    


 


INR  0.95  (0.87-1.13)   01/05/18  08:39    


 


POC ABG pH  7.389  (7.35-7.45)   12/30/17  19:49    


 


POC ABG pCO2  28.9  (35-45)  L  12/30/17  19:49    


 


POC ABG pO2  98  ()   12/30/17  19:49    


 


POC ABG HCO3  17.4   12/30/17  19:49    


 


POC ABG Total CO2  18   12/30/17  19:49    


 


POC ABG O2 Sat  98   12/30/17  19:49    


 


POC ABG Base Excess  -8   12/30/17  19:49    


 


FiO2  21 %  12/30/17  19:49    


 


Sodium  144 mmol/L (137-145)   01/03/18  05:55    


 


Potassium  4.1 mmol/L (3.6-5.0)  D 01/03/18  05:55    


 


Chloride  108.2 mmol/L ()  H  01/03/18  05:55    


 


Carbon Dioxide  21 mmol/L (22-30)  L  01/03/18  05:55    


 


Anion Gap  19 mmol/L  01/03/18  05:55    


 


BUN  12 mg/dL (7-17)   01/03/18  05:55    


 


Creatinine  0.7 mg/dL (0.7-1.2)   01/03/18  05:55    


 


Estimated GFR  > 60 ml/min  01/03/18  05:55    


 


BUN/Creatinine Ratio  17 %  01/03/18  05:55    


 


Glucose  228 mg/dL ()  H  01/03/18  05:55    


 


POC Glucose  223  ()  H  01/05/18  11:20    


 


Hemoglobin A1c  10.0 % (4-6)  H  12/28/17  17:02    


 


Lactic Acid  1.80 mmol/L (0.7-2.0)   12/30/17  19:44    


 


Calcium  8.1 mg/dL (8.4-10.2)  L  01/03/18  05:55    


 


Phosphorus  1.50 mg/dL (2.5-4.5)  L  12/30/17  07:07    


 


Magnesium  1.80 mg/dL (1.7-2.3)   01/02/18  04:40    


 


Total Bilirubin  0.30 mg/dL (0.1-1.2)   12/28/17  13:44    


 


Direct Bilirubin  0.2 mg/dL (0-0.2)   12/28/17  13:44    


 


Indirect Bilirubin  0.1 mg/dL  12/28/17  13:44    


 


AST  15 units/L (5-40)   12/28/17  13:44    


 


ALT  11 units/L (7-56)   12/28/17  13:44    


 


Alkaline Phosphatase  89 units/L ()   12/28/17  13:44    


 


C-Reactive Protein  4.70 mg/dL (0.00-1.30)  H  12/30/17  19:44    


 


Total Protein  5.8 g/dL (6.3-8.2)  L  12/28/17  13:44    


 


Albumin  3.0 g/dL (3.9-5)  L  12/28/17  13:44    


 


Albumin/Globulin Ratio  1.1 %  12/28/17  13:44    


 


Triglycerides  94 mg/dL (2-149)   01/01/18  07:27    


 


Cholesterol  148 mg/dL ()   01/01/18  07:27    


 


LDL Cholesterol Direct  42 mg/dL ()  L  01/01/18  07:27    


 


HDL Cholesterol  88 mg/dL (40-59)  H  01/01/18  07:27    


 


Cholesterol/HDL Ratio  1.68 %  01/01/18  07:27    


 


HCG, Qual  Negative  (Negative)   12/30/17  15:05    


 


Urine Color  Yellow  (Yellow)   12/28/17  14:28    


 


Urine Turbidity  Clear  (Clear)   12/28/17  14:28    


 


Urine pH  5.0  (5.0-7.0)   12/28/17  14:28    


 


Ur Specific Gravity  1.016  (1.003-1.030)   12/28/17  14:28    


 


Urine Protein  100 mg/dl mg/dL (Negative)   12/28/17  14:28    


 


Urine Glucose (UA)  >=500 mg/dL (Negative)   12/28/17  14:28    


 


Urine Ketones  80 mg/dL (Negative)   12/28/17  14:28    


 


Urine Blood  Mod  (Negative)   12/28/17  14:28    


 


Urine Nitrite  Neg  (Negative)   12/28/17  14:28    


 


Urine Bilirubin  Neg  (Negative)   12/28/17  14:28    


 


Urine Urobilinogen  < 2.0 mg/dL (<2.0)   12/28/17  14:28    


 


Ur Leukocyte Esterase  Neg  (Negative)   12/28/17  14:28    


 


Urine WBC (Auto)  2.0 /HPF (0.0-6.0)   12/28/17  14:28    


 


Urine RBC (Auto)  < 1.0 /HPF (0.0-6.0)   12/28/17  14:28    


 


U Epithel Cells (Auto)  < 1.0 /HPF (0-13.0)   12/28/17  14:28    


 


Urine Bacteria (Auto)  1+ /HPF (Negative)   12/28/17  14:28    


 


Urine Opiates Screen  Presumptive negative   12/28/17  14:28    


 


Urine Methadone Screen  Presumptive negative   12/28/17  14:28    


 


Ur Barbiturates Screen  Presumptive negative   12/28/17  14:28    


 


Ur Phencyclidine Scrn  Presumptive negative   12/28/17  14:28    


 


Ur Amphetamines Screen  Presumptive negative   12/28/17  14:28    


 


U Benzodiazepines Scrn  Presumptive negative   12/28/17  14:28    


 


Urine Cocaine Screen  Presumptive negative   12/28/17  14:28    


 


U Marijuana (THC) Screen  Presumptive negative   12/28/17  14:28    


 


Drugs of Abuse Note  Disclamer   12/28/17  14:28

## 2018-01-06 RX ADMIN — CYCLOBENZAPRINE PRN MG: 10 TABLET, FILM COATED ORAL at 22:03

## 2018-01-06 RX ADMIN — BRIMONIDINE TARTRATE, TIMOLOL MALEATE SCH DROPS: 2; 5 SOLUTION/ DROPS OPHTHALMIC at 10:32

## 2018-01-06 RX ADMIN — NIFEDIPINE SCH MG: 30 TABLET, FILM COATED, EXTENDED RELEASE ORAL at 10:33

## 2018-01-06 RX ADMIN — PIPERACILLIN SODIUM AND TAZOBACTAM SODIUM SCH MLS/HR: 3; .375 INJECTION, POWDER, LYOPHILIZED, FOR SOLUTION INTRAVENOUS at 05:53

## 2018-01-06 RX ADMIN — BRIMONIDINE TARTRATE, TIMOLOL MALEATE SCH DROPS: 2; 5 SOLUTION/ DROPS OPHTHALMIC at 22:03

## 2018-01-06 RX ADMIN — ASPIRIN SCH: 300 SUPPOSITORY RECTAL at 10:45

## 2018-01-06 RX ADMIN — INSULIN ASPART SCH UNITS: 100 INJECTION, SOLUTION INTRAVENOUS; SUBCUTANEOUS at 08:20

## 2018-01-06 RX ADMIN — INSULIN ASPART SCH UNITS: 100 INJECTION, SOLUTION INTRAVENOUS; SUBCUTANEOUS at 13:18

## 2018-01-06 RX ADMIN — FAMOTIDINE SCH MG: 20 TABLET ORAL at 10:33

## 2018-01-06 RX ADMIN — PIPERACILLIN SODIUM AND TAZOBACTAM SODIUM SCH MLS/HR: 3; .375 INJECTION, POWDER, LYOPHILIZED, FOR SOLUTION INTRAVENOUS at 17:30

## 2018-01-06 RX ADMIN — INSULIN ASPART SCH: 100 INJECTION, SOLUTION INTRAVENOUS; SUBCUTANEOUS at 07:32

## 2018-01-06 RX ADMIN — CEFTRIAXONE SODIUM SCH MLS/10 MIN: 10 INJECTION, POWDER, FOR SOLUTION INTRAVENOUS at 17:31

## 2018-01-06 RX ADMIN — MORPHINE SULFATE PRN MG: 4 INJECTION, SOLUTION INTRAMUSCULAR; INTRAVENOUS at 10:33

## 2018-01-06 RX ADMIN — INSULIN ASPART SCH UNITS: 100 INJECTION, SOLUTION INTRAVENOUS; SUBCUTANEOUS at 17:30

## 2018-01-06 RX ADMIN — PIPERACILLIN SODIUM AND TAZOBACTAM SODIUM SCH MLS/HR: 3; .375 INJECTION, POWDER, LYOPHILIZED, FOR SOLUTION INTRAVENOUS at 13:17

## 2018-01-06 NOTE — PROGRESS NOTE
Assessment and Plan


Assessment and plan: 


Patient is 37-year-old woman with a history of insulin-dependent diabetes 

mellitus, CVA on aspirin and Plavix, hypertension, dyslipidemia and depression 

who presented with altered mental status and was admitted for DKA.  Her 

baseline mental function per sister Cidny: she walks with a walker, she 

feeds herself, needs help with clothes, left arm weaker than right and speech 

is usually slurred. Cindy reports that patient was discharged from [facility] about 1.5 months ago and went to [facility]. Then, she 

went to her home from there. She lives with daughter Ej and brother, 

Gonzalo, who moved in with her for more support.  





per Cindy: "I wanna say she wasn't talking her insulin, because this 3rd 

stoke did alittle mentally and she more forgettable, that is what I am thinking

, for example, she will say, I need to take my insulin but she had just took 

her insulin." They went to [place], on the way back Wednesday night 11pm, she 

was sleeping off and on, BG was 516 and she took her insulin. Her daughter, 

Ej, said next morning she got up Thursday morning to go to the bathroom 

and she fell and then was unresponsive, she was found on the floor in urine by 

daughter, Ej. 





-DKA with acute metabolic acidosis, high anion gap: Treated with IV fluids, 

insulin drip now resolved


-Acute metabolic encephalopathy/semiconscious state: treat the above


-Recurrent Acute ischemic strokes. ?hydrocephalus on Ct head: consulted 

Neurology


--->Sepsis due to acute right mastoiditis, poa, start iv abx 


-Hypernatremia: needs more free water, bmp


-Moderate malnutrition: dietician


-Acute ischemic stroke, poa: get ECHO, used stroke protocol





12/30/17: Insulin drip was stopped because of hypoglycemia and patient was sent 

from ED to medical/surg floor. Anion gap was still high so I sent ICU to start 

insulin drip. I also ordered bmp stat. By the time the stat bmp was resulted, 

patient was in the icu. Anion gap has closed, so i will downgrade back to 

medical surg floor. Replace her potassium, start diet, if she unable to eat 

continue D5W due to hypernatremia and not eating. Await neurology assessment 

because she is not at her mental baseline of slurred speech and ambulation with 

a walker. Order PT/OT





12/31/17: I gave pt 0.5mg iv ativan x 1 to complete mri brain that Neurology 

ordered, MRI brain reported as multiple focal areas of restricted diffusion 

right cerebral hemisphere suggestive excuse ischemia, no evidence of hemorrhage

, dilated right lateral ventricle without definite evidence of obstruction, 

acute right mastoiditis. I started abx, iv rocephin, used stroke protocol





1/1/2018: WBC went down, Still hypernatremic, more free water, repeat labs in 

am. ECHO pending. I called Cindy at [phone], no answer ?phone broken 

message. 


1/2/18: echo pending, replace potassium, hyperglycemia on D5W for the 

hypernatremia, continue ssi,  if she tolerates a diet today, then d/c ivf, 

change ssi to achs instead of q4hr,  urine culture still pending. Hopefully SNF 

placement in 1-2 days


1/3/18: Echo reviewed, hypernatremia/hypokalemia resolved, stop ivf. Waiting on 

placement


1/4/18: [facility] in [place], [place], waiting on insurance pre-auth


1/5/18: failed swallow, going for peg on monday because she is on Plavix. 

Nutrition: Patient refusing NGT but she will consider. she is comminunicating 

via paper and pencil. 


1/6/18: continue peg tube. 





History


Interval history: 


Patient was seen and examined.  Follow-up on current diagnosis/altered mental 

status.  Overnight uneventful.  Patient not given any history; therefore,  

Imaging, nursing note, chart, labs and old chart reviewed.  Responding more. 





Hospitalist Physical





- Physical exam


Narrative exam: 


GEN: Thin frail woman chronic a bit debilitating appearing, lethargic, nonverbal


HEENT: NCAT, EOMI, PERRL, OP Clear


NECK: supple, no adenopathy, no thyromegaly, no JVD


CVS/HEART: regular tachycardia NORMAL S1S2, NO JVD, pulses present bilaterally


CHEST/LUNGS: CTA B, Symmetrical chest expansion, good air entry bilaterally


GI/Abdomen: soft, NTND, good bowel sounds, no guarding or rebound


/Bladder: no suprapubic tenderness, no CVA or paraspinal tenderness


EXT/Skin: no c/c/e, no obvious rash


MSK: Moving both arms with contractures 


Neuro: CN 2-12 grossly intact except nonverbal, hemiparesis, facial asymmetry, 

no new focal deficits


Psych: calm but confused








- Constitutional


Vitals: 


 











Temp Pulse Resp BP Pulse Ox


 


 98.2 F   89   20   125/82   97 


 


 01/06/18 08:03  01/06/18 08:03  01/06/18 08:03  01/06/18 08:03  01/06/18 08:03











General appearance: Present: well-nourished.  Absent: mild distress





Results





- Labs


CBC & Chem 7: 


 01/03/18 05:55





 01/03/18 05:55


Labs: 


 Laboratory Last Values











WBC  12.4 K/mm3 (4.5-11.0)  H  01/03/18  05:55    


 


RBC  2.92 M/mm3 (3.65-5.03)  L  01/03/18  05:55    


 


Hgb  8.8 gm/dl (10.1-14.3)  L  01/03/18  05:55    


 


Hct  26.1 % (30.3-42.9)  L  01/03/18  05:55    


 


MCV  89 fl (79-97)   01/03/18  05:55    


 


MCH  30 pg (28-32)   01/03/18  05:55    


 


MCHC  34 % (30-34)   01/03/18  05:55    


 


RDW  12.6 % (13.2-15.2)  L  01/03/18  05:55    


 


Plt Count  249 K/mm3 (140-440)   01/03/18  05:55    


 


Add Manual Diff  Complete   12/28/17  12:26    


 


Total Counted  100   12/28/17  12:26    


 


Seg Neutrophils %  Np   12/28/17  12:26    


 


Seg Neuts % (Manual)  90.0 % (40.0-70.0)  H  12/28/17  12:26    


 


Band Neutrophils %  4.0 %  12/28/17  12:26    


 


Lymphocytes % (Manual)  2.0 % (13.4-35.0)  L  12/28/17  12:26    


 


Reactive Lymphs % (Man)  0 %  12/28/17  12:26    


 


Monocytes % (Manual)  4.0 % (0.0-7.3)   12/28/17  12:26    


 


Eosinophils % (Manual)  0 % (0.0-4.3)   12/28/17  12:26    


 


Basophils % (Manual)  0 % (0.0-1.8)   12/28/17  12:26    


 


Metamyelocytes %  0 %  12/28/17  12:26    


 


Myelocytes %  0 %  12/28/17  12:26    


 


Promyelocytes %  0 %  12/28/17  12:26    


 


Blast Cells %  0 %  12/28/17  12:26    


 


Nucleated RBC %  Not Reportable   12/28/17  12:26    


 


Seg Neutrophils # Man  18.1 K/mm3 (1.8-7.7)  H  12/28/17  12:26    


 


Band Neutrophils #  0.8 K/mm3  12/28/17  12:26    


 


Lymphocytes # (Manual)  0.4 K/mm3 (1.2-5.4)  L  12/28/17  12:26    


 


Abs React Lymphs (Man)  0.0 K/mm3  12/28/17  12:26    


 


Monocytes # (Manual)  0.8 K/mm3 (0.0-0.8)   12/28/17  12:26    


 


Eosinophils # (Manual)  0.0 K/mm3 (0.0-0.4)   12/28/17  12:26    


 


Basophils # (Manual)  0.0 K/mm3 (0.0-0.1)   12/28/17  12:26    


 


Metamyelocytes #  0.0 K/mm3  12/28/17  12:26    


 


Myelocytes #  0.0 K/mm3  12/28/17  12:26    


 


Promyelocytes #  0.0 K/mm3  12/28/17  12:26    


 


Blast Cells #  0.0 K/mm3  12/28/17  12:26    


 


WBC Morphology  Not Reportable   12/28/17  12:26    


 


Hypersegmented Neuts  Not Reportable   12/28/17  12:26    


 


Hyposegmented Neuts  Not Reportable   12/28/17  12:26    


 


Hypogranular Neuts  Not Reportable   12/28/17  12:26    


 


Smudge Cells  Not Reportable   12/28/17  12:26    


 


Toxic Granulation  Not Reportable   12/28/17  12:26    


 


Toxic Vacuolation  Not Reportable   12/28/17  12:26    


 


Dohle Bodies  Not Reportable   12/28/17  12:26    


 


Pelger-Huet Anomaly  Not Reportable   12/28/17  12:26    


 


Yuly Rods  Not Reportable   12/28/17  12:26    


 


Platelet Estimate  Not Reportable   12/28/17  12:26    


 


Clumped Platelets  Not Reportable   12/28/17  12:26    


 


Plt Clumps, EDTA  Not Reportable   12/28/17  12:26    


 


Large Platelets  Not Reportable   12/28/17  12:26    


 


Giant Platelets  Not Reportable   12/28/17  12:26    


 


Platelet Satelliting  Not Reportable   12/28/17  12:26    


 


Plt Morphology Comment  Not Reportable   12/28/17  12:26    


 


RBC Morphology  Normal   12/28/17  12:26    


 


Dimorphic RBCs  Not Reportable   12/28/17  12:26    


 


Polychromasia  Not Reportable   12/28/17  12:26    


 


Hypochromasia  Not Reportable   12/28/17  12:26    


 


Poikilocytosis  Not Reportable   12/28/17  12:26    


 


Anisocytosis  Not Reportable   12/28/17  12:26    


 


Microcytosis  Not Reportable   12/28/17  12:26    


 


Macrocytosis  Not Reportable   12/28/17  12:26    


 


Spherocytes  Not Reportable   12/28/17  12:26    


 


Pappenheimer Bodies  Not Reportable   12/28/17  12:26    


 


Sickle Cells  Not Reportable   12/28/17  12:26    


 


Target Cells  Not Reportable   12/28/17  12:26    


 


Tear Drop Cells  Not Reportable   12/28/17  12:26    


 


Ovalocytes  Not Reportable   12/28/17  12:26    


 


Helmet Cells  Not Reportable   12/28/17  12:26    


 


[place] Bodies  Not Reportable   12/28/17  12:26    


 


Cabot Rings  Not Reportable   12/28/17  12:26    


 


Marco Cells  Not Reportable   12/28/17  12:26    


 


Bite Cells  Not Reportable   12/28/17  12:26    


 


Crenated Cell  Not Reportable   12/28/17  12:26    


 


Elliptocytes  Not Reportable   12/28/17  12:26    


 


Acanthocytes (Spur)  Not Reportable   12/28/17  12:26    


 


Rouleaux  Not Reportable   12/28/17  12:26    


 


Hemoglobin C Crystals  Not Reportable   12/28/17  12:26    


 


Schistocytes  Not Reportable   12/28/17  12:26    


 


Malaria parasites  Not Reportable   12/28/17  12:26    


 


Juliano Bodies  Not Reportable   12/28/17  12:26    


 


Hem Pathologist Commnt  No   12/28/17  12:26    


 


PT  13.2 Sec. (12.2-14.9)   01/05/18  08:39    


 


INR  0.95  (0.87-1.13)   01/05/18  08:39    


 


POC ABG pH  7.389  (7.35-7.45)   12/30/17  19:49    


 


POC ABG pCO2  28.9  (35-45)  L  12/30/17  19:49    


 


POC ABG pO2  98  ()   12/30/17  19:49    


 


POC ABG HCO3  17.4   12/30/17  19:49    


 


POC ABG Total CO2  18   12/30/17  19:49    


 


POC ABG O2 Sat  98   12/30/17  19:49    


 


POC ABG Base Excess  -8   12/30/17  19:49    


 


FiO2  21 %  12/30/17  19:49    


 


Sodium  144 mmol/L (137-145)   01/03/18  05:55    


 


Potassium  4.1 mmol/L (3.6-5.0)  D 01/03/18  05:55    


 


Chloride  108.2 mmol/L ()  H  01/03/18  05:55    


 


Carbon Dioxide  21 mmol/L (22-30)  L  01/03/18  05:55    


 


Anion Gap  19 mmol/L  01/03/18  05:55    


 


BUN  12 mg/dL (7-17)   01/03/18  05:55    


 


Creatinine  0.7 mg/dL (0.7-1.2)   01/03/18  05:55    


 


Estimated GFR  > 60 ml/min  01/03/18  05:55    


 


BUN/Creatinine Ratio  17 %  01/03/18  05:55    


 


Glucose  228 mg/dL ()  H  01/03/18  05:55    


 


POC Glucose  344  ()  H  01/06/18  11:15    


 


Hemoglobin A1c  10.0 % (4-6)  H  12/28/17  17:02    


 


Lactic Acid  1.80 mmol/L (0.7-2.0)   12/30/17  19:44    


 


Calcium  8.1 mg/dL (8.4-10.2)  L  01/03/18  05:55    


 


Phosphorus  1.50 mg/dL (2.5-4.5)  L  12/30/17  07:07    


 


Magnesium  1.80 mg/dL (1.7-2.3)   01/02/18  04:40    


 


Total Bilirubin  0.30 mg/dL (0.1-1.2)   12/28/17  13:44    


 


Direct Bilirubin  0.2 mg/dL (0-0.2)   12/28/17  13:44    


 


Indirect Bilirubin  0.1 mg/dL  12/28/17  13:44    


 


AST  15 units/L (5-40)   12/28/17  13:44    


 


ALT  11 units/L (7-56)   12/28/17  13:44    


 


Alkaline Phosphatase  89 units/L ()   12/28/17  13:44    


 


C-Reactive Protein  4.70 mg/dL (0.00-1.30)  H  12/30/17  19:44    


 


Total Protein  5.8 g/dL (6.3-8.2)  L  12/28/17  13:44    


 


Albumin  3.0 g/dL (3.9-5)  L  12/28/17  13:44    


 


Albumin/Globulin Ratio  1.1 %  12/28/17  13:44    


 


Triglycerides  94 mg/dL (2-149)   01/01/18  07:27    


 


Cholesterol  148 mg/dL ()   01/01/18  07:27    


 


LDL Cholesterol Direct  42 mg/dL ()  L  01/01/18  07:27    


 


HDL Cholesterol  88 mg/dL (40-59)  H  01/01/18  07:27    


 


Cholesterol/HDL Ratio  1.68 %  01/01/18  07:27    


 


HCG, Qual  Negative  (Negative)   12/30/17  15:05    


 


Urine Color  Yellow  (Yellow)   12/28/17  14:28    


 


Urine Turbidity  Clear  (Clear)   12/28/17  14:28    


 


Urine pH  5.0  (5.0-7.0)   12/28/17  14:28    


 


Ur Specific Gravity  1.016  (1.003-1.030)   12/28/17  14:28    


 


Urine Protein  100 mg/dl mg/dL (Negative)   12/28/17  14:28    


 


Urine Glucose (UA)  >=500 mg/dL (Negative)   12/28/17  14:28    


 


Urine Ketones  80 mg/dL (Negative)   12/28/17  14:28    


 


Urine Blood  Mod  (Negative)   12/28/17  14:28    


 


Urine Nitrite  Neg  (Negative)   12/28/17  14:28    


 


Urine Bilirubin  Neg  (Negative)   12/28/17  14:28    


 


Urine Urobilinogen  < 2.0 mg/dL (<2.0)   12/28/17  14:28    


 


Ur Leukocyte Esterase  Neg  (Negative)   12/28/17  14:28    


 


Urine WBC (Auto)  2.0 /HPF (0.0-6.0)   12/28/17  14:28    


 


Urine RBC (Auto)  < 1.0 /HPF (0.0-6.0)   12/28/17  14:28    


 


U Epithel Cells (Auto)  < 1.0 /HPF (0-13.0)   12/28/17  14:28    


 


Urine Bacteria (Auto)  1+ /HPF (Negative)   12/28/17  14:28    


 


Urine Opiates Screen  Presumptive negative   12/28/17  14:28    


 


Urine Methadone Screen  Presumptive negative   12/28/17  14:28    


 


Ur Barbiturates Screen  Presumptive negative   12/28/17  14:28    


 


Ur Phencyclidine Scrn  Presumptive negative   12/28/17  14:28    


 


Ur Amphetamines Screen  Presumptive negative   12/28/17  14:28    


 


U Benzodiazepines Scrn  Presumptive negative   12/28/17  14:28    


 


Urine Cocaine Screen  Presumptive negative   12/28/17  14:28    


 


U Marijuana (THC) Screen  Presumptive negative   12/28/17  14:28    


 


Drugs of Abuse Note  Disclamer   12/28/17  14:28

## 2018-01-06 NOTE — GASTROENTEROLOGY PROGRESS NOTE
Assessment and Plan





- Patient Problems


(1) Inability to swallow


Current Visit: Yes   Status: Acute   


Plan to address problem: 


The patient's care was discussed with the speech therapist.  She has some 

swallowing abilities but will not likely tolerate enough to meet her needs. 


Will plan PEG on Monday.








(2) HTN (hypertension)


Current Visit: Yes   Status: Chronic   


Qualifiers: 


   Hypertension type: essential hypertension   Qualified Code(s): I10 - 

Essential (primary) hypertension   





(3) History of CVA with residual deficit


Current Visit: Yes   Status: Chronic   





Subjective


Date of service: 01/06/18


Principal diagnosis: inability to swallow


Interval history: 





The patient denies and discomfort.





Objective





- Constitutional


Vitals: 


 











Temp Pulse Resp BP Pulse Ox


 


 98.2 F   89   20   125/82   97 


 


 01/06/18 08:03  01/06/18 08:03  01/06/18 08:03  01/06/18 08:03  01/06/18 08:03














- EENT


ENT: hearing intact, clear oral mucosa, dentition normal





- Neck


Neck: supple, normal ROM





- Respiratory


Respiratory effort: normal


Respiratory: bilateral: CTA





- Breasts


Breasts: deferred





- Cardiovascular


Rhythm: regular





- Gastrointestinal


General gastrointestinal: Present: soft, non-tender, non-distended, normal 

bowel sounds


Rectal Exam: normal exam-external/orifice





- Genitourinary


Female Genitourinary: deferred





- Integumentary


Integumentary: Present: clear, warm, dry





- Neurologic


Neurological: oriented to person, oriented to place





- Psychiatric


Psychiatric: appropriate mood/affect





- Labs


CBC & Chem 7: 


 01/03/18 05:55





 01/03/18 05:55


Labs: 


 Laboratory Results - last 24 hr











  01/05/18 01/06/18 01/06/18





  16:53 01:08 06:31


 


POC Glucose  264 H  183 H  238 H














  01/06/18





  11:15


 


POC Glucose  344 H

## 2018-01-06 NOTE — PROGRESS NOTE
Assessment and Plan





-Acute ischemic stroke-Sepsis secondary to acute mastoiditis


-DKA with acute metabolic acidosis, high anion gap-improving


-Acute metabolic encephalopathy( improved)


-Recurrent Acute ischemic strokes


-Expressive aphasia


-Hypernatremia


-Moderate malnutrition


-Oropharyngeal dysphagia





Monitor hemodynamics


Complete antibiotics per ID


Continue with supplemental oxygen for O2 sats >92%


Aspiration precautions


Speech language pathologist to continue to treat- swallow and speech


Enteric nutrition


Free water flushes


PT/OT/mobility


Secondary stroke prophylaxis


Evaluate for other secondary causes of stroke.


Accuchecks with glycemic control








Subjective


Date of service: 01/06/18


Principal diagnosis: inability to swallow


Interval history: 





Patient is seen today for: Acute on Chronic Encephalopathy; SIRS; DKA; Seizures





Seen and examined at bedside; 24hour events reviewed; nursing and respiratory 

care staff consulted; no adverse overnight events reported to me;


She wants to eat.





Objective





- Exam


Narrative Exam: 


GEN: Thin frail woman chronic a bit debilitating appearing, lethargic, nonverbal


HEENT: NCAT, EOMI, PERRL, OP Clear


NECK: supple, no adenopathy, no thyromegaly, no JVD


CVS/HEART: regular tachycardia NORMAL S1S2, NO JVD, pulses present bilaterally


CHEST/LUNGS: CTA B, Symmetrical chest expansion, good air entry bilaterally


GI/Abdomen: soft, NTND, good bowel sounds, no guarding or rebound


/Bladder: no suprapubic tenderness, no CVA or paraspinal tenderness


EXT/Skin: no c/c/e, no obvious rash


MSK: Moving right upper extremity. Left hand in a splint


Neuro: Expressive aphasia


Psych: calm, good affect





 Vital Signs - 12hr











  01/06/18





  08:03


 


Temperature 98.2 F


 


Pulse Rate 89


 


Respiratory 20





Rate 


 


Blood Pressure 125/82


 


O2 Sat by Pulse 97





Oximetry 











Constitutional: no acute distress, asleep


Eyes: non-icteric


Neck: supple, no lymphadenopathy


Ascultation: Bilateral: clear


Cardiovascular: regular rate and rhythm


Gastrointestinal: normoactive bowel sounds, soft, non-tender


Integumentary: normal


Extremities: no cyanosis, no edema


Neurologic: other (Patient sleeping at this time.)


Psychiatric: other (Patient sleeping at this time.)


CBC and BMP: 


 01/03/18 05:55





 01/03/18 05:55


ABG, PT/INR, D-dimer: 


ABG











POC ABG pH  7.389  (7.35-7.45)   12/30/17  19:49    


 


POC ABG pCO2  28.9  (35-45)  L  12/30/17  19:49    


 


POC ABG pO2  98  ()   12/30/17  19:49    


 


POC ABG HCO3  17.4   12/30/17  19:49    


 


POC ABG Total CO2  18   12/30/17  19:49    


 


POC ABG O2 Sat  98   12/30/17  19:49    





PT/INR, D-dimer











PT  13.2 Sec. (12.2-14.9)   01/05/18  08:39    


 


INR  0.95  (0.87-1.13)   01/05/18  08:39    








Abnormal lab findings: 


 Abnormal Labs











  12/28/17 12/28/17 12/28/17





  12:26 12:26 12:26


 


WBC  20.1 H  


 


RBC   


 


Hgb   


 


Hct   


 


MCV  101 H  


 


MCHC   


 


RDW   


 


Seg Neuts % (Manual)  90.0 H  


 


Lymphocytes % (Manual)  2.0 L  


 


Seg Neutrophils # Man  18.1 H  


 


Lymphocytes # (Manual)  0.4 L  


 


PT   16.4 H 


 


INR   1.25 H 


 


POC ABG pH   


 


POC ABG pCO2   


 


POC ABG pO2   


 


Sodium   


 


Potassium   


 


Chloride   


 


Carbon Dioxide   


 


BUN   


 


Creatinine   


 


Glucose   


 


POC Glucose    445 H


 


Hemoglobin A1c   


 


Lactic Acid   


 


Calcium   


 


Phosphorus   


 


Magnesium   


 


C-Reactive Protein   


 


Total Protein   


 


Albumin   


 


LDL Cholesterol Direct   


 


HDL Cholesterol   














  12/28/17 12/28/17 12/28/17





  12:39 13:44 13:44


 


WBC   


 


RBC   


 


Hgb   


 


Hct   


 


MCV   


 


MCHC   


 


RDW   


 


Seg Neuts % (Manual)   


 


Lymphocytes % (Manual)   


 


Seg Neutrophils # Man   


 


Lymphocytes # (Manual)   


 


PT   


 


INR   


 


POC ABG pH  6.856 L  


 


POC ABG pCO2  12.4 L  


 


POC ABG pO2  288 H  


 


Sodium   


 


Potassium   


 


Chloride   


 


Carbon Dioxide   


 


BUN   


 


Creatinine   


 


Glucose   


 


POC Glucose   


 


Hemoglobin A1c   


 


Lactic Acid   


 


Calcium   


 


Phosphorus    8.10 H


 


Magnesium    2.60 H


 


C-Reactive Protein   


 


Total Protein   5.8 L 


 


Albumin   3.0 L 


 


LDL Cholesterol Direct   


 


HDL Cholesterol   














  12/28/17 12/28/17 12/28/17





  13:44 14:17 15:17


 


WBC   


 


RBC   


 


Hgb   


 


Hct   


 


MCV   


 


MCHC   


 


RDW   


 


Seg Neuts % (Manual)   


 


Lymphocytes % (Manual)   


 


Seg Neutrophils # Man   


 


Lymphocytes # (Manual)   


 


PT   


 


INR   


 


POC ABG pH   


 


POC ABG pCO2   


 


POC ABG pO2   


 


Sodium   


 


Potassium  6.5 H*  5.8 H  5.7 H


 


Chloride  94.4 L  


 


Carbon Dioxide  5 L*  2 L*  < 2.0 L*


 


BUN  43 H  38 H  39 H


 


Creatinine   1.3 H  1.3 H


 


Glucose  629 H*  610 H*  585 H*


 


POC Glucose   


 


Hemoglobin A1c   


 


Lactic Acid   


 


Calcium  8.3 L  7.7 L  7.9 L


 


Phosphorus   


 


Magnesium   


 


C-Reactive Protein   


 


Total Protein   


 


Albumin   


 


LDL Cholesterol Direct   


 


HDL Cholesterol   














  12/28/17 12/28/17 12/28/17





  15:17 15:40 16:57


 


WBC   


 


RBC   


 


Hgb   


 


Hct   


 


MCV   


 


MCHC   


 


RDW   


 


Seg Neuts % (Manual)   


 


Lymphocytes % (Manual)   


 


Seg Neutrophils # Man   


 


Lymphocytes # (Manual)   


 


PT   


 


INR   


 


POC ABG pH   


 


POC ABG pCO2   


 


POC ABG pO2   


 


Sodium   


 


Potassium   


 


Chloride   


 


Carbon Dioxide    3 L*


 


BUN    42 H


 


Creatinine    1.3 H


 


Glucose    557 H*


 


POC Glucose   > 500 H 


 


Hemoglobin A1c   


 


Lactic Acid  2.90 H*  


 


Calcium    8.1 L


 


Phosphorus   


 


Magnesium   


 


C-Reactive Protein   


 


Total Protein   


 


Albumin   


 


LDL Cholesterol Direct   


 


HDL Cholesterol   














  12/28/17 12/28/17 12/28/17





  17:02 17:02 18:05


 


WBC   


 


RBC   


 


Hgb   


 


Hct   


 


MCV   


 


MCHC   


 


RDW   


 


Seg Neuts % (Manual)   


 


Lymphocytes % (Manual)   


 


Seg Neutrophils # Man   


 


Lymphocytes # (Manual)   


 


PT   


 


INR   


 


POC ABG pH   


 


POC ABG pCO2   


 


POC ABG pO2   


 


Sodium   


 


Potassium   


 


Chloride   


 


Carbon Dioxide   


 


BUN   


 


Creatinine   


 


Glucose   


 


POC Glucose    485 H


 


Hemoglobin A1c  10.0 H  


 


Lactic Acid   


 


Calcium   


 


Phosphorus   6.60 H 


 


Magnesium   2.70 H 


 


C-Reactive Protein   


 


Total Protein   


 


Albumin   


 


LDL Cholesterol Direct   


 


HDL Cholesterol   














  12/28/17 12/28/17 12/28/17





  18:52 20:20 21:23


 


WBC   


 


RBC   


 


Hgb   


 


Hct   


 


MCV   


 


MCHC   


 


RDW   


 


Seg Neuts % (Manual)   


 


Lymphocytes % (Manual)   


 


Seg Neutrophils # Man   


 


Lymphocytes # (Manual)   


 


PT   


 


INR   


 


POC ABG pH   


 


POC ABG pCO2   


 


POC ABG pO2   


 


Sodium  148 H  


 


Potassium   


 


Chloride   


 


Carbon Dioxide  < 2.0 L*  


 


BUN  41 H  


 


Creatinine  1.4 H  


 


Glucose  473 H  


 


POC Glucose   356 H  322 H


 


Hemoglobin A1c   


 


Lactic Acid   


 


Calcium  7.8 L  


 


Phosphorus   


 


Magnesium   


 


C-Reactive Protein   


 


Total Protein   


 


Albumin   


 


LDL Cholesterol Direct   


 


HDL Cholesterol   














  12/28/17 12/28/17 12/28/17





  22:28 23:01 23:31


 


WBC   


 


RBC   


 


Hgb   


 


Hct   


 


MCV   


 


MCHC   


 


RDW   


 


Seg Neuts % (Manual)   


 


Lymphocytes % (Manual)   


 


Seg Neutrophils # Man   


 


Lymphocytes # (Manual)   


 


PT   


 


INR   


 


POC ABG pH   


 


POC ABG pCO2   


 


POC ABG pO2   


 


Sodium   148 H 


 


Potassium   


 


Chloride   111.6 H 


 


Carbon Dioxide   4 L* 


 


BUN   43 H 


 


Creatinine   1.3 H 


 


Glucose   177 H 


 


POC Glucose  242 H   204 H


 


Hemoglobin A1c   


 


Lactic Acid   


 


Calcium   7.7 L 


 


Phosphorus   


 


Magnesium   


 


C-Reactive Protein   


 


Total Protein   


 


Albumin   


 


LDL Cholesterol Direct   


 


HDL Cholesterol   














  12/29/17 12/29/17 12/29/17





  01:20 03:30 04:01


 


WBC   


 


RBC   


 


Hgb   


 


Hct   


 


MCV   


 


MCHC   


 


RDW   


 


Seg Neuts % (Manual)   


 


Lymphocytes % (Manual)   


 


Seg Neutrophils # Man   


 


Lymphocytes # (Manual)   


 


PT   


 


INR   


 


POC ABG pH   


 


POC ABG pCO2   


 


POC ABG pO2   


 


Sodium   


 


Potassium   


 


Chloride   


 


Carbon Dioxide   


 


BUN   


 


Creatinine   


 


Glucose   


 


POC Glucose  120 H  59 L  132 H


 


Hemoglobin A1c   


 


Lactic Acid   


 


Calcium   


 


Phosphorus   


 


Magnesium   


 


C-Reactive Protein   


 


Total Protein   


 


Albumin   


 


LDL Cholesterol Direct   


 


HDL Cholesterol   














  12/29/17 12/29/17 12/29/17





  04:20 04:20 05:15


 


WBC   


 


RBC   


 


Hgb   


 


Hct   


 


MCV   


 


MCHC   


 


RDW   


 


Seg Neuts % (Manual)   


 


Lymphocytes % (Manual)   


 


Seg Neutrophils # Man   


 


Lymphocytes # (Manual)   


 


PT   


 


INR   


 


POC ABG pH   


 


POC ABG pCO2   


 


POC ABG pO2   


 


Sodium  151 H  150 H 


 


Potassium   


 


Chloride  114.3 H  115.4 H 


 


Carbon Dioxide  10 L  10 L 


 


BUN  48 H  48 H 


 


Creatinine   1.3 H 


 


Glucose  121 H  118 H 


 


POC Glucose    134 H


 


Hemoglobin A1c   


 


Lactic Acid   


 


Calcium  7.8 L  7.8 L 


 


Phosphorus   


 


Magnesium   


 


C-Reactive Protein   


 


Total Protein   


 


Albumin   


 


LDL Cholesterol Direct   


 


HDL Cholesterol   














  12/29/17 12/29/17 12/29/17





  06:41 07:49 08:12


 


WBC   


 


RBC   


 


Hgb   


 


Hct   


 


MCV   


 


MCHC   


 


RDW   


 


Seg Neuts % (Manual)   


 


Lymphocytes % (Manual)   


 


Seg Neutrophils # Man   


 


Lymphocytes # (Manual)   


 


PT   


 


INR   


 


POC ABG pH   


 


POC ABG pCO2   


 


POC ABG pO2   


 


Sodium   151 H 


 


Potassium   


 


Chloride   116.5 H 


 


Carbon Dioxide   13 L 


 


BUN   52 H 


 


Creatinine   1.3 H 


 


Glucose   213 H 


 


POC Glucose  172 H   228 H


 


Hemoglobin A1c   


 


Lactic Acid   


 


Calcium   7.9 L 


 


Phosphorus   


 


Magnesium   


 


C-Reactive Protein   


 


Total Protein   


 


Albumin   


 


LDL Cholesterol Direct   


 


HDL Cholesterol   














  12/29/17 12/29/17 12/29/17





  08:22 09:31 11:04


 


WBC   


 


RBC   


 


Hgb   


 


Hct   


 


MCV   


 


MCHC   


 


RDW   


 


Seg Neuts % (Manual)   


 


Lymphocytes % (Manual)   


 


Seg Neutrophils # Man   


 


Lymphocytes # (Manual)   


 


PT   


 


INR   


 


POC ABG pH   


 


POC ABG pCO2   


 


POC ABG pO2   


 


Sodium   


 


Potassium   


 


Chloride   


 


Carbon Dioxide   


 


BUN   


 


Creatinine   


 


Glucose   


 


POC Glucose  140 H  140 H  136 H


 


Hemoglobin A1c   


 


Lactic Acid   


 


Calcium   


 


Phosphorus   


 


Magnesium   


 


C-Reactive Protein   


 


Total Protein   


 


Albumin   


 


LDL Cholesterol Direct   


 


HDL Cholesterol   














  12/29/17 12/29/17 12/29/17





  12:11 13:40 17:21


 


WBC   


 


RBC   


 


Hgb   


 


Hct   


 


MCV   


 


MCHC   


 


RDW   


 


Seg Neuts % (Manual)   


 


Lymphocytes % (Manual)   


 


Seg Neutrophils # Man   


 


Lymphocytes # (Manual)   


 


PT   


 


INR   


 


POC ABG pH   


 


POC ABG pCO2   


 


POC ABG pO2   


 


Sodium   152 H 


 


Potassium   


 


Chloride   117.6 H 


 


Carbon Dioxide   15 L 


 


BUN   50 H 


 


Creatinine   1.3 H 


 


Glucose   144 H 


 


POC Glucose  111 H   182 H


 


Hemoglobin A1c   


 


Lactic Acid   


 


Calcium   


 


Phosphorus   


 


Magnesium   


 


C-Reactive Protein   


 


Total Protein   


 


Albumin   


 


LDL Cholesterol Direct   


 


HDL Cholesterol   














  12/29/17 12/29/17 12/30/17





  17:45 23:06 00:39


 


WBC   


 


RBC   


 


Hgb   


 


Hct   


 


MCV   


 


MCHC   


 


RDW   


 


Seg Neuts % (Manual)   


 


Lymphocytes % (Manual)   


 


Seg Neutrophils # Man   


 


Lymphocytes # (Manual)   


 


PT   


 


INR   


 


POC ABG pH   


 


POC ABG pCO2   


 


POC ABG pO2   


 


Sodium  149 H  


 


Potassium   


 


Chloride  116.2 H  


 


Carbon Dioxide  13 L  


 


BUN  48 H  


 


Creatinine  1.3 H  


 


Glucose  171 H  


 


POC Glucose   < 40 L  123 H


 


Hemoglobin A1c   


 


Lactic Acid   


 


Calcium  8.1 L  


 


Phosphorus   


 


Magnesium   


 


C-Reactive Protein   


 


Total Protein   


 


Albumin   


 


LDL Cholesterol Direct   


 


HDL Cholesterol   














  12/30/17 12/30/17 12/30/17





  07:07 07:07 11:35


 


WBC   


 


RBC   


 


Hgb   


 


Hct   


 


MCV   


 


MCHC   


 


RDW   


 


Seg Neuts % (Manual)   


 


Lymphocytes % (Manual)   


 


Seg Neutrophils # Man   


 


Lymphocytes # (Manual)   


 


PT   


 


INR   


 


POC ABG pH   


 


POC ABG pCO2   


 


POC ABG pO2   


 


Sodium   152 H 


 


Potassium   3.0 L D 


 


Chloride   121.9 H 


 


Carbon Dioxide   18 L 


 


BUN   44 H 


 


Creatinine   


 


Glucose   


 


POC Glucose    118 H


 


Hemoglobin A1c   


 


Lactic Acid   


 


Calcium   


 


Phosphorus  1.50 L  


 


Magnesium   


 


C-Reactive Protein   


 


Total Protein   


 


Albumin   


 


LDL Cholesterol Direct   


 


HDL Cholesterol   














  12/30/17 12/30/17 12/30/17





  16:54 19:44 19:49


 


WBC   


 


RBC   


 


Hgb   


 


Hct   


 


MCV   


 


MCHC   


 


RDW   


 


Seg Neuts % (Manual)   


 


Lymphocytes % (Manual)   


 


Seg Neutrophils # Man   


 


Lymphocytes # (Manual)   


 


PT   


 


INR   


 


POC ABG pH   


 


POC ABG pCO2    28.9 L


 


POC ABG pO2   


 


Sodium   


 


Potassium   


 


Chloride   


 


Carbon Dioxide   


 


BUN   


 


Creatinine   


 


Glucose   


 


POC Glucose  231 H  


 


Hemoglobin A1c   


 


Lactic Acid   


 


Calcium   


 


Phosphorus   


 


Magnesium   


 


C-Reactive Protein   4.70 H 


 


Total Protein   


 


Albumin   


 


LDL Cholesterol Direct   


 


HDL Cholesterol   














  12/30/17 12/31/17 12/31/17





  21:48 06:25 12:35


 


WBC   


 


RBC   


 


Hgb   


 


Hct   


 


MCV   


 


MCHC   


 


RDW   


 


Seg Neuts % (Manual)   


 


Lymphocytes % (Manual)   


 


Seg Neutrophils # Man   


 


Lymphocytes # (Manual)   


 


PT   


 


INR   


 


POC ABG pH   


 


POC ABG pCO2   


 


POC ABG pO2   


 


Sodium   


 


Potassium   


 


Chloride   


 


Carbon Dioxide   


 


BUN   


 


Creatinine   


 


Glucose   


 


POC Glucose  176 H  299 H  258 H


 


Hemoglobin A1c   


 


Lactic Acid   


 


Calcium   


 


Phosphorus   


 


Magnesium   


 


C-Reactive Protein   


 


Total Protein   


 


Albumin   


 


LDL Cholesterol Direct   


 


HDL Cholesterol   














  12/31/17 12/31/17 01/01/18





  16:43 22:07 02:59


 


WBC   


 


RBC   


 


Hgb   


 


Hct   


 


MCV   


 


MCHC   


 


RDW   


 


Seg Neuts % (Manual)   


 


Lymphocytes % (Manual)   


 


Seg Neutrophils # Man   


 


Lymphocytes # (Manual)   


 


PT   


 


INR   


 


POC ABG pH   


 


POC ABG pCO2   


 


POC ABG pO2   


 


Sodium   


 


Potassium   


 


Chloride   


 


Carbon Dioxide   


 


BUN   


 


Creatinine   


 


Glucose   


 


POC Glucose  252 H  248 H  193 H


 


Hemoglobin A1c   


 


Lactic Acid   


 


Calcium   


 


Phosphorus   


 


Magnesium   


 


C-Reactive Protein   


 


Total Protein   


 


Albumin   


 


LDL Cholesterol Direct   


 


HDL Cholesterol   














  01/01/18 01/01/18 01/01/18





  06:20 07:27 07:27


 


WBC   18.6 H 


 


RBC   2.90 L 


 


Hgb   9.0 L 


 


Hct   27.0 L 


 


MCV   


 


MCHC   


 


RDW   


 


Seg Neuts % (Manual)   


 


Lymphocytes % (Manual)   


 


Seg Neutrophils # Man   


 


Lymphocytes # (Manual)   


 


PT   


 


INR   


 


POC ABG pH   


 


POC ABG pCO2   


 


POC ABG pO2   


 


Sodium    152 H


 


Potassium   


 


Chloride    116.2 H


 


Carbon Dioxide    19 L


 


BUN    24 H


 


Creatinine   


 


Glucose    288 H


 


POC Glucose  280 H  


 


Hemoglobin A1c   


 


Lactic Acid   


 


Calcium    8.3 L


 


Phosphorus   


 


Magnesium   


 


C-Reactive Protein   


 


Total Protein   


 


Albumin   


 


LDL Cholesterol Direct    42 L


 


HDL Cholesterol    88 H














  01/01/18 01/01/18 01/01/18





  11:09 17:12 20:59


 


WBC   


 


RBC   


 


Hgb   


 


Hct   


 


MCV   


 


MCHC   


 


RDW   


 


Seg Neuts % (Manual)   


 


Lymphocytes % (Manual)   


 


Seg Neutrophils # Man   


 


Lymphocytes # (Manual)   


 


PT   


 


INR   


 


POC ABG pH   


 


POC ABG pCO2   


 


POC ABG pO2   


 


Sodium   


 


Potassium   


 


Chloride   


 


Carbon Dioxide   


 


BUN   


 


Creatinine   


 


Glucose   


 


POC Glucose  328 H  306 H  318 H


 


Hemoglobin A1c   


 


Lactic Acid   


 


Calcium   


 


Phosphorus   


 


Magnesium   


 


C-Reactive Protein   


 


Total Protein   


 


Albumin   


 


LDL Cholesterol Direct   


 


HDL Cholesterol   














  01/02/18 01/02/18 01/02/18





  01:55 04:40 04:40


 


WBC   13.0 H 


 


RBC   2.88 L 


 


Hgb   9.0 L 


 


Hct   26.2 L 


 


MCV   


 


MCHC   35 H 


 


RDW   12.6 L 


 


Seg Neuts % (Manual)   


 


Lymphocytes % (Manual)   


 


Seg Neutrophils # Man   


 


Lymphocytes # (Manual)   


 


PT   


 


INR   


 


POC ABG pH   


 


POC ABG pCO2   


 


POC ABG pO2   


 


Sodium    148 H


 


Potassium    3.3 L


 


Chloride    111.8 H


 


Carbon Dioxide   


 


BUN    18 H


 


Creatinine   


 


Glucose    212 H


 


POC Glucose  274 H  


 


Hemoglobin A1c   


 


Lactic Acid   


 


Calcium    8.3 L


 


Phosphorus   


 


Magnesium   


 


C-Reactive Protein   


 


Total Protein   


 


Albumin   


 


LDL Cholesterol Direct   


 


HDL Cholesterol   














  01/02/18 01/02/18 01/02/18





  06:30 12:14 16:50


 


WBC   


 


RBC   


 


Hgb   


 


Hct   


 


MCV   


 


MCHC   


 


RDW   


 


Seg Neuts % (Manual)   


 


Lymphocytes % (Manual)   


 


Seg Neutrophils # Man   


 


Lymphocytes # (Manual)   


 


PT   


 


INR   


 


POC ABG pH   


 


POC ABG pCO2   


 


POC ABG pO2   


 


Sodium   


 


Potassium   


 


Chloride   


 


Carbon Dioxide   


 


BUN   


 


Creatinine   


 


Glucose   


 


POC Glucose  285 H  166 H  178 H


 


Hemoglobin A1c   


 


Lactic Acid   


 


Calcium   


 


Phosphorus   


 


Magnesium   


 


C-Reactive Protein   


 


Total Protein   


 


Albumin   


 


LDL Cholesterol Direct   


 


HDL Cholesterol   














  01/02/18 01/03/18 01/03/18





  21:24 05:47 05:55


 


WBC    12.4 H


 


RBC    2.92 L


 


Hgb    8.8 L


 


Hct    26.1 L


 


MCV   


 


MCHC   


 


RDW    12.6 L


 


Seg Neuts % (Manual)   


 


Lymphocytes % (Manual)   


 


Seg Neutrophils # Man   


 


Lymphocytes # (Manual)   


 


PT   


 


INR   


 


POC ABG pH   


 


POC ABG pCO2   


 


POC ABG pO2   


 


Sodium   


 


Potassium   


 


Chloride   


 


Carbon Dioxide   


 


BUN   


 


Creatinine   


 


Glucose   


 


POC Glucose  197 H  291 H 


 


Hemoglobin A1c   


 


Lactic Acid   


 


Calcium   


 


Phosphorus   


 


Magnesium   


 


C-Reactive Protein   


 


Total Protein   


 


Albumin   


 


LDL Cholesterol Direct   


 


HDL Cholesterol   














  01/03/18 01/03/18 01/03/18





  05:55 12:24 16:33


 


WBC   


 


RBC   


 


Hgb   


 


Hct   


 


MCV   


 


MCHC   


 


RDW   


 


Seg Neuts % (Manual)   


 


Lymphocytes % (Manual)   


 


Seg Neutrophils # Man   


 


Lymphocytes # (Manual)   


 


PT   


 


INR   


 


POC ABG pH   


 


POC ABG pCO2   


 


POC ABG pO2   


 


Sodium   


 


Potassium   


 


Chloride  108.2 H  


 


Carbon Dioxide  21 L  


 


BUN   


 


Creatinine   


 


Glucose  228 H  


 


POC Glucose   215 H  280 H


 


Hemoglobin A1c   


 


Lactic Acid   


 


Calcium  8.1 L  


 


Phosphorus   


 


Magnesium   


 


C-Reactive Protein   


 


Total Protein   


 


Albumin   


 


LDL Cholesterol Direct   


 


HDL Cholesterol   














  01/03/18 01/04/18 01/04/18





  22:12 05:27 12:47


 


WBC   


 


RBC   


 


Hgb   


 


Hct   


 


MCV   


 


MCHC   


 


RDW   


 


Seg Neuts % (Manual)   


 


Lymphocytes % (Manual)   


 


Seg Neutrophils # Man   


 


Lymphocytes # (Manual)   


 


PT   


 


INR   


 


POC ABG pH   


 


POC ABG pCO2   


 


POC ABG pO2   


 


Sodium   


 


Potassium   


 


Chloride   


 


Carbon Dioxide   


 


BUN   


 


Creatinine   


 


Glucose   


 


POC Glucose  236 H  186 H  206 H


 


Hemoglobin A1c   


 


Lactic Acid   


 


Calcium   


 


Phosphorus   


 


Magnesium   


 


C-Reactive Protein   


 


Total Protein   


 


Albumin   


 


LDL Cholesterol Direct   


 


HDL Cholesterol   














  01/04/18 01/04/18 01/05/18





  17:27 22:19 05:48


 


WBC   


 


RBC   


 


Hgb   


 


Hct   


 


MCV   


 


MCHC   


 


RDW   


 


Seg Neuts % (Manual)   


 


Lymphocytes % (Manual)   


 


Seg Neutrophils # Man   


 


Lymphocytes # (Manual)   


 


PT   


 


INR   


 


POC ABG pH   


 


POC ABG pCO2   


 


POC ABG pO2   


 


Sodium   


 


Potassium   


 


Chloride   


 


Carbon Dioxide   


 


BUN   


 


Creatinine   


 


Glucose   


 


POC Glucose  213 H  123 H  203 H


 


Hemoglobin A1c   


 


Lactic Acid   


 


Calcium   


 


Phosphorus   


 


Magnesium   


 


C-Reactive Protein   


 


Total Protein   


 


Albumin   


 


LDL Cholesterol Direct   


 


HDL Cholesterol   














  01/05/18 01/05/18 01/06/18





  11:20 16:53 01:08


 


WBC   


 


RBC   


 


Hgb   


 


Hct   


 


MCV   


 


MCHC   


 


RDW   


 


Seg Neuts % (Manual)   


 


Lymphocytes % (Manual)   


 


Seg Neutrophils # Man   


 


Lymphocytes # (Manual)   


 


PT   


 


INR   


 


POC ABG pH   


 


POC ABG pCO2   


 


POC ABG pO2   


 


Sodium   


 


Potassium   


 


Chloride   


 


Carbon Dioxide   


 


BUN   


 


Creatinine   


 


Glucose   


 


POC Glucose  223 H  264 H  183 H


 


Hemoglobin A1c   


 


Lactic Acid   


 


Calcium   


 


Phosphorus   


 


Magnesium   


 


C-Reactive Protein   


 


Total Protein   


 


Albumin   


 


LDL Cholesterol Direct   


 


HDL Cholesterol   














  01/06/18 01/06/18





  06:31 11:15


 


WBC  


 


RBC  


 


Hgb  


 


Hct  


 


MCV  


 


MCHC  


 


RDW  


 


Seg Neuts % (Manual)  


 


Lymphocytes % (Manual)  


 


Seg Neutrophils # Man  


 


Lymphocytes # (Manual)  


 


PT  


 


INR  


 


POC ABG pH  


 


POC ABG pCO2  


 


POC ABG pO2  


 


Sodium  


 


Potassium  


 


Chloride  


 


Carbon Dioxide  


 


BUN  


 


Creatinine  


 


Glucose  


 


POC Glucose  238 H  344 H


 


Hemoglobin A1c  


 


Lactic Acid  


 


Calcium  


 


Phosphorus  


 


Magnesium  


 


C-Reactive Protein  


 


Total Protein  


 


Albumin  


 


LDL Cholesterol Direct  


 


HDL Cholesterol  











Allied health notes reviewed: nursing

## 2018-01-07 RX ADMIN — INSULIN ASPART SCH UNITS: 100 INJECTION, SOLUTION INTRAVENOUS; SUBCUTANEOUS at 12:19

## 2018-01-07 RX ADMIN — BRIMONIDINE TARTRATE, TIMOLOL MALEATE SCH DROPS: 2; 5 SOLUTION/ DROPS OPHTHALMIC at 11:16

## 2018-01-07 RX ADMIN — PIPERACILLIN SODIUM AND TAZOBACTAM SODIUM SCH MLS/HR: 3; .375 INJECTION, POWDER, LYOPHILIZED, FOR SOLUTION INTRAVENOUS at 05:59

## 2018-01-07 RX ADMIN — FAMOTIDINE SCH MG: 20 TABLET ORAL at 11:16

## 2018-01-07 RX ADMIN — MORPHINE SULFATE PRN MG: 4 INJECTION, SOLUTION INTRAMUSCULAR; INTRAVENOUS at 06:02

## 2018-01-07 RX ADMIN — INSULIN ASPART SCH UNITS: 100 INJECTION, SOLUTION INTRAVENOUS; SUBCUTANEOUS at 18:16

## 2018-01-07 RX ADMIN — CEFTRIAXONE SODIUM SCH MLS/10 MIN: 10 INJECTION, POWDER, FOR SOLUTION INTRAVENOUS at 18:15

## 2018-01-07 RX ADMIN — INSULIN ASPART SCH UNITS: 100 INJECTION, SOLUTION INTRAVENOUS; SUBCUTANEOUS at 00:01

## 2018-01-07 RX ADMIN — BRIMONIDINE TARTRATE, TIMOLOL MALEATE SCH DROPS: 2; 5 SOLUTION/ DROPS OPHTHALMIC at 21:17

## 2018-01-07 RX ADMIN — INSULIN ASPART SCH UNITS: 100 INJECTION, SOLUTION INTRAVENOUS; SUBCUTANEOUS at 08:42

## 2018-01-07 RX ADMIN — PIPERACILLIN SODIUM AND TAZOBACTAM SODIUM SCH MLS/HR: 3; .375 INJECTION, POWDER, LYOPHILIZED, FOR SOLUTION INTRAVENOUS at 11:17

## 2018-01-07 RX ADMIN — PIPERACILLIN SODIUM AND TAZOBACTAM SODIUM SCH MLS/HR: 3; .375 INJECTION, POWDER, LYOPHILIZED, FOR SOLUTION INTRAVENOUS at 00:01

## 2018-01-07 RX ADMIN — PIPERACILLIN SODIUM AND TAZOBACTAM SODIUM SCH MLS/HR: 3; .375 INJECTION, POWDER, LYOPHILIZED, FOR SOLUTION INTRAVENOUS at 18:15

## 2018-01-07 RX ADMIN — NIFEDIPINE SCH MG: 30 TABLET, FILM COATED, EXTENDED RELEASE ORAL at 11:15

## 2018-01-07 RX ADMIN — ASPIRIN SCH MG: 300 SUPPOSITORY RECTAL at 11:16

## 2018-01-07 NOTE — GASTROENTEROLOGY PROGRESS NOTE
Assessment and Plan





- Patient Problems


(1) Inability to swallow


Current Visit: Yes   Status: Acute   


Plan to address problem: 


PEG scheduled for tomorrow.  Off plavix since Thursday 1/4








(2) HTN (hypertension)


Current Visit: Yes   Status: Chronic   


Qualifiers: 


   Hypertension type: essential hypertension   Qualified Code(s): I10 - 

Essential (primary) hypertension   





(3) History of CVA with residual deficit


Current Visit: Yes   Status: Chronic   





Subjective


Date of service: 01/07/18


Principal diagnosis: inability to swallow


Interval history: 





Feels OK.  No complaints.





Objective





- Constitutional


Vitals: 


 











Temp Pulse Resp BP Pulse Ox


 


 98.4 F   85   19   145/80   97 


 


 01/07/18 08:05  01/07/18 08:05  01/07/18 08:05  01/07/18 08:05  01/07/18 08:05











General appearance: no acute distress





- EENT


ENT: hearing intact, clear oral mucosa





- Neck


Neck: supple, normal ROM





- Respiratory


Respiratory effort: normal


Respiratory: bilateral: CTA





- Cardiovascular


Rhythm: regular





- Gastrointestinal


General gastrointestinal: Present: soft, non-tender, non-distended, normal 

bowel sounds





- Neurologic


Neurological: other (alert and cooperative. No speech.)





- Psychiatric


Psychiatric: appropriate mood/affect





- Labs


CBC & Chem 7: 


 01/03/18 05:55





 01/03/18 05:55


Labs: 


 Laboratory Results - last 24 hr











  01/06/18 01/06/18 01/07/18





  16:55 21:38 06:14


 


POC Glucose  302 H  259 H  275 H














  01/07/18





  11:35


 


POC Glucose  291 H

## 2018-01-07 NOTE — XRAY REPORT
Flatplate of abdomen: Compared to 1/5/18.



History: NG tip.



Findings:



Tip of Dobbhoff feeding tube is noted in the fundus of the stomach. A 

single distended loop of small bowel in lower mid abdomen. Stool in the 

colon with contrast in colon.



Impression:



No significant interval change.

## 2018-01-07 NOTE — PROGRESS NOTE
Assessment and Plan


-Sepsis secondary to acute mastoiditis


-Acute ischemic stroke


-DKA with acute metabolic acidosis, high anion gap-improving


-Acute metabolic encephalopathy( improved)


-Recurrent Acute ischemic strokes


-Expressive aphasia


-Hypernatremia


-Moderate malnutrition


-Oropharyngeal dysphagia





Monitor hemodynamics


Complete antibiotics per ID


Continue with supplemental oxygen for O2 sats >92%


Aspiration precautions


Speech language pathologist to continue to treat- swallow and speech


Enteric nutrition


Free water flushes


PT/OT/mobility


Secondary stroke prophylaxis


Evaluate for other secondary causes of stroke.


Accuchecks with glycemic control








Subjective


Date of service: 01/07/18


Principal diagnosis: inability to swallow


Interval history: 





Patient is seen today for: Acute on Chronic Encephalopathy; SIRS; DKA; Seizures





Seen and examined at bedside; 24hour events reviewed; nursing and respiratory 

care staff consulted; no adverse overnight events reported to me;


She wants to eat.





Objective


 Vital Signs - 12hr











  01/07/18





  08:05


 


Temperature 98.4 F


 


Pulse Rate 85


 


Respiratory 19





Rate 


 


Blood Pressure 145/80


 


O2 Sat by Pulse 97





Oximetry 











Constitutional: no acute distress, asleep


Eyes: non-icteric


Neck: supple, no lymphadenopathy


Ascultation: Bilateral: clear


Cardiovascular: regular rate and rhythm


Gastrointestinal: normoactive bowel sounds, soft, non-tender


Integumentary: normal


Extremities: no cyanosis, no edema


Neurologic: other (Patient sleeping at this time.)


Psychiatric: other (Patient sleeping at this time.)


CBC and BMP: 


 01/03/18 05:55





 01/03/18 05:55


ABG, PT/INR, D-dimer: 


ABG











POC ABG pH  7.389  (7.35-7.45)   12/30/17  19:49    


 


POC ABG pCO2  28.9  (35-45)  L  12/30/17  19:49    


 


POC ABG pO2  98  ()   12/30/17  19:49    


 


POC ABG HCO3  17.4   12/30/17  19:49    


 


POC ABG Total CO2  18   12/30/17  19:49    


 


POC ABG O2 Sat  98   12/30/17  19:49    





PT/INR, D-dimer











PT  13.2 Sec. (12.2-14.9)   01/05/18  08:39    


 


INR  0.95  (0.87-1.13)   01/05/18  08:39    








Abnormal lab findings: 


 Abnormal Labs











  12/28/17 12/28/17 12/28/17





  12:26 12:26 12:26


 


WBC  20.1 H  


 


RBC   


 


Hgb   


 


Hct   


 


MCV  101 H  


 


MCHC   


 


RDW   


 


Seg Neuts % (Manual)  90.0 H  


 


Lymphocytes % (Manual)  2.0 L  


 


Seg Neutrophils # Man  18.1 H  


 


Lymphocytes # (Manual)  0.4 L  


 


PT   16.4 H 


 


INR   1.25 H 


 


POC ABG pH   


 


POC ABG pCO2   


 


POC ABG pO2   


 


Sodium   


 


Potassium   


 


Chloride   


 


Carbon Dioxide   


 


BUN   


 


Creatinine   


 


Glucose   


 


POC Glucose    445 H


 


Hemoglobin A1c   


 


Lactic Acid   


 


Calcium   


 


Phosphorus   


 


Magnesium   


 


C-Reactive Protein   


 


Total Protein   


 


Albumin   


 


LDL Cholesterol Direct   


 


HDL Cholesterol   














  12/28/17 12/28/17 12/28/17





  12:39 13:44 13:44


 


WBC   


 


RBC   


 


Hgb   


 


Hct   


 


MCV   


 


MCHC   


 


RDW   


 


Seg Neuts % (Manual)   


 


Lymphocytes % (Manual)   


 


Seg Neutrophils # Man   


 


Lymphocytes # (Manual)   


 


PT   


 


INR   


 


POC ABG pH  6.856 L  


 


POC ABG pCO2  12.4 L  


 


POC ABG pO2  288 H  


 


Sodium   


 


Potassium   


 


Chloride   


 


Carbon Dioxide   


 


BUN   


 


Creatinine   


 


Glucose   


 


POC Glucose   


 


Hemoglobin A1c   


 


Lactic Acid   


 


Calcium   


 


Phosphorus    8.10 H


 


Magnesium    2.60 H


 


C-Reactive Protein   


 


Total Protein   5.8 L 


 


Albumin   3.0 L 


 


LDL Cholesterol Direct   


 


HDL Cholesterol   














  12/28/17 12/28/17 12/28/17





  13:44 14:17 15:17


 


WBC   


 


RBC   


 


Hgb   


 


Hct   


 


MCV   


 


MCHC   


 


RDW   


 


Seg Neuts % (Manual)   


 


Lymphocytes % (Manual)   


 


Seg Neutrophils # Man   


 


Lymphocytes # (Manual)   


 


PT   


 


INR   


 


POC ABG pH   


 


POC ABG pCO2   


 


POC ABG pO2   


 


Sodium   


 


Potassium  6.5 H*  5.8 H  5.7 H


 


Chloride  94.4 L  


 


Carbon Dioxide  5 L*  2 L*  < 2.0 L*


 


BUN  43 H  38 H  39 H


 


Creatinine   1.3 H  1.3 H


 


Glucose  629 H*  610 H*  585 H*


 


POC Glucose   


 


Hemoglobin A1c   


 


Lactic Acid   


 


Calcium  8.3 L  7.7 L  7.9 L


 


Phosphorus   


 


Magnesium   


 


C-Reactive Protein   


 


Total Protein   


 


Albumin   


 


LDL Cholesterol Direct   


 


HDL Cholesterol   














  12/28/17 12/28/17 12/28/17





  15:17 15:40 16:57


 


WBC   


 


RBC   


 


Hgb   


 


Hct   


 


MCV   


 


MCHC   


 


RDW   


 


Seg Neuts % (Manual)   


 


Lymphocytes % (Manual)   


 


Seg Neutrophils # Man   


 


Lymphocytes # (Manual)   


 


PT   


 


INR   


 


POC ABG pH   


 


POC ABG pCO2   


 


POC ABG pO2   


 


Sodium   


 


Potassium   


 


Chloride   


 


Carbon Dioxide    3 L*


 


BUN    42 H


 


Creatinine    1.3 H


 


Glucose    557 H*


 


POC Glucose   > 500 H 


 


Hemoglobin A1c   


 


Lactic Acid  2.90 H*  


 


Calcium    8.1 L


 


Phosphorus   


 


Magnesium   


 


C-Reactive Protein   


 


Total Protein   


 


Albumin   


 


LDL Cholesterol Direct   


 


HDL Cholesterol   














  12/28/17 12/28/17 12/28/17





  17:02 17:02 18:05


 


WBC   


 


RBC   


 


Hgb   


 


Hct   


 


MCV   


 


MCHC   


 


RDW   


 


Seg Neuts % (Manual)   


 


Lymphocytes % (Manual)   


 


Seg Neutrophils # Man   


 


Lymphocytes # (Manual)   


 


PT   


 


INR   


 


POC ABG pH   


 


POC ABG pCO2   


 


POC ABG pO2   


 


Sodium   


 


Potassium   


 


Chloride   


 


Carbon Dioxide   


 


BUN   


 


Creatinine   


 


Glucose   


 


POC Glucose    485 H


 


Hemoglobin A1c  10.0 H  


 


Lactic Acid   


 


Calcium   


 


Phosphorus   6.60 H 


 


Magnesium   2.70 H 


 


C-Reactive Protein   


 


Total Protein   


 


Albumin   


 


LDL Cholesterol Direct   


 


HDL Cholesterol   














  12/28/17 12/28/17 12/28/17





  18:52 20:20 21:23


 


WBC   


 


RBC   


 


Hgb   


 


Hct   


 


MCV   


 


MCHC   


 


RDW   


 


Seg Neuts % (Manual)   


 


Lymphocytes % (Manual)   


 


Seg Neutrophils # Man   


 


Lymphocytes # (Manual)   


 


PT   


 


INR   


 


POC ABG pH   


 


POC ABG pCO2   


 


POC ABG pO2   


 


Sodium  148 H  


 


Potassium   


 


Chloride   


 


Carbon Dioxide  < 2.0 L*  


 


BUN  41 H  


 


Creatinine  1.4 H  


 


Glucose  473 H  


 


POC Glucose   356 H  322 H


 


Hemoglobin A1c   


 


Lactic Acid   


 


Calcium  7.8 L  


 


Phosphorus   


 


Magnesium   


 


C-Reactive Protein   


 


Total Protein   


 


Albumin   


 


LDL Cholesterol Direct   


 


HDL Cholesterol   














  12/28/17 12/28/17 12/28/17





  22:28 23:01 23:31


 


WBC   


 


RBC   


 


Hgb   


 


Hct   


 


MCV   


 


MCHC   


 


RDW   


 


Seg Neuts % (Manual)   


 


Lymphocytes % (Manual)   


 


Seg Neutrophils # Man   


 


Lymphocytes # (Manual)   


 


PT   


 


INR   


 


POC ABG pH   


 


POC ABG pCO2   


 


POC ABG pO2   


 


Sodium   148 H 


 


Potassium   


 


Chloride   111.6 H 


 


Carbon Dioxide   4 L* 


 


BUN   43 H 


 


Creatinine   1.3 H 


 


Glucose   177 H 


 


POC Glucose  242 H   204 H


 


Hemoglobin A1c   


 


Lactic Acid   


 


Calcium   7.7 L 


 


Phosphorus   


 


Magnesium   


 


C-Reactive Protein   


 


Total Protein   


 


Albumin   


 


LDL Cholesterol Direct   


 


HDL Cholesterol   














  12/29/17 12/29/17 12/29/17





  01:20 03:30 04:01


 


WBC   


 


RBC   


 


Hgb   


 


Hct   


 


MCV   


 


MCHC   


 


RDW   


 


Seg Neuts % (Manual)   


 


Lymphocytes % (Manual)   


 


Seg Neutrophils # Man   


 


Lymphocytes # (Manual)   


 


PT   


 


INR   


 


POC ABG pH   


 


POC ABG pCO2   


 


POC ABG pO2   


 


Sodium   


 


Potassium   


 


Chloride   


 


Carbon Dioxide   


 


BUN   


 


Creatinine   


 


Glucose   


 


POC Glucose  120 H  59 L  132 H


 


Hemoglobin A1c   


 


Lactic Acid   


 


Calcium   


 


Phosphorus   


 


Magnesium   


 


C-Reactive Protein   


 


Total Protein   


 


Albumin   


 


LDL Cholesterol Direct   


 


HDL Cholesterol   














  12/29/17 12/29/17 12/29/17





  04:20 04:20 05:15


 


WBC   


 


RBC   


 


Hgb   


 


Hct   


 


MCV   


 


MCHC   


 


RDW   


 


Seg Neuts % (Manual)   


 


Lymphocytes % (Manual)   


 


Seg Neutrophils # Man   


 


Lymphocytes # (Manual)   


 


PT   


 


INR   


 


POC ABG pH   


 


POC ABG pCO2   


 


POC ABG pO2   


 


Sodium  151 H  150 H 


 


Potassium   


 


Chloride  114.3 H  115.4 H 


 


Carbon Dioxide  10 L  10 L 


 


BUN  48 H  48 H 


 


Creatinine   1.3 H 


 


Glucose  121 H  118 H 


 


POC Glucose    134 H


 


Hemoglobin A1c   


 


Lactic Acid   


 


Calcium  7.8 L  7.8 L 


 


Phosphorus   


 


Magnesium   


 


C-Reactive Protein   


 


Total Protein   


 


Albumin   


 


LDL Cholesterol Direct   


 


HDL Cholesterol   














  12/29/17 12/29/17 12/29/17





  06:41 07:49 08:12


 


WBC   


 


RBC   


 


Hgb   


 


Hct   


 


MCV   


 


MCHC   


 


RDW   


 


Seg Neuts % (Manual)   


 


Lymphocytes % (Manual)   


 


Seg Neutrophils # Man   


 


Lymphocytes # (Manual)   


 


PT   


 


INR   


 


POC ABG pH   


 


POC ABG pCO2   


 


POC ABG pO2   


 


Sodium   151 H 


 


Potassium   


 


Chloride   116.5 H 


 


Carbon Dioxide   13 L 


 


BUN   52 H 


 


Creatinine   1.3 H 


 


Glucose   213 H 


 


POC Glucose  172 H   228 H


 


Hemoglobin A1c   


 


Lactic Acid   


 


Calcium   7.9 L 


 


Phosphorus   


 


Magnesium   


 


C-Reactive Protein   


 


Total Protein   


 


Albumin   


 


LDL Cholesterol Direct   


 


HDL Cholesterol   














  12/29/17 12/29/17 12/29/17





  08:22 09:31 11:04


 


WBC   


 


RBC   


 


Hgb   


 


Hct   


 


MCV   


 


MCHC   


 


RDW   


 


Seg Neuts % (Manual)   


 


Lymphocytes % (Manual)   


 


Seg Neutrophils # Man   


 


Lymphocytes # (Manual)   


 


PT   


 


INR   


 


POC ABG pH   


 


POC ABG pCO2   


 


POC ABG pO2   


 


Sodium   


 


Potassium   


 


Chloride   


 


Carbon Dioxide   


 


BUN   


 


Creatinine   


 


Glucose   


 


POC Glucose  140 H  140 H  136 H


 


Hemoglobin A1c   


 


Lactic Acid   


 


Calcium   


 


Phosphorus   


 


Magnesium   


 


C-Reactive Protein   


 


Total Protein   


 


Albumin   


 


LDL Cholesterol Direct   


 


HDL Cholesterol   














  12/29/17 12/29/17 12/29/17





  12:11 13:40 17:21


 


WBC   


 


RBC   


 


Hgb   


 


Hct   


 


MCV   


 


MCHC   


 


RDW   


 


Seg Neuts % (Manual)   


 


Lymphocytes % (Manual)   


 


Seg Neutrophils # Man   


 


Lymphocytes # (Manual)   


 


PT   


 


INR   


 


POC ABG pH   


 


POC ABG pCO2   


 


POC ABG pO2   


 


Sodium   152 H 


 


Potassium   


 


Chloride   117.6 H 


 


Carbon Dioxide   15 L 


 


BUN   50 H 


 


Creatinine   1.3 H 


 


Glucose   144 H 


 


POC Glucose  111 H   182 H


 


Hemoglobin A1c   


 


Lactic Acid   


 


Calcium   


 


Phosphorus   


 


Magnesium   


 


C-Reactive Protein   


 


Total Protein   


 


Albumin   


 


LDL Cholesterol Direct   


 


HDL Cholesterol   














  12/29/17 12/29/17 12/30/17





  17:45 23:06 00:39


 


WBC   


 


RBC   


 


Hgb   


 


Hct   


 


MCV   


 


MCHC   


 


RDW   


 


Seg Neuts % (Manual)   


 


Lymphocytes % (Manual)   


 


Seg Neutrophils # Man   


 


Lymphocytes # (Manual)   


 


PT   


 


INR   


 


POC ABG pH   


 


POC ABG pCO2   


 


POC ABG pO2   


 


Sodium  149 H  


 


Potassium   


 


Chloride  116.2 H  


 


Carbon Dioxide  13 L  


 


BUN  48 H  


 


Creatinine  1.3 H  


 


Glucose  171 H  


 


POC Glucose   < 40 L  123 H


 


Hemoglobin A1c   


 


Lactic Acid   


 


Calcium  8.1 L  


 


Phosphorus   


 


Magnesium   


 


C-Reactive Protein   


 


Total Protein   


 


Albumin   


 


LDL Cholesterol Direct   


 


HDL Cholesterol   














  12/30/17 12/30/17 12/30/17





  07:07 07:07 11:35


 


WBC   


 


RBC   


 


Hgb   


 


Hct   


 


MCV   


 


MCHC   


 


RDW   


 


Seg Neuts % (Manual)   


 


Lymphocytes % (Manual)   


 


Seg Neutrophils # Man   


 


Lymphocytes # (Manual)   


 


PT   


 


INR   


 


POC ABG pH   


 


POC ABG pCO2   


 


POC ABG pO2   


 


Sodium   152 H 


 


Potassium   3.0 L D 


 


Chloride   121.9 H 


 


Carbon Dioxide   18 L 


 


BUN   44 H 


 


Creatinine   


 


Glucose   


 


POC Glucose    118 H


 


Hemoglobin A1c   


 


Lactic Acid   


 


Calcium   


 


Phosphorus  1.50 L  


 


Magnesium   


 


C-Reactive Protein   


 


Total Protein   


 


Albumin   


 


LDL Cholesterol Direct   


 


HDL Cholesterol   














  12/30/17 12/30/17 12/30/17





  16:54 19:44 19:49


 


WBC   


 


RBC   


 


Hgb   


 


Hct   


 


MCV   


 


MCHC   


 


RDW   


 


Seg Neuts % (Manual)   


 


Lymphocytes % (Manual)   


 


Seg Neutrophils # Man   


 


Lymphocytes # (Manual)   


 


PT   


 


INR   


 


POC ABG pH   


 


POC ABG pCO2    28.9 L


 


POC ABG pO2   


 


Sodium   


 


Potassium   


 


Chloride   


 


Carbon Dioxide   


 


BUN   


 


Creatinine   


 


Glucose   


 


POC Glucose  231 H  


 


Hemoglobin A1c   


 


Lactic Acid   


 


Calcium   


 


Phosphorus   


 


Magnesium   


 


C-Reactive Protein   4.70 H 


 


Total Protein   


 


Albumin   


 


LDL Cholesterol Direct   


 


HDL Cholesterol   














  12/30/17 12/31/17 12/31/17





  21:48 06:25 12:35


 


WBC   


 


RBC   


 


Hgb   


 


Hct   


 


MCV   


 


MCHC   


 


RDW   


 


Seg Neuts % (Manual)   


 


Lymphocytes % (Manual)   


 


Seg Neutrophils # Man   


 


Lymphocytes # (Manual)   


 


PT   


 


INR   


 


POC ABG pH   


 


POC ABG pCO2   


 


POC ABG pO2   


 


Sodium   


 


Potassium   


 


Chloride   


 


Carbon Dioxide   


 


BUN   


 


Creatinine   


 


Glucose   


 


POC Glucose  176 H  299 H  258 H


 


Hemoglobin A1c   


 


Lactic Acid   


 


Calcium   


 


Phosphorus   


 


Magnesium   


 


C-Reactive Protein   


 


Total Protein   


 


Albumin   


 


LDL Cholesterol Direct   


 


HDL Cholesterol   














  12/31/17 12/31/17 01/01/18





  16:43 22:07 02:59


 


WBC   


 


RBC   


 


Hgb   


 


Hct   


 


MCV   


 


MCHC   


 


RDW   


 


Seg Neuts % (Manual)   


 


Lymphocytes % (Manual)   


 


Seg Neutrophils # Man   


 


Lymphocytes # (Manual)   


 


PT   


 


INR   


 


POC ABG pH   


 


POC ABG pCO2   


 


POC ABG pO2   


 


Sodium   


 


Potassium   


 


Chloride   


 


Carbon Dioxide   


 


BUN   


 


Creatinine   


 


Glucose   


 


POC Glucose  252 H  248 H  193 H


 


Hemoglobin A1c   


 


Lactic Acid   


 


Calcium   


 


Phosphorus   


 


Magnesium   


 


C-Reactive Protein   


 


Total Protein   


 


Albumin   


 


LDL Cholesterol Direct   


 


HDL Cholesterol   














  01/01/18 01/01/18 01/01/18





  06:20 07:27 07:27


 


WBC   18.6 H 


 


RBC   2.90 L 


 


Hgb   9.0 L 


 


Hct   27.0 L 


 


MCV   


 


MCHC   


 


RDW   


 


Seg Neuts % (Manual)   


 


Lymphocytes % (Manual)   


 


Seg Neutrophils # Man   


 


Lymphocytes # (Manual)   


 


PT   


 


INR   


 


POC ABG pH   


 


POC ABG pCO2   


 


POC ABG pO2   


 


Sodium    152 H


 


Potassium   


 


Chloride    116.2 H


 


Carbon Dioxide    19 L


 


BUN    24 H


 


Creatinine   


 


Glucose    288 H


 


POC Glucose  280 H  


 


Hemoglobin A1c   


 


Lactic Acid   


 


Calcium    8.3 L


 


Phosphorus   


 


Magnesium   


 


C-Reactive Protein   


 


Total Protein   


 


Albumin   


 


LDL Cholesterol Direct    42 L


 


HDL Cholesterol    88 H














  01/01/18 01/01/18 01/01/18





  11:09 17:12 20:59


 


WBC   


 


RBC   


 


Hgb   


 


Hct   


 


MCV   


 


MCHC   


 


RDW   


 


Seg Neuts % (Manual)   


 


Lymphocytes % (Manual)   


 


Seg Neutrophils # Man   


 


Lymphocytes # (Manual)   


 


PT   


 


INR   


 


POC ABG pH   


 


POC ABG pCO2   


 


POC ABG pO2   


 


Sodium   


 


Potassium   


 


Chloride   


 


Carbon Dioxide   


 


BUN   


 


Creatinine   


 


Glucose   


 


POC Glucose  328 H  306 H  318 H


 


Hemoglobin A1c   


 


Lactic Acid   


 


Calcium   


 


Phosphorus   


 


Magnesium   


 


C-Reactive Protein   


 


Total Protein   


 


Albumin   


 


LDL Cholesterol Direct   


 


HDL Cholesterol   














  01/02/18 01/02/18 01/02/18





  01:55 04:40 04:40


 


WBC   13.0 H 


 


RBC   2.88 L 


 


Hgb   9.0 L 


 


Hct   26.2 L 


 


MCV   


 


MCHC   35 H 


 


RDW   12.6 L 


 


Seg Neuts % (Manual)   


 


Lymphocytes % (Manual)   


 


Seg Neutrophils # Man   


 


Lymphocytes # (Manual)   


 


PT   


 


INR   


 


POC ABG pH   


 


POC ABG pCO2   


 


POC ABG pO2   


 


Sodium    148 H


 


Potassium    3.3 L


 


Chloride    111.8 H


 


Carbon Dioxide   


 


BUN    18 H


 


Creatinine   


 


Glucose    212 H


 


POC Glucose  274 H  


 


Hemoglobin A1c   


 


Lactic Acid   


 


Calcium    8.3 L


 


Phosphorus   


 


Magnesium   


 


C-Reactive Protein   


 


Total Protein   


 


Albumin   


 


LDL Cholesterol Direct   


 


HDL Cholesterol   














  01/02/18 01/02/18 01/02/18





  06:30 12:14 16:50


 


WBC   


 


RBC   


 


Hgb   


 


Hct   


 


MCV   


 


MCHC   


 


RDW   


 


Seg Neuts % (Manual)   


 


Lymphocytes % (Manual)   


 


Seg Neutrophils # Man   


 


Lymphocytes # (Manual)   


 


PT   


 


INR   


 


POC ABG pH   


 


POC ABG pCO2   


 


POC ABG pO2   


 


Sodium   


 


Potassium   


 


Chloride   


 


Carbon Dioxide   


 


BUN   


 


Creatinine   


 


Glucose   


 


POC Glucose  285 H  166 H  178 H


 


Hemoglobin A1c   


 


Lactic Acid   


 


Calcium   


 


Phosphorus   


 


Magnesium   


 


C-Reactive Protein   


 


Total Protein   


 


Albumin   


 


LDL Cholesterol Direct   


 


HDL Cholesterol   














  01/02/18 01/03/18 01/03/18





  21:24 05:47 05:55


 


WBC    12.4 H


 


RBC    2.92 L


 


Hgb    8.8 L


 


Hct    26.1 L


 


MCV   


 


MCHC   


 


RDW    12.6 L


 


Seg Neuts % (Manual)   


 


Lymphocytes % (Manual)   


 


Seg Neutrophils # Man   


 


Lymphocytes # (Manual)   


 


PT   


 


INR   


 


POC ABG pH   


 


POC ABG pCO2   


 


POC ABG pO2   


 


Sodium   


 


Potassium   


 


Chloride   


 


Carbon Dioxide   


 


BUN   


 


Creatinine   


 


Glucose   


 


POC Glucose  197 H  291 H 


 


Hemoglobin A1c   


 


Lactic Acid   


 


Calcium   


 


Phosphorus   


 


Magnesium   


 


C-Reactive Protein   


 


Total Protein   


 


Albumin   


 


LDL Cholesterol Direct   


 


HDL Cholesterol   














  01/03/18 01/03/18 01/03/18





  05:55 12:24 16:33


 


WBC   


 


RBC   


 


Hgb   


 


Hct   


 


MCV   


 


MCHC   


 


RDW   


 


Seg Neuts % (Manual)   


 


Lymphocytes % (Manual)   


 


Seg Neutrophils # Man   


 


Lymphocytes # (Manual)   


 


PT   


 


INR   


 


POC ABG pH   


 


POC ABG pCO2   


 


POC ABG pO2   


 


Sodium   


 


Potassium   


 


Chloride  108.2 H  


 


Carbon Dioxide  21 L  


 


BUN   


 


Creatinine   


 


Glucose  228 H  


 


POC Glucose   215 H  280 H


 


Hemoglobin A1c   


 


Lactic Acid   


 


Calcium  8.1 L  


 


Phosphorus   


 


Magnesium   


 


C-Reactive Protein   


 


Total Protein   


 


Albumin   


 


LDL Cholesterol Direct   


 


HDL Cholesterol   














  01/03/18 01/04/18 01/04/18





  22:12 05:27 12:47


 


WBC   


 


RBC   


 


Hgb   


 


Hct   


 


MCV   


 


MCHC   


 


RDW   


 


Seg Neuts % (Manual)   


 


Lymphocytes % (Manual)   


 


Seg Neutrophils # Man   


 


Lymphocytes # (Manual)   


 


PT   


 


INR   


 


POC ABG pH   


 


POC ABG pCO2   


 


POC ABG pO2   


 


Sodium   


 


Potassium   


 


Chloride   


 


Carbon Dioxide   


 


BUN   


 


Creatinine   


 


Glucose   


 


POC Glucose  236 H  186 H  206 H


 


Hemoglobin A1c   


 


Lactic Acid   


 


Calcium   


 


Phosphorus   


 


Magnesium   


 


C-Reactive Protein   


 


Total Protein   


 


Albumin   


 


LDL Cholesterol Direct   


 


HDL Cholesterol   














  01/04/18 01/04/18 01/05/18





  17:27 22:19 05:48


 


WBC   


 


RBC   


 


Hgb   


 


Hct   


 


MCV   


 


MCHC   


 


RDW   


 


Seg Neuts % (Manual)   


 


Lymphocytes % (Manual)   


 


Seg Neutrophils # Man   


 


Lymphocytes # (Manual)   


 


PT   


 


INR   


 


POC ABG pH   


 


POC ABG pCO2   


 


POC ABG pO2   


 


Sodium   


 


Potassium   


 


Chloride   


 


Carbon Dioxide   


 


BUN   


 


Creatinine   


 


Glucose   


 


POC Glucose  213 H  123 H  203 H


 


Hemoglobin A1c   


 


Lactic Acid   


 


Calcium   


 


Phosphorus   


 


Magnesium   


 


C-Reactive Protein   


 


Total Protein   


 


Albumin   


 


LDL Cholesterol Direct   


 


HDL Cholesterol   














  01/05/18 01/05/18 01/06/18





  11:20 16:53 01:08


 


WBC   


 


RBC   


 


Hgb   


 


Hct   


 


MCV   


 


MCHC   


 


RDW   


 


Seg Neuts % (Manual)   


 


Lymphocytes % (Manual)   


 


Seg Neutrophils # Man   


 


Lymphocytes # (Manual)   


 


PT   


 


INR   


 


POC ABG pH   


 


POC ABG pCO2   


 


POC ABG pO2   


 


Sodium   


 


Potassium   


 


Chloride   


 


Carbon Dioxide   


 


BUN   


 


Creatinine   


 


Glucose   


 


POC Glucose  223 H  264 H  183 H


 


Hemoglobin A1c   


 


Lactic Acid   


 


Calcium   


 


Phosphorus   


 


Magnesium   


 


C-Reactive Protein   


 


Total Protein   


 


Albumin   


 


LDL Cholesterol Direct   


 


HDL Cholesterol   














  01/06/18 01/06/18 01/06/18





  06:31 11:15 16:55


 


WBC   


 


RBC   


 


Hgb   


 


Hct   


 


MCV   


 


MCHC   


 


RDW   


 


Seg Neuts % (Manual)   


 


Lymphocytes % (Manual)   


 


Seg Neutrophils # Man   


 


Lymphocytes # (Manual)   


 


PT   


 


INR   


 


POC ABG pH   


 


POC ABG pCO2   


 


POC ABG pO2   


 


Sodium   


 


Potassium   


 


Chloride   


 


Carbon Dioxide   


 


BUN   


 


Creatinine   


 


Glucose   


 


POC Glucose  238 H  344 H  302 H


 


Hemoglobin A1c   


 


Lactic Acid   


 


Calcium   


 


Phosphorus   


 


Magnesium   


 


C-Reactive Protein   


 


Total Protein   


 


Albumin   


 


LDL Cholesterol Direct   


 


HDL Cholesterol   














  01/06/18 01/07/18 01/07/18





  21:38 06:14 11:35


 


WBC   


 


RBC   


 


Hgb   


 


Hct   


 


MCV   


 


MCHC   


 


RDW   


 


Seg Neuts % (Manual)   


 


Lymphocytes % (Manual)   


 


Seg Neutrophils # Man   


 


Lymphocytes # (Manual)   


 


PT   


 


INR   


 


POC ABG pH   


 


POC ABG pCO2   


 


POC ABG pO2   


 


Sodium   


 


Potassium   


 


Chloride   


 


Carbon Dioxide   


 


BUN   


 


Creatinine   


 


Glucose   


 


POC Glucose  259 H  275 H  291 H


 


Hemoglobin A1c   


 


Lactic Acid   


 


Calcium   


 


Phosphorus   


 


Magnesium   


 


C-Reactive Protein   


 


Total Protein   


 


Albumin   


 


LDL Cholesterol Direct   


 


HDL Cholesterol   











Allied health notes reviewed: nursing

## 2018-01-07 NOTE — PROGRESS NOTE
Assessment and Plan


Assessment and plan: 


Patient is 37-year-old woman with a history of insulin-dependent diabetes 

mellitus, CVA on aspirin and Plavix, hypertension, dyslipidemia and depression 

who presented with altered mental status and was admitted for DKA.  Her 

baseline mental function per sister Cindy: she walks with a walker, she 

feeds herself, needs help with clothes, left arm weaker than right and speech 

is usually slurred. Cindy reports that patient was discharged from [facility] about 1.5 months ago and went to [facility]. Then, she 

went to her home from there. She lives with daughter Ej and brother, 

Gonzalo, who moved in with her for more support.  





per Cindy: "I wanna say she wasn't talking her insulin, because this 3rd 

stoke did alittle mentally and she more forgettable, that is what I am thinking

, for example, she will say, I need to take my insulin but she had just took 

her insulin." They went to [place], on the way back Wednesday night 11pm, she 

was sleeping off and on, BG was 516 and she took her insulin. Her daughter, 

Ej, said next morning she got up Thursday morning to go to the bathroom 

and she fell and then was unresponsive, she was found on the floor in urine by 

daughter, Ej. 





-DKA with acute metabolic acidosis, high anion gap: Treated with IV fluids, 

insulin drip now resolved


-Acute metabolic encephalopathy/semiconscious state: treat the above


-Recurrent Acute ischemic strokes. ?hydrocephalus on Ct head: consulted 

Neurology


--->Sepsis due to acute right mastoiditis, poa, start iv abx 


-Hypernatremia: needs more free water, bmp


-Moderate malnutrition: dietician


-Acute ischemic stroke, poa: get ECHO, used stroke protocol





12/30/17: Insulin drip was stopped because of hypoglycemia and patient was sent 

from ED to medical/surg floor. Anion gap was still high so I sent ICU to start 

insulin drip. I also ordered bmp stat. By the time the stat bmp was resulted, 

patient was in the icu. Anion gap has closed, so i will downgrade back to 

medical surg floor. Replace her potassium, start diet, if she unable to eat 

continue D5W due to hypernatremia and not eating. Await neurology assessment 

because she is not at her mental baseline of slurred speech and ambulation with 

a walker. Order PT/OT





12/31/17: I gave pt 0.5mg iv ativan x 1 to complete mri brain that Neurology 

ordered, MRI brain reported as multiple focal areas of restricted diffusion 

right cerebral hemisphere suggestive excuse ischemia, no evidence of hemorrhage

, dilated right lateral ventricle without definite evidence of obstruction, 

acute right mastoiditis. I started abx, iv rocephin, used stroke protocol





1/1/2018: WBC went down, Still hypernatremic, more free water, repeat labs in 

am. ECHO pending. I called Cindy at [phone], no answer ?phone broken 

message. 


1/2/18: echo pending, replace potassium, hyperglycemia on D5W for the 

hypernatremia, continue ssi,  if she tolerates a diet today, then d/c ivf, 

change ssi to achs instead of q4hr,  urine culture still pending. Hopefully SNF 

placement in 1-2 days


1/3/18: Echo reviewed, hypernatremia/hypokalemia resolved, stop ivf. Waiting on 

placement


1/4/18: [facility] in [place], [place], waiting on insurance pre-auth


1/5/18: failed swallow, going for peg on monday because she is on Plavix. 

Nutrition: Patient refusing NGT but she will re-consider; she is 

comminunicating via paper and pencil. She agreed to NGT


1/6/18: continue ngt tube. 


1/7/18: abd xray, check ngt placement. peg tube tomorrow then placement





History


Interval history: 


Patient was seen and examined.  Follow-up on current diagnosis/altered mental 

status.  Overnight uneventful.  Patient not given any history; therefore,  

Imaging, nursing note, chart, labs and old chart reviewed.  Responding more. 





Hospitalist Physical





- Physical exam


Narrative exam: 


GEN: Thin frail woman chronic a bit debilitating appearing, lethargic, nonverbal


HEENT: NCAT, EOMI, PERRL, OP Clear


NECK: supple, no adenopathy, no thyromegaly, no JVD


CVS/HEART: regular tachycardia NORMAL S1S2, NO JVD, pulses present bilaterally


CHEST/LUNGS: CTA B, Symmetrical chest expansion, good air entry bilaterally


GI/Abdomen: soft, NTND, good bowel sounds, no guarding or rebound


/Bladder: no suprapubic tenderness, no CVA or paraspinal tenderness


EXT/Skin: no c/c/e, no obvious rash


MSK: Moving both arms with contractures 


Neuro: CN 2-12 grossly intact except nonverbal, hemiparesis, facial asymmetry, 

no new focal deficits


Psych: calm but confused








- Constitutional


Vitals: 


 











Temp Pulse Resp BP Pulse Ox


 


 98.4 F   85   19   145/80   97 


 


 01/07/18 08:05  01/07/18 08:05  01/07/18 08:05  01/07/18 08:05  01/07/18 08:05











General appearance: Present: well-nourished.  Absent: mild distress





Results





- Labs


CBC & Chem 7: 


 01/03/18 05:55





 01/03/18 05:55


Labs: 


 Laboratory Last Values











WBC  12.4 K/mm3 (4.5-11.0)  H  01/03/18  05:55    


 


RBC  2.92 M/mm3 (3.65-5.03)  L  01/03/18  05:55    


 


Hgb  8.8 gm/dl (10.1-14.3)  L  01/03/18  05:55    


 


Hct  26.1 % (30.3-42.9)  L  01/03/18  05:55    


 


MCV  89 fl (79-97)   01/03/18  05:55    


 


MCH  30 pg (28-32)   01/03/18  05:55    


 


MCHC  34 % (30-34)   01/03/18  05:55    


 


RDW  12.6 % (13.2-15.2)  L  01/03/18  05:55    


 


Plt Count  249 K/mm3 (140-440)   01/03/18  05:55    


 


Add Manual Diff  Complete   12/28/17  12:26    


 


Total Counted  100   12/28/17  12:26    


 


Seg Neutrophils %  Np   12/28/17  12:26    


 


Seg Neuts % (Manual)  90.0 % (40.0-70.0)  H  12/28/17  12:26    


 


Band Neutrophils %  4.0 %  12/28/17  12:26    


 


Lymphocytes % (Manual)  2.0 % (13.4-35.0)  L  12/28/17  12:26    


 


Reactive Lymphs % (Man)  0 %  12/28/17  12:26    


 


Monocytes % (Manual)  4.0 % (0.0-7.3)   12/28/17  12:26    


 


Eosinophils % (Manual)  0 % (0.0-4.3)   12/28/17  12:26    


 


Basophils % (Manual)  0 % (0.0-1.8)   12/28/17  12:26    


 


Metamyelocytes %  0 %  12/28/17  12:26    


 


Myelocytes %  0 %  12/28/17  12:26    


 


Promyelocytes %  0 %  12/28/17  12:26    


 


Blast Cells %  0 %  12/28/17  12:26    


 


Nucleated RBC %  Not Reportable   12/28/17  12:26    


 


Seg Neutrophils # Man  18.1 K/mm3 (1.8-7.7)  H  12/28/17  12:26    


 


Band Neutrophils #  0.8 K/mm3  12/28/17  12:26    


 


Lymphocytes # (Manual)  0.4 K/mm3 (1.2-5.4)  L  12/28/17  12:26    


 


Abs React Lymphs (Man)  0.0 K/mm3  12/28/17  12:26    


 


Monocytes # (Manual)  0.8 K/mm3 (0.0-0.8)   12/28/17  12:26    


 


Eosinophils # (Manual)  0.0 K/mm3 (0.0-0.4)   12/28/17  12:26    


 


Basophils # (Manual)  0.0 K/mm3 (0.0-0.1)   12/28/17  12:26    


 


Metamyelocytes #  0.0 K/mm3  12/28/17  12:26    


 


Myelocytes #  0.0 K/mm3  12/28/17  12:26    


 


Promyelocytes #  0.0 K/mm3  12/28/17  12:26    


 


Blast Cells #  0.0 K/mm3  12/28/17  12:26    


 


WBC Morphology  Not Reportable   12/28/17  12:26    


 


Hypersegmented Neuts  Not Reportable   12/28/17  12:26    


 


Hyposegmented Neuts  Not Reportable   12/28/17  12:26    


 


Hypogranular Neuts  Not Reportable   12/28/17  12:26    


 


Smudge Cells  Not Reportable   12/28/17  12:26    


 


Toxic Granulation  Not Reportable   12/28/17  12:26    


 


Toxic Vacuolation  Not Reportable   12/28/17  12:26    


 


Dohle Bodies  Not Reportable   12/28/17  12:26    


 


Pelger-Huet Anomaly  Not Reportable   12/28/17  12:26    


 


Yuly Rods  Not Reportable   12/28/17  12:26    


 


Platelet Estimate  Not Reportable   12/28/17  12:26    


 


Clumped Platelets  Not Reportable   12/28/17  12:26    


 


Plt Clumps, EDTA  Not Reportable   12/28/17  12:26    


 


Large Platelets  Not Reportable   12/28/17  12:26    


 


Giant Platelets  Not Reportable   12/28/17  12:26    


 


Platelet Satelliting  Not Reportable   12/28/17  12:26    


 


Plt Morphology Comment  Not Reportable   12/28/17  12:26    


 


RBC Morphology  Normal   12/28/17  12:26    


 


Dimorphic RBCs  Not Reportable   12/28/17  12:26    


 


Polychromasia  Not Reportable   12/28/17  12:26    


 


Hypochromasia  Not Reportable   12/28/17  12:26    


 


Poikilocytosis  Not Reportable   12/28/17  12:26    


 


Anisocytosis  Not Reportable   12/28/17  12:26    


 


Microcytosis  Not Reportable   12/28/17  12:26    


 


Macrocytosis  Not Reportable   12/28/17  12:26    


 


Spherocytes  Not Reportable   12/28/17  12:26    


 


Pappenheimer Bodies  Not Reportable   12/28/17  12:26    


 


Sickle Cells  Not Reportable   12/28/17  12:26    


 


Target Cells  Not Reportable   12/28/17  12:26    


 


Tear Drop Cells  Not Reportable   12/28/17  12:26    


 


Ovalocytes  Not Reportable   12/28/17  12:26    


 


Helmet Cells  Not Reportable   12/28/17  12:26    


 


Acosta-Gardner Bodies  Not Reportable   12/28/17  12:26    


 


Cabot Rings  Not Reportable   12/28/17  12:26    


 


Marco Cells  Not Reportable   12/28/17  12:26    


 


Bite Cells  Not Reportable   12/28/17  12:26    


 


Crenated Cell  Not Reportable   12/28/17  12:26    


 


Elliptocytes  Not Reportable   12/28/17  12:26    


 


Acanthocytes (Spur)  Not Reportable   12/28/17  12:26    


 


Rouleaux  Not Reportable   12/28/17  12:26    


 


Hemoglobin C Crystals  Not Reportable   12/28/17  12:26    


 


Schistocytes  Not Reportable   12/28/17  12:26    


 


Malaria parasites  Not Reportable   12/28/17  12:26    


 


Juliano Bodies  Not Reportable   12/28/17  12:26    


 


Hem Pathologist Commnt  No   12/28/17  12:26    


 


PT  13.2 Sec. (12.2-14.9)   01/05/18  08:39    


 


INR  0.95  (0.87-1.13)   01/05/18  08:39    


 


POC ABG pH  7.389  (7.35-7.45)   12/30/17  19:49    


 


POC ABG pCO2  28.9  (35-45)  L  12/30/17  19:49    


 


POC ABG pO2  98  ()   12/30/17  19:49    


 


POC ABG HCO3  17.4   12/30/17  19:49    


 


POC ABG Total CO2  18   12/30/17  19:49    


 


POC ABG O2 Sat  98   12/30/17  19:49    


 


POC ABG Base Excess  -8   12/30/17  19:49    


 


FiO2  21 %  12/30/17  19:49    


 


Sodium  144 mmol/L (137-145)   01/03/18  05:55    


 


Potassium  4.1 mmol/L (3.6-5.0)  D 01/03/18  05:55    


 


Chloride  108.2 mmol/L ()  H  01/03/18  05:55    


 


Carbon Dioxide  21 mmol/L (22-30)  L  01/03/18  05:55    


 


Anion Gap  19 mmol/L  01/03/18  05:55    


 


BUN  12 mg/dL (7-17)   01/03/18  05:55    


 


Creatinine  0.7 mg/dL (0.7-1.2)   01/03/18  05:55    


 


Estimated GFR  > 60 ml/min  01/03/18  05:55    


 


BUN/Creatinine Ratio  17 %  01/03/18  05:55    


 


Glucose  228 mg/dL ()  H  01/03/18  05:55    


 


POC Glucose  275  ()  H  01/07/18  06:14    


 


Hemoglobin A1c  10.0 % (4-6)  H  12/28/17  17:02    


 


Lactic Acid  1.80 mmol/L (0.7-2.0)   12/30/17  19:44    


 


Calcium  8.1 mg/dL (8.4-10.2)  L  01/03/18  05:55    


 


Phosphorus  1.50 mg/dL (2.5-4.5)  L  12/30/17  07:07    


 


Magnesium  1.80 mg/dL (1.7-2.3)   01/02/18  04:40    


 


Total Bilirubin  0.30 mg/dL (0.1-1.2)   12/28/17  13:44    


 


Direct Bilirubin  0.2 mg/dL (0-0.2)   12/28/17  13:44    


 


Indirect Bilirubin  0.1 mg/dL  12/28/17  13:44    


 


AST  15 units/L (5-40)   12/28/17  13:44    


 


ALT  11 units/L (7-56)   12/28/17  13:44    


 


Alkaline Phosphatase  89 units/L ()   12/28/17  13:44    


 


C-Reactive Protein  4.70 mg/dL (0.00-1.30)  H  12/30/17  19:44    


 


Total Protein  5.8 g/dL (6.3-8.2)  L  12/28/17  13:44    


 


Albumin  3.0 g/dL (3.9-5)  L  12/28/17  13:44    


 


Albumin/Globulin Ratio  1.1 %  12/28/17  13:44    


 


Triglycerides  94 mg/dL (2-149)   01/01/18  07:27    


 


Cholesterol  148 mg/dL ()   01/01/18  07:27    


 


LDL Cholesterol Direct  42 mg/dL ()  L  01/01/18  07:27    


 


HDL Cholesterol  88 mg/dL (40-59)  H  01/01/18  07:27    


 


Cholesterol/HDL Ratio  1.68 %  01/01/18  07:27    


 


HCG, Qual  Negative  (Negative)   12/30/17  15:05    


 


Urine Color  Yellow  (Yellow)   12/28/17  14:28    


 


Urine Turbidity  Clear  (Clear)   12/28/17  14:28    


 


Urine pH  5.0  (5.0-7.0)   12/28/17  14:28    


 


Ur Specific Gravity  1.016  (1.003-1.030)   12/28/17  14:28    


 


Urine Protein  100 mg/dl mg/dL (Negative)   12/28/17  14:28    


 


Urine Glucose (UA)  >=500 mg/dL (Negative)   12/28/17  14:28    


 


Urine Ketones  80 mg/dL (Negative)   12/28/17  14:28    


 


Urine Blood  Mod  (Negative)   12/28/17  14:28    


 


Urine Nitrite  Neg  (Negative)   12/28/17  14:28    


 


Urine Bilirubin  Neg  (Negative)   12/28/17  14:28    


 


Urine Urobilinogen  < 2.0 mg/dL (<2.0)   12/28/17  14:28    


 


Ur Leukocyte Esterase  Neg  (Negative)   12/28/17  14:28    


 


Urine WBC (Auto)  2.0 /HPF (0.0-6.0)   12/28/17  14:28    


 


Urine RBC (Auto)  < 1.0 /HPF (0.0-6.0)   12/28/17  14:28    


 


U Epithel Cells (Auto)  < 1.0 /HPF (0-13.0)   12/28/17  14:28    


 


Urine Bacteria (Auto)  1+ /HPF (Negative)   12/28/17  14:28    


 


Urine Opiates Screen  Presumptive negative   12/28/17  14:28    


 


Urine Methadone Screen  Presumptive negative   12/28/17  14:28    


 


Ur Barbiturates Screen  Presumptive negative   12/28/17  14:28    


 


Ur Phencyclidine Scrn  Presumptive negative   12/28/17  14:28    


 


Ur Amphetamines Screen  Presumptive negative   12/28/17  14:28    


 


U Benzodiazepines Scrn  Presumptive negative   12/28/17  14:28    


 


Urine Cocaine Screen  Presumptive negative   12/28/17  14:28    


 


U Marijuana (THC) Screen  Presumptive negative   12/28/17  14:28    


 


Drugs of Abuse Note  Disclamer   12/28/17  14:28

## 2018-01-08 PROCEDURE — 0DH63UZ INSERTION OF FEEDING DEVICE INTO STOMACH, PERCUTANEOUS APPROACH: ICD-10-PCS | Performed by: INTERNAL MEDICINE

## 2018-01-08 PROCEDURE — 3E0G76Z INTRODUCTION OF NUTRITIONAL SUBSTANCE INTO UPPER GI, VIA NATURAL OR ARTIFICIAL OPENING: ICD-10-PCS | Performed by: INTERNAL MEDICINE

## 2018-01-08 RX ADMIN — INSULIN ASPART SCH UNITS: 100 INJECTION, SOLUTION INTRAVENOUS; SUBCUTANEOUS at 17:02

## 2018-01-08 RX ADMIN — NIFEDIPINE SCH: 30 TABLET, FILM COATED, EXTENDED RELEASE ORAL at 10:28

## 2018-01-08 RX ADMIN — BRIMONIDINE TARTRATE, TIMOLOL MALEATE SCH: 2; 5 SOLUTION/ DROPS OPHTHALMIC at 12:40

## 2018-01-08 RX ADMIN — FAMOTIDINE SCH: 20 TABLET ORAL at 10:28

## 2018-01-08 RX ADMIN — INSULIN ASPART SCH UNITS: 100 INJECTION, SOLUTION INTRAVENOUS; SUBCUTANEOUS at 00:05

## 2018-01-08 RX ADMIN — INSULIN ASPART SCH: 100 INJECTION, SOLUTION INTRAVENOUS; SUBCUTANEOUS at 00:11

## 2018-01-08 RX ADMIN — MORPHINE SULFATE PRN MG: 4 INJECTION, SOLUTION INTRAMUSCULAR; INTRAVENOUS at 00:04

## 2018-01-08 RX ADMIN — INSULIN ASPART SCH: 100 INJECTION, SOLUTION INTRAVENOUS; SUBCUTANEOUS at 12:40

## 2018-01-08 RX ADMIN — INSULIN ASPART SCH UNITS: 100 INJECTION, SOLUTION INTRAVENOUS; SUBCUTANEOUS at 08:06

## 2018-01-08 RX ADMIN — ASPIRIN SCH: 300 SUPPOSITORY RECTAL at 16:51

## 2018-01-08 RX ADMIN — INSULIN ASPART SCH UNITS: 100 INJECTION, SOLUTION INTRAVENOUS; SUBCUTANEOUS at 08:05

## 2018-01-08 RX ADMIN — CEFTRIAXONE SODIUM SCH MLS/10 MIN: 10 INJECTION, POWDER, FOR SOLUTION INTRAVENOUS at 17:02

## 2018-01-08 NOTE — POST OPERATIVE NOTE
Pre-op diagnosis: Neurogenic Dysphagia


Post-op diagnosis: other (Same, s/p PEG)


Findings: 





1. Normal appearing stomach


2. PEG placed 2 cm below L mid clav line body of stomach


   - External bumper at 5cm


Procedure: 





EGD with PEG insertion


Anesthesia: MAC


Surgeon: MARGOTH SPENCER


Estimated blood loss: minimal


Pathology: none


Specimen disposition: other (N/A)


Condition: stable


Disposition: floor (Recs: 1. May use PEG in 4 hours for feeds/flush/meds.  2. 

Will loosen bumper tomorrow. 3. Will resume Plavix/ASA in AM.)

## 2018-01-08 NOTE — PROGRESS NOTE
Subjective


Date of service: 01/08/18


Principal diagnosis: Acute on Chronic Encephalopathy; SIRS; DKA; Seizures


Interval history: 





Patient is seen today for: Acute on Chronic Encephalopathy; SIRS; DKA; Seizures





Seen and examined at bedside; 24hour events reviewed; nursing and respiratory 

care staff consulted; no adverse overnight events reported to me;





Objective


 Vital Signs - 12hr











  01/08/18





  07:48


 


Temperature 99.1 F


 


Pulse Rate 86


 


Respiratory 18





Rate 


 


Blood Pressure 157/90


 


O2 Sat by Pulse 97





Oximetry 











Constitutional: no acute distress, asleep


Eyes: non-icteric


Neck: supple, no lymphadenopathy


Ascultation: Bilateral: clear


Cardiovascular: regular rate and rhythm


Gastrointestinal: normoactive bowel sounds, soft, non-tender


Integumentary: normal


Extremities: no cyanosis, no edema


Neurologic: other (Patient sleeping at this time.)


Psychiatric: other (Patient sleeping at this time.)


CBC and BMP: 


 01/03/18 05:55





 01/03/18 05:55


ABG, PT/INR, D-dimer: 


ABG











POC ABG pH  7.389  (7.35-7.45)   12/30/17  19:49    


 


POC ABG pCO2  28.9  (35-45)  L  12/30/17  19:49    


 


POC ABG pO2  98  ()   12/30/17  19:49    


 


POC ABG HCO3  17.4   12/30/17  19:49    


 


POC ABG Total CO2  18   12/30/17  19:49    


 


POC ABG O2 Sat  98   12/30/17  19:49    





PT/INR, D-dimer











PT  13.2 Sec. (12.2-14.9)   01/05/18  08:39    


 


INR  0.95  (0.87-1.13)   01/05/18  08:39    








Abnormal lab findings: 


 Abnormal Labs











  12/28/17 12/28/17 12/28/17





  12:26 12:26 12:26


 


WBC  20.1 H  


 


RBC   


 


Hgb   


 


Hct   


 


MCV  101 H  


 


MCHC   


 


RDW   


 


Seg Neuts % (Manual)  90.0 H  


 


Lymphocytes % (Manual)  2.0 L  


 


Seg Neutrophils # Man  18.1 H  


 


Lymphocytes # (Manual)  0.4 L  


 


PT   16.4 H 


 


INR   1.25 H 


 


POC ABG pH   


 


POC ABG pCO2   


 


POC ABG pO2   


 


Sodium   


 


Potassium   


 


Chloride   


 


Carbon Dioxide   


 


BUN   


 


Creatinine   


 


Glucose   


 


POC Glucose    445 H


 


Hemoglobin A1c   


 


Lactic Acid   


 


Calcium   


 


Phosphorus   


 


Magnesium   


 


C-Reactive Protein   


 


Total Protein   


 


Albumin   


 


LDL Cholesterol Direct   


 


HDL Cholesterol   














  12/28/17 12/28/17 12/28/17





  12:39 13:44 13:44


 


WBC   


 


RBC   


 


Hgb   


 


Hct   


 


MCV   


 


MCHC   


 


RDW   


 


Seg Neuts % (Manual)   


 


Lymphocytes % (Manual)   


 


Seg Neutrophils # Man   


 


Lymphocytes # (Manual)   


 


PT   


 


INR   


 


POC ABG pH  6.856 L  


 


POC ABG pCO2  12.4 L  


 


POC ABG pO2  288 H  


 


Sodium   


 


Potassium   


 


Chloride   


 


Carbon Dioxide   


 


BUN   


 


Creatinine   


 


Glucose   


 


POC Glucose   


 


Hemoglobin A1c   


 


Lactic Acid   


 


Calcium   


 


Phosphorus    8.10 H


 


Magnesium    2.60 H


 


C-Reactive Protein   


 


Total Protein   5.8 L 


 


Albumin   3.0 L 


 


LDL Cholesterol Direct   


 


HDL Cholesterol   














  12/28/17 12/28/17 12/28/17





  13:44 14:17 15:17


 


WBC   


 


RBC   


 


Hgb   


 


Hct   


 


MCV   


 


MCHC   


 


RDW   


 


Seg Neuts % (Manual)   


 


Lymphocytes % (Manual)   


 


Seg Neutrophils # Man   


 


Lymphocytes # (Manual)   


 


PT   


 


INR   


 


POC ABG pH   


 


POC ABG pCO2   


 


POC ABG pO2   


 


Sodium   


 


Potassium  6.5 H*  5.8 H  5.7 H


 


Chloride  94.4 L  


 


Carbon Dioxide  5 L*  2 L*  < 2.0 L*


 


BUN  43 H  38 H  39 H


 


Creatinine   1.3 H  1.3 H


 


Glucose  629 H*  610 H*  585 H*


 


POC Glucose   


 


Hemoglobin A1c   


 


Lactic Acid   


 


Calcium  8.3 L  7.7 L  7.9 L


 


Phosphorus   


 


Magnesium   


 


C-Reactive Protein   


 


Total Protein   


 


Albumin   


 


LDL Cholesterol Direct   


 


HDL Cholesterol   














  12/28/17 12/28/17 12/28/17





  15:17 15:40 16:57


 


WBC   


 


RBC   


 


Hgb   


 


Hct   


 


MCV   


 


MCHC   


 


RDW   


 


Seg Neuts % (Manual)   


 


Lymphocytes % (Manual)   


 


Seg Neutrophils # Man   


 


Lymphocytes # (Manual)   


 


PT   


 


INR   


 


POC ABG pH   


 


POC ABG pCO2   


 


POC ABG pO2   


 


Sodium   


 


Potassium   


 


Chloride   


 


Carbon Dioxide    3 L*


 


BUN    42 H


 


Creatinine    1.3 H


 


Glucose    557 H*


 


POC Glucose   > 500 H 


 


Hemoglobin A1c   


 


Lactic Acid  2.90 H*  


 


Calcium    8.1 L


 


Phosphorus   


 


Magnesium   


 


C-Reactive Protein   


 


Total Protein   


 


Albumin   


 


LDL Cholesterol Direct   


 


HDL Cholesterol   














  12/28/17 12/28/17 12/28/17





  17:02 17:02 18:05


 


WBC   


 


RBC   


 


Hgb   


 


Hct   


 


MCV   


 


MCHC   


 


RDW   


 


Seg Neuts % (Manual)   


 


Lymphocytes % (Manual)   


 


Seg Neutrophils # Man   


 


Lymphocytes # (Manual)   


 


PT   


 


INR   


 


POC ABG pH   


 


POC ABG pCO2   


 


POC ABG pO2   


 


Sodium   


 


Potassium   


 


Chloride   


 


Carbon Dioxide   


 


BUN   


 


Creatinine   


 


Glucose   


 


POC Glucose    485 H


 


Hemoglobin A1c  10.0 H  


 


Lactic Acid   


 


Calcium   


 


Phosphorus   6.60 H 


 


Magnesium   2.70 H 


 


C-Reactive Protein   


 


Total Protein   


 


Albumin   


 


LDL Cholesterol Direct   


 


HDL Cholesterol   














  12/28/17 12/28/17 12/28/17





  18:52 20:20 21:23


 


WBC   


 


RBC   


 


Hgb   


 


Hct   


 


MCV   


 


MCHC   


 


RDW   


 


Seg Neuts % (Manual)   


 


Lymphocytes % (Manual)   


 


Seg Neutrophils # Man   


 


Lymphocytes # (Manual)   


 


PT   


 


INR   


 


POC ABG pH   


 


POC ABG pCO2   


 


POC ABG pO2   


 


Sodium  148 H  


 


Potassium   


 


Chloride   


 


Carbon Dioxide  < 2.0 L*  


 


BUN  41 H  


 


Creatinine  1.4 H  


 


Glucose  473 H  


 


POC Glucose   356 H  322 H


 


Hemoglobin A1c   


 


Lactic Acid   


 


Calcium  7.8 L  


 


Phosphorus   


 


Magnesium   


 


C-Reactive Protein   


 


Total Protein   


 


Albumin   


 


LDL Cholesterol Direct   


 


HDL Cholesterol   














  12/28/17 12/28/17 12/28/17





  22:28 23:01 23:31


 


WBC   


 


RBC   


 


Hgb   


 


Hct   


 


MCV   


 


MCHC   


 


RDW   


 


Seg Neuts % (Manual)   


 


Lymphocytes % (Manual)   


 


Seg Neutrophils # Man   


 


Lymphocytes # (Manual)   


 


PT   


 


INR   


 


POC ABG pH   


 


POC ABG pCO2   


 


POC ABG pO2   


 


Sodium   148 H 


 


Potassium   


 


Chloride   111.6 H 


 


Carbon Dioxide   4 L* 


 


BUN   43 H 


 


Creatinine   1.3 H 


 


Glucose   177 H 


 


POC Glucose  242 H   204 H


 


Hemoglobin A1c   


 


Lactic Acid   


 


Calcium   7.7 L 


 


Phosphorus   


 


Magnesium   


 


C-Reactive Protein   


 


Total Protein   


 


Albumin   


 


LDL Cholesterol Direct   


 


HDL Cholesterol   














  12/29/17 12/29/17 12/29/17





  01:20 03:30 04:01


 


WBC   


 


RBC   


 


Hgb   


 


Hct   


 


MCV   


 


MCHC   


 


RDW   


 


Seg Neuts % (Manual)   


 


Lymphocytes % (Manual)   


 


Seg Neutrophils # Man   


 


Lymphocytes # (Manual)   


 


PT   


 


INR   


 


POC ABG pH   


 


POC ABG pCO2   


 


POC ABG pO2   


 


Sodium   


 


Potassium   


 


Chloride   


 


Carbon Dioxide   


 


BUN   


 


Creatinine   


 


Glucose   


 


POC Glucose  120 H  59 L  132 H


 


Hemoglobin A1c   


 


Lactic Acid   


 


Calcium   


 


Phosphorus   


 


Magnesium   


 


C-Reactive Protein   


 


Total Protein   


 


Albumin   


 


LDL Cholesterol Direct   


 


HDL Cholesterol   














  12/29/17 12/29/17 12/29/17





  04:20 04:20 05:15


 


WBC   


 


RBC   


 


Hgb   


 


Hct   


 


MCV   


 


MCHC   


 


RDW   


 


Seg Neuts % (Manual)   


 


Lymphocytes % (Manual)   


 


Seg Neutrophils # Man   


 


Lymphocytes # (Manual)   


 


PT   


 


INR   


 


POC ABG pH   


 


POC ABG pCO2   


 


POC ABG pO2   


 


Sodium  151 H  150 H 


 


Potassium   


 


Chloride  114.3 H  115.4 H 


 


Carbon Dioxide  10 L  10 L 


 


BUN  48 H  48 H 


 


Creatinine   1.3 H 


 


Glucose  121 H  118 H 


 


POC Glucose    134 H


 


Hemoglobin A1c   


 


Lactic Acid   


 


Calcium  7.8 L  7.8 L 


 


Phosphorus   


 


Magnesium   


 


C-Reactive Protein   


 


Total Protein   


 


Albumin   


 


LDL Cholesterol Direct   


 


HDL Cholesterol   














  12/29/17 12/29/17 12/29/17





  06:41 07:49 08:12


 


WBC   


 


RBC   


 


Hgb   


 


Hct   


 


MCV   


 


MCHC   


 


RDW   


 


Seg Neuts % (Manual)   


 


Lymphocytes % (Manual)   


 


Seg Neutrophils # Man   


 


Lymphocytes # (Manual)   


 


PT   


 


INR   


 


POC ABG pH   


 


POC ABG pCO2   


 


POC ABG pO2   


 


Sodium   151 H 


 


Potassium   


 


Chloride   116.5 H 


 


Carbon Dioxide   13 L 


 


BUN   52 H 


 


Creatinine   1.3 H 


 


Glucose   213 H 


 


POC Glucose  172 H   228 H


 


Hemoglobin A1c   


 


Lactic Acid   


 


Calcium   7.9 L 


 


Phosphorus   


 


Magnesium   


 


C-Reactive Protein   


 


Total Protein   


 


Albumin   


 


LDL Cholesterol Direct   


 


HDL Cholesterol   














  12/29/17 12/29/17 12/29/17





  08:22 09:31 11:04


 


WBC   


 


RBC   


 


Hgb   


 


Hct   


 


MCV   


 


MCHC   


 


RDW   


 


Seg Neuts % (Manual)   


 


Lymphocytes % (Manual)   


 


Seg Neutrophils # Man   


 


Lymphocytes # (Manual)   


 


PT   


 


INR   


 


POC ABG pH   


 


POC ABG pCO2   


 


POC ABG pO2   


 


Sodium   


 


Potassium   


 


Chloride   


 


Carbon Dioxide   


 


BUN   


 


Creatinine   


 


Glucose   


 


POC Glucose  140 H  140 H  136 H


 


Hemoglobin A1c   


 


Lactic Acid   


 


Calcium   


 


Phosphorus   


 


Magnesium   


 


C-Reactive Protein   


 


Total Protein   


 


Albumin   


 


LDL Cholesterol Direct   


 


HDL Cholesterol   














  12/29/17 12/29/17 12/29/17





  12:11 13:40 17:21


 


WBC   


 


RBC   


 


Hgb   


 


Hct   


 


MCV   


 


MCHC   


 


RDW   


 


Seg Neuts % (Manual)   


 


Lymphocytes % (Manual)   


 


Seg Neutrophils # Man   


 


Lymphocytes # (Manual)   


 


PT   


 


INR   


 


POC ABG pH   


 


POC ABG pCO2   


 


POC ABG pO2   


 


Sodium   152 H 


 


Potassium   


 


Chloride   117.6 H 


 


Carbon Dioxide   15 L 


 


BUN   50 H 


 


Creatinine   1.3 H 


 


Glucose   144 H 


 


POC Glucose  111 H   182 H


 


Hemoglobin A1c   


 


Lactic Acid   


 


Calcium   


 


Phosphorus   


 


Magnesium   


 


C-Reactive Protein   


 


Total Protein   


 


Albumin   


 


LDL Cholesterol Direct   


 


HDL Cholesterol   














  12/29/17 12/29/17 12/30/17





  17:45 23:06 00:39


 


WBC   


 


RBC   


 


Hgb   


 


Hct   


 


MCV   


 


MCHC   


 


RDW   


 


Seg Neuts % (Manual)   


 


Lymphocytes % (Manual)   


 


Seg Neutrophils # Man   


 


Lymphocytes # (Manual)   


 


PT   


 


INR   


 


POC ABG pH   


 


POC ABG pCO2   


 


POC ABG pO2   


 


Sodium  149 H  


 


Potassium   


 


Chloride  116.2 H  


 


Carbon Dioxide  13 L  


 


BUN  48 H  


 


Creatinine  1.3 H  


 


Glucose  171 H  


 


POC Glucose   < 40 L  123 H


 


Hemoglobin A1c   


 


Lactic Acid   


 


Calcium  8.1 L  


 


Phosphorus   


 


Magnesium   


 


C-Reactive Protein   


 


Total Protein   


 


Albumin   


 


LDL Cholesterol Direct   


 


HDL Cholesterol   














  12/30/17 12/30/17 12/30/17





  07:07 07:07 11:35


 


WBC   


 


RBC   


 


Hgb   


 


Hct   


 


MCV   


 


MCHC   


 


RDW   


 


Seg Neuts % (Manual)   


 


Lymphocytes % (Manual)   


 


Seg Neutrophils # Man   


 


Lymphocytes # (Manual)   


 


PT   


 


INR   


 


POC ABG pH   


 


POC ABG pCO2   


 


POC ABG pO2   


 


Sodium   152 H 


 


Potassium   3.0 L D 


 


Chloride   121.9 H 


 


Carbon Dioxide   18 L 


 


BUN   44 H 


 


Creatinine   


 


Glucose   


 


POC Glucose    118 H


 


Hemoglobin A1c   


 


Lactic Acid   


 


Calcium   


 


Phosphorus  1.50 L  


 


Magnesium   


 


C-Reactive Protein   


 


Total Protein   


 


Albumin   


 


LDL Cholesterol Direct   


 


HDL Cholesterol   














  12/30/17 12/30/17 12/30/17





  16:54 19:44 19:49


 


WBC   


 


RBC   


 


Hgb   


 


Hct   


 


MCV   


 


MCHC   


 


RDW   


 


Seg Neuts % (Manual)   


 


Lymphocytes % (Manual)   


 


Seg Neutrophils # Man   


 


Lymphocytes # (Manual)   


 


PT   


 


INR   


 


POC ABG pH   


 


POC ABG pCO2    28.9 L


 


POC ABG pO2   


 


Sodium   


 


Potassium   


 


Chloride   


 


Carbon Dioxide   


 


BUN   


 


Creatinine   


 


Glucose   


 


POC Glucose  231 H  


 


Hemoglobin A1c   


 


Lactic Acid   


 


Calcium   


 


Phosphorus   


 


Magnesium   


 


C-Reactive Protein   4.70 H 


 


Total Protein   


 


Albumin   


 


LDL Cholesterol Direct   


 


HDL Cholesterol   














  12/30/17 12/31/17 12/31/17





  21:48 06:25 12:35


 


WBC   


 


RBC   


 


Hgb   


 


Hct   


 


MCV   


 


MCHC   


 


RDW   


 


Seg Neuts % (Manual)   


 


Lymphocytes % (Manual)   


 


Seg Neutrophils # Man   


 


Lymphocytes # (Manual)   


 


PT   


 


INR   


 


POC ABG pH   


 


POC ABG pCO2   


 


POC ABG pO2   


 


Sodium   


 


Potassium   


 


Chloride   


 


Carbon Dioxide   


 


BUN   


 


Creatinine   


 


Glucose   


 


POC Glucose  176 H  299 H  258 H


 


Hemoglobin A1c   


 


Lactic Acid   


 


Calcium   


 


Phosphorus   


 


Magnesium   


 


C-Reactive Protein   


 


Total Protein   


 


Albumin   


 


LDL Cholesterol Direct   


 


HDL Cholesterol   














  12/31/17 12/31/17 01/01/18





  16:43 22:07 02:59


 


WBC   


 


RBC   


 


Hgb   


 


Hct   


 


MCV   


 


MCHC   


 


RDW   


 


Seg Neuts % (Manual)   


 


Lymphocytes % (Manual)   


 


Seg Neutrophils # Man   


 


Lymphocytes # (Manual)   


 


PT   


 


INR   


 


POC ABG pH   


 


POC ABG pCO2   


 


POC ABG pO2   


 


Sodium   


 


Potassium   


 


Chloride   


 


Carbon Dioxide   


 


BUN   


 


Creatinine   


 


Glucose   


 


POC Glucose  252 H  248 H  193 H


 


Hemoglobin A1c   


 


Lactic Acid   


 


Calcium   


 


Phosphorus   


 


Magnesium   


 


C-Reactive Protein   


 


Total Protein   


 


Albumin   


 


LDL Cholesterol Direct   


 


HDL Cholesterol   














  01/01/18 01/01/18 01/01/18





  06:20 07:27 07:27


 


WBC   18.6 H 


 


RBC   2.90 L 


 


Hgb   9.0 L 


 


Hct   27.0 L 


 


MCV   


 


MCHC   


 


RDW   


 


Seg Neuts % (Manual)   


 


Lymphocytes % (Manual)   


 


Seg Neutrophils # Man   


 


Lymphocytes # (Manual)   


 


PT   


 


INR   


 


POC ABG pH   


 


POC ABG pCO2   


 


POC ABG pO2   


 


Sodium    152 H


 


Potassium   


 


Chloride    116.2 H


 


Carbon Dioxide    19 L


 


BUN    24 H


 


Creatinine   


 


Glucose    288 H


 


POC Glucose  280 H  


 


Hemoglobin A1c   


 


Lactic Acid   


 


Calcium    8.3 L


 


Phosphorus   


 


Magnesium   


 


C-Reactive Protein   


 


Total Protein   


 


Albumin   


 


LDL Cholesterol Direct    42 L


 


HDL Cholesterol    88 H














  01/01/18 01/01/18 01/01/18





  11:09 17:12 20:59


 


WBC   


 


RBC   


 


Hgb   


 


Hct   


 


MCV   


 


MCHC   


 


RDW   


 


Seg Neuts % (Manual)   


 


Lymphocytes % (Manual)   


 


Seg Neutrophils # Man   


 


Lymphocytes # (Manual)   


 


PT   


 


INR   


 


POC ABG pH   


 


POC ABG pCO2   


 


POC ABG pO2   


 


Sodium   


 


Potassium   


 


Chloride   


 


Carbon Dioxide   


 


BUN   


 


Creatinine   


 


Glucose   


 


POC Glucose  328 H  306 H  318 H


 


Hemoglobin A1c   


 


Lactic Acid   


 


Calcium   


 


Phosphorus   


 


Magnesium   


 


C-Reactive Protein   


 


Total Protein   


 


Albumin   


 


LDL Cholesterol Direct   


 


HDL Cholesterol   














  01/02/18 01/02/18 01/02/18





  01:55 04:40 04:40


 


WBC   13.0 H 


 


RBC   2.88 L 


 


Hgb   9.0 L 


 


Hct   26.2 L 


 


MCV   


 


MCHC   35 H 


 


RDW   12.6 L 


 


Seg Neuts % (Manual)   


 


Lymphocytes % (Manual)   


 


Seg Neutrophils # Man   


 


Lymphocytes # (Manual)   


 


PT   


 


INR   


 


POC ABG pH   


 


POC ABG pCO2   


 


POC ABG pO2   


 


Sodium    148 H


 


Potassium    3.3 L


 


Chloride    111.8 H


 


Carbon Dioxide   


 


BUN    18 H


 


Creatinine   


 


Glucose    212 H


 


POC Glucose  274 H  


 


Hemoglobin A1c   


 


Lactic Acid   


 


Calcium    8.3 L


 


Phosphorus   


 


Magnesium   


 


C-Reactive Protein   


 


Total Protein   


 


Albumin   


 


LDL Cholesterol Direct   


 


HDL Cholesterol   














  01/02/18 01/02/18 01/02/18





  06:30 12:14 16:50


 


WBC   


 


RBC   


 


Hgb   


 


Hct   


 


MCV   


 


MCHC   


 


RDW   


 


Seg Neuts % (Manual)   


 


Lymphocytes % (Manual)   


 


Seg Neutrophils # Man   


 


Lymphocytes # (Manual)   


 


PT   


 


INR   


 


POC ABG pH   


 


POC ABG pCO2   


 


POC ABG pO2   


 


Sodium   


 


Potassium   


 


Chloride   


 


Carbon Dioxide   


 


BUN   


 


Creatinine   


 


Glucose   


 


POC Glucose  285 H  166 H  178 H


 


Hemoglobin A1c   


 


Lactic Acid   


 


Calcium   


 


Phosphorus   


 


Magnesium   


 


C-Reactive Protein   


 


Total Protein   


 


Albumin   


 


LDL Cholesterol Direct   


 


HDL Cholesterol   














  01/02/18 01/03/18 01/03/18





  21:24 05:47 05:55


 


WBC    12.4 H


 


RBC    2.92 L


 


Hgb    8.8 L


 


Hct    26.1 L


 


MCV   


 


MCHC   


 


RDW    12.6 L


 


Seg Neuts % (Manual)   


 


Lymphocytes % (Manual)   


 


Seg Neutrophils # Man   


 


Lymphocytes # (Manual)   


 


PT   


 


INR   


 


POC ABG pH   


 


POC ABG pCO2   


 


POC ABG pO2   


 


Sodium   


 


Potassium   


 


Chloride   


 


Carbon Dioxide   


 


BUN   


 


Creatinine   


 


Glucose   


 


POC Glucose  197 H  291 H 


 


Hemoglobin A1c   


 


Lactic Acid   


 


Calcium   


 


Phosphorus   


 


Magnesium   


 


C-Reactive Protein   


 


Total Protein   


 


Albumin   


 


LDL Cholesterol Direct   


 


HDL Cholesterol   














  01/03/18 01/03/18 01/03/18





  05:55 12:24 16:33


 


WBC   


 


RBC   


 


Hgb   


 


Hct   


 


MCV   


 


MCHC   


 


RDW   


 


Seg Neuts % (Manual)   


 


Lymphocytes % (Manual)   


 


Seg Neutrophils # Man   


 


Lymphocytes # (Manual)   


 


PT   


 


INR   


 


POC ABG pH   


 


POC ABG pCO2   


 


POC ABG pO2   


 


Sodium   


 


Potassium   


 


Chloride  108.2 H  


 


Carbon Dioxide  21 L  


 


BUN   


 


Creatinine   


 


Glucose  228 H  


 


POC Glucose   215 H  280 H


 


Hemoglobin A1c   


 


Lactic Acid   


 


Calcium  8.1 L  


 


Phosphorus   


 


Magnesium   


 


C-Reactive Protein   


 


Total Protein   


 


Albumin   


 


LDL Cholesterol Direct   


 


HDL Cholesterol   














  01/03/18 01/04/18 01/04/18





  22:12 05:27 12:47


 


WBC   


 


RBC   


 


Hgb   


 


Hct   


 


MCV   


 


MCHC   


 


RDW   


 


Seg Neuts % (Manual)   


 


Lymphocytes % (Manual)   


 


Seg Neutrophils # Man   


 


Lymphocytes # (Manual)   


 


PT   


 


INR   


 


POC ABG pH   


 


POC ABG pCO2   


 


POC ABG pO2   


 


Sodium   


 


Potassium   


 


Chloride   


 


Carbon Dioxide   


 


BUN   


 


Creatinine   


 


Glucose   


 


POC Glucose  236 H  186 H  206 H


 


Hemoglobin A1c   


 


Lactic Acid   


 


Calcium   


 


Phosphorus   


 


Magnesium   


 


C-Reactive Protein   


 


Total Protein   


 


Albumin   


 


LDL Cholesterol Direct   


 


HDL Cholesterol   














  01/04/18 01/04/18 01/05/18





  17:27 22:19 05:48


 


WBC   


 


RBC   


 


Hgb   


 


Hct   


 


MCV   


 


MCHC   


 


RDW   


 


Seg Neuts % (Manual)   


 


Lymphocytes % (Manual)   


 


Seg Neutrophils # Man   


 


Lymphocytes # (Manual)   


 


PT   


 


INR   


 


POC ABG pH   


 


POC ABG pCO2   


 


POC ABG pO2   


 


Sodium   


 


Potassium   


 


Chloride   


 


Carbon Dioxide   


 


BUN   


 


Creatinine   


 


Glucose   


 


POC Glucose  213 H  123 H  203 H


 


Hemoglobin A1c   


 


Lactic Acid   


 


Calcium   


 


Phosphorus   


 


Magnesium   


 


C-Reactive Protein   


 


Total Protein   


 


Albumin   


 


LDL Cholesterol Direct   


 


HDL Cholesterol   














  01/05/18 01/05/18 01/06/18





  11:20 16:53 01:08


 


WBC   


 


RBC   


 


Hgb   


 


Hct   


 


MCV   


 


MCHC   


 


RDW   


 


Seg Neuts % (Manual)   


 


Lymphocytes % (Manual)   


 


Seg Neutrophils # Man   


 


Lymphocytes # (Manual)   


 


PT   


 


INR   


 


POC ABG pH   


 


POC ABG pCO2   


 


POC ABG pO2   


 


Sodium   


 


Potassium   


 


Chloride   


 


Carbon Dioxide   


 


BUN   


 


Creatinine   


 


Glucose   


 


POC Glucose  223 H  264 H  183 H


 


Hemoglobin A1c   


 


Lactic Acid   


 


Calcium   


 


Phosphorus   


 


Magnesium   


 


C-Reactive Protein   


 


Total Protein   


 


Albumin   


 


LDL Cholesterol Direct   


 


HDL Cholesterol   














  01/06/18 01/06/18 01/06/18





  06:31 11:15 16:55


 


WBC   


 


RBC   


 


Hgb   


 


Hct   


 


MCV   


 


MCHC   


 


RDW   


 


Seg Neuts % (Manual)   


 


Lymphocytes % (Manual)   


 


Seg Neutrophils # Man   


 


Lymphocytes # (Manual)   


 


PT   


 


INR   


 


POC ABG pH   


 


POC ABG pCO2   


 


POC ABG pO2   


 


Sodium   


 


Potassium   


 


Chloride   


 


Carbon Dioxide   


 


BUN   


 


Creatinine   


 


Glucose   


 


POC Glucose  238 H  344 H  302 H


 


Hemoglobin A1c   


 


Lactic Acid   


 


Calcium   


 


Phosphorus   


 


Magnesium   


 


C-Reactive Protein   


 


Total Protein   


 


Albumin   


 


LDL Cholesterol Direct   


 


HDL Cholesterol   














  01/06/18 01/07/18 01/07/18





  21:38 06:14 11:35


 


WBC   


 


RBC   


 


Hgb   


 


Hct   


 


MCV   


 


MCHC   


 


RDW   


 


Seg Neuts % (Manual)   


 


Lymphocytes % (Manual)   


 


Seg Neutrophils # Man   


 


Lymphocytes # (Manual)   


 


PT   


 


INR   


 


POC ABG pH   


 


POC ABG pCO2   


 


POC ABG pO2   


 


Sodium   


 


Potassium   


 


Chloride   


 


Carbon Dioxide   


 


BUN   


 


Creatinine   


 


Glucose   


 


POC Glucose  259 H  275 H  291 H


 


Hemoglobin A1c   


 


Lactic Acid   


 


Calcium   


 


Phosphorus   


 


Magnesium   


 


C-Reactive Protein   


 


Total Protein   


 


Albumin   


 


LDL Cholesterol Direct   


 


HDL Cholesterol   














  01/07/18 01/07/18 01/08/18





  15:52 22:37 06:28


 


WBC   


 


RBC   


 


Hgb   


 


Hct   


 


MCV   


 


MCHC   


 


RDW   


 


Seg Neuts % (Manual)   


 


Lymphocytes % (Manual)   


 


Seg Neutrophils # Man   


 


Lymphocytes # (Manual)   


 


PT   


 


INR   


 


POC ABG pH   


 


POC ABG pCO2   


 


POC ABG pO2   


 


Sodium   


 


Potassium   


 


Chloride   


 


Carbon Dioxide   


 


BUN   


 


Creatinine   


 


Glucose   


 


POC Glucose  253 H  197 H  328 H


 


Hemoglobin A1c   


 


Lactic Acid   


 


Calcium   


 


Phosphorus   


 


Magnesium   


 


C-Reactive Protein   


 


Total Protein   


 


Albumin   


 


LDL Cholesterol Direct   


 


HDL Cholesterol   











Allied health notes reviewed: nursing

## 2018-01-08 NOTE — PROGRESS NOTE
Assessment and Plan








Acute ischemic stroke


Sepsis secondary to acute mastoiditis


DKA with acute metabolic acidosis, high anion gap-improving


Acute metabolic encephalopathy (improved)


Recurrent Acute ischemic strokes


Expressive aphasia


Hypernatremia


Moderate malnutrition


Oropharyngeal dysphagia


Sore Throat





- s/p PEG


- will give prn chloraseptic for sore throat


- Complete antibiotics per ID


- Continue with supplemental oxygen for O2 sats >92%


- continue aspiration precautions


- Enteric nutrition as tolerated


- Free water flushes


- PT/OT/mobility


- Secondary stroke prophylaxis


- Evaluate for other secondary causes of stroke.


- Accuchecks with glycemic control





....re-evaluate in am & prn





Subjective


Date of service: 01/08/18


Principal diagnosis: Acute on Chronic Encephalopathy; SIRS; DKA; Seizures


Interval history: 





Patient is seen today for: Acute on Chronic Encephalopathy; SIRS; DKA; Seizures





Seen and examined at bedside; 24hour events reviewed; nursing and respiratory 

care staff consulted; no adverse overnight events reported to me; resting in bed

; looks and feels so much better; friend visiting; states that she has a sore 

throat; No N/V/F/C





Objective


 Vital Signs - 12hr











  01/08/18 01/08/18 01/08/18





  12:09 12:12 15:08


 


Temperature 98.7 F 98.7 F 98.4 F


 


Pulse Rate 82 82 97 H


 


Respiratory 18 18 16





Rate   


 


Blood Pressure 159/88 159/88 138/86


 


O2 Sat by Pulse 98 98 96





Oximetry   














  01/08/18 01/08/18





  15:23 15:38


 


Temperature  


 


Pulse Rate 87 85


 


Respiratory 11 L 10 L





Rate  


 


Blood Pressure 145/87 145/92


 


O2 Sat by Pulse 100 100





Oximetry  











Constitutional: no acute distress, alert


Eyes: non-icteric


ENT: oropharynx moist, other (No goiter)


Neck: supple, no lymphadenopathy, no JVD


Effort: normal


Ascultation: Bilateral: clear


Percussion: Bilateral: not dull


Cardiovascular: regular rate and rhythm, other (No rubs or murmurs)


Gastrointestinal: normoactive bowel sounds, soft, non-tender, non-distended, 

other (No HSM)


Integumentary: normal


Extremities: no cyanosis, no edema, pulses normal, no ischemia or petechiae


Neurologic: pupils equal and round, other (left hemiparesis)


Psychiatric: mood appropriate, affect normal


CBC and BMP: 


 01/03/18 05:55





 01/03/18 05:55


ABG, PT/INR, D-dimer: 


ABG











POC ABG pH  7.389  (7.35-7.45)   12/30/17  19:49    


 


POC ABG pCO2  28.9  (35-45)  L  12/30/17  19:49    


 


POC ABG pO2  98  ()   12/30/17  19:49    


 


POC ABG HCO3  17.4   12/30/17  19:49    


 


POC ABG Total CO2  18   12/30/17  19:49    


 


POC ABG O2 Sat  98   12/30/17  19:49    





PT/INR, D-dimer











PT  13.2 Sec. (12.2-14.9)   01/05/18  08:39    


 


INR  0.95  (0.87-1.13)   01/05/18  08:39    








Abnormal lab findings: 


 Abnormal Labs











  12/28/17 12/28/17 12/28/17





  12:26 12:26 12:26


 


WBC  20.1 H  


 


RBC   


 


Hgb   


 


Hct   


 


MCV  101 H  


 


MCHC   


 


RDW   


 


Seg Neuts % (Manual)  90.0 H  


 


Lymphocytes % (Manual)  2.0 L  


 


Seg Neutrophils # Man  18.1 H  


 


Lymphocytes # (Manual)  0.4 L  


 


PT   16.4 H 


 


INR   1.25 H 


 


POC ABG pH   


 


POC ABG pCO2   


 


POC ABG pO2   


 


Sodium   


 


Potassium   


 


Chloride   


 


Carbon Dioxide   


 


BUN   


 


Creatinine   


 


Glucose   


 


POC Glucose    445 H


 


Hemoglobin A1c   


 


Lactic Acid   


 


Calcium   


 


Phosphorus   


 


Magnesium   


 


C-Reactive Protein   


 


Total Protein   


 


Albumin   


 


LDL Cholesterol Direct   


 


HDL Cholesterol   














  12/28/17 12/28/17 12/28/17





  12:39 13:44 13:44


 


WBC   


 


RBC   


 


Hgb   


 


Hct   


 


MCV   


 


MCHC   


 


RDW   


 


Seg Neuts % (Manual)   


 


Lymphocytes % (Manual)   


 


Seg Neutrophils # Man   


 


Lymphocytes # (Manual)   


 


PT   


 


INR   


 


POC ABG pH  6.856 L  


 


POC ABG pCO2  12.4 L  


 


POC ABG pO2  288 H  


 


Sodium   


 


Potassium   


 


Chloride   


 


Carbon Dioxide   


 


BUN   


 


Creatinine   


 


Glucose   


 


POC Glucose   


 


Hemoglobin A1c   


 


Lactic Acid   


 


Calcium   


 


Phosphorus    8.10 H


 


Magnesium    2.60 H


 


C-Reactive Protein   


 


Total Protein   5.8 L 


 


Albumin   3.0 L 


 


LDL Cholesterol Direct   


 


HDL Cholesterol   














  12/28/17 12/28/17 12/28/17





  13:44 14:17 15:17


 


WBC   


 


RBC   


 


Hgb   


 


Hct   


 


MCV   


 


MCHC   


 


RDW   


 


Seg Neuts % (Manual)   


 


Lymphocytes % (Manual)   


 


Seg Neutrophils # Man   


 


Lymphocytes # (Manual)   


 


PT   


 


INR   


 


POC ABG pH   


 


POC ABG pCO2   


 


POC ABG pO2   


 


Sodium   


 


Potassium  6.5 H*  5.8 H  5.7 H


 


Chloride  94.4 L  


 


Carbon Dioxide  5 L*  2 L*  < 2.0 L*


 


BUN  43 H  38 H  39 H


 


Creatinine   1.3 H  1.3 H


 


Glucose  629 H*  610 H*  585 H*


 


POC Glucose   


 


Hemoglobin A1c   


 


Lactic Acid   


 


Calcium  8.3 L  7.7 L  7.9 L


 


Phosphorus   


 


Magnesium   


 


C-Reactive Protein   


 


Total Protein   


 


Albumin   


 


LDL Cholesterol Direct   


 


HDL Cholesterol   














  12/28/17 12/28/17 12/28/17





  15:17 15:40 16:57


 


WBC   


 


RBC   


 


Hgb   


 


Hct   


 


MCV   


 


MCHC   


 


RDW   


 


Seg Neuts % (Manual)   


 


Lymphocytes % (Manual)   


 


Seg Neutrophils # Man   


 


Lymphocytes # (Manual)   


 


PT   


 


INR   


 


POC ABG pH   


 


POC ABG pCO2   


 


POC ABG pO2   


 


Sodium   


 


Potassium   


 


Chloride   


 


Carbon Dioxide    3 L*


 


BUN    42 H


 


Creatinine    1.3 H


 


Glucose    557 H*


 


POC Glucose   > 500 H 


 


Hemoglobin A1c   


 


Lactic Acid  2.90 H*  


 


Calcium    8.1 L


 


Phosphorus   


 


Magnesium   


 


C-Reactive Protein   


 


Total Protein   


 


Albumin   


 


LDL Cholesterol Direct   


 


HDL Cholesterol   














  12/28/17 12/28/17 12/28/17





  17:02 17:02 18:05


 


WBC   


 


RBC   


 


Hgb   


 


Hct   


 


MCV   


 


MCHC   


 


RDW   


 


Seg Neuts % (Manual)   


 


Lymphocytes % (Manual)   


 


Seg Neutrophils # Man   


 


Lymphocytes # (Manual)   


 


PT   


 


INR   


 


POC ABG pH   


 


POC ABG pCO2   


 


POC ABG pO2   


 


Sodium   


 


Potassium   


 


Chloride   


 


Carbon Dioxide   


 


BUN   


 


Creatinine   


 


Glucose   


 


POC Glucose    485 H


 


Hemoglobin A1c  10.0 H  


 


Lactic Acid   


 


Calcium   


 


Phosphorus   6.60 H 


 


Magnesium   2.70 H 


 


C-Reactive Protein   


 


Total Protein   


 


Albumin   


 


LDL Cholesterol Direct   


 


HDL Cholesterol   














  12/28/17 12/28/17 12/28/17





  18:52 20:20 21:23


 


WBC   


 


RBC   


 


Hgb   


 


Hct   


 


MCV   


 


MCHC   


 


RDW   


 


Seg Neuts % (Manual)   


 


Lymphocytes % (Manual)   


 


Seg Neutrophils # Man   


 


Lymphocytes # (Manual)   


 


PT   


 


INR   


 


POC ABG pH   


 


POC ABG pCO2   


 


POC ABG pO2   


 


Sodium  148 H  


 


Potassium   


 


Chloride   


 


Carbon Dioxide  < 2.0 L*  


 


BUN  41 H  


 


Creatinine  1.4 H  


 


Glucose  473 H  


 


POC Glucose   356 H  322 H


 


Hemoglobin A1c   


 


Lactic Acid   


 


Calcium  7.8 L  


 


Phosphorus   


 


Magnesium   


 


C-Reactive Protein   


 


Total Protein   


 


Albumin   


 


LDL Cholesterol Direct   


 


HDL Cholesterol   














  12/28/17 12/28/17 12/28/17





  22:28 23:01 23:31


 


WBC   


 


RBC   


 


Hgb   


 


Hct   


 


MCV   


 


MCHC   


 


RDW   


 


Seg Neuts % (Manual)   


 


Lymphocytes % (Manual)   


 


Seg Neutrophils # Man   


 


Lymphocytes # (Manual)   


 


PT   


 


INR   


 


POC ABG pH   


 


POC ABG pCO2   


 


POC ABG pO2   


 


Sodium   148 H 


 


Potassium   


 


Chloride   111.6 H 


 


Carbon Dioxide   4 L* 


 


BUN   43 H 


 


Creatinine   1.3 H 


 


Glucose   177 H 


 


POC Glucose  242 H   204 H


 


Hemoglobin A1c   


 


Lactic Acid   


 


Calcium   7.7 L 


 


Phosphorus   


 


Magnesium   


 


C-Reactive Protein   


 


Total Protein   


 


Albumin   


 


LDL Cholesterol Direct   


 


HDL Cholesterol   














  12/29/17 12/29/17 12/29/17





  01:20 03:30 04:01


 


WBC   


 


RBC   


 


Hgb   


 


Hct   


 


MCV   


 


MCHC   


 


RDW   


 


Seg Neuts % (Manual)   


 


Lymphocytes % (Manual)   


 


Seg Neutrophils # Man   


 


Lymphocytes # (Manual)   


 


PT   


 


INR   


 


POC ABG pH   


 


POC ABG pCO2   


 


POC ABG pO2   


 


Sodium   


 


Potassium   


 


Chloride   


 


Carbon Dioxide   


 


BUN   


 


Creatinine   


 


Glucose   


 


POC Glucose  120 H  59 L  132 H


 


Hemoglobin A1c   


 


Lactic Acid   


 


Calcium   


 


Phosphorus   


 


Magnesium   


 


C-Reactive Protein   


 


Total Protein   


 


Albumin   


 


LDL Cholesterol Direct   


 


HDL Cholesterol   














  12/29/17 12/29/17 12/29/17





  04:20 04:20 05:15


 


WBC   


 


RBC   


 


Hgb   


 


Hct   


 


MCV   


 


MCHC   


 


RDW   


 


Seg Neuts % (Manual)   


 


Lymphocytes % (Manual)   


 


Seg Neutrophils # Man   


 


Lymphocytes # (Manual)   


 


PT   


 


INR   


 


POC ABG pH   


 


POC ABG pCO2   


 


POC ABG pO2   


 


Sodium  151 H  150 H 


 


Potassium   


 


Chloride  114.3 H  115.4 H 


 


Carbon Dioxide  10 L  10 L 


 


BUN  48 H  48 H 


 


Creatinine   1.3 H 


 


Glucose  121 H  118 H 


 


POC Glucose    134 H


 


Hemoglobin A1c   


 


Lactic Acid   


 


Calcium  7.8 L  7.8 L 


 


Phosphorus   


 


Magnesium   


 


C-Reactive Protein   


 


Total Protein   


 


Albumin   


 


LDL Cholesterol Direct   


 


HDL Cholesterol   














  12/29/17 12/29/17 12/29/17





  06:41 07:49 08:12


 


WBC   


 


RBC   


 


Hgb   


 


Hct   


 


MCV   


 


MCHC   


 


RDW   


 


Seg Neuts % (Manual)   


 


Lymphocytes % (Manual)   


 


Seg Neutrophils # Man   


 


Lymphocytes # (Manual)   


 


PT   


 


INR   


 


POC ABG pH   


 


POC ABG pCO2   


 


POC ABG pO2   


 


Sodium   151 H 


 


Potassium   


 


Chloride   116.5 H 


 


Carbon Dioxide   13 L 


 


BUN   52 H 


 


Creatinine   1.3 H 


 


Glucose   213 H 


 


POC Glucose  172 H   228 H


 


Hemoglobin A1c   


 


Lactic Acid   


 


Calcium   7.9 L 


 


Phosphorus   


 


Magnesium   


 


C-Reactive Protein   


 


Total Protein   


 


Albumin   


 


LDL Cholesterol Direct   


 


HDL Cholesterol   














  12/29/17 12/29/17 12/29/17





  08:22 09:31 11:04


 


WBC   


 


RBC   


 


Hgb   


 


Hct   


 


MCV   


 


MCHC   


 


RDW   


 


Seg Neuts % (Manual)   


 


Lymphocytes % (Manual)   


 


Seg Neutrophils # Man   


 


Lymphocytes # (Manual)   


 


PT   


 


INR   


 


POC ABG pH   


 


POC ABG pCO2   


 


POC ABG pO2   


 


Sodium   


 


Potassium   


 


Chloride   


 


Carbon Dioxide   


 


BUN   


 


Creatinine   


 


Glucose   


 


POC Glucose  140 H  140 H  136 H


 


Hemoglobin A1c   


 


Lactic Acid   


 


Calcium   


 


Phosphorus   


 


Magnesium   


 


C-Reactive Protein   


 


Total Protein   


 


Albumin   


 


LDL Cholesterol Direct   


 


HDL Cholesterol   














  12/29/17 12/29/17 12/29/17





  12:11 13:40 17:21


 


WBC   


 


RBC   


 


Hgb   


 


Hct   


 


MCV   


 


MCHC   


 


RDW   


 


Seg Neuts % (Manual)   


 


Lymphocytes % (Manual)   


 


Seg Neutrophils # Man   


 


Lymphocytes # (Manual)   


 


PT   


 


INR   


 


POC ABG pH   


 


POC ABG pCO2   


 


POC ABG pO2   


 


Sodium   152 H 


 


Potassium   


 


Chloride   117.6 H 


 


Carbon Dioxide   15 L 


 


BUN   50 H 


 


Creatinine   1.3 H 


 


Glucose   144 H 


 


POC Glucose  111 H   182 H


 


Hemoglobin A1c   


 


Lactic Acid   


 


Calcium   


 


Phosphorus   


 


Magnesium   


 


C-Reactive Protein   


 


Total Protein   


 


Albumin   


 


LDL Cholesterol Direct   


 


HDL Cholesterol   














  12/29/17 12/29/17 12/30/17





  17:45 23:06 00:39


 


WBC   


 


RBC   


 


Hgb   


 


Hct   


 


MCV   


 


MCHC   


 


RDW   


 


Seg Neuts % (Manual)   


 


Lymphocytes % (Manual)   


 


Seg Neutrophils # Man   


 


Lymphocytes # (Manual)   


 


PT   


 


INR   


 


POC ABG pH   


 


POC ABG pCO2   


 


POC ABG pO2   


 


Sodium  149 H  


 


Potassium   


 


Chloride  116.2 H  


 


Carbon Dioxide  13 L  


 


BUN  48 H  


 


Creatinine  1.3 H  


 


Glucose  171 H  


 


POC Glucose   < 40 L  123 H


 


Hemoglobin A1c   


 


Lactic Acid   


 


Calcium  8.1 L  


 


Phosphorus   


 


Magnesium   


 


C-Reactive Protein   


 


Total Protein   


 


Albumin   


 


LDL Cholesterol Direct   


 


HDL Cholesterol   














  12/30/17 12/30/17 12/30/17





  07:07 07:07 11:35


 


WBC   


 


RBC   


 


Hgb   


 


Hct   


 


MCV   


 


MCHC   


 


RDW   


 


Seg Neuts % (Manual)   


 


Lymphocytes % (Manual)   


 


Seg Neutrophils # Man   


 


Lymphocytes # (Manual)   


 


PT   


 


INR   


 


POC ABG pH   


 


POC ABG pCO2   


 


POC ABG pO2   


 


Sodium   152 H 


 


Potassium   3.0 L D 


 


Chloride   121.9 H 


 


Carbon Dioxide   18 L 


 


BUN   44 H 


 


Creatinine   


 


Glucose   


 


POC Glucose    118 H


 


Hemoglobin A1c   


 


Lactic Acid   


 


Calcium   


 


Phosphorus  1.50 L  


 


Magnesium   


 


C-Reactive Protein   


 


Total Protein   


 


Albumin   


 


LDL Cholesterol Direct   


 


HDL Cholesterol   














  12/30/17 12/30/17 12/30/17





  16:54 19:44 19:49


 


WBC   


 


RBC   


 


Hgb   


 


Hct   


 


MCV   


 


MCHC   


 


RDW   


 


Seg Neuts % (Manual)   


 


Lymphocytes % (Manual)   


 


Seg Neutrophils # Man   


 


Lymphocytes # (Manual)   


 


PT   


 


INR   


 


POC ABG pH   


 


POC ABG pCO2    28.9 L


 


POC ABG pO2   


 


Sodium   


 


Potassium   


 


Chloride   


 


Carbon Dioxide   


 


BUN   


 


Creatinine   


 


Glucose   


 


POC Glucose  231 H  


 


Hemoglobin A1c   


 


Lactic Acid   


 


Calcium   


 


Phosphorus   


 


Magnesium   


 


C-Reactive Protein   4.70 H 


 


Total Protein   


 


Albumin   


 


LDL Cholesterol Direct   


 


HDL Cholesterol   














  12/30/17 12/31/17 12/31/17





  21:48 06:25 12:35


 


WBC   


 


RBC   


 


Hgb   


 


Hct   


 


MCV   


 


MCHC   


 


RDW   


 


Seg Neuts % (Manual)   


 


Lymphocytes % (Manual)   


 


Seg Neutrophils # Man   


 


Lymphocytes # (Manual)   


 


PT   


 


INR   


 


POC ABG pH   


 


POC ABG pCO2   


 


POC ABG pO2   


 


Sodium   


 


Potassium   


 


Chloride   


 


Carbon Dioxide   


 


BUN   


 


Creatinine   


 


Glucose   


 


POC Glucose  176 H  299 H  258 H


 


Hemoglobin A1c   


 


Lactic Acid   


 


Calcium   


 


Phosphorus   


 


Magnesium   


 


C-Reactive Protein   


 


Total Protein   


 


Albumin   


 


LDL Cholesterol Direct   


 


HDL Cholesterol   














  12/31/17 12/31/17 01/01/18





  16:43 22:07 02:59


 


WBC   


 


RBC   


 


Hgb   


 


Hct   


 


MCV   


 


MCHC   


 


RDW   


 


Seg Neuts % (Manual)   


 


Lymphocytes % (Manual)   


 


Seg Neutrophils # Man   


 


Lymphocytes # (Manual)   


 


PT   


 


INR   


 


POC ABG pH   


 


POC ABG pCO2   


 


POC ABG pO2   


 


Sodium   


 


Potassium   


 


Chloride   


 


Carbon Dioxide   


 


BUN   


 


Creatinine   


 


Glucose   


 


POC Glucose  252 H  248 H  193 H


 


Hemoglobin A1c   


 


Lactic Acid   


 


Calcium   


 


Phosphorus   


 


Magnesium   


 


C-Reactive Protein   


 


Total Protein   


 


Albumin   


 


LDL Cholesterol Direct   


 


HDL Cholesterol   














  01/01/18 01/01/18 01/01/18





  06:20 07:27 07:27


 


WBC   18.6 H 


 


RBC   2.90 L 


 


Hgb   9.0 L 


 


Hct   27.0 L 


 


MCV   


 


MCHC   


 


RDW   


 


Seg Neuts % (Manual)   


 


Lymphocytes % (Manual)   


 


Seg Neutrophils # Man   


 


Lymphocytes # (Manual)   


 


PT   


 


INR   


 


POC ABG pH   


 


POC ABG pCO2   


 


POC ABG pO2   


 


Sodium    152 H


 


Potassium   


 


Chloride    116.2 H


 


Carbon Dioxide    19 L


 


BUN    24 H


 


Creatinine   


 


Glucose    288 H


 


POC Glucose  280 H  


 


Hemoglobin A1c   


 


Lactic Acid   


 


Calcium    8.3 L


 


Phosphorus   


 


Magnesium   


 


C-Reactive Protein   


 


Total Protein   


 


Albumin   


 


LDL Cholesterol Direct    42 L


 


HDL Cholesterol    88 H














  01/01/18 01/01/18 01/01/18





  11:09 17:12 20:59


 


WBC   


 


RBC   


 


Hgb   


 


Hct   


 


MCV   


 


MCHC   


 


RDW   


 


Seg Neuts % (Manual)   


 


Lymphocytes % (Manual)   


 


Seg Neutrophils # Man   


 


Lymphocytes # (Manual)   


 


PT   


 


INR   


 


POC ABG pH   


 


POC ABG pCO2   


 


POC ABG pO2   


 


Sodium   


 


Potassium   


 


Chloride   


 


Carbon Dioxide   


 


BUN   


 


Creatinine   


 


Glucose   


 


POC Glucose  328 H  306 H  318 H


 


Hemoglobin A1c   


 


Lactic Acid   


 


Calcium   


 


Phosphorus   


 


Magnesium   


 


C-Reactive Protein   


 


Total Protein   


 


Albumin   


 


LDL Cholesterol Direct   


 


HDL Cholesterol   














  01/02/18 01/02/18 01/02/18





  01:55 04:40 04:40


 


WBC   13.0 H 


 


RBC   2.88 L 


 


Hgb   9.0 L 


 


Hct   26.2 L 


 


MCV   


 


MCHC   35 H 


 


RDW   12.6 L 


 


Seg Neuts % (Manual)   


 


Lymphocytes % (Manual)   


 


Seg Neutrophils # Man   


 


Lymphocytes # (Manual)   


 


PT   


 


INR   


 


POC ABG pH   


 


POC ABG pCO2   


 


POC ABG pO2   


 


Sodium    148 H


 


Potassium    3.3 L


 


Chloride    111.8 H


 


Carbon Dioxide   


 


BUN    18 H


 


Creatinine   


 


Glucose    212 H


 


POC Glucose  274 H  


 


Hemoglobin A1c   


 


Lactic Acid   


 


Calcium    8.3 L


 


Phosphorus   


 


Magnesium   


 


C-Reactive Protein   


 


Total Protein   


 


Albumin   


 


LDL Cholesterol Direct   


 


HDL Cholesterol   














  01/02/18 01/02/18 01/02/18





  06:30 12:14 16:50


 


WBC   


 


RBC   


 


Hgb   


 


Hct   


 


MCV   


 


MCHC   


 


RDW   


 


Seg Neuts % (Manual)   


 


Lymphocytes % (Manual)   


 


Seg Neutrophils # Man   


 


Lymphocytes # (Manual)   


 


PT   


 


INR   


 


POC ABG pH   


 


POC ABG pCO2   


 


POC ABG pO2   


 


Sodium   


 


Potassium   


 


Chloride   


 


Carbon Dioxide   


 


BUN   


 


Creatinine   


 


Glucose   


 


POC Glucose  285 H  166 H  178 H


 


Hemoglobin A1c   


 


Lactic Acid   


 


Calcium   


 


Phosphorus   


 


Magnesium   


 


C-Reactive Protein   


 


Total Protein   


 


Albumin   


 


LDL Cholesterol Direct   


 


HDL Cholesterol   














  01/02/18 01/03/18 01/03/18





  21:24 05:47 05:55


 


WBC    12.4 H


 


RBC    2.92 L


 


Hgb    8.8 L


 


Hct    26.1 L


 


MCV   


 


MCHC   


 


RDW    12.6 L


 


Seg Neuts % (Manual)   


 


Lymphocytes % (Manual)   


 


Seg Neutrophils # Man   


 


Lymphocytes # (Manual)   


 


PT   


 


INR   


 


POC ABG pH   


 


POC ABG pCO2   


 


POC ABG pO2   


 


Sodium   


 


Potassium   


 


Chloride   


 


Carbon Dioxide   


 


BUN   


 


Creatinine   


 


Glucose   


 


POC Glucose  197 H  291 H 


 


Hemoglobin A1c   


 


Lactic Acid   


 


Calcium   


 


Phosphorus   


 


Magnesium   


 


C-Reactive Protein   


 


Total Protein   


 


Albumin   


 


LDL Cholesterol Direct   


 


HDL Cholesterol   














  01/03/18 01/03/18 01/03/18





  05:55 12:24 16:33


 


WBC   


 


RBC   


 


Hgb   


 


Hct   


 


MCV   


 


MCHC   


 


RDW   


 


Seg Neuts % (Manual)   


 


Lymphocytes % (Manual)   


 


Seg Neutrophils # Man   


 


Lymphocytes # (Manual)   


 


PT   


 


INR   


 


POC ABG pH   


 


POC ABG pCO2   


 


POC ABG pO2   


 


Sodium   


 


Potassium   


 


Chloride  108.2 H  


 


Carbon Dioxide  21 L  


 


BUN   


 


Creatinine   


 


Glucose  228 H  


 


POC Glucose   215 H  280 H


 


Hemoglobin A1c   


 


Lactic Acid   


 


Calcium  8.1 L  


 


Phosphorus   


 


Magnesium   


 


C-Reactive Protein   


 


Total Protein   


 


Albumin   


 


LDL Cholesterol Direct   


 


HDL Cholesterol   














  01/03/18 01/04/18 01/04/18





  22:12 05:27 12:47


 


WBC   


 


RBC   


 


Hgb   


 


Hct   


 


MCV   


 


MCHC   


 


RDW   


 


Seg Neuts % (Manual)   


 


Lymphocytes % (Manual)   


 


Seg Neutrophils # Man   


 


Lymphocytes # (Manual)   


 


PT   


 


INR   


 


POC ABG pH   


 


POC ABG pCO2   


 


POC ABG pO2   


 


Sodium   


 


Potassium   


 


Chloride   


 


Carbon Dioxide   


 


BUN   


 


Creatinine   


 


Glucose   


 


POC Glucose  236 H  186 H  206 H


 


Hemoglobin A1c   


 


Lactic Acid   


 


Calcium   


 


Phosphorus   


 


Magnesium   


 


C-Reactive Protein   


 


Total Protein   


 


Albumin   


 


LDL Cholesterol Direct   


 


HDL Cholesterol   














  01/04/18 01/04/18 01/05/18





  17:27 22:19 05:48


 


WBC   


 


RBC   


 


Hgb   


 


Hct   


 


MCV   


 


MCHC   


 


RDW   


 


Seg Neuts % (Manual)   


 


Lymphocytes % (Manual)   


 


Seg Neutrophils # Man   


 


Lymphocytes # (Manual)   


 


PT   


 


INR   


 


POC ABG pH   


 


POC ABG pCO2   


 


POC ABG pO2   


 


Sodium   


 


Potassium   


 


Chloride   


 


Carbon Dioxide   


 


BUN   


 


Creatinine   


 


Glucose   


 


POC Glucose  213 H  123 H  203 H


 


Hemoglobin A1c   


 


Lactic Acid   


 


Calcium   


 


Phosphorus   


 


Magnesium   


 


C-Reactive Protein   


 


Total Protein   


 


Albumin   


 


LDL Cholesterol Direct   


 


HDL Cholesterol   














  01/05/18 01/05/18 01/06/18





  11:20 16:53 01:08


 


WBC   


 


RBC   


 


Hgb   


 


Hct   


 


MCV   


 


MCHC   


 


RDW   


 


Seg Neuts % (Manual)   


 


Lymphocytes % (Manual)   


 


Seg Neutrophils # Man   


 


Lymphocytes # (Manual)   


 


PT   


 


INR   


 


POC ABG pH   


 


POC ABG pCO2   


 


POC ABG pO2   


 


Sodium   


 


Potassium   


 


Chloride   


 


Carbon Dioxide   


 


BUN   


 


Creatinine   


 


Glucose   


 


POC Glucose  223 H  264 H  183 H


 


Hemoglobin A1c   


 


Lactic Acid   


 


Calcium   


 


Phosphorus   


 


Magnesium   


 


C-Reactive Protein   


 


Total Protein   


 


Albumin   


 


LDL Cholesterol Direct   


 


HDL Cholesterol   














  01/06/18 01/06/18 01/06/18





  06:31 11:15 16:55


 


WBC   


 


RBC   


 


Hgb   


 


Hct   


 


MCV   


 


MCHC   


 


RDW   


 


Seg Neuts % (Manual)   


 


Lymphocytes % (Manual)   


 


Seg Neutrophils # Man   


 


Lymphocytes # (Manual)   


 


PT   


 


INR   


 


POC ABG pH   


 


POC ABG pCO2   


 


POC ABG pO2   


 


Sodium   


 


Potassium   


 


Chloride   


 


Carbon Dioxide   


 


BUN   


 


Creatinine   


 


Glucose   


 


POC Glucose  238 H  344 H  302 H


 


Hemoglobin A1c   


 


Lactic Acid   


 


Calcium   


 


Phosphorus   


 


Magnesium   


 


C-Reactive Protein   


 


Total Protein   


 


Albumin   


 


LDL Cholesterol Direct   


 


HDL Cholesterol   














  01/06/18 01/07/18 01/07/18





  21:38 06:14 11:35


 


WBC   


 


RBC   


 


Hgb   


 


Hct   


 


MCV   


 


MCHC   


 


RDW   


 


Seg Neuts % (Manual)   


 


Lymphocytes % (Manual)   


 


Seg Neutrophils # Man   


 


Lymphocytes # (Manual)   


 


PT   


 


INR   


 


POC ABG pH   


 


POC ABG pCO2   


 


POC ABG pO2   


 


Sodium   


 


Potassium   


 


Chloride   


 


Carbon Dioxide   


 


BUN   


 


Creatinine   


 


Glucose   


 


POC Glucose  259 H  275 H  291 H


 


Hemoglobin A1c   


 


Lactic Acid   


 


Calcium   


 


Phosphorus   


 


Magnesium   


 


C-Reactive Protein   


 


Total Protein   


 


Albumin   


 


LDL Cholesterol Direct   


 


HDL Cholesterol   














  01/07/18 01/07/18 01/08/18





  15:52 22:37 06:28


 


WBC   


 


RBC   


 


Hgb   


 


Hct   


 


MCV   


 


MCHC   


 


RDW   


 


Seg Neuts % (Manual)   


 


Lymphocytes % (Manual)   


 


Seg Neutrophils # Man   


 


Lymphocytes # (Manual)   


 


PT   


 


INR   


 


POC ABG pH   


 


POC ABG pCO2   


 


POC ABG pO2   


 


Sodium   


 


Potassium   


 


Chloride   


 


Carbon Dioxide   


 


BUN   


 


Creatinine   


 


Glucose   


 


POC Glucose  253 H  197 H  328 H


 


Hemoglobin A1c   


 


Lactic Acid   


 


Calcium   


 


Phosphorus   


 


Magnesium   


 


C-Reactive Protein   


 


Total Protein   


 


Albumin   


 


LDL Cholesterol Direct   


 


HDL Cholesterol   














  01/08/18 01/08/18





  11:38 16:18


 


WBC  


 


RBC  


 


Hgb  


 


Hct  


 


MCV  


 


MCHC  


 


RDW  


 


Seg Neuts % (Manual)  


 


Lymphocytes % (Manual)  


 


Seg Neutrophils # Man  


 


Lymphocytes # (Manual)  


 


PT  


 


INR  


 


POC ABG pH  


 


POC ABG pCO2  


 


POC ABG pO2  


 


Sodium  


 


Potassium  


 


Chloride  


 


Carbon Dioxide  


 


BUN  


 


Creatinine  


 


Glucose  


 


POC Glucose  176 H  244 H


 


Hemoglobin A1c  


 


Lactic Acid  


 


Calcium  


 


Phosphorus  


 


Magnesium  


 


C-Reactive Protein  


 


Total Protein  


 


Albumin  


 


LDL Cholesterol Direct  


 


HDL Cholesterol  











Allied health notes reviewed: nursing

## 2018-01-08 NOTE — OPERATIVE REPORT
PROCEDURE PERFORMED:  Esophagogastroduodenoscopy with percutaneous gastrostomy

tube insertion.



PREOPERATIVE DIAGNOSIS:  Neurogenic dysphagia from a stroke.



POSTOPERATIVE DIAGNOSIS:  Neurogenic dysphagia from a stroke, status post

gastrostomy tube.



ENDOSCOPIST:  Tim Ray MD.



INSTRUMENT:  [employer] video endoscope.



MEDICATIONS:  MAC anesthesia by [facility] as well as Ancef 2 grams

given preoperatively.



COMPLICATIONS:  No apparent complications.



ESTIMATED BLOOD LOSS:  Minimal.



SPECIMENS:  None.



IMPLANTS:  A 20-Filipino pull type gastrostomy tube in the body of the stomach.



ASSISTANTS:  None.



CONDITION AT COMPLETION:  Stable.



TECHNIQUE:  The patient and her family were apprised of the risks and benefits

of the procedure.  The patient gave consent to proceed.  After consent was

obtained, the patient was placed in the supine position.  The above sedative

medications were given.  Her vital signs remained stable throughout the

procedure.  The instrument was advanced from the mouth to the second portion of

the duodenum under direct visualization.  At that point, the bowel was

insufflated and the endoscope was slowly withdrawn into the body of the stomach.

 There was a good red light reflex and indentation seen in an area 2 cm below

the left midclavicular line in the abdomen.  The area was sterilely draped and

prepped and a 20-Filipino pull type gastrostomy tube was placed over a guidewire

in the usual manner.  The external bumper was fixed at 5 cm and the procedure

was then terminated.



FINDINGS:

1.  Normal appearing upper GI tract.

2.  Successful percutaneous gastrostomy tube placement, 20-Filipino/pull type,

placed 2 cm below the left midclavicular line in the abdomen, in the body of the

stomach.

A. The external bumper was fixed at 5 cm.



RECOMMENDATIONS:

1.  May use the gastrostomy tube in 4 hours for feeds, flushing and medications.

2.  We will loosen the bumper tomorrow.

3.  We will resume Plavix and aspirin in the morning.





DD: 01/08/2018 15:03

DT: 01/08/2018 19:37

JOB# [id number]  [id number]

PREM/SHELBY

## 2018-01-08 NOTE — PROGRESS NOTE
Assessment and Plan


Assessment and plan: 


Patient is 37-year-old woman with a history of insulin-dependent diabetes 

mellitus, CVA on aspirin and Plavix, hypertension, dyslipidemia and depression 

who presented with altered mental status and was admitted for DKA.  Her 

baseline mental function per sister Cindy: she walks with a walker, she 

feeds herself, needs help with clothes, left arm weaker than right and speech 

is usually slurred. Cindy reports that patient was discharged from [facility] about 1.5 months ago and went to [facility]. Then, she 

went to her home from there. She lives with daughter Ej and brother, 

Gonzalo, who moved in with her for more support.  





per Cindy: "I wanna say she wasn't talking her insulin, because this 3rd 

stoke did alittle mentally and she more forgettable, that is what I am thinking

, for example, she will say, I need to take my insulin but she had just took 

her insulin." They went to [place], on the way back Wednesday night 11pm, she 

was sleeping off and on, BG was 516 and she took her insulin. Her daughter, 

Ej, said next morning she got up Thursday morning to go to the bathroom 

and she fell and then was unresponsive, she was found on the floor in urine by 

daughter, Ej. 





-DKA with acute metabolic acidosis, high anion gap: Treated with IV fluids, 

insulin drip now resolved


-Acute metabolic encephalopathy/semiconscious state: treat the above


-Recurrent Acute ischemic strokes. ?hydrocephalus on Ct head: consulted 

Neurology


--->Sepsis due to acute right mastoiditis, poa, start iv abx 


-Hypernatremia: needs more free water, bmp


-Moderate malnutrition: dietician


-Acute ischemic stroke, poa: get ECHO, used stroke protocol





12/30/17: Insulin drip was stopped because of hypoglycemia and patient was sent 

from ED to medical/surg floor. Anion gap was still high so I sent ICU to start 

insulin drip. I also ordered bmp stat. By the time the stat bmp was resulted, 

patient was in the icu. Anion gap has closed, so i will downgrade back to 

medical surg floor. Replace her potassium, start diet, if she unable to eat 

continue D5W due to hypernatremia and not eating. Await neurology assessment 

because she is not at her mental baseline of slurred speech and ambulation with 

a walker. Order PT/OT





12/31/17: I gave pt 0.5mg iv ativan x 1 to complete mri brain that Neurology 

ordered, MRI brain reported as multiple focal areas of restricted diffusion 

right cerebral hemisphere suggestive excuse ischemia, no evidence of hemorrhage

, dilated right lateral ventricle without definite evidence of obstruction, 

acute right mastoiditis. I started abx, iv rocephin, used stroke protocol





1/1/2018: WBC went down, Still hypernatremic, more free water, repeat labs in 

am. ECHO pending. I called Cindy at [phone], no answer ?phone broken 

message. 


1/2/18: echo pending, replace potassium, hyperglycemia on D5W for the 

hypernatremia, continue ssi,  if she tolerates a diet today, then d/c ivf, 

change ssi to achs instead of q4hr,  urine culture still pending. Hopefully SNF 

placement in 1-2 days


1/3/18: Echo reviewed, hypernatremia/hypokalemia resolved, stop ivf. Waiting on 

placement


1/4/18: [facility] in [place], [place], waiting on insurance pre-auth


1/5/18: failed swallow, going for peg on monday because she is on Plavix. 

Nutrition: Patient refusing NGT but she will re-consider; she is 

comminunicating via paper and pencil. She agreed to NGT


1/6/18: continue ngt tube. 


1/7/18: abd xray, check ngt placement. peg tube tomorrow then placement


1/8/18: peg today, restart antiplatelet soon after





History


Interval history: 


Patient was seen and examined.  Follow-up on current diagnosis/altered mental 

status.  Overnight uneventful.  Patient not given any history; therefore,  

Imaging, nursing note, chart, labs and old chart reviewed.  Responding more. 





Hospitalist Physical





- Physical exam


Narrative exam: 


GEN: Thin frail woman chronic a bit debilitating appearing, lethargic, nonverbal


HEENT: NCAT, EOMI, PERRL, OP Clear


NECK: supple, no adenopathy, no thyromegaly, no JVD


CVS/HEART: regular tachycardia NORMAL S1S2, NO JVD, pulses present bilaterally


CHEST/LUNGS: CTA B, Symmetrical chest expansion, good air entry bilaterally


GI/Abdomen: soft, NTND, good bowel sounds, no guarding or rebound


/Bladder: no suprapubic tenderness, no CVA or paraspinal tenderness


EXT/Skin: no c/c/e, no obvious rash


MSK: Moving both arms with contractures 


Neuro: CN 2-12 grossly intact except nonverbal, hemiparesis, facial asymmetry, 

no new focal deficits


Psych: calm but confused








- Constitutional


Vitals: 


 











Temp Pulse Resp BP Pulse Ox


 


 99.1 F   86   18   157/90   97 


 


 01/08/18 07:48  01/08/18 07:48  01/08/18 07:48  01/08/18 07:48  01/08/18 07:48











General appearance: Present: well-nourished.  Absent: mild distress





Results





- Labs


CBC & Chem 7: 


 01/03/18 05:55





 01/03/18 05:55


Labs: 


 Laboratory Last Values











WBC  12.4 K/mm3 (4.5-11.0)  H  01/03/18  05:55    


 


RBC  2.92 M/mm3 (3.65-5.03)  L  01/03/18  05:55    


 


Hgb  8.8 gm/dl (10.1-14.3)  L  01/03/18  05:55    


 


Hct  26.1 % (30.3-42.9)  L  01/03/18  05:55    


 


MCV  89 fl (79-97)   01/03/18  05:55    


 


MCH  30 pg (28-32)   01/03/18  05:55    


 


MCHC  34 % (30-34)   01/03/18  05:55    


 


RDW  12.6 % (13.2-15.2)  L  01/03/18  05:55    


 


Plt Count  249 K/mm3 (140-440)   01/03/18  05:55    


 


Add Manual Diff  Complete   12/28/17  12:26    


 


Total Counted  100   12/28/17  12:26    


 


Seg Neutrophils %  Np   12/28/17  12:26    


 


Seg Neuts % (Manual)  90.0 % (40.0-70.0)  H  12/28/17  12:26    


 


Band Neutrophils %  4.0 %  12/28/17  12:26    


 


Lymphocytes % (Manual)  2.0 % (13.4-35.0)  L  12/28/17  12:26    


 


Reactive Lymphs % (Man)  0 %  12/28/17  12:26    


 


Monocytes % (Manual)  4.0 % (0.0-7.3)   12/28/17  12:26    


 


Eosinophils % (Manual)  0 % (0.0-4.3)   12/28/17  12:26    


 


Basophils % (Manual)  0 % (0.0-1.8)   12/28/17  12:26    


 


Metamyelocytes %  0 %  12/28/17  12:26    


 


Myelocytes %  0 %  12/28/17  12:26    


 


Promyelocytes %  0 %  12/28/17  12:26    


 


Blast Cells %  0 %  12/28/17  12:26    


 


Nucleated RBC %  Not Reportable   12/28/17  12:26    


 


Seg Neutrophils # Man  18.1 K/mm3 (1.8-7.7)  H  12/28/17  12:26    


 


Band Neutrophils #  0.8 K/mm3  12/28/17  12:26    


 


Lymphocytes # (Manual)  0.4 K/mm3 (1.2-5.4)  L  12/28/17  12:26    


 


Abs React Lymphs (Man)  0.0 K/mm3  12/28/17  12:26    


 


Monocytes # (Manual)  0.8 K/mm3 (0.0-0.8)   12/28/17  12:26    


 


Eosinophils # (Manual)  0.0 K/mm3 (0.0-0.4)   12/28/17  12:26    


 


Basophils # (Manual)  0.0 K/mm3 (0.0-0.1)   12/28/17  12:26    


 


Metamyelocytes #  0.0 K/mm3  12/28/17  12:26    


 


Myelocytes #  0.0 K/mm3  12/28/17  12:26    


 


Promyelocytes #  0.0 K/mm3  12/28/17  12:26    


 


Blast Cells #  0.0 K/mm3  12/28/17  12:26    


 


WBC Morphology  Not Reportable   12/28/17  12:26    


 


Hypersegmented Neuts  Not Reportable   12/28/17  12:26    


 


Hyposegmented Neuts  Not Reportable   12/28/17  12:26    


 


Hypogranular Neuts  Not Reportable   12/28/17  12:26    


 


Smudge Cells  Not Reportable   12/28/17  12:26    


 


Toxic Granulation  Not Reportable   12/28/17  12:26    


 


Toxic Vacuolation  Not Reportable   12/28/17  12:26    


 


Dohle Bodies  Not Reportable   12/28/17  12:26    


 


Pelger-Huet Anomaly  Not Reportable   12/28/17  12:26    


 


Yuly Rods  Not Reportable   12/28/17  12:26    


 


Platelet Estimate  Not Reportable   12/28/17  12:26    


 


Clumped Platelets  Not Reportable   12/28/17  12:26    


 


Plt Clumps, EDTA  Not Reportable   12/28/17  12:26    


 


Large Platelets  Not Reportable   12/28/17  12:26    


 


Giant Platelets  Not Reportable   12/28/17  12:26    


 


Platelet Satelliting  Not Reportable   12/28/17  12:26    


 


Plt Morphology Comment  Not Reportable   12/28/17  12:26    


 


RBC Morphology  Normal   12/28/17  12:26    


 


Dimorphic RBCs  Not Reportable   12/28/17  12:26    


 


Polychromasia  Not Reportable   12/28/17  12:26    


 


Hypochromasia  Not Reportable   12/28/17  12:26    


 


Poikilocytosis  Not Reportable   12/28/17  12:26    


 


Anisocytosis  Not Reportable   12/28/17  12:26    


 


Microcytosis  Not Reportable   12/28/17  12:26    


 


Macrocytosis  Not Reportable   12/28/17  12:26    


 


Spherocytes  Not Reportable   12/28/17  12:26    


 


Pappenheimer Bodies  Not Reportable   12/28/17  12:26    


 


Sickle Cells  Not Reportable   12/28/17  12:26    


 


Target Cells  Not Reportable   12/28/17  12:26    


 


Tear Drop Cells  Not Reportable   12/28/17  12:26    


 


Ovalocytes  Not Reportable   12/28/17  12:26    


 


Helmet Cells  Not Reportable   12/28/17  12:26    


 


Ac[place] Bodies  Not Reportable   12/28/17  12:26    


 


Cabot Rings  Not Reportable   12/28/17  12:26    


 


Trumann Cells  Not Reportable   12/28/17  12:26    


 


Bite Cells  Not Reportable   12/28/17  12:26    


 


Crenated Cell  Not Reportable   12/28/17  12:26    


 


Elliptocytes  Not Reportable   12/28/17  12:26    


 


Acanthocytes (Spur)  Not Reportable   12/28/17  12:26    


 


Rouleaux  Not Reportable   12/28/17  12:26    


 


Hemoglobin C Crystals  Not Reportable   12/28/17  12:26    


 


Schistocytes  Not Reportable   12/28/17  12:26    


 


Malaria parasites  Not Reportable   12/28/17  12:26    


 


Juliano Bodies  Not Reportable   12/28/17  12:26    


 


Hem Pathologist Commnt  No   12/28/17  12:26    


 


PT  13.2 Sec. (12.2-14.9)   01/05/18  08:39    


 


INR  0.95  (0.87-1.13)   01/05/18  08:39    


 


POC ABG pH  7.389  (7.35-7.45)   12/30/17  19:49    


 


POC ABG pCO2  28.9  (35-45)  L  12/30/17  19:49    


 


POC ABG pO2  98  ()   12/30/17  19:49    


 


POC ABG HCO3  17.4   12/30/17  19:49    


 


POC ABG Total CO2  18   12/30/17  19:49    


 


POC ABG O2 Sat  98   12/30/17  19:49    


 


POC ABG Base Excess  -8   12/30/17  19:49    


 


FiO2  21 %  12/30/17  19:49    


 


Sodium  144 mmol/L (137-145)   01/03/18  05:55    


 


Potassium  4.1 mmol/L (3.6-5.0)  D 01/03/18  05:55    


 


Chloride  108.2 mmol/L ()  H  01/03/18  05:55    


 


Carbon Dioxide  21 mmol/L (22-30)  L  01/03/18  05:55    


 


Anion Gap  19 mmol/L  01/03/18  05:55    


 


BUN  12 mg/dL (7-17)   01/03/18  05:55    


 


Creatinine  0.7 mg/dL (0.7-1.2)   01/03/18  05:55    


 


Estimated GFR  > 60 ml/min  01/03/18  05:55    


 


BUN/Creatinine Ratio  17 %  01/03/18  05:55    


 


Glucose  228 mg/dL ()  H  01/03/18  05:55    


 


POC Glucose  328  ()  H  01/08/18  06:28    


 


Hemoglobin A1c  10.0 % (4-6)  H  12/28/17  17:02    


 


Lactic Acid  1.80 mmol/L (0.7-2.0)   12/30/17  19:44    


 


Calcium  8.1 mg/dL (8.4-10.2)  L  01/03/18  05:55    


 


Phosphorus  1.50 mg/dL (2.5-4.5)  L  12/30/17  07:07    


 


Magnesium  1.80 mg/dL (1.7-2.3)   01/02/18  04:40    


 


Total Bilirubin  0.30 mg/dL (0.1-1.2)   12/28/17  13:44    


 


Direct Bilirubin  0.2 mg/dL (0-0.2)   12/28/17  13:44    


 


Indirect Bilirubin  0.1 mg/dL  12/28/17  13:44    


 


AST  15 units/L (5-40)   12/28/17  13:44    


 


ALT  11 units/L (7-56)   12/28/17  13:44    


 


Alkaline Phosphatase  89 units/L ()   12/28/17  13:44    


 


C-Reactive Protein  4.70 mg/dL (0.00-1.30)  H  12/30/17  19:44    


 


Total Protein  5.8 g/dL (6.3-8.2)  L  12/28/17  13:44    


 


Albumin  3.0 g/dL (3.9-5)  L  12/28/17  13:44    


 


Albumin/Globulin Ratio  1.1 %  12/28/17  13:44    


 


Triglycerides  94 mg/dL (2-149)   01/01/18  07:27    


 


Cholesterol  148 mg/dL ()   01/01/18  07:27    


 


LDL Cholesterol Direct  42 mg/dL ()  L  01/01/18  07:27    


 


HDL Cholesterol  88 mg/dL (40-59)  H  01/01/18  07:27    


 


Cholesterol/HDL Ratio  1.68 %  01/01/18  07:27    


 


HCG, Qual  Negative  (Negative)   12/30/17  15:05    


 


Urine Color  Yellow  (Yellow)   12/28/17  14:28    


 


Urine Turbidity  Clear  (Clear)   12/28/17  14:28    


 


Urine pH  5.0  (5.0-7.0)   12/28/17  14:28    


 


Ur Specific Gravity  1.016  (1.003-1.030)   12/28/17  14:28    


 


Urine Protein  100 mg/dl mg/dL (Negative)   12/28/17  14:28    


 


Urine Glucose (UA)  >=500 mg/dL (Negative)   12/28/17  14:28    


 


Urine Ketones  80 mg/dL (Negative)   12/28/17  14:28    


 


Urine Blood  Mod  (Negative)   12/28/17  14:28    


 


Urine Nitrite  Neg  (Negative)   12/28/17  14:28    


 


Urine Bilirubin  Neg  (Negative)   12/28/17  14:28    


 


Urine Urobilinogen  < 2.0 mg/dL (<2.0)   12/28/17  14:28    


 


Ur Leukocyte Esterase  Neg  (Negative)   12/28/17  14:28    


 


Urine WBC (Auto)  2.0 /HPF (0.0-6.0)   12/28/17  14:28    


 


Urine RBC (Auto)  < 1.0 /HPF (0.0-6.0)   12/28/17  14:28    


 


U Epithel Cells (Auto)  < 1.0 /HPF (0-13.0)   12/28/17  14:28    


 


Urine Bacteria (Auto)  1+ /HPF (Negative)   12/28/17  14:28    


 


Urine Opiates Screen  Presumptive negative   12/28/17  14:28    


 


Urine Methadone Screen  Presumptive negative   12/28/17  14:28    


 


Ur Barbiturates Screen  Presumptive negative   12/28/17  14:28    


 


Ur Phencyclidine Scrn  Presumptive negative   12/28/17  14:28    


 


Ur Amphetamines Screen  Presumptive negative   12/28/17  14:28    


 


U Benzodiazepines Scrn  Presumptive negative   12/28/17  14:28    


 


Urine Cocaine Screen  Presumptive negative   12/28/17  14:28    


 


U Marijuana (THC) Screen  Presumptive negative   12/28/17  14:28    


 


Drugs of Abuse Note  Disclamer   12/28/17  14:28

## 2018-01-08 NOTE — ANESTHESIA CONSULTATION
Anesthesia Consult and Med Hx


Date of service: 01/08/18





- Airway


Anesthetic Teeth Evaluation: Poor (missing teeth)


ROM Head & Neck: Adequate


Mental/Hyoid Distance: Adequate


Mallampati Class: Class III


Intubation Access Assessment: Probably Good





- Pulmonary Exam


CTA: Yes





- Cardiac Exam


Cardiac Exam: RRR





- Pre-Operative Health Status


ASA Pre-Surgery Classification: ASA3


Proposed Anesthetic Plan: MAC





- Pulmonary


Hx Smoking: No





- Cardiovascular System


Hx Hypertension: Yes (ECHO 12/17 EF 50-55%)


Hx Heart Attack/AMI: No





- Central Nervous System


Hx Seizures: Yes


CVA: Yes (WALK W WALKER)


Hx Psychiatric Problems: Yes (DEPRESSION)





- Endocrine


Hx Insulin Dependent Diabetes: Yes (DKA )

## 2018-01-09 VITALS — DIASTOLIC BLOOD PRESSURE: 86 MMHG | SYSTOLIC BLOOD PRESSURE: 131 MMHG

## 2018-01-09 RX ADMIN — BRIMONIDINE TARTRATE, TIMOLOL MALEATE SCH DROPS: 2; 5 SOLUTION/ DROPS OPHTHALMIC at 11:48

## 2018-01-09 RX ADMIN — INSULIN ASPART SCH UNITS: 100 INJECTION, SOLUTION INTRAVENOUS; SUBCUTANEOUS at 12:31

## 2018-01-09 RX ADMIN — INSULIN ASPART SCH UNITS: 100 INJECTION, SOLUTION INTRAVENOUS; SUBCUTANEOUS at 07:04

## 2018-01-09 RX ADMIN — MORPHINE SULFATE PRN MG: 4 INJECTION, SOLUTION INTRAMUSCULAR; INTRAVENOUS at 04:06

## 2018-01-09 RX ADMIN — BRIMONIDINE TARTRATE, TIMOLOL MALEATE SCH DROPS: 2; 5 SOLUTION/ DROPS OPHTHALMIC at 11:47

## 2018-01-09 RX ADMIN — INSULIN ASPART SCH: 100 INJECTION, SOLUTION INTRAVENOUS; SUBCUTANEOUS at 03:53

## 2018-01-09 NOTE — VASCULAR LAB REPORT
CAROTID DUPLEX STUDY:



RIGHT        PSVEDV

CCA PROX:[id number]

CCA DIST:[id number]

ICA PROX:[id number]

ICA MID:[id number]

ICA DIST:[id number]

ECA:         137

VERT:              65              28  



LEFT         PSVEDV

CCA PROX:[id number]

CCA DIST:[id number]

ICA PROX:[id number]

ICA MID:[id number]

ICA DIST:[id number]

ECA:               124

VERT:              46        20





REASON FOR EXAM: Stroke.

     

COMMENTS ON THE RIGHT:

Doppler frequency analysis is consistent with 16 to 49 percent diameter 

reduction of the internal carotid artery.  Minimal amount of plaque is 

seen.  The common carotid artery is patent. The external carotid artery 

is patent.  The vertebral artery has antegrade flow.



COMMENTS ON THE LEFT:

Doppler frequency analysis is consistent with 16 to 49 percent diameter 

reduction of the internal carotid artery.  Minimal amount of plaque is 

seen.  The common carotid artery is patent. The external carotid artery 

is patent.  The vertebral artery has antegrade flow.



IMPRESSION:

Less than 50% diameter reduction in the internal carotid arteries 

bilaterally.

## 2018-01-09 NOTE — DISCHARGE SUMMARY
Providers





- Providers


Date of Admission: 


12/28/17 15:53





Date of discharge: 01/09/18


Attending physician: 


LESLEY TUBBS





 





01/04/18 17:39


Consult to Physician [CONS] Routine 


   Consulting Provider: SANTOS VARELA


   Reason For Exam: peg placement


   Place consult to:: DR VARELA


   Notified:: AS


   Phone number called:: [phone]


   Was contact made?: Yes


   If yes, spoke with:: LUCIA


   Time called:: 17:41





01/05/18 11:16


Consult to Dietitian/Nutrition [CONS] Routine 


   Physician Instructions: 


   Reason For Exam: 


   Reason for Consult: Write/Manage Tube Feeding





12/28/17 19:09


Consult to Physician [CONS] Stat 


   Consulting Provider: DOREEN TRIMBLE


   Reason For Exam: ICU admission


   Place consult to:: Dr. Trimble


   Notified:: Answering Service


   Phone number called:: [phone]


   Was contact made?: Yes


   If yes, spoke with:: Dr. Trimble


   Time called:: 19:00





12/29/17 09:31


Consult to Physician [CONS] Routine 


   Consulting Provider: CLARISA MOSELEY


   Reason For Exam: ?hydrocephalus


   Place consult to:: Dr. Moseley


   Notified:: Answering Service


   Phone number called:: [phone]


   Was contact made?: Yes


   If yes, spoke with:: Dr. Moseley


   Time called:: 21:31





12/30/17 09:20


Physical Therapy Evaluation and Treat [CONS] Routine 


   Comment: 


   Reason For Exam: stroke residuals





12/30/17 09:21


Occupational Therapy Evaluate and Treat [CONS] Routine 


   Comment: 


   Reason For Exam: stroke residuals





12/31/17 11:45


Speech Therapy Evaluation and Treat [CONS] Routine 


   Reason For Exam: swallowing





12/31/17 17:15


Consult to Case Management [CONS] Routine 


   Services Needed at Discharge: 


   Notified:: ss


Consult to Dietitian/Nutrition [CONS] Routine 


   Physician Instructions: 


   Reason For Exam: 


   Reason for Consult: Nutrition Recommendations


   Reason for Consult: Malnutrition


Occupational Therapy Evaluate and Treat [CONS] Routine 


   Comment: 


   Reason For Exam: Neuro deficits


Physical Therapy Evaluation and Treat [CONS] Routine 


   Comment: 


   Reason For Exam: Neuro deficits











Primary care physician: 


PRIMARY CARE MD








Hospitalization


Condition: Stable


Hospital course: 


Patient is 37-year-old woman with a history of insulin-dependent diabetes 

mellitus, CVA on aspirin and Plavix, hypertension, dyslipidemia and depression 

who presented with altered mental status and was admitted for DKA.  Her 

baseline mental function per sister Cindy: she walks with a walker, she 

feeds herself, needs help with clothes, left arm weaker than right and speech 

is usually slurred. Cindy reports that patient was discharged from [facility] about 1.5 months ago and went to [facility]. Then, she 

went to her home from there. She lives with daughter Ej and brother, 

Gonzalo, who moved in with her for more support.  





per Cindy: "I wanna say she wasn't talking her insulin, because this 3rd 

stoke did alittle mentally and she more forgettable, that is what I am thinking

, for example, she will say, I need to take my insulin but she had just took 

her insulin." They went to [place], on the way back Wednesday night 11pm, she 

was sleeping off and on, BG was 516 and she took her insulin. Her daughter, 

Ej, said next morning she got up Thursday morning to go to the bathroom 

and she fell and then was unresponsive, she was found on the floor in urine by 

daughter, Ej. 





-DKA with acute metabolic acidosis, high anion gap: Treated with IV fluids, 

insulin drip now resolved


-Acute metabolic encephalopathy/semiconscious state: treat the above


-Recurrent Acute ischemic strokes. ?hydrocephalus on Ct head: consulted 

Neurology


-Sepsis due to acute right mastoiditis, poa, finished coarse of iv rocephin 


-Hypernatremia: needs more free water, bmp


-Moderate malnutrition: dietician


-Acute ischemic stroke, poa: get ECHO, used stroke protocol





12/30/17: Insulin drip was stopped because of hypoglycemia and patient was sent 

from ED to medical/surg floor. Anion gap was still high so I sent ICU to start 

insulin drip. I also ordered bmp stat. By the time the stat bmp was resulted, 

patient was in the icu. Anion gap has closed, so i will downgrade back to 

medical surg floor. Replace her potassium, start diet, if she unable to eat 

continue D5W due to hypernatremia and not eating. Await neurology assessment 

because she is not at her mental baseline of slurred speech and ambulation with 

a walker. Order PT/OT





12/31/17: I gave pt 0.5mg iv ativan x 1 to complete mri brain that Neurology 

ordered, MRI brain reported as multiple focal areas of restricted diffusion 

right cerebral hemisphere suggestive excuse ischemia, no evidence of hemorrhage

, dilated right lateral ventricle without definite evidence of obstruction, 

acute right mastoiditis. I started abx, iv rocephin, used stroke protocol





1/1/2018: WBC went down, Still hypernatremic, more free water, repeat labs in 

am. ECHO pending. I called Cindy at [phone], no answer ?phone broken 

message. 


1/2/18: echo pending, replace potassium, hyperglycemia on D5W for the 

hypernatremia, continue ssi,  if she tolerates a diet today, then d/c ivf, 

change ssi to achs instead of q4hr,  urine culture still pending. Hopefully SNF 

placement in 1-2 days


1/3/18: Echo reviewed, hypernatremia/hypokalemia resolved, stop ivf. Waiting on 

placement


1/4/18: [facility] in [place], [place], waiting on insurance pre-auth


1/5/18: failed swallow, going for peg on monday because she is on Plavix. 

Nutrition: Patient refusing NGT but she will re-consider; she is 

comminunicating via paper and pencil. She agreed to NGT


1/6/18: continue ngt tube. 


1/7/18: abd xray, check ngt placement. peg tube tomorrow then placement


1/8/18: peg today, restart antiplatelet soon after


1/9/18: tolerated tube feedings, going to [facility], sister Evon at 

bedside (who i discharge last week, she looks great). 





Disposition: [id number]/[facility]-[id number] SNF W [facility] CERT


Time spent for discharge: 35 minutes





Core Measure Documentation





- Palliative Care


Palliative Care/ Comfort Measures: Not Applicable





- Core Measures


Any of the following diagnoses?: stroke





- VTE Discharge Requirements


Deep Vein Thrombosis/Pulmonary Embolism Present on Admission: No


Has pt received <5 days of overlap therapy or INR<2.0: No


Anticoagulant overlap therapy prescribed at discharge: No


Contraindication No Overlap Therapy order at DC: Not Indicated





- Stroke Discharge Requirements


Statin for LDL = or >70 mg/dl on DC: Yes


Anticoag for atrial fib/atrial flutter: Not Applicable


Antithrombotic for ischemic stroke: Yes





Exam





- Physical Exam


Narrative exam: 


GEN: Thin frail woman chronic a bit debilitating appearing, lethargic, nonverbal


HEENT: NCAT, EOMI, PERRL, OP Clear


NECK: supple, no adenopathy, no thyromegaly, no JVD


CVS/HEART: regular tachycardia NORMAL S1S2, NO JVD, pulses present bilaterally


CHEST/LUNGS: CTA B, Symmetrical chest expansion, good air entry bilaterally


GI/Abdomen: soft, NTND, good bowel sounds, no guarding or rebound


/Bladder: no suprapubic tenderness, no CVA or paraspinal tenderness


EXT/Skin: no c/c/e, no obvious rash


MSK: Moving both arms with contractures 


Neuro: CN 2-12 grossly intact except nonverbal, hemiparesis, facial asymmetry, 

no new focal deficits


Psych: calm but confused








- Constitutional


Vitals: 


 











Temp Pulse Resp BP Pulse Ox


 


 99.1 F   104 H  18   150/78   95 


 


 01/09/18 07:52  01/09/18 07:52  01/09/18 07:52  01/09/18 11:46  01/09/18 07:52














Plan


Activity: up only with assistance, fall precautions, other (no strenous 

activity until cleared by pcp)


Diet: per dietitian instruction ([employer] goal tube feedings 55 mL per 

hour)


Special Instructions: record daily BP diary, record blood sugar diary


Follow up with: 


PRIMARY CARE,MD [Primary Care Provider] - 3-5 Days

## 2018-01-09 NOTE — GASTROENTEROLOGY PROGRESS NOTE
Assessment and Plan


1.PEG placement


 2.acute ischemic stroke


 3.acute metabolic encephalopathy


 4.DKA


 


-failed speech eval with modified barium swallow 


-s/p EGD with PEG placement yesterday


-PEG site WNL w/o signs of infection


-bumper off loaded to prevent skin breakdown


-tolerating tube feeding


-split gauze dressing PRN


-okay to resume plavix and ASA


-continue supportive care


-no further GI recommendations at this time, will sign off








Subjective


Date of service: 01/09/18


Principal diagnosis: PEG placement


Interval history: 


Patient resting in bed. No acute distress. Alert and able to nod head to 

questions. Shakes head no when asked if she is having any abd pain or N/V. PEG 

site is w/o redness, swelling, odor, bleeding, or drainage. Tolerating tube 

feedings.








Objective





- Constitutional


Vitals: 


 











Temp Pulse Resp BP Pulse Ox


 


 99.1 F   104 H  18   152/93   95 


 


 01/09/18 07:52  01/09/18 07:52  01/09/18 07:52  01/09/18 07:52  01/09/18 07:52











General appearance: no acute distress, other (alert, non-verbal)





- Respiratory


Respiratory: bilateral: CTA





- Cardiovascular


Rhythm: regular


Heart Sounds: Present: S1 & S2





- Gastrointestinal


General gastrointestinal: Present: soft, non-tender, non-distended, normal 

bowel sounds, other (+PEG)





- Labs


CBC & Chem 7: 


 01/03/18 05:55





 01/03/18 05:55


Labs: 


 Laboratory Results - last 24 hr











  01/08/18 01/08/18 01/08/18





  11:38 16:18 22:39


 


POC Glucose  176 H  244 H  208 H














  01/09/18





  06:58


 


POC Glucose  297 H

## 2019-11-23 ENCOUNTER — HOSPITAL ENCOUNTER (INPATIENT)
Dept: HOSPITAL 5 - ED | Age: 39
LOS: 4 days | Discharge: HOME HEALTH | DRG: 189 | End: 2019-11-27
Attending: INTERNAL MEDICINE | Admitting: INTERNAL MEDICINE
Payer: MEDICARE

## 2019-11-23 DIAGNOSIS — J96.01: Primary | ICD-10-CM

## 2019-11-23 DIAGNOSIS — N30.00: ICD-10-CM

## 2019-11-23 DIAGNOSIS — G93.40: ICD-10-CM

## 2019-11-23 DIAGNOSIS — I25.10: ICD-10-CM

## 2019-11-23 DIAGNOSIS — I69.354: ICD-10-CM

## 2019-11-23 DIAGNOSIS — E11.22: ICD-10-CM

## 2019-11-23 DIAGNOSIS — I16.0: ICD-10-CM

## 2019-11-23 DIAGNOSIS — J90: ICD-10-CM

## 2019-11-23 DIAGNOSIS — J81.0: ICD-10-CM

## 2019-11-23 DIAGNOSIS — I16.1: ICD-10-CM

## 2019-11-23 DIAGNOSIS — H40.9: ICD-10-CM

## 2019-11-23 DIAGNOSIS — E43: ICD-10-CM

## 2019-11-23 DIAGNOSIS — N17.9: ICD-10-CM

## 2019-11-23 DIAGNOSIS — Z79.82: ICD-10-CM

## 2019-11-23 DIAGNOSIS — N18.9: ICD-10-CM

## 2019-11-23 DIAGNOSIS — G40.909: ICD-10-CM

## 2019-11-23 DIAGNOSIS — D63.8: ICD-10-CM

## 2019-11-23 DIAGNOSIS — E11.65: ICD-10-CM

## 2019-11-23 LAB
ALBUMIN SERPL-MCNC: 2.8 G/DL (ref 3.9–5)
ALT SERPL-CCNC: 16 UNITS/L (ref 7–56)
APTT BLD: 29 SEC. (ref 24.2–36.6)
BACTERIA #/AREA URNS HPF: (no result) /HPF
BAND NEUTROPHILS # (MANUAL): 0 K/MM3
BILIRUB UR QL STRIP: (no result)
BLOOD UR QL VISUAL: (no result)
BUN SERPL-MCNC: 30 MG/DL (ref 7–17)
BUN/CREAT SERPL: 12 %
CALCIUM SERPL-MCNC: 8.1 MG/DL (ref 8.4–10.2)
HCO3 BLDA-SCNC: 21.7 MMOL/L (ref 20–26)
HCT VFR BLD CALC: 24.5 % (ref 30.3–42.9)
HDLC SERPL-MCNC: 74 MG/DL (ref 40–59)
HEMOLYSIS INDEX: 3
HGB BLD-MCNC: 8 GM/DL (ref 10.1–14.3)
HYALINE CASTS #/AREA URNS LPF: 2 /LPF
INR PPP: 1.11 (ref 0.87–1.13)
MCHC RBC AUTO-ENTMCNC: 33 % (ref 30–34)
MCV RBC AUTO: 92 FL (ref 79–97)
MYELOCYTES # (MANUAL): 0 K/MM3
PCO2 BLDA: 37.7 MM HG
PH BLDA: 7.38 PH UNITS (ref 7.35–7.45)
PH UR STRIP: 7 [PH] (ref 5–7)
PLATELET # BLD: 264 K/MM3 (ref 140–440)
PO2 BLDA: 82.9 MM HG (ref 80–90)
PROMYELOCYTES # (MANUAL): 0 K/MM3
PROT UR STRIP-MCNC: >500 MG/DL
RBC # BLD AUTO: 2.67 M/MM3 (ref 3.65–5.03)
RBC #/AREA URNS HPF: 28 /HPF (ref 0–6)
TOTAL CELLS COUNTED BLD: 100
UROBILINOGEN UR-MCNC: < 2 MG/DL (ref ?–2)
VEN PH: 7.38 (ref 7.32–7.42)
WBC #/AREA URNS HPF: 20 /HPF (ref 0–6)

## 2019-11-23 PROCEDURE — 84156 ASSAY OF PROTEIN URINE: CPT

## 2019-11-23 PROCEDURE — 83520 IMMUNOASSAY QUANT NOS NONAB: CPT

## 2019-11-23 PROCEDURE — 87086 URINE CULTURE/COLONY COUNT: CPT

## 2019-11-23 PROCEDURE — 84484 ASSAY OF TROPONIN QUANT: CPT

## 2019-11-23 PROCEDURE — 85007 BL SMEAR W/DIFF WBC COUNT: CPT

## 2019-11-23 PROCEDURE — 93005 ELECTROCARDIOGRAM TRACING: CPT

## 2019-11-23 PROCEDURE — 71045 X-RAY EXAM CHEST 1 VIEW: CPT

## 2019-11-23 PROCEDURE — 85025 COMPLETE CBC W/AUTO DIFF WBC: CPT

## 2019-11-23 PROCEDURE — 80061 LIPID PANEL: CPT

## 2019-11-23 PROCEDURE — 83735 ASSAY OF MAGNESIUM: CPT

## 2019-11-23 PROCEDURE — 86021 WBC ANTIBODY IDENTIFICATION: CPT

## 2019-11-23 PROCEDURE — 93010 ELECTROCARDIOGRAM REPORT: CPT

## 2019-11-23 PROCEDURE — 86038 ANTINUCLEAR ANTIBODIES: CPT

## 2019-11-23 PROCEDURE — 85610 PROTHROMBIN TIME: CPT

## 2019-11-23 PROCEDURE — 85730 THROMBOPLASTIN TIME PARTIAL: CPT

## 2019-11-23 PROCEDURE — 76770 US EXAM ABDO BACK WALL COMP: CPT

## 2019-11-23 PROCEDURE — 36415 COLL VENOUS BLD VENIPUNCTURE: CPT

## 2019-11-23 PROCEDURE — 86160 COMPLEMENT ANTIGEN: CPT

## 2019-11-23 PROCEDURE — 81001 URINALYSIS AUTO W/SCOPE: CPT

## 2019-11-23 PROCEDURE — 84300 ASSAY OF URINE SODIUM: CPT

## 2019-11-23 PROCEDURE — 85027 COMPLETE CBC AUTOMATED: CPT

## 2019-11-23 PROCEDURE — 80053 COMPREHEN METABOLIC PANEL: CPT

## 2019-11-23 PROCEDURE — 4A033R1 MEASUREMENT OF ARTERIAL SATURATION, PERIPHERAL, PERCUTANEOUS APPROACH: ICD-10-PCS | Performed by: INTERNAL MEDICINE

## 2019-11-23 PROCEDURE — 82805 BLOOD GASES W/O2 SATURATION: CPT

## 2019-11-23 PROCEDURE — 82570 ASSAY OF URINE CREATININE: CPT

## 2019-11-23 PROCEDURE — 83880 ASSAY OF NATRIURETIC PEPTIDE: CPT

## 2019-11-23 PROCEDURE — 82550 ASSAY OF CK (CPK): CPT

## 2019-11-23 PROCEDURE — 80048 BASIC METABOLIC PNL TOTAL CA: CPT

## 2019-11-23 PROCEDURE — 96375 TX/PRO/DX INJ NEW DRUG ADDON: CPT

## 2019-11-23 PROCEDURE — 82803 BLOOD GASES ANY COMBINATION: CPT

## 2019-11-23 PROCEDURE — 96365 THER/PROPH/DIAG IV INF INIT: CPT

## 2019-11-23 PROCEDURE — 5A09357 ASSISTANCE WITH RESPIRATORY VENTILATION, LESS THAN 24 CONSECUTIVE HOURS, CONTINUOUS POSITIVE AIRWAY PRESSURE: ICD-10-PCS | Performed by: INTERNAL MEDICINE

## 2019-11-23 PROCEDURE — 82962 GLUCOSE BLOOD TEST: CPT

## 2019-11-23 RX ADMIN — METHYLPREDNISOLONE SODIUM SUCCINATE SCH MG: 125 INJECTION, POWDER, FOR SOLUTION INTRAMUSCULAR; INTRAVENOUS at 23:45

## 2019-11-23 RX ADMIN — VALSARTAN SCH MG: 160 TABLET, FILM COATED ORAL at 23:45

## 2019-11-23 NOTE — EMERGENCY DEPARTMENT REPORT
ED General Adult HPI





- General


Chief complaint: Dyspnea/Respdistress


Stated complaint: TIFFANIE


Time Seen by Provider: 19 18:01


Source: patient, EMS (verbal report received from emergency medical services. 

EMS documentation not available at time of chart dictation ), RN notes reviewed,

old records reviewed


Mode of arrival: Stretcher


Limitations: Physical Limitation





- History of Present Illness


Initial comments: 





This is a 39-year-old female.  The patient has a history of stroke, left-sided 

hemiparesis, hypertension, seizure, diabetes





The patient is brought to the hospital by emergency medical services for acute 

respiratory distress.  Upon arrival, patient on BiPAP, saturating 88% on BiPAP 

therapy, in moderate to severe respiratory distress.  Emergency medical services

gave steroids, albuterol, and Lasix prior to arrival.  Upon initial evaluation, 

patient is awake, protecting airway, in moderate to severe respiratory distress,

with cool clammy skin, diaphoresis, and market hypertension, systolic blood 

pressure in the 220s.





Patient continued on BiPAP therapy, given nitroglycerin IV push by myself, 600 

g, 400 g, 1 g.








Patient then started on nitroglycerin drip.  This is continued with her BiPAP.  

This markedly improved her symptoms.








The patient indicated that she had a primary care doctor but cannot recall their

name.  Patient not able to describe exacerbating or relieving factors.


-: Sudden (per ems)


Radiation: other


Quality: other


Consistency: other


Improves with: other


Worsens with: other


Associated Symptoms: shortness of breath





- Related Data


                                Home Medications











 Medication  Instructions  Recorded  Confirmed  Last Taken


 


NIFEdipine [Nifedipine ER] 10 mg PO TID 18 1 Day Ago





    ~01/10/18








                                  Previous Rx's











 Medication  Instructions  Recorded  Last Taken  Type


 


Acetaminophen [Acetaminophen TAB] 325 mg PO Q4H PRN #30 tablet 18 Unknown 

Rx


 


Aspirin [Aspirin EC] 81 mg PO DAILY #30 18 1 Day Ago Rx





   ~01/10/18 


 


AtorvaSTATin [Lipitor] 80 mg PO QHS #30 day 18 1 Day Ago Rx





   ~01/10/18 


 


Bisacodyl [Dulcolax suppos] 10 mg AK QDAY PRN #7 supp.rect 18 Unknown Rx


 


Brimonidine/Timolol 0.2-0.5% 1 drops OU Q12HR #1 bottle 18 1 Day Ago Rx





[Combigan 0.2-0.5%]   ~01/10/18 


 


Citalopram [celeXA] 10 mg PO QDAY #30 18 1 Day Ago Rx





   ~01/10/18 


 


Clopidogrel [Plavix] 75 mg PO QDAY #30 18 1 Day Ago Rx





   ~01/10/18 


 


Cyclobenzaprine [Flexeril 10 MG 10 mg PO TID PRN #30 day 18 Unknown Rx





TAB]    


 


Lipase/Protease/Amylase [Pancreaze 1 each FEEDTUBE PRN PRN #30 capsule 18 

Unknown Rx





Dr 10,500 Unit]    


 


Magnesium Hydroxide [Milk of 30 ml PO Q4H PRN #30 oral.liqd 18 Unknown Rx





Magnesia]    


 


Petrolatum,White [Vaseline Lip 1 applic TP AS DIRECT PRN #30 tube 18 

Unknown Rx





Therapy]    


 


Simple Syrup 15 ml FEEDTUBE PRN PRN #30 18 Unknown Rx





 oral.liqd   


 


Simple Syrup 30 ml FEEDTUBE PRN PRN #30 18 Unknown Rx





 oral.liqd   


 


Sodium Bicarbonate 325 mg FEEDTUBE PRN PRN #30 tablet 18 Unknown Rx


 


amLODIPine 5 mg PO QDAY #30 tablet 18 1 Day Ago Rx





   ~01/10/18 


 


Detemir (Nf) [Levemir (Nf)] 10 units SUB-Q QHS  units 18 Unknown Rx











                                    Allergies











Allergy/AdvReac Type Severity Reaction Status Date / Time


 


No Known Allergies Allergy   Unverified 01/10/15 03:04














ED Review of Systems


ROS: 


Stated complaint: TIFFANIE


Other details as noted in HPI





Comment: Unobtainable due to pts medical conditions





ED Past Medical Hx





- Past Medical History


Hx Hypertension: Yes (ECHO  EF 50-55%)


Hx CVA: Yes (left side)


Hx Heart Attack/AMI: No


Hx Congestive Heart Failure: No


Hx Diabetes: Yes


Hx Seizures: Yes


Hx Asthma: No


Hx COPD: No





- Surgical History


Additional Surgical History: Spinal surgery, neck/artery surgery





- Social History


Smoking Status: Never Smoker





- Medications


Home Medications: 


                                Home Medications











 Medication  Instructions  Recorded  Confirmed  Last Taken  Type


 


Acetaminophen [Acetaminophen TAB] 325 mg PO Q4H PRN #30 tablet 18

Unknown Rx


 


Aspirin [Aspirin EC] 81 mg PO DAILY #30 18 1 Day Ago Rx





    ~01/10/18 


 


AtorvaSTATin [Lipitor] 80 mg PO QHS #30 day 18 1 Day Ago Rx





    ~01/10/18 


 


Bisacodyl [Dulcolax suppos] 10 mg AK QDAY PRN #7 supp.rect 18 

Unknown Rx


 


Brimonidine/Timolol 0.2-0.5% 1 drops OU Q12HR #1 bottle 18 1 Day 

Ago Rx





[Combigan 0.2-0.5%]    ~01/10/18 


 


Citalopram [celeXA] 10 mg PO QDAY #30 18 1 Day Ago Rx





    ~01/10/18 


 


Clopidogrel [Plavix] 75 mg PO QDAY #30 18 1 Day Ago Rx





    ~01/10/18 


 


Cyclobenzaprine [Flexeril 10 MG 10 mg PO TID PRN #30 day 18 Unk

nown Rx





TAB]     


 


Lipase/Protease/Amylase [Pancreaze 1 each FEEDTUBE PRN PRN #30 capsule 18 Unknown Rx





Dr 10,500 Unit]     


 


Magnesium Hydroxide [Milk of 30 ml PO Q4H PRN #30 oral.liqd 18 

Unknown Rx





Magnesia]     


 


Petrolatum,White [Vaseline Lip 1 applic TP AS DIRECT PRN #30 tube 18 Unknown Rx





Therapy]     


 


Simple Syrup 15 ml FEEDTUBE PRN PRN #30 18 Unknown Rx





 oral.liqd    


 


Simple Syrup 30 ml FEEDTUBE PRN PRN #30 18 Unknown Rx





 oral.liqd    


 


Sodium Bicarbonate 325 mg FEEDTUBE PRN PRN #30 tablet 18 Unknown 

Rx


 


amLODIPine 5 mg PO QDAY #30 tablet 18 1 Day Ago Rx





    ~01/10/18 


 


NIFEdipine [Nifedipine ER] 10 mg PO TID 18 1 Day Ago History





    ~01/10/18 


 


Detemir (Nf) [Levemir (Nf)] 10 units SUB-Q QHS  units 18  Unknown Rx














ED Physical Exam





- General


Limitations: Physical Limitation


General appearance: anxious, in distress, obese





- Head


Head exam: Present: atraumatic, normocephalic





- Eye


Eye exam: Present: normal appearance





- ENT


ENT exam: Present: normal orophraynx, mucous membranes moist, normal external 

ear exam





- Neck


Neck exam: Present: normal inspection, full ROM.  Absent: tenderness, 

meningismus





- Respiratory


Respiratory exam: Present: respiratory distress, rales, accessory muscle use





- Cardiovascular


Cardiovascular Exam: Present: normal rhythm, tachycardia, normal heart sounds.  

Absent: systolic murmur, diastolic murmur, rubs, gallop





- GI/Abdominal


GI/Abdominal exam: Present: soft, normal bowel sounds.  Absent: distended, 

tenderness, guarding, rebound, rigid, pulsatile mass





- Extremities Exam


Extremities exam: Present: normal inspection (muscular compartments are soft.), 

pedal edema, other (2+ pulses noted in the bilateral upper, lower extremities.  

There is left upper extremity left lower extremity chronic hemiparesis.  There 

is no palpable cord.  There is a negative Homans sign.).  Absent: calf 

tenderness





- Back Exam


Back exam: Present: normal inspection.  Absent: tenderness, CVA tenderness (R), 

CVA tenderness (L), paraspinal tenderness, vertebral tenderness





- Neurological Exam


Neurological exam: Present: alert, motor sensory deficit (there is left leg 

weakness, left arm weakness.  This is chronic.  Moving right arm, right leg 

spontaneously)





- Psychiatric


Psychiatric exam: Present: anxious





- Skin


Skin exam: Present: warm, dry, intact, normal color.  Absent: rash





ED Course


                                   Vital Signs











  19





  17:57 18:02 18:04


 


Temperature   


 


Pulse Rate 117 H 102 H 104 H


 


Respiratory 25 H  20





Rate   


 


Blood Pressure   210/131


 


Blood Pressure 221/135  





[Right]   


 


O2 Sat by Pulse 100  100





Oximetry   














  19





  18:20 19:10 19:15


 


Temperature 97.1 F L  


 


Pulse Rate  97 H 102 H


 


Respiratory 25 H 19 21





Rate   


 


Blood Pressure   


 


Blood Pressure   





[Right]   


 


O2 Sat by Pulse  96 92





Oximetry   














  19





  19:31 19:35 19:45


 


Temperature   97.2 F L


 


Pulse Rate 94 H 99 H 99 H


 


Respiratory 20 23 16





Rate   


 


Blood Pressure 206/109 207/109 


 


Blood Pressure   206/109





[Right]   


 


O2 Sat by Pulse 99 97 96





Oximetry   














  19





  20:00 20:30 20:45


 


Temperature   


 


Pulse Rate 94 H 99 H 95 H


 


Respiratory 20 18 19





Rate   


 


Blood Pressure 180/97 197/106 202/108


 


Blood Pressure   





[Right]   


 


O2 Sat by Pulse 97 99 99





Oximetry   














  19





  20:56 21:00 21:15


 


Temperature   


 


Pulse Rate 93 H 93 H 90


 


Respiratory  15 14





Rate   


 


Blood Pressure 195/105 173/97 151/85


 


Blood Pressure   





[Right]   


 


O2 Sat by Pulse  98 98





Oximetry   














ED Medical Decision Making





- Lab Data


Result diagrams: 


                                 19 18:40





                                 19 18:40








                                   Vital Signs











  19





  17:57 18:02 18:04


 


Temperature   


 


Pulse Rate 117 H 102 H 104 H


 


Respiratory 25 H  20





Rate   


 


Blood Pressure   210/131


 


Blood Pressure 221/135  





[Right]   


 


O2 Sat by Pulse 100  100





Oximetry   














  19





  18:20 19:10 19:15


 


Temperature 97.1 F L  


 


Pulse Rate  97 H 102 H


 


Respiratory 25 H 19 21





Rate   


 


Blood Pressure   


 


Blood Pressure   





[Right]   


 


O2 Sat by Pulse  96 92





Oximetry   














  19





  19:31 19:35 19:45


 


Temperature   


 


Pulse Rate 94 H 99 H 99 H


 


Respiratory 20 23 16





Rate   


 


Blood Pressure 206/109 207/109 


 


Blood Pressure   206/109





[Right]   


 


O2 Sat by Pulse 99 97 96





Oximetry   











                                   Lab Results











  19 Range/Units





  18:34 18:40 18:40 


 


WBC   13.8 H   (4.5-11.0)  K/mm3


 


RBC   2.67 L   (3.65-5.03)  M/mm3


 


Hgb   8.0 L   (10.1-14.3)  gm/dl


 


Hct   24.5 L   (30.3-42.9)  %


 


MCV   92   (79-97)  fl


 


MCH   30   (28-32)  pg


 


MCHC   33   (30-34)  %


 


RDW   13.2   (13.2-15.2)  %


 


Plt Count   264   (140-440)  K/mm3


 


Add Manual Diff   Complete   


 


Total Counted   100   


 


Seg Neutrophils %   Np   


 


Seg Neuts % (Manual)   93.0 H   (40.0-70.0)  %


 


Band Neutrophils %   0   %


 


Lymphocytes % (Manual)   4.0 L   (13.4-35.0)  %


 


Reactive Lymphs % (Man)   0   %


 


Monocytes % (Manual)   2.0   (0.0-7.3)  %


 


Eosinophils % (Manual)   1.0   (0.0-4.3)  %


 


Basophils % (Manual)   0   (0.0-1.8)  %


 


Metamyelocytes %   0   %


 


Myelocytes %   0   %


 


Promyelocytes %   0   %


 


Blast Cells %   0   %


 


Nucleated RBC %   Not Reportable   


 


Seg Neutrophils # Man   12.8 H   (1.8-7.7)  K/mm3


 


Band Neutrophils #   0.0   K/mm3


 


Lymphocytes # (Manual)   0.6 L   (1.2-5.4)  K/mm3


 


Abs React Lymphs (Man)   0.0   K/mm3


 


Monocytes # (Manual)   0.3   (0.0-0.8)  K/mm3


 


Eosinophils # (Manual)   0.1   (0.0-0.4)  K/mm3


 


Basophils # (Manual)   0.0   (0.0-0.1)  K/mm3


 


Metamyelocytes #   0.0   K/mm3


 


Myelocytes #   0.0   K/mm3


 


Promyelocytes #   0.0   K/mm3


 


Blast Cells #   0.0   K/mm3


 


WBC Morphology   Not Reportable   


 


Hypersegmented Neuts   Not Reportable   


 


Hyposegmented Neuts   Not Reportable   


 


Hypogranular Neuts   Not Reportable   


 


Smudge Cells   Not Reportable   


 


Toxic Granulation   Not Reportable   


 


Toxic Vacuolation   Not Reportable   


 


Dohle Bodies   Not Reportable   


 


Pelger-Huet Anomaly   Not Reportable   


 


Yuly Rods   Not Reportable   


 


Platelet Estimate   Consistent w auto   


 


Clumped Platelets   Not Reportable   


 


Plt Clumps, EDTA   Not Reportable   


 


Large Platelets   1+   


 


Giant Platelets   Not Reportable   


 


Platelet Satelliting   Not Reportable   


 


Plt Morphology Comment   Not Reportable   


 


RBC Morphology   Normal   


 


Dimorphic RBCs   Not Reportable   


 


Polychromasia   Not Reportable   


 


Hypochromasia   Not Reportable   


 


Poikilocytosis   Not Reportable   


 


Anisocytosis   Not Reportable   


 


Microcytosis   Not Reportable   


 


Macrocytosis   Not Reportable   


 


Spherocytes   Not Reportable   


 


Pappenheimer Bodies   Not Reportable   


 


Sickle Cells   Not Reportable   


 


Target Cells   Not Reportable   


 


Tear Drop Cells   Not Reportable   


 


Ovalocytes   Not Reportable   


 


Helmet Cells   Not Reportable   


 


[place] Bodies   Not Reportable   


 


Cabot Rings   Not Reportable   


 


[place] Cells   Not Reportable   


 


Bite Cells   Not Reportable   


 


Crenated Cell   Not Reportable   


 


Elliptocytes   Not Reportable   


 


Acanthocytes (Spur)   Not Reportable   


 


Rouleaux   Not Reportable   


 


Hemoglobin C Crystals   Not Reportable   


 


Schistocytes   Not Reportable   


 


Malaria parasites   Not Reportable   


 


Juliano Bodies   Not Reportable   


 


Hem Pathologist Commnt   No   


 


PT    14.2  (12.2-14.9)  Sec.


 


INR    1.11  (0.87-1.13)  


 


APTT    29.0  (24.2-36.6)  Sec.


 


POC ABG pH  7.336 L    (7.35-7.45)  


 


ABG pH     (7.350-7.450)  pH Units


 


POC ABG pCO2  44.2    (35-45)  


 


ABG pCO2     mm Hg


 


POC ABG pO2  147 H    ()  


 


ABG pO2     (80.0-90.0)  mm Hg


 


POC ABG HCO3  23.6    (22-26 mml/L)  


 


ABG HCO3     (20.0-26.0)  mmol/L


 


POC ABG Total CO2  25    (23-27mmol/L)  


 


POC ABG O2 Sat  99    


 


ABG O2 Saturation     (95.0-99.0)  %


 


ABG O2 Content     (0.0-44)  


 


POC ABG Base Excess  -2    ((-2) - (+3)mmol/L)  


 


ABG Base Excess     (-2.0-3.0)  mmol/L


 


ABG Hemoglobin     (12.0-16.0)  gm/dl


 


ABG Carboxyhemoglobin     (0.0-5.0)  %


 


ABG Methemoglobin     (0.0-1.5)  %


 


VBG pH     (7.320-7.420)  


 


Oxyhemoglobin     (95.0-99.0)  %


 


FiO2  50    %


 


Sodium     (137-145)  mmol/L


 


Potassium     (3.6-5.0)  mmol/L


 


Chloride     ()  mmol/L


 


Carbon Dioxide     (22-30)  mmol/L


 


Anion Gap     mmol/L


 


BUN     (7-17)  mg/dL


 


Creatinine     (0.7-1.2)  mg/dL


 


Estimated GFR     ml/min


 


BUN/Creatinine Ratio     %


 


Glucose     ()  mg/dL


 


POC Glucose     ()  


 


Calcium     (8.4-10.2)  mg/dL


 


Magnesium     (1.7-2.3)  mg/dL


 


Total Bilirubin     (0.1-1.2)  mg/dL


 


AST     (5-40)  units/L


 


ALT     (7-56)  units/L


 


Alkaline Phosphatase     ()  units/L


 


Total Creatine Kinase     ()  units/L


 


Troponin T     (0.00-0.029)  ng/mL


 


NT-Pro-B Natriuret Pep     (0-450)  pg/mL


 


Total Protein     (6.3-8.2)  g/dL


 


Albumin     (3.9-5)  g/dL


 


Albumin/Globulin Ratio     %


 


Triglycerides     (2-149)  mg/dL


 


Cholesterol     ()  mg/dL


 


LDL Cholesterol Direct     ()  mg/dL


 


HDL Cholesterol     (40-59)  mg/dL


 


Cholesterol/HDL Ratio     %


 


Urine Color     (Yellow)  


 


Urine Turbidity     (Clear)  


 


Urine pH     (5.0-7.0)  


 


Ur Specific Gravity     (1.003-1.030)  


 


Urine Protein     (Negative)  mg/dL


 


Urine Glucose (UA)     (Negative)  mg/dL


 


Urine Ketones     (Negative)  mg/dL


 


Urine Blood     (Negative)  


 


Urine Nitrite     (Negative)  


 


Urine Bilirubin     (Negative)  


 


Urine Urobilinogen     (<2.0)  mg/dL


 


Ur Leukocyte Esterase     (Negative)  


 


Urine WBC (Auto)     (0.0-6.0)  /HPF


 


Urine RBC (Auto)     (0.0-6.0)  /HPF


 


U Epithel Cells (Auto)     (0-13.0)  /HPF


 


Urine Bacteria (Auto)     (Negative)  /HPF


 


Hyaline Casts     /LPF


 


Urine Yeast (Budding)     /HPF














  19 Range/Units





  18:40 18:40 18:40 


 


WBC     (4.5-11.0)  K/mm3


 


RBC     (3.65-5.03)  M/mm3


 


Hgb     (10.1-14.3)  gm/dl


 


Hct     (30.3-42.9)  %


 


MCV     (79-97)  fl


 


MCH     (28-32)  pg


 


MCHC     (30-34)  %


 


RDW     (13.2-15.2)  %


 


Plt Count     (140-440)  K/mm3


 


Add Manual Diff     


 


Total Counted     


 


Seg Neutrophils %     


 


Seg Neuts % (Manual)     (40.0-70.0)  %


 


Band Neutrophils %     %


 


Lymphocytes % (Manual)     (13.4-35.0)  %


 


Reactive Lymphs % (Man)     %


 


Monocytes % (Manual)     (0.0-7.3)  %


 


Eosinophils % (Manual)     (0.0-4.3)  %


 


Basophils % (Manual)     (0.0-1.8)  %


 


Metamyelocytes %     %


 


Myelocytes %     %


 


Promyelocytes %     %


 


Blast Cells %     %


 


Nucleated RBC %     


 


Seg Neutrophils # Man     (1.8-7.7)  K/mm3


 


Band Neutrophils #     K/mm3


 


Lymphocytes # (Manual)     (1.2-5.4)  K/mm3


 


Abs React Lymphs (Man)     K/mm3


 


Monocytes # (Manual)     (0.0-0.8)  K/mm3


 


Eosinophils # (Manual)     (0.0-0.4)  K/mm3


 


Basophils # (Manual)     (0.0-0.1)  K/mm3


 


Metamyelocytes #     K/mm3


 


Myelocytes #     K/mm3


 


Promyelocytes #     K/mm3


 


Blast Cells #     K/mm3


 


WBC Morphology     


 


Hypersegmented Neuts     


 


Hyposegmented Neuts     


 


Hypogranular Neuts     


 


Smudge Cells     


 


Toxic Granulation     


 


Toxic Vacuolation     


 


Dohle Bodies     


 


Pelger-Huet Anomaly     


 


Yuly Rods     


 


Platelet Estimate     


 


Clumped Platelets     


 


Plt Clumps, EDTA     


 


Large Platelets     


 


Giant Platelets     


 


Platelet Satelliting     


 


Plt Morphology Comment     


 


RBC Morphology     


 


Dimorphic RBCs     


 


Polychromasia     


 


Hypochromasia     


 


Poikilocytosis     


 


Anisocytosis     


 


Microcytosis     


 


Macrocytosis     


 


Spherocytes     


 


Pappenheimer Bodies     


 


Sickle Cells     


 


Target Cells     


 


Tear Drop Cells     


 


Ovalocytes     


 


Helmet Cells     


 


[place] Bodies     


 


Cabot Rings     


 


[place] Cells     


 


Bite Cells     


 


Crenated Cell     


 


Elliptocytes     


 


Acanthocytes (Spur)     


 


Rouleaux     


 


Hemoglobin C Crystals     


 


Schistocytes     


 


Malaria parasites     


 


Juliano Bodies     


 


Hem Pathologist Commnt     


 


PT     (12.2-14.9)  Sec.


 


INR     (0.87-1.13)  


 


APTT     (24.2-36.6)  Sec.


 


POC ABG pH     (7.35-7.45)  


 


ABG pH   7.378   (7.350-7.450)  pH Units


 


POC ABG pCO2     (35-45)  


 


ABG pCO2   37.7   mm Hg


 


POC ABG pO2     ()  


 


ABG pO2   82.9   (80.0-90.0)  mm Hg


 


POC ABG HCO3     (22-26 mml/L)  


 


ABG HCO3   21.7   (20.0-26.0)  mmol/L


 


POC ABG Total CO2     (23-27mmol/L)  


 


POC ABG O2 Sat     


 


ABG O2 Saturation   96.8   (95.0-99.0)  %


 


ABG O2 Content   12.0   (0.0-44)  


 


POC ABG Base Excess     ((-2) - (+3)mmol/L)  


 


ABG Base Excess   -3.1 L   (-2.0-3.0)  mmol/L


 


ABG Hemoglobin   9.2 L   (12.0-16.0)  gm/dl


 


ABG Carboxyhemoglobin   4.4   (0.0-5.0)  %


 


ABG Methemoglobin   0.3   (0.0-1.5)  %


 


VBG pH   7.378   (7.320-7.420)  


 


Oxyhemoglobin   92.2 L   (95.0-99.0)  %


 


FiO2   21   %


 


Sodium  137    (137-145)  mmol/L


 


Potassium  4.3    (3.6-5.0)  mmol/L


 


Chloride  103.1    ()  mmol/L


 


Carbon Dioxide  19 L    (22-30)  mmol/L


 


Anion Gap  19    mmol/L


 


BUN  30 H    (7-17)  mg/dL


 


Creatinine  2.5 H    (0.7-1.2)  mg/dL


 


Estimated GFR  26    ml/min


 


BUN/Creatinine Ratio  12    %


 


Glucose  474 H    ()  mg/dL


 


POC Glucose     ()  


 


Calcium  8.1 L    (8.4-10.2)  mg/dL


 


Magnesium  2.00    (1.7-2.3)  mg/dL


 


Total Bilirubin  0.20    (0.1-1.2)  mg/dL


 


AST  19    (5-40)  units/L


 


ALT  16    (7-56)  units/L


 


Alkaline Phosphatase  109    ()  units/L


 


Total Creatine Kinase  64    ()  units/L


 


Troponin T  0.034 H    (0.00-0.029)  ng/mL


 


NT-Pro-B Natriuret Pep    75147 H  (0-450)  pg/mL


 


Total Protein  6.6    (6.3-8.2)  g/dL


 


Albumin  2.8 L    (3.9-5)  g/dL


 


Albumin/Globulin Ratio  0.7    %


 


Triglycerides  149    (2-149)  mg/dL


 


Cholesterol  268 H    ()  mg/dL


 


LDL Cholesterol Direct  176 H    ()  mg/dL


 


HDL Cholesterol  74 H    (40-59)  mg/dL


 


Cholesterol/HDL Ratio  3.62    %


 


Urine Color     (Yellow)  


 


Urine Turbidity     (Clear)  


 


Urine pH     (5.0-7.0)  


 


Ur Specific Gravity     (1.003-1.030)  


 


Urine Protein     (Negative)  mg/dL


 


Urine Glucose (UA)     (Negative)  mg/dL


 


Urine Ketones     (Negative)  mg/dL


 


Urine Blood     (Negative)  


 


Urine Nitrite     (Negative)  


 


Urine Bilirubin     (Negative)  


 


Urine Urobilinogen     (<2.0)  mg/dL


 


Ur Leukocyte Esterase     (Negative)  


 


Urine WBC (Auto)     (0.0-6.0)  /HPF


 


Urine RBC (Auto)     (0.0-6.0)  /HPF


 


U Epithel Cells (Auto)     (0-13.0)  /HPF


 


Urine Bacteria (Auto)     (Negative)  /HPF


 


Hyaline Casts     /LPF


 


Urine Yeast (Budding)     /HPF














  19 Range/Units





  18:46 19:50 


 


WBC    (4.5-11.0)  K/mm3


 


RBC    (3.65-5.03)  M/mm3


 


Hgb    (10.1-14.3)  gm/dl


 


Hct    (30.3-42.9)  %


 


MCV    (79-97)  fl


 


MCH    (28-32)  pg


 


MCHC    (30-34)  %


 


RDW    (13.2-15.2)  %


 


Plt Count    (140-440)  K/mm3


 


Add Manual Diff    


 


Total Counted    


 


Seg Neutrophils %    


 


Seg Neuts % (Manual)    (40.0-70.0)  %


 


Band Neutrophils %    %


 


Lymphocytes % (Manual)    (13.4-35.0)  %


 


Reactive Lymphs % (Man)    %


 


Monocytes % (Manual)    (0.0-7.3)  %


 


Eosinophils % (Manual)    (0.0-4.3)  %


 


Basophils % (Manual)    (0.0-1.8)  %


 


Metamyelocytes %    %


 


Myelocytes %    %


 


Promyelocytes %    %


 


Blast Cells %    %


 


Nucleated RBC %    


 


Seg Neutrophils # Man    (1.8-7.7)  K/mm3


 


Band Neutrophils #    K/mm3


 


Lymphocytes # (Manual)    (1.2-5.4)  K/mm3


 


Abs React Lymphs (Man)    K/mm3


 


Monocytes # (Manual)    (0.0-0.8)  K/mm3


 


Eosinophils # (Manual)    (0.0-0.4)  K/mm3


 


Basophils # (Manual)    (0.0-0.1)  K/mm3


 


Metamyelocytes #    K/mm3


 


Myelocytes #    K/mm3


 


Promyelocytes #    K/mm3


 


Blast Cells #    K/mm3


 


WBC Morphology    


 


Hypersegmented Neuts    


 


Hyposegmented Neuts    


 


Hypogranular Neuts    


 


Smudge Cells    


 


Toxic Granulation    


 


Toxic Vacuolation    


 


Dohle Bodies    


 


Pelger-Huet Anomaly    


 


Yuly Rods    


 


Platelet Estimate    


 


Clumped Platelets    


 


Plt Clumps, EDTA    


 


Large Platelets    


 


Giant Platelets    


 


Platelet Satelliting    


 


Plt Morphology Comment    


 


RBC Morphology    


 


Dimorphic RBCs    


 


Polychromasia    


 


Hypochromasia    


 


Poikilocytosis    


 


Anisocytosis    


 


Microcytosis    


 


Macrocytosis    


 


Spherocytes    


 


Pappenheimer Bodies    


 


Sickle Cells    


 


Target Cells    


 


Tear Drop Cells    


 


Ovalocytes    


 


Helmet Cells    


 


[place] Bodies    


 


Cabot Rings    


 


[place] Cells    


 


Bite Cells    


 


Crenated Cell    


 


Elliptocytes    


 


Acanthocytes (Spur)    


 


Rouleaux    


 


Hemoglobin C Crystals    


 


Schistocytes    


 


Malaria parasites    


 


Juliano Bodies    


 


Hem Pathologist Commnt    


 


PT    (12.2-14.9)  Sec.


 


INR    (0.87-1.13)  


 


APTT    (24.2-36.6)  Sec.


 


POC ABG pH    (7.35-7.45)  


 


ABG pH    (7.350-7.450)  pH Units


 


POC ABG pCO2    (35-45)  


 


ABG pCO2    mm Hg


 


POC ABG pO2    ()  


 


ABG pO2    (80.0-90.0)  mm Hg


 


POC ABG HCO3    (22-26 mml/L)  


 


ABG HCO3    (20.0-26.0)  mmol/L


 


POC ABG Total CO2    (23-27mmol/L)  


 


POC ABG O2 Sat    


 


ABG O2 Saturation    (95.0-99.0)  %


 


ABG O2 Content    (0.0-44)  


 


POC ABG Base Excess    ((-2) - (+3)mmol/L)  


 


ABG Base Excess    (-2.0-3.0)  mmol/L


 


ABG Hemoglobin    (12.0-16.0)  gm/dl


 


ABG Carboxyhemoglobin    (0.0-5.0)  %


 


ABG Methemoglobin    (0.0-1.5)  %


 


VBG pH    (7.320-7.420)  


 


Oxyhemoglobin    (95.0-99.0)  %


 


FiO2    %


 


Sodium    (137-145)  mmol/L


 


Potassium    (3.6-5.0)  mmol/L


 


Chloride    ()  mmol/L


 


Carbon Dioxide    (22-30)  mmol/L


 


Anion Gap    mmol/L


 


BUN    (7-17)  mg/dL


 


Creatinine    (0.7-1.2)  mg/dL


 


Estimated GFR    ml/min


 


BUN/Creatinine Ratio    %


 


Glucose    ()  mg/dL


 


POC Glucose   473 H  ()  


 


Calcium    (8.4-10.2)  mg/dL


 


Magnesium    (1.7-2.3)  mg/dL


 


Total Bilirubin    (0.1-1.2)  mg/dL


 


AST    (5-40)  units/L


 


ALT    (7-56)  units/L


 


Alkaline Phosphatase    ()  units/L


 


Total Creatine Kinase    ()  units/L


 


Troponin T    (0.00-0.029)  ng/mL


 


NT-Pro-B Natriuret Pep    (0-450)  pg/mL


 


Total Protein    (6.3-8.2)  g/dL


 


Albumin    (3.9-5)  g/dL


 


Albumin/Globulin Ratio    %


 


Triglycerides    (2-149)  mg/dL


 


Cholesterol    ()  mg/dL


 


LDL Cholesterol Direct    ()  mg/dL


 


HDL Cholesterol    (40-59)  mg/dL


 


Cholesterol/HDL Ratio    %


 


Urine Color  Straw   (Yellow)  


 


Urine Turbidity  Clear   (Clear)  


 


Urine pH  7.0   (5.0-7.0)  


 


Ur Specific Gravity  1.013   (1.003-1.030)  


 


Urine Protein  >500   (Negative)  mg/dL


 


Urine Glucose (UA)  >=500   (Negative)  mg/dL


 


Urine Ketones  Neg   (Negative)  mg/dL


 


Urine Blood  Sm   (Negative)  


 


Urine Nitrite  Neg   (Negative)  


 


Urine Bilirubin  Neg   (Negative)  


 


Urine Urobilinogen  < 2.0   (<2.0)  mg/dL


 


Ur Leukocyte Esterase  Mod   (Negative)  


 


Urine WBC (Auto)  20.0 H   (0.0-6.0)  /HPF


 


Urine RBC (Auto)  28.0   (0.0-6.0)  /HPF


 


U Epithel Cells (Auto)  2.0   (0-13.0)  /HPF


 


Urine Bacteria (Auto)  1+   (Negative)  /HPF


 


Hyaline Casts  2   /LPF


 


Urine Yeast (Budding)  Few   /HPF














- EKG Data


-: EKG Interpreted by Me


EKG shows normal: sinus rhythm


Rate: tachycardia





- EKG Data





19 20:26


The EKG today shows a sinus rhythm, 98 beats for minute, normal axis, the QTC is

 prolonged, there is low voltage, there is no endorsement of chest pain, the EKG

 is not consistent with a STEMI.  It appears to be grossly unchanged from prior 

EKG from 2018











- Radiology Data


Radiology results: report reviewed, image reviewed





Print Report











Referring Physician: PILLO RIOS 


 


Patient Name: MELANY FLORES 


 


Patient ID: [id number] 


 


YOB: 1980 


 


Sex: Female 


 


Accession: [id number] 


 


Report Date: 2019 


 


Report Status: Finalized 


 


Findings


[facility] [address] [id number] 

XRay Report Signed Patient: MELANY FLORES MR#: [id number] : 

1980 Acct:[id number] Age/Sex: 39 / F ADM Date: 19 Loc: ED 

Attending Dr: Ordering Physician: PILLO RIOS MD Date of Service: 19

 Procedure(s): XR chest 1V ap Accession Number(s): [id number] cc: PILLO RIOS MD Fluoro Time In Minutes: CHEST 1 VIEW 2019 6:02 PM INDICATION / 

CLINICAL INFORMATION: Dyspnea. COMPARISON: One view of the chest from 1/10/2018.

 FINDINGS: SUPPORT DEVICES: None. HEART / MEDIASTINUM: No significant 

abnormality. LUNGS / PLEURA: There are small bilateral pleural effusions with 

bilateral atelectasis/edema. No pneumothorax is seen. ADDITIONAL FINDINGS: No 

significant additional findings. IMPRESSION: Small pleural effusions with 

bilateral atelectasis/edema. Signer Name: Altaf Berrios MD Signed: 2019 

6:31 PM Workstation Name: [id number] Transcribed By: MN Dictated By: Altaf Berrios MD Electronically Authenticated By: Altaf Berrios MD Signed 

Date/Time: 19 1279   














- Medical Decision Making





Differential diagnosis, including not limited to: Malignant hypertensive 

emergency, flash pulmonary edema, cardiorenal syndrome, pneumonia, urinary tract

 infection, hyperglycemia





Assessment and plan: 39-year-old female with emergent presentation of flash 

pulmonary edema, requiring initiation of positive pressure ventilation and 

vasoactive medications.  She is much improved at this time.  Laboratory studies 

suggest renal insufficiency, we appreciate her physical exam, however we will 

give her a trial bolus of gentle IV fluids, and reassess.


She was given diuretics prior to my evaluation by emergency medical services.  

Elevated troponin is reviewed and appreciated, this is likely a type II troponin

 leak.


Hyperglycemia not consistent with diabetic ketoacidosis, insulin is ordered.








Urinalysis is reviewed and appreciated, suggestive of possible urinary tract 

infection.








Case was presented to the Hospital physician, Dr. Washburn who has accepted the 

patient to his service.  Patient continues to appear clinically improved on 

multiple repeat examinations.











Critical Care Time: Yes


Critical care time in (mins) excluding proc time.: 60


Critical care attestation.: 


If time is entered above; I have spent that time in minutes in the direct care 

of this critically ill patient, excluding procedure time.








ED Disposition


Clinical Impression: 


 Flash pulmonary edema, SHA (acute kidney injury), Malignant hypertensive 

urgency, Hyperglycemia





Disposition: DC-09 OP ADMIT IP TO THIS HOSP


Is pt being admited?: Yes


Does the pt Need Aspirin: Yes


Condition: Critical

## 2019-11-23 NOTE — XRAY REPORT
CHEST 1 VIEW 11/23/2019 6:02 PM



INDICATION / CLINICAL INFORMATION:

Dyspnea.



COMPARISON: 

One view of the chest from 1/10/2018.



FINDINGS:



SUPPORT DEVICES: None.

HEART / MEDIASTINUM: No significant abnormality. 

LUNGS / PLEURA: There are small bilateral pleural effusions with bilateral atelectasis/edema. No pneu
mothorax is seen.



ADDITIONAL FINDINGS: No significant additional findings.



IMPRESSION:

Small pleural effusions with bilateral atelectasis/edema.



Signer Name: Altaf Berrios MD 

Signed: 11/23/2019 6:31 PM

 Workstation Name: [id number]

## 2019-11-24 RX ADMIN — INSULIN LISPRO SCH UNIT: 100 INJECTION, SOLUTION INTRAVENOUS; SUBCUTANEOUS at 17:01

## 2019-11-24 RX ADMIN — CEFTRIAXONE SODIUM SCH: 2 INJECTION, POWDER, FOR SOLUTION INTRAMUSCULAR; INTRAVENOUS at 09:48

## 2019-11-24 RX ADMIN — VALSARTAN SCH MG: 160 TABLET, FILM COATED ORAL at 22:25

## 2019-11-24 RX ADMIN — INSULIN LISPRO SCH UNIT: 100 INJECTION, SOLUTION INTRAVENOUS; SUBCUTANEOUS at 12:57

## 2019-11-24 RX ADMIN — VALSARTAN SCH MG: 160 TABLET, FILM COATED ORAL at 09:48

## 2019-11-24 RX ADMIN — CEFTRIAXONE SODIUM SCH: 2 INJECTION, POWDER, FOR SOLUTION INTRAMUSCULAR; INTRAVENOUS at 09:51

## 2019-11-24 RX ADMIN — CLOPIDOGREL BISULFATE SCH MG: 75 TABLET ORAL at 09:49

## 2019-11-24 RX ADMIN — ASPIRIN SCH MG: 81 TABLET, COATED ORAL at 09:49

## 2019-11-24 RX ADMIN — INSULIN LISPRO SCH UNIT: 100 INJECTION, SOLUTION INTRAVENOUS; SUBCUTANEOUS at 09:01

## 2019-11-24 RX ADMIN — BRIMONIDINE TARTRATE, TIMOLOL MALEATE SCH DROPS: 2; 5 SOLUTION/ DROPS OPHTHALMIC at 09:49

## 2019-11-24 RX ADMIN — CITALOPRAM HYDROBROMIDE SCH MG: 10 TABLET ORAL at 09:49

## 2019-11-24 RX ADMIN — CEFTRIAXONE SODIUM SCH MLS/HR: 2 INJECTION, POWDER, FOR SOLUTION INTRAMUSCULAR; INTRAVENOUS at 09:48

## 2019-11-24 RX ADMIN — INSULIN LISPRO SCH UNIT: 100 INJECTION, SOLUTION INTRAVENOUS; SUBCUTANEOUS at 22:25

## 2019-11-24 RX ADMIN — NIFEDIPINE SCH MG: 30 TABLET, FILM COATED, EXTENDED RELEASE ORAL at 16:31

## 2019-11-24 RX ADMIN — ACETAMINOPHEN PRN MG: 325 TABLET ORAL at 14:06

## 2019-11-24 RX ADMIN — HEPARIN SODIUM SCH UNIT: 5000 INJECTION, SOLUTION INTRAVENOUS; SUBCUTANEOUS at 09:01

## 2019-11-24 RX ADMIN — METHYLPREDNISOLONE SODIUM SUCCINATE SCH MG: 125 INJECTION, POWDER, FOR SOLUTION INTRAMUSCULAR; INTRAVENOUS at 05:52

## 2019-11-24 RX ADMIN — HEPARIN SODIUM SCH UNIT: 5000 INJECTION, SOLUTION INTRAVENOUS; SUBCUTANEOUS at 22:25

## 2019-11-24 RX ADMIN — BRIMONIDINE TARTRATE, TIMOLOL MALEATE SCH DROPS: 2; 5 SOLUTION/ DROPS OPHTHALMIC at 22:45

## 2019-11-24 NOTE — PROGRESS NOTE
Assessment and Plan





- Patient Problems


(1) SHA (acute kidney injury)


Current Visit: Yes   Status: Acute   


Plan to address problem: 


Patient with acute kidney injury.  Most likely secondary to acute on chronic 

kidney disease.  Prerenal azotemia.  And malignant hypertension.








(2) Flash pulmonary edema


Current Visit: Yes   Status: Acute   


Plan to address problem: 


Last pulmonary edema secondary to uncontrolled blood pressure hypertensive 

emergency.  Should improve with correction of blood pressure and diuresis gentle

diuresis.








(3) Malignant hypertensive urgency


Current Visit: Yes   Status: Acute   


Plan to address problem: 


We'll start patient on by mouth medications Procardia and angiotensin receptor 

blocker.  Monitor blood pressure treat when necessary hydralazine for 

breakthrough pressure








(4) CAD (coronary artery disease)


Current Visit: Yes   Status: Chronic   


Qualifiers: 


   Coronary Disease-Associated Artery/Lesion type: native artery   Native vs. 

transplanted heart: native heart 


Plan to address problem: 


Patient is not having chest pain shortness of breath at this particular time 

stable coronary artery disease.








(5) IDDM (insulin dependent diabetes mellitus)


Current Visit: Yes   Status: Chronic   


Plan to address problem: 


Patient blood sugars uncontrolled.  We'll start patient on Lantus 15 units daily

at bedtime.








(6) History of CVA with residual deficit


Current Visit: No   Status: Chronic   


Plan to address problem: 


Patient just has residual weakness.  We'll continue to treat with aggressive 

antilipid's and antiplatelet therapy.  Along with beta blocker when blood 

pressure permits.








History


Interval history: 





Patient 39-year-old female with a history of CVA left hemiparesis and dysarthria

hypertension seizure disorder and diabetes presents with acute respiratory 

failure placed on BiPAP sats were 88%.  Patient also placed on steroids and beta

agonists.  Was found to have malignant hypertension with systolic blood pressure

greater than 220.  Patient then placed on nitroglycerin drip.  Patient currently

states she is hungry she is more alert today.  Appears to be close at her 

baseline.  Nitroglycerin drip was weaned off.





Hospitalist Physical





- Constitutional


Vitals: 


                                        











Temp Pulse Resp BP Pulse Ox


 


 96.9 F L  83   14   119/71   99 


 


 11/24/19 12:00  11/24/19 15:00  11/24/19 15:00  11/24/19 15:00  11/24/19 15:00











General appearance: Present: no acute distress, well-nourished





- EENT


Eyes: Present: PERRL, EOM intact


ENT: hearing intact, clear oral mucosa, dentition normal, other (dysarthria)





- Neck


Neck: Present: supple, normal ROM





- Respiratory


Respiratory: bilateral: CTA





- Cardiovascular


Rhythm: regular


Heart Sounds: Present: S1 & S2





- Extremities


Extremities: no ischemia, pulses intact, pulses symmetrical, No edema, normal 

temperature





- Abdominal


General gastrointestinal: soft, non-tender, non-distended, other (PEG tube 

feedings)





- Integumentary


Integumentary: Present: clear, warm, dry





- Psychiatric


Psychiatric: appropriate mood/affect





- Neurologic


Neurologic: CNII-XII intact, focal deficits





Results





- Labs


CBC & Chem 7: 


                                 11/23/19 18:40





                                 11/23/19 18:40


Labs: 


                             Laboratory Last Values











WBC  13.8 K/mm3 (4.5-11.0)  H  11/23/19  18:40    


 


RBC  2.67 M/mm3 (3.65-5.03)  L  11/23/19  18:40    


 


Hgb  8.0 gm/dl (10.1-14.3)  L  11/23/19  18:40    


 


Hct  24.5 % (30.3-42.9)  L  11/23/19  18:40    


 


MCV  92 fl (79-97)   11/23/19  18:40    


 


MCH  30 pg (28-32)   11/23/19  18:40    


 


MCHC  33 % (30-34)   11/23/19  18:40    


 


RDW  13.2 % (13.2-15.2)   11/23/19  18:40    


 


Plt Count  264 K/mm3 (140-440)   11/23/19  18:40    


 


Add Manual Diff  Complete   11/23/19  18:40    


 


Total Counted  100   11/23/19  18:40    


 


Seg Neutrophils %  Np   11/23/19  18:40    


 


Seg Neuts % (Manual)  93.0 % (40.0-70.0)  H  11/23/19  18:40    


 


Band Neutrophils %  0 %  11/23/19  18:40    


 


Lymphocytes % (Manual)  4.0 % (13.4-35.0)  L  11/23/19  18:40    


 


Reactive Lymphs % (Man)  0 %  11/23/19  18:40    


 


Monocytes % (Manual)  2.0 % (0.0-7.3)   11/23/19  18:40    


 


Eosinophils % (Manual)  1.0 % (0.0-4.3)   11/23/19  18:40    


 


Basophils % (Manual)  0 % (0.0-1.8)   11/23/19  18:40    


 


Metamyelocytes %  0 %  11/23/19  18:40    


 


Myelocytes %  0 %  11/23/19  18:40    


 


Promyelocytes %  0 %  11/23/19  18:40    


 


Blast Cells %  0 %  11/23/19  18:40    


 


Nucleated RBC %  Not Reportable   11/23/19  18:40    


 


Seg Neutrophils # Man  12.8 K/mm3 (1.8-7.7)  H  11/23/19  18:40    


 


Band Neutrophils #  0.0 K/mm3  11/23/19  18:40    


 


Lymphocytes # (Manual)  0.6 K/mm3 (1.2-5.4)  L  11/23/19  18:40    


 


Abs React Lymphs (Man)  0.0 K/mm3  11/23/19  18:40    


 


Monocytes # (Manual)  0.3 K/mm3 (0.0-0.8)   11/23/19  18:40    


 


Eosinophils # (Manual)  0.1 K/mm3 (0.0-0.4)   11/23/19  18:40    


 


Basophils # (Manual)  0.0 K/mm3 (0.0-0.1)   11/23/19  18:40    


 


Metamyelocytes #  0.0 K/mm3  11/23/19  18:40    


 


Myelocytes #  0.0 K/mm3  11/23/19  18:40    


 


Promyelocytes #  0.0 K/mm3  11/23/19  18:40    


 


Blast Cells #  0.0 K/mm3  11/23/19  18:40    


 


WBC Morphology  Not Reportable   11/23/19  18:40    


 


Hypersegmented Neuts  Not Reportable   11/23/19  18:40    


 


Hyposegmented Neuts  Not Reportable   11/23/19  18:40    


 


Hypogranular Neuts  Not Reportable   11/23/19  18:40    


 


Smudge Cells  Not Reportable   11/23/19  18:40    


 


Toxic Granulation  Not Reportable   11/23/19  18:40    


 


Toxic Vacuolation  Not Reportable   11/23/19  18:40    


 


Dohle Bodies  Not Reportable   11/23/19  18:40    


 


Pelger-Huet Anomaly  Not Reportable   11/23/19  18:40    


 


Yuly Rods  Not Reportable   11/23/19  18:40    


 


Platelet Estimate  Consistent w auto   11/23/19  18:40    


 


Clumped Platelets  Not Reportable   11/23/19  18:40    


 


Plt Clumps, EDTA  Not Reportable   11/23/19  18:40    


 


Large Platelets  1+   11/23/19  18:40    


 


Giant Platelets  Not Reportable   11/23/19  18:40    


 


Platelet Satelliting  Not Reportable   11/23/19  18:40    


 


Plt Morphology Comment  Not Reportable   11/23/19  18:40    


 


RBC Morphology  Normal   11/23/19  18:40    


 


Dimorphic RBCs  Not Reportable   11/23/19  18:40    


 


Polychromasia  Not Reportable   11/23/19  18:40    


 


Hypochromasia  Not Reportable   11/23/19  18:40    


 


Poikilocytosis  Not Reportable   11/23/19  18:40    


 


Anisocytosis  Not Reportable   11/23/19  18:40    


 


Microcytosis  Not Reportable   11/23/19  18:40    


 


Macrocytosis  Not Reportable   11/23/19  18:40    


 


Spherocytes  Not Reportable   11/23/19  18:40    


 


Pappenheimer Bodies  Not Reportable   11/23/19  18:40    


 


Sickle Cells  Not Reportable   11/23/19  18:40    


 


Target Cells  Not Reportable   11/23/19  18:40    


 


Tear Drop Cells  Not Reportable   11/23/19  18:40    


 


Ovalocytes  Not Reportable   11/23/19  18:40    


 


Helmet Cells  Not Reportable   11/23/19  18:40    


 


Acosta-Convoy Bodies  Not Reportable   11/23/19  18:40    


 


Cabot Rings  Not Reportable   11/23/19  18:40    


 


G[place] Cells  Not Reportable   11/23/19  18:40    


 


Bite Cells  Not Reportable   11/23/19  18:40    


 


Crenated Cell  Not Reportable   11/23/19  18:40    


 


Elliptocytes  Not Reportable   11/23/19  18:40    


 


Acanthocytes (Spur)  Not Reportable   11/23/19  18:40    


 


Rouleaux  Not Reportable   11/23/19  18:40    


 


Hemoglobin C Crystals  Not Reportable   11/23/19  18:40    


 


Schistocytes  Not Reportable   11/23/19  18:40    


 


Malaria parasites  Not Reportable   11/23/19  18:40    


 


Juliano Bodies  Not Reportable   11/23/19  18:40    


 


Hem Pathologist Commnt  No   11/23/19  18:40    


 


PT  14.2 Sec. (12.2-14.9)   11/23/19  18:40    


 


INR  1.11  (0.87-1.13)   11/23/19  18:40    


 


APTT  29.0 Sec. (24.2-36.6)   11/23/19  18:40    


 


POC ABG pH  7.336  (7.35-7.45)  L  11/23/19  18:34    


 


ABG pH  7.378 pH Units (7.350-7.450)   11/23/19  18:40    


 


POC ABG pCO2  44.2  (35-45)   11/23/19  18:34    


 


ABG pCO2  37.7 mm Hg  11/23/19  18:40    


 


POC ABG pO2  147  ()  H  11/23/19  18:34    


 


ABG pO2  82.9 mm Hg (80.0-90.0)   11/23/19  18:40    


 


POC ABG HCO3  23.6  (22-26 mml/L)   11/23/19  18:34    


 


ABG HCO3  21.7 mmol/L (20.0-26.0)   11/23/19  18:40    


 


POC ABG Total CO2  25  (23-27mmol/L)   11/23/19  18:34    


 


POC ABG O2 Sat  99   11/23/19  18:34    


 


ABG O2 Saturation  96.8 % (95.0-99.0)   11/23/19  18:40    


 


ABG O2 Content  12.0  (0.0-44)   11/23/19  18:40    


 


POC ABG Base Excess  -2  ((-2) - (+3)mmol/L)   11/23/19  18:34    


 


ABG Base Excess  -3.1 mmol/L (-2.0-3.0)  L  11/23/19  18:40    


 


ABG Hemoglobin  9.2 gm/dl (12.0-16.0)  L  11/23/19  18:40    


 


ABG Carboxyhemoglobin  4.4 % (0.0-5.0)   11/23/19  18:40    


 


ABG Methemoglobin  0.3 % (0.0-1.5)   11/23/19  18:40    


 


VBG pH  7.378  (7.320-7.420)   11/23/19  18:40    


 


Oxyhemoglobin  92.2 % (95.0-99.0)  L  11/23/19  18:40    


 


FiO2  21 %  11/23/19  18:40    


 


Sodium  137 mmol/L (137-145)   11/23/19  18:40    


 


Potassium  4.3 mmol/L (3.6-5.0)   11/23/19  18:40    


 


Chloride  103.1 mmol/L ()   11/23/19  18:40    


 


Carbon Dioxide  19 mmol/L (22-30)  L  11/23/19  18:40    


 


Anion Gap  19 mmol/L  11/23/19  18:40    


 


BUN  30 mg/dL (7-17)  H  11/23/19  18:40    


 


Creatinine  2.5 mg/dL (0.7-1.2)  H  11/23/19  18:40    


 


Estimated GFR  26 ml/min  11/23/19  18:40    


 


BUN/Creatinine Ratio  12 %  11/23/19  18:40    


 


Glucose  474 mg/dL ()  H  11/23/19  18:40    


 


POC Glucose  398  ()  H  11/24/19  13:05    


 


Calcium  8.1 mg/dL (8.4-10.2)  L  11/23/19  18:40    


 


Magnesium  2.00 mg/dL (1.7-2.3)   11/23/19  18:40    


 


Total Bilirubin  0.20 mg/dL (0.1-1.2)   11/23/19  18:40    


 


AST  19 units/L (5-40)   11/23/19  18:40    


 


ALT  16 units/L (7-56)   11/23/19  18:40    


 


Alkaline Phosphatase  109 units/L ()   11/23/19  18:40    


 


Total Creatine Kinase  64 units/L ()   11/23/19  18:40    


 


Troponin T  0.034 ng/mL (0.00-0.029)  H  11/23/19  18:40    


 


NT-Pro-B Natriuret Pep  18699 pg/mL (0-450)  H  11/23/19  18:40    


 


Total Protein  6.6 g/dL (6.3-8.2)   11/23/19  18:40    


 


Albumin  2.8 g/dL (3.9-5)  L  11/23/19  18:40    


 


Albumin/Globulin Ratio  0.7 %  11/23/19  18:40    


 


Triglycerides  149 mg/dL (2-149)   11/23/19  18:40    


 


Cholesterol  268 mg/dL ()  H  11/23/19  18:40    


 


LDL Cholesterol Direct  176 mg/dL ()  H  11/23/19  18:40    


 


HDL Cholesterol  74 mg/dL (40-59)  H  11/23/19  18:40    


 


Cholesterol/HDL Ratio  3.62 %  11/23/19  18:40    


 


Urine Color  Straw  (Yellow)   11/23/19  18:46    


 


Urine Turbidity  Clear  (Clear)   11/23/19  18:46    


 


Urine pH  7.0  (5.0-7.0)   11/23/19  18:46    


 


Ur Specific Gravity  1.013  (1.003-1.030)   11/23/19  18:46    


 


Urine Protein  >500 mg/dL (Negative)   11/23/19  18:46    


 


Urine Glucose (UA)  >=500 mg/dL (Negative)   11/23/19  18:46    


 


Urine Ketones  Neg mg/dL (Negative)   11/23/19  18:46    


 


Urine Blood  Sm  (Negative)   11/23/19  18:46    


 


Urine Nitrite  Neg  (Negative)   11/23/19  18:46    


 


Urine Bilirubin  Neg  (Negative)   11/23/19  18:46    


 


Urine Urobilinogen  < 2.0 mg/dL (<2.0)   11/23/19  18:46    


 


Ur Leukocyte Esterase  Mod  (Negative)   11/23/19  18:46    


 


Urine WBC (Auto)  20.0 /HPF (0.0-6.0)  H  11/23/19  18:46    


 


Urine RBC (Auto)  28.0 /HPF (0.0-6.0)   11/23/19  18:46    


 


U Epithel Cells (Auto)  2.0 /HPF (0-13.0)   11/23/19  18:46    


 


Urine Bacteria (Auto)  1+ /HPF (Negative)   11/23/19  18:46    


 


Hyaline Casts  2 /LPF  11/23/19  18:46    


 


Urine Yeast (Budding)  Few /HPF  11/23/19  18:46    














- Imaging and Cardiology


EKG: image reviewed


Chest x-ray: report reviewed, image reviewed





Active Medications





- Current Medications


Current Medications: 














Generic Name Dose Route Start Last Admin





  Trade Name Freq  PRN Reason Stop Dose Admin


 


Acetaminophen  325 mg  11/23/19 23:00  11/24/19 14:06





  Tylenol  PO   325 mg





  Q4H PRN   Administration





  Pain MILD(1-3)/Fever >100.5/HA  


 


Lipase/Protease/Amylase  1 each  11/23/19 23:00 





  Pancreaze Dr 10,500 Unit  FEEDTUBE  





  PRN PRN  





  For Clogged Feeding Tube  


 


Aspirin  81 mg  11/24/19 10:00  11/24/19 09:49





  Halfprin Ec  PO   81 mg





  DAILY VANDA   Administration


 


Atorvastatin Calcium  80 mg  11/24/19 22:00 





  Lipitor  PO  





  QHS VANDA  


 


Bisacodyl  10 mg  11/23/19 23:00 





  Dulcolax  DC  





  QDAY PRN  





  Constipation  


 


Brimonidine/Timolol  1 drops  11/24/19 10:00  11/24/19 09:49





  Combigan 0.2-0.5%  OU   1 drops





  Q12HR VANDA   Administration


 


Citalopram Hydrobromide  10 mg  11/24/19 10:00  11/24/19 09:49





  Celexa  PO   10 mg





  QDAY VANAD   Administration


 


Clopidogrel Bisulfate  75 mg  11/24/19 10:00  11/24/19 09:49





  Plavix  PO   75 mg





  QDAY VANDA   Administration


 


Cyclobenzaprine HCl  10 mg  11/23/19 23:00 





  Flexeril  PO  





  TID PRN  





  Muscle Spasm  


 


Heparin Sodium (Porcine)  5,000 unit  11/24/19 10:00  11/24/19 09:01





  Heparin  SUB-Q   5,000 unit





  Q12HR VANDA   Administration


 


Hydralazine HCl  10 mg  11/23/19 23:06 





  Apresoline  IV  





  Q30MIN PRN  





  Blood Pressure  


 


Ceftriaxone Sodium  2 gm in 100 mls @ 200 mls/hr  11/24/19 10:00  11/24/19 09:51





  Rocephin/Ns 2 Gm/100 Ml  IV   Not Given





  [facility]  





  Protocol  


 


Nicardipine HCl 50 mg/ Sodium  250 mls @ 25 mls/hr  11/23/19 23:45  11/24/19 

09:49





  Chloride  IV   0 mg/hr





  TITR VANDA   0 mls/hr





    Titration





  Protocol  





  5 MG/HR  


 


Ceftriaxone Sodium  2 gm in 100 mls @ 200 mls/hr  11/24/19 08:00  11/24/19 10:20





  Rocephin/Ns 2 Gm/100 Ml  IV   Infused





  Q24HR [facility]   Infusion





  Protocol  


 


Insulin Glargine  15 units  11/24/19 22:00 





  Lantus  SUB-Q  





  [facility]  


 


Insulin Human Lispro  0 unit  11/24/19 09:00  11/24/19 12:57





  Humalog  SUB-Q   8 unit





  [facility]   Administration





  Protocol  


 


Magnesium Hydroxide  30 ml  11/23/19 23:00 





  Milk Of Magnesia  PO  





  Q4H PRN  





  Constipation  


 


Simple Syrup  15 ml  11/23/19 23:00 





  Simple Syrup  FEEDTUBE  





  PRN PRN  





  Hypoglycemia  


 


Simple Syrup  30 ml  11/23/19 23:00 





  Simple Syrup  FEEDTUBE  





  PRN PRN  





  Hypoglycemia  


 


Sodium Bicarbonate  325 mg  11/23/19 23:00 





  Sodium Bicarbonate  FEEDTUBE  





  PRN PRN  





  For Clogged Feeding Tube  


 


Valsartan  160 mg  11/23/19 23:45  11/24/19 09:48





  Diovan  PO   160 mg





  BID VANDA   Administration














Nutrition/Malnutrition Assess





- Dietary Evaluation


Nutrition/Malnutrition Findings: 


                                        





Nutrition Notes                                            Start:  11/24/19 

10:54


Freq:                                                      Status: Active       




Protocol:                                                                       




 Document     11/24/19 11:29  LP  (Rec: 11/24/19 11:31  LP  [id number])


 Nutrition Notes


     Need for Assessment generated from:         RN Referral,MST


     Initial or Follow up                        Brief Note


     Current Diagnosis                           Acute Kidney Injury,Diabetes,


                                                 Hypertension,Stroke


     Current Diet                                Consistent CHO


     Labs/Tests                                  ,439


                                                 BNP [id number]


     Subjective/Other Information                Screen for MST. Pt denies wt


                                                 changes and eating well PTA.


                                                 Pt food preferences noted. Pt


                                                 wanting diet education on


                                                 heart and DM.


     Minimum of two criteria                     No physical signs of


                                                 malnutrition


     #1


      Nutrition Diagnosis                        Food and nutrition-related


                                                 knowledge deficit


      Etiology                                   DM and cardiac diet


      As Evidenced by Signs and Symptoms         Pt not following DM and


                                                 cardiac diet at home


 Nutrition Intervention


     Teaching Recipient                          Patient


     Learning Readiness                          Good


     Teaching Methods                            Discussion,Handout


     Response to Teaching                        Verbalize understanding


     Education Handouts Provided                 Consistent CHO and Stroke


                                                 nutrition therapy


     Barriers to Learning                        No Barriers


     RD phone number provided                    Yes


     Patient aware of follow up options          Yes


     Follow-Up By:                               11/25/19


     Additional Comments                         Follow for diet education


                                                 questions

## 2019-11-24 NOTE — CONSULTATION
History of Present Illness


Consult date: 11/24/19


Requesting physician: CASSIE NUNES


Reason for consult: dyspnea


History of present illness: 





40 y/o female with several medical issues admitted with hypertensive emergency 

and acute respiratory failure secondary to flash pulmonary edema.  Per patient, 

missed some of her blood pressure medications.  Has some difficulty with speech,

most likely secondary to stroke so difficult to understand but she answers 

questions appropriately.  No family present at bedside.  





Past History


Past Medical History: hypertension, hyperlipidemia





Medications and Allergies


                                    Allergies











Allergy/AdvReac Type Severity Reaction Status Date / Time


 


No Known Allergies Allergy   Unverified 01/10/15 03:04











                                Home Medications











 Medication  Instructions  Recorded  Confirmed  Last Taken  Type


 


Acetaminophen [Acetaminophen TAB] 325 mg PO Q4H PRN #30 tablet 01/09/18 01/11/18

Unknown Rx


 


Aspirin [Aspirin EC] 81 mg PO DAILY #30 01/09/18 01/11/18 1 Day Ago Rx





    ~01/10/18 


 


AtorvaSTATin [Lipitor] 80 mg PO QHS #30 day 01/09/18 01/11/18 1 Day Ago Rx





    ~01/10/18 


 


Bisacodyl [Dulcolax suppos] 10 mg CO QDAY PRN #7 supp.rect 01/09/18 01/11/18 

Unknown Rx


 


Brimonidine/Timolol 0.2-0.5% 1 drops OU Q12HR #1 bottle 01/09/18 01/11/18 1 Day 

Ago Rx





[Combigan 0.2-0.5%]    ~01/10/18 


 


Citalopram [celeXA] 10 mg PO QDAY #30 01/09/18 01/11/18 1 Day Ago Rx





    ~01/10/18 


 


Clopidogrel [Plavix] 75 mg PO QDAY #30 01/09/18 01/11/18 1 Day Ago Rx





    ~01/10/18 


 


Cyclobenzaprine [Flexeril 10 MG 10 mg PO TID PRN #30 day 01/09/18 01/11/18 

Unknown Rx





TAB]     


 


Lipase/Protease/Amylase [Pancreaze 1 each FEEDTUBE PRN PRN #30 capsule 01/09/18 01/11/18 Unknown Rx





 10,500 Unit]     


 


Magnesium Hydroxide [Milk of 30 ml PO Q4H PRN #30 oral.liqd 01/09/18 01/11/18 

Unknown Rx





Magnesia]     


 


Petrolatum,White [Vaseline Lip 1 applic TP AS DIRECT PRN #30 tube 01/09/18 01/11/18 Unknown Rx





Therapy]     


 


Simple Syrup 15 ml FEEDTUBE PRN PRN #30 01/09/18 01/11/18 Unknown Rx





 oral.liqd    


 


Simple Syrup 30 ml FEEDTUBE PRN PRN #30 01/09/18 01/11/18 Unknown Rx





 oral.liqd    


 


Sodium Bicarbonate 325 mg FEEDTUBE PRN PRN #30 tablet 01/09/18 01/11/18 Unknown 

Rx


 


amLODIPine 5 mg PO QDAY #30 tablet 01/09/18 01/11/18 1 Day Ago Rx





    ~01/10/18 


 


NIFEdipine [Nifedipine ER] 10 mg PO TID 01/11/18 01/11/18 1 Day Ago History





    ~01/10/18 


 


Detemir (Nf) [Levemir (Nf)] 10 units SUB-Q QHS  units 01/18/18  Unknown Rx











Active Meds: 


Active Medications





Acetaminophen (Tylenol)  325 mg PO Q4H PRN


   PRN Reason: Pain MILD(1-3)/Fever >100.5/HA


Albuterol/Ipratropium (Duoneb *Not For Prn Use*)  1 ampul IH QIDRT [facility]


Lipase/Protease/Amylase (Pancreaze Dr 10,500 Unit)  1 each FEEDTUBE PRN PRN


   PRN Reason: For Clogged Feeding Tube


Aspirin (Halfprin Ec)  81 mg PO DAILY [facility]


Atorvastatin Calcium (Lipitor)  80 mg PO QHS [facility]


Bisacodyl (Dulcolax)  10 mg CO QDAY PRN


   PRN Reason: Constipation


Brimonidine/Timolol (Combigan 0.2-0.5%)  1 drops OU Q12HR [facility]


Citalopram Hydrobromide (Celexa)  10 mg PO QDAY [facility]


Clopidogrel Bisulfate (Plavix)  75 mg PO QDAY [facility]


Cyclobenzaprine HCl (Flexeril)  10 mg PO TID PRN


   PRN Reason: Muscle Spasm


Heparin Sodium (Porcine) (Heparin)  5,000 unit SUB-Q Q12HR [facility]


   Last Admin: 11/24/19 09:01 Dose:  5,000 unit


   Documented by: 


Hydralazine HCl (Apresoline)  10 mg IV Q30MIN PRN


   PRN Reason: Blood Pressure


Ceftriaxone Sodium (Rocephin/Ns 2 Gm/100 Ml)  2 gm in 100 mls @ 200 mls/hr IV 

Q24HR [facility]; Protocol


Nicardipine HCl 50 mg/ Sodium (Chloride)  250 mls @ 25 mls/hr IV TITR [facility]; 

Protocol


   Last Titration: 11/24/19 07:45 Dose:  2.5 mg/hr, 12.5 mls/hr


   Documented by: 


Ceftriaxone Sodium (Rocephin/Ns 2 Gm/100 Ml)  2 gm in 100 mls @ 200 mls/hr IV 

Q24HR [facility]; Protocol


Insulin Glargine (Lantus)  10 units SUB-Q QHS [facility]


Insulin Human Lispro (Humalog)  0 unit SUB-Q ACHS [facility]; Protocol


   Last Admin: 11/24/19 09:01 Dose:  8 unit


   Documented by: 


Magnesium Hydroxide (Milk Of Magnesia)  30 ml PO Q4H PRN


   PRN Reason: Constipation


Simple Syrup (Simple Syrup)  15 ml FEEDTUBE PRN PRN


   PRN Reason: Hypoglycemia


Simple Syrup (Simple Syrup)  30 ml FEEDTUBE PRN PRN


   PRN Reason: Hypoglycemia


Sodium Bicarbonate (Sodium Bicarbonate)  325 mg FEEDTUBE PRN PRN


   PRN Reason: For Clogged Feeding Tube


Valsartan (Diovan)  160 mg PO BID [facility]


   Last Admin: 11/23/19 23:45 Dose:  160 mg


   Documented by: 











Review of Systems


All systems: negative





Physical Examination


Vital signs: 


                                   Vital Signs











Pulse Resp BP Pulse Ox


 


 117 H  25 H  221/135   100 


 


 11/23/19 17:57  11/23/19 17:57  11/23/19 17:57  11/23/19 17:57











General appearance: no acute distress, alert


Eyes: non-icteric


ENT: oropharynx moist


Neck: supple


Effort: normal


Ascultation: Bilateral: clear


Percussion: Bilateral: not dull


Cardiovascular: regular rate and rhythm


Gastrointestinal: soft, non-tender





Results





- Laboratory Findings


CBC and BMP: 


                                 11/23/19 18:40





                                 11/23/19 18:40


ABG











POC ABG pH  7.336  (7.35-7.45)  L  11/23/19  18:34    


 


ABG pH  7.378 pH Units (7.350-7.450)   11/23/19  18:40    


 


POC ABG pCO2  44.2  (35-45)   11/23/19  18:34    


 


ABG pCO2  37.7 mm Hg  11/23/19  18:40    


 


POC ABG pO2  147  ()  H  11/23/19  18:34    


 


ABG pO2  82.9 mm Hg (80.0-90.0)   11/23/19  18:40    


 


POC ABG HCO3  23.6  (22-26 mml/L)   11/23/19  18:34    


 


POC ABG Total CO2  25  (23-27mmol/L)   11/23/19  18:34    


 


POC ABG O2 Sat  99   11/23/19  18:34    


 


ABG O2 Saturation  96.8 % (95.0-99.0)   11/23/19  18:40    





PT/INR, D-dimer











PT  14.2 Sec. (12.2-14.9)   11/23/19  18:40    


 


INR  1.11  (0.87-1.13)   11/23/19  18:40    








Abnormal lab findings: 


                                  Abnormal Labs











  11/23/19 11/23/19 11/23/19





  18:34 18:40 18:40


 


WBC   13.8 H 


 


RBC   2.67 L 


 


Hgb   8.0 L 


 


Hct   24.5 L 


 


Seg Neuts % (Manual)   93.0 H 


 


Lymphocytes % (Manual)   4.0 L 


 


Seg Neutrophils # Man   12.8 H 


 


Lymphocytes # (Manual)   0.6 L 


 


POC ABG pH  7.336 L  


 


POC ABG pO2  147 H  


 


ABG Base Excess   


 


ABG Hemoglobin   


 


Oxyhemoglobin   


 


Carbon Dioxide    19 L


 


BUN    30 H


 


Creatinine    2.5 H


 


Glucose    474 H


 


POC Glucose   


 


Calcium    8.1 L


 


Troponin T    0.034 H


 


NT-Pro-B Natriuret Pep   


 


Albumin    2.8 L


 


Cholesterol    268 H


 


LDL Cholesterol Direct    176 H


 


HDL Cholesterol    74 H


 


Urine WBC (Auto)   














  11/23/19 11/23/19 11/23/19





  18:40 18:40 18:46


 


WBC   


 


RBC   


 


Hgb   


 


Hct   


 


Seg Neuts % (Manual)   


 


Lymphocytes % (Manual)   


 


Seg Neutrophils # Man   


 


Lymphocytes # (Manual)   


 


POC ABG pH   


 


POC ABG pO2   


 


ABG Base Excess  -3.1 L  


 


ABG Hemoglobin  9.2 L  


 


Oxyhemoglobin  92.2 L  


 


Carbon Dioxide   


 


BUN   


 


Creatinine   


 


Glucose   


 


POC Glucose   


 


Calcium   


 


Troponin T   


 


NT-Pro-B Natriuret Pep   61555 H 


 


Albumin   


 


Cholesterol   


 


LDL Cholesterol Direct   


 


HDL Cholesterol   


 


Urine WBC (Auto)    20.0 H














  11/23/19 11/23/19 11/24/19





  19:50 21:27 00:15


 


WBC   


 


RBC   


 


Hgb   


 


Hct   


 


Seg Neuts % (Manual)   


 


Lymphocytes % (Manual)   


 


Seg Neutrophils # Man   


 


Lymphocytes # (Manual)   


 


POC ABG pH   


 


POC ABG pO2   


 


ABG Base Excess   


 


ABG Hemoglobin   


 


Oxyhemoglobin   


 


Carbon Dioxide   


 


BUN   


 


Creatinine   


 


Glucose   


 


POC Glucose  473 H  329 H  325 H


 


Calcium   


 


Troponin T   


 


NT-Pro-B Natriuret Pep   


 


Albumin   


 


Cholesterol   


 


LDL Cholesterol Direct   


 


HDL Cholesterol   


 


Urine WBC (Auto)   














  11/24/19





  06:33


 


WBC 


 


RBC 


 


Hgb 


 


Hct 


 


Seg Neuts % (Manual) 


 


Lymphocytes % (Manual) 


 


Seg Neutrophils # Man 


 


Lymphocytes # (Manual) 


 


POC ABG pH 


 


POC ABG pO2 


 


ABG Base Excess 


 


ABG Hemoglobin 


 


Oxyhemoglobin 


 


Carbon Dioxide 


 


BUN 


 


Creatinine 


 


Glucose 


 


POC Glucose  439 H


 


Calcium 


 


Troponin T 


 


NT-Pro-B Natriuret Pep 


 


Albumin 


 


Cholesterol 


 


LDL Cholesterol Direct 


 


HDL Cholesterol 


 


Urine WBC (Auto) 














- Diagnostic Findings


Chest x-ray: image reviewed (Mild pulmonary edema)





Assessment and Plan





40 y/o female with multiple medical issues admitted with hypertensive emergency,

acute respiratory failure and acute renal failure.





1.  Needs labs for today.  


2.  Will speak to nursing about stopping drip now that on oral therapy


3.  If renal function still elevated will need renal ultrasound, and urine cr as

well as urine sodium


4.  If able to transition off drip, should be stable for transfer out of unit.  

Wean FiO2 to off.  





CCT 31 minutes.

## 2019-11-24 NOTE — HISTORY AND PHYSICAL REPORT
History of Present Illness


Date of examination: 11/23/19


Date of admission: 


11/23/19 20:29





Chief complaint: 


Acute onset of SOB for few hours





History of present illness: 


39-year-old female with  history of stroke, left-sided hemiparesis, 

hypertension, seizure, diabetes brought to the hospital by emergency medical 

services for acute respiratory distress.  Upon arrival, patient on BiPAP, 

saturating 88% on BiPAP therapy, in moderate to severe respiratory distress.  E

[facility] medical services gave steroids, albuterol, and Lasix prior to arrival. 

Upon initial evaluation, patient is awake, protecting airway, in moderate to 

severe respiratory distress, with cool clammy skin, diaphoresis, and markedly 

high  BP--- systolic blood pressure in the 220s.





Patient continued on BiPAP therapy and was started on NTG drip.


Patient not able to describe exacerbating or relieving factors.





Patient has a PEG tube sec to Dysphagia caused by CVA














Past Medical History


Hypertension: Yes (ECHO 12/17 EF 50-55%)


CVA: Yes (left side)


Diabetes: Yes


Seizures: Yes








Surgical History


Additional Surgical History: Spinal surgery, neck/artery surgery





Social  History  


Smoking Status: Never Smoker  





Family History


Htn





- Medications


Home Medications: 


                                Home Medications











 Medication  Instructions  Recorded  Confirmed  Last Taken  Type


 


Acetaminophen [Acetaminophen TAB] 325 mg PO Q4H PRN #30 tablet 01/09/18 01/11/18

Unknown Rx


 


Aspirin [Aspirin EC] 81 mg PO DAILY #30 01/09/18 01/11/18 1 Day Ago Rx





    ~01/10/18 


 


AtorvaSTATin [Lipitor] 80 mg PO QHS #30 day 01/09/18 01/11/18 1 Day Ago Rx





    ~01/10/18 


 


Bisacodyl [Dulcolax suppos] 10 mg MI QDAY PRN #7 supp.rect 01/09/18 01/11/18 

Unknown Rx


 


Brimonidine/Timolol 0.2-0.5% 1 drops OU Q12HR #1 bottle 01/09/18 01/11/18 1 Day 

Ago Rx





[Combigan 0.2-0.5%]    ~01/10/18 


 


Citalopram [celeXA] 10 mg PO QDAY #30 01/09/18 01/11/18 1 Day Ago Rx





    ~01/10/18 


 


Clopidogrel [Plavix] 75 mg PO QDAY #30 01/09/18 01/11/18 1 Day Ago Rx





    ~01/10/18 


 


Cyclobenzaprine [Flexeril 10 MG 10 mg PO TID PRN #30 day 01/09/18 01/11/18 

Unknown Rx





TAB]     


 


Lipase/Protease/Amylase [Pancreaze 1 each FEEDTUBE PRN PRN #30 capsule 01/09/18 01/11/18 Unknown Rx





Dr 10,500 Unit]     


 


Magnesium Hydroxide [Milk of 30 ml PO Q4H PRN #30 oral.liqd 01/09/18 01/11/18 

Unknown Rx





Magnesia]     


 


Petrolatum,White [Vaseline Lip 1 applic TP AS DIRECT PRN #30 tube 01/09/18 01/11/18 Unknown Rx





Therapy]     


 


Simple Syrup 15 ml FEEDTUBE PRN PRN #30 01/09/18 01/11/18 Unknown Rx





 oral.liqd    


 


Simple Syrup 30 ml FEEDTUBE PRN PRN #30 01/09/18 01/11/18 Unknown Rx





 oral.liqd    


 


Sodium Bicarbonate 325 mg FEEDTUBE PRN PRN #30 tablet 01/09/18 01/11/18 Unknown 

Rx


 


amLODIPine 5 mg PO QDAY #30 tablet 01/09/18 01/11/18 1 Day Ago Rx





    ~01/10/18 


 


NIFEdipine [Nifedipine ER] 10 mg PO TID 01/11/18 01/11/18 1 Day Ago History





    ~01/10/18 


 


Detemir (Nf) [Levemir (Nf)] 10 units SUB-Q QHS  units 01/18/18  Unknown Rx

















Review of Systems


ROS: 


Stated complaint: TIFFANIE


Other details as noted in HPI





Comment: Unobtainable due to pts medical conditions




















Medications and Allergies


                                    Allergies











Allergy/AdvReac Type Severity Reaction Status Date / Time


 


No Known Allergies Allergy   Unverified 01/10/15 03:04











                                Home Medications











 Medication  Instructions  Recorded  Confirmed  Last Taken  Type


 


Acetaminophen [Acetaminophen TAB] 325 mg PO Q4H PRN #30 tablet 01/09/18 01/11/18

Unknown Rx


 


Aspirin [Aspirin EC] 81 mg PO DAILY #30 01/09/18 01/11/18 1 Day Ago Rx





    ~01/10/18 


 


AtorvaSTATin [Lipitor] 80 mg PO QHS #30 day 01/09/18 01/11/18 1 Day Ago Rx





    ~01/10/18 


 


Bisacodyl [Dulcolax suppos] 10 mg MI QDAY PRN #7 supp.rect 01/09/18 01/11/18 

Unknown Rx


 


Brimonidine/Timolol 0.2-0.5% 1 drops OU Q12HR #1 bottle 01/09/18 01/11/18 1 Day 

Ago Rx





[Combigan 0.2-0.5%]    ~01/10/18 


 


Citalopram [celeXA] 10 mg PO QDAY #30 01/09/18 01/11/18 1 Day Ago Rx





    ~01/10/18 


 


Clopidogrel [Plavix] 75 mg PO QDAY #30 01/09/18 01/11/18 1 Day Ago Rx





    ~01/10/18 


 


Cyclobenzaprine [Flexeril 10 MG 10 mg PO TID PRN #30 day 01/09/18 01/11/18 

Unknown Rx





TAB]     


 


Lipase/Protease/Amylase [Pancreaze 1 each FEEDTUBE PRN PRN #30 capsule 01/09/18 01/11/18 Unknown Rx





Dr 10,500 Unit]     


 


Magnesium Hydroxide [Milk of 30 ml PO Q4H PRN #30 oral.liqd 01/09/18 01/11/18 

Unknown Rx





Magnesia]     


 


Petrolatum,White [Vaseline Lip 1 applic TP AS DIRECT PRN #30 tube 01/09/18 01/11/18 Unknown Rx





Therapy]     


 


Simple Syrup 15 ml FEEDTUBE PRN PRN #30 01/09/18 01/11/18 Unknown Rx





 oral.liqd    


 


Simple Syrup 30 ml FEEDTUBE PRN PRN #30 01/09/18 01/11/18 Unknown Rx





 oral.liqd    


 


Sodium Bicarbonate 325 mg FEEDTUBE PRN PRN #30 tablet 01/09/18 01/11/18 Unknown 

Rx


 


amLODIPine 5 mg PO QDAY #30 tablet 01/09/18 01/11/18 1 Day Ago Rx





    ~01/10/18 


 


NIFEdipine [Nifedipine ER] 10 mg PO TID 01/11/18 01/11/18 1 Day Ago History





    ~01/10/18 


 


Detemir (Nf) [Levemir (Nf)] 10 units SUB-Q QHS  units 01/18/18  Unknown Rx











Active Meds: 


Active Medications





Acetaminophen (Tylenol)  325 mg PO Q4H PRN


   PRN Reason: Pain MILD(1-3)/Fever >100.5/HA


Albuterol (Proventil)  2.5 mg IH Q3HRT PRN


   PRN Reason: Wheezing


Albuterol/Ipratropium (Duoneb *Not For Prn Use*)  1 ampul IH QIDRT [facility]


Lipase/Protease/Amylase (Pancreaze Dr 10,500 Unit)  1 each FEEDTUBE PRN PRN


   PRN Reason: For Clogged Feeding Tube


Aspirin (Halfprin Ec)  81 mg PO DAILY [facility]


Atorvastatin Calcium (Lipitor)  80 mg PO QHS [facility]


Bisacodyl (Dulcolax)  10 mg MI QDAY PRN


   PRN Reason: Constipation


Brimonidine/Timolol (Combigan 0.2-0.5%)  1 drops OU Q12HR [facility]


Citalopram Hydrobromide (Celexa)  10 mg PO QDAY [facility]


Clopidogrel Bisulfate (Plavix)  75 mg PO QDAY [facility]AN[facility]


Cyclobenzaprine HCl (Flexeril)  10 mg PO TID PRN


   PRN Reason: Muscle Spasm


Heparin Sodium (Porcine) (Heparin)  5,000 unit SUB-Q Q12HR [facility]


Hydralazine HCl (Apresoline)  10 mg IV Q30MIN PRN


   PRN Reason: Blood Pressure


Ceftriaxone Sodium (Rocephin/Ns 2 Gm/100 Ml)  2 gm in 100 mls @ 200 mls/hr IV 

Q24HR [facility]; Protocol


Azithromycin 500 mg/ Sodium (Chloride)  250 mls @ 250 mls/hr IV Q24HR [facility]; 

Protocol


Nicardipine HCl 50 mg/ Sodium (Chloride)  250 mls @ 25 mls/hr IV TITR [facility]; 

Protocol


   Last Titration: 11/24/19 07:45 Dose:  2.5 mg/hr, 12.5 mls/hr


   Documented by: 


Insulin Glargine (Lantus)  10 units SUB-Q QHS [facility]


Magnesium Hydroxide (Milk Of Magnesia)  30 ml PO Q4H PRN


   PRN Reason: Constipation


Methylprednisolone Sodium Succinate (Solu-Medrol)  125 mg IV Q8HR [facility]


   Last Admin: 11/24/19 05:52 Dose:  125 mg


   Documented by: 


Simple Syrup (Simple Syrup)  15 ml FEEDTUBE PRN PRN


   PRN Reason: Hypoglycemia


Simple Syrup (Simple Syrup)  30 ml FEEDTUBE PRN PRN


   PRN Reason: Hypoglycemia


Sodium Bicarbonate (Sodium Bicarbonate)  325 mg FEEDTUBE PRN PRN


   PRN Reason: For Clogged Feeding Tube


Valsartan (Diovan)  160 mg PO BID [facility]


   Last Admin: 11/23/19 23:45 Dose:  160 mg


   Documented by: 











Exam





- Constitutional


Vitals: 


                                        











Temp Pulse Resp BP Pulse Ox


 


 97.2 F L  93 H  13   221/135  99 


 


 11/23/19 19:45  11/24/19 07:15  11/24/19 07:15  11/24/19 07:15  11/24/19 07:15











General appearance: Present: severe distress, well-nourished





- EENT


Eyes: Present: PERRL


ENT: hearing intact, clear oral mucosa





- Neck


Neck: Present: supple, normal ROM





- Respiratory


Respiratory effort: normal


Respiratory: bilateral: rales, rhonchi, wheezing





- Cardiovascular


Heart rate: 130


Rhythm: regular


Heart Sounds: Present: S1 & S2.  Absent: rub, click





- Extremities


Extremities: no ischemia, pulses symmetrical, No edema


Peripheral Pulses: within normal limits





- Abdominal


General gastrointestinal: Present: soft, non-tender, non-distended, normal bowel

sounds, other (PEG in place)


Female genitourinary: Present: normal





- Rectal


Rectal Exam: deferred





- Integumentary


Integumentary: Present: clear, warm, dry





- Musculoskeletal


Musculoskeletal: left sided weakness





- Psychiatric


Psychiatric: appropriate mood/affect, intact judgment & insight





- Neurologic


Neurologic: CNII-XII intact, moves all extremities





- Allied Health


Allied health notes reviewed: nursing





Results





- Labs


CBC & Chem 7: 


                                 11/23/19 18:40





                                 11/23/19 18:40


Labs: 


                             Laboratory Last Values











WBC  13.8 K/mm3 (4.5-11.0)  H  11/23/19  18:40    


 


RBC  2.67 M/mm3 (3.65-5.03)  L  11/23/19  18:40    


 


Hgb  8.0 gm/dl (10.1-14.3)  L  11/23/19  18:40    


 


Hct  24.5 % (30.3-42.9)  L  11/23/19  18:40    


 


MCV  92 fl (79-97)   11/23/19  18:40    


 


MCH  30 pg (28-32)   11/23/19  18:40    


 


MCHC  33 % (30-34)   11/23/19  18:40    


 


RDW  13.2 % (13.2-15.2)   11/23/19  18:40    


 


Plt Count  264 K/mm3 (140-440)   11/23/19  18:40    


 


Add Manual Diff  Complete   11/23/19  18:40    


 


Total Counted  100   11/23/19  18:40    


 


Seg Neutrophils %  Np   11/23/19  18:40    


 


Seg Neuts % (Manual)  93.0 % (40.0-70.0)  H  11/23/19  18:40    


 


Band Neutrophils %  0 %  11/23/19  18:40    


 


Lymphocytes % (Manual)  4.0 % (13.4-35.0)  L  11/23/19  18:40    


 


Reactive Lymphs % (Man)  0 %  11/23/19  18:40    


 


Monocytes % (Manual)  2.0 % (0.0-7.3)   11/23/19  18:40    


 


Eosinophils % (Manual)  1.0 % (0.0-4.3)   11/23/19  18:40    


 


Basophils % (Manual)  0 % (0.0-1.8)   11/23/19  18:40    


 


Metamyelocytes %  0 %  11/23/19  18:40    


 


Myelocytes %  0 %  11/23/19  18:40    


 


Promyelocytes %  0 %  11/23/19  18:40    


 


Blast Cells %  0 %  11/23/19  18:40    


 


Nucleated RBC %  Not Reportable   11/23/19  18:40    


 


Seg Neutrophils # Man  12.8 K/mm3 (1.8-7.7)  H  11/23/19  18:40    


 


Band Neutrophils #  0.0 K/mm3  11/23/19  18:40    


 


Lymphocytes # (Manual)  0.6 K/mm3 (1.2-5.4)  L  11/23/19  18:40    


 


Abs React Lymphs (Man)  0.0 K/mm3  11/23/19  18:40    


 


Monocytes # (Manual)  0.3 K/mm3 (0.0-0.8)   11/23/19  18:40    


 


Eosinophils # (Manual)  0.1 K/mm3 (0.0-0.4)   11/23/19  18:40    


 


Basophils # (Manual)  0.0 K/mm3 (0.0-0.1)   11/23/19  18:40    


 


Metamyelocytes #  0.0 K/mm3  11/23/19  18:40    


 


Myelocytes #  0.0 K/mm3  11/23/19  18:40    


 


Promyelocytes #  0.0 K/mm3  11/23/19  18:40    


 


Blast Cells #  0.0 K/mm3  11/23/19  18:40    


 


WBC Morphology  Not Reportable   11/23/19  18:40    


 


Hypersegmented Neuts  Not Reportable   11/23/19  18:40    


 


Hyposegmented Neuts  Not Reportable   11/23/19  18:40    


 


Hypogranular Neuts  Not Reportable   11/23/19  18:40    


 


Smudge Cells  Not Reportable   11/23/19  18:40    


 


Toxic Granulation  Not Reportable   11/23/19  18:40    


 


Toxic Vacuolation  Not Reportable   11/23/19  18:40    


 


Dohle Bodies  Not Reportable   11/23/19  18:40    


 


Pelger-Huet Anomaly  Not Reportable   11/23/19  18:40    


 


Yuly Rods  Not Reportable   11/23/19  18:40    


 


Platelet Estimate  Consistent w auto   11/23/19  18:40    


 


Clumped Platelets  Not Reportable   11/23/19  18:40    


 


Plt Clumps, EDTA  Not Reportable   11/23/19  18:40    


 


Large Platelets  1+   11/23/19  18:40    


 


Giant Platelets  Not Reportable   11/23/19  18:40    


 


Platelet Satelliting  Not Reportable   11/23/19  18:40    


 


Plt Morphology Comment  Not Reportable   11/23/19  18:40    


 


RBC Morphology  Normal   11/23/19  18:40    


 


Dimorphic RBCs  Not Reportable   11/23/19  18:40    


 


Polychromasia  Not Reportable   11/23/19  18:40    


 


Hypochromasia  Not Reportable   11/23/19  18:40    


 


Poikilocytosis  Not Reportable   11/23/19  18:40    


 


Anisocytosis  Not Reportable   11/23/19  18:40    


 


Microcytosis  Not Reportable   11/23/19  18:40    


 


Macrocytosis  Not Reportable   11/23/19  18:40    


 


Spherocytes  Not Reportable   11/23/19  18:40    


 


Pappenheimer Bodies  Not Reportable   11/23/19  18:40    


 


Sickle Cells  Not Reportable   11/23/19  18:40    


 


Target Cells  Not Reportable   11/23/19  18:40    


 


Tear Drop Cells  Not Reportable   11/23/19  18:40    


 


Ovalocytes  Not Reportable   11/23/19  18:40    


 


Helmet Cells  Not Reportable   11/23/19  18:40    


 


Acosta-Erhard Bodies  Not Reportable   11/23/19  18:40    


 


Cabot Rings  Not Reportable   11/23/19  18:40    


 


Marco Cells  Not Reportable   11/23/19  18:40    


 


Bite Cells  Not Reportable   11/23/19  18:40    


 


Crenated Cell  Not Reportable   11/23/19  18:40    


 


Elliptocytes  Not Reportable   11/23/19  18:40    


 


Acanthocytes (Spur)  Not Reportable   11/23/19  18:40    


 


Rouleaux  Not Reportable   11/23/19  18:40    


 


Hemoglobin C Crystals  Not Reportable   11/23/19  18:40    


 


Schistocytes  Not Reportable   11/23/19  18:40    


 


Malaria parasites  Not Reportable   11/23/19  18:40    


 


Juliano Bodies  Not Reportable   11/23/19  18:40    


 


Hem Pathologist Commnt  No   11/23/19  18:40    


 


PT  14.2 Sec. (12.2-14.9)   11/23/19  18:40    


 


INR  1.11  (0.87-1.13)   11/23/19  18:40    


 


APTT  29.0 Sec. (24.2-36.6)   11/23/19  18:40    


 


POC ABG pH  7.336  (7.35-7.45)  L  11/23/19  18:34    


 


ABG pH  7.378 pH Units (7.350-7.450)   11/23/19  18:40    


 


POC ABG pCO2  44.2  (35-45)   11/23/19  18:34    


 


ABG pCO2  37.7 mm Hg  11/23/19  18:40    


 


POC ABG pO2  147  ()  H  11/23/19  18:34    


 


ABG pO2  82.9 mm Hg (80.0-90.0)   11/23/19  18:40    


 


POC ABG HCO3  23.6  (22-26 mml/L)   11/23/19  18:34    


 


ABG HCO3  21.7 mmol/L (20.0-26.0)   11/23/19  18:40    


 


POC ABG Total CO2  25  (23-27mmol/L)   11/23/19  18:34    


 


POC ABG O2 Sat  99   11/23/19  18:34    


 


ABG O2 Saturation  96.8 % (95.0-99.0)   11/23/19  18:40    


 


ABG O2 Content  12.0  (0.0-44)   11/23/19  18:40    


 


POC ABG Base Excess  -2  ((-2) - (+3)mmol/L)   11/23/19  18:34    


 


ABG Base Excess  -3.1 mmol/L (-2.0-3.0)  L  11/23/19  18:40    


 


ABG Hemoglobin  9.2 gm/dl (12.0-16.0)  L  11/23/19  18:40    


 


ABG Carboxyhemoglobin  4.4 % (0.0-5.0)   11/23/19  18:40    


 


ABG Methemoglobin  0.3 % (0.0-1.5)   11/23/19  18:40    


 


VBG pH  7.378  (7.320-7.420)   11/23/19  18:40    


 


Oxyhemoglobin  92.2 % (95.0-99.0)  L  11/23/19  18:40    


 


FiO2  21 %  11/23/19  18:40    


 


Sodium  137 mmol/L (137-145)   11/23/19  18:40    


 


Potassium  4.3 mmol/L (3.6-5.0)   11/23/19  18:40    


 


Chloride  103.1 mmol/L ()   11/23/19  18:40    


 


Carbon Dioxide  19 mmol/L (22-30)  L  11/23/19  18:40    


 


Anion Gap  19 mmol/L  11/23/19  18:40    


 


BUN  30 mg/dL (7-17)  H  11/23/19  18:40    


 


Creatinine  2.5 mg/dL (0.7-1.2)  H  11/23/19  18:40    


 


Estimated GFR  26 ml/min  11/23/19  18:40    


 


BUN/Creatinine Ratio  12 %  11/23/19  18:40    


 


Glucose  474 mg/dL ()  H  11/23/19  18:40    


 


POC Glucose  439  ()  H  11/24/19  06:33    


 


Calcium  8.1 mg/dL (8.4-10.2)  L  11/23/19  18:40    


 


Magnesium  2.00 mg/dL (1.7-2.3)   11/23/19  18:40    


 


Total Bilirubin  0.20 mg/dL (0.1-1.2)   11/23/19  18:40    


 


AST  19 units/L (5-40)   11/23/19  18:40    


 


ALT  16 units/L (7-56)   11/23/19  18:40    


 


Alkaline Phosphatase  109 units/L ()   11/23/19  18:40    


 


Total Creatine Kinase  64 units/L ()   11/23/19  18:40    


 


Troponin T  0.034 ng/mL (0.00-0.029)  H  11/23/19  18:40    


 


NT-Pro-B Natriuret Pep  75869 pg/mL (0-450)  H  11/23/19  18:40    


 


Total Protein  6.6 g/dL (6.3-8.2)   11/23/19  18:40    


 


Albumin  2.8 g/dL (3.9-5)  L  11/23/19  18:40    


 


Albumin/Globulin Ratio  0.7 %  11/23/19  18:40    


 


Triglycerides  149 mg/dL (2-149)   11/23/19  18:40    


 


Cholesterol  268 mg/dL ()  H  11/23/19  18:40    


 


LDL Cholesterol Direct  176 mg/dL ()  H  11/23/19  18:40    


 


HDL Cholesterol  74 mg/dL (40-59)  H  11/23/19  18:40    


 


Cholesterol/HDL Ratio  3.62 %  11/23/19  18:40    


 


Urine Color  Straw  (Yellow)   11/23/19  18:46    


 


Urine Turbidity  Clear  (Clear)   11/23/19  18:46    


 


Urine pH  7.0  (5.0-7.0)   11/23/19  18:46    


 


Ur Specific Gravity  1.013  (1.003-1.030)   11/23/19  18:46    


 


Urine Protein  >500 mg/dL (Negative)   11/23/19  18:46    


 


Urine Glucose (UA)  >=500 mg/dL (Negative)   11/23/19  18:46    


 


Urine Ketones  Neg mg/dL (Negative)   11/23/19  18:46    


 


Urine Blood  Sm  (Negative)   11/23/19  18:46    


 


Urine Nitrite  Neg  (Negative)   11/23/19  18:46    


 


Urine Bilirubin  Neg  (Negative)   11/23/19  18:46    


 


Urine Urobilinogen  < 2.0 mg/dL (<2.0)   11/23/19  18:46    


 


Ur Leukocyte Esterase  Mod  (Negative)   11/23/19  18:46    


 


Urine WBC (Auto)  20.0 /HPF (0.0-6.0)  H  11/23/19  18:46    


 


Urine RBC (Auto)  28.0 /HPF (0.0-6.0)   11/23/19  18:46    


 


U Epithel Cells (Auto)  2.0 /HPF (0-13.0)   11/23/19  18:46    


 


Urine Bacteria (Auto)  1+ /HPF (Negative)   11/23/19  18:46    


 


Hyaline Casts  2 /LPF  11/23/19  18:46    


 


Urine Yeast (Budding)  Few /HPF  11/23/19  18:46    








                                    Short CBC











  11/23/19 Range/Units





  18:40 


 


WBC  13.8 H  (4.5-11.0)  K/mm3


 


Hgb  8.0 L  (10.1-14.3)  gm/dl


 


Hct  24.5 L  (30.3-42.9)  %


 


Plt Count  264  (140-440)  K/mm3








                                       BMP











  11/23/19





  18:40


 


Sodium  137


 


Potassium  4.3


 


Chloride  103.1


 


Carbon Dioxide  19 L


 


BUN  30 H


 


Creatinine  2.5 H


 


Glucose  474 H


 


Calcium  8.1 L








                                 Cardiac Enzymes











  11/23/19 Range/Units





  18:40 


 


Total Creatine Kinase  64  ()  units/L


 


Troponin T  0.034 H  (0.00-0.029)  ng/mL








                                 Liver Function











  11/23/19 Range/Units





  18:40 


 


Total Bilirubin  0.20  (0.1-1.2)  mg/dL


 


AST  19  (5-40)  units/L


 


ALT  16  (7-56)  units/L


 


Alkaline Phosphatase  109  ()  units/L


 


Albumin  2.8 L  (3.9-5)  g/dL








                                      Urine











  11/23/19 Range/Units





  18:46 


 


Urine Color  Straw  (Yellow)  


 


Urine pH  7.0  (5.0-7.0)  


 


Ur Specific Gravity  1.013  (1.003-1.030)  


 


Urine Protein  >500  (Negative)  mg/dL


 


Urine Glucose (UA)  >=500  (Negative)  mg/dL














- Imaging and Cardiology


EKG: report reviewed (Sinus rhythm 98/min Non specific T abnormalities)


Chest x-ray: report reviewed (Bilateral small effusions Bilateral 

atelectasis/edema)





Assessment and Plan


Assessment and plan: 





CCT 40 minutes


Advance Directives: Yes (Full Code)


VTE prophylaxis?: Chemical


Plan of care discussed with patient/family: Yes





- Patient Problems


(1) Malignant hypertensive urgency


Current Visit: Yes   Status: Acute   


Plan to address problem: 


On NTG drip 


Antihypertensives in the form of Nifedipine  Valsartan etc started


IV Hydralazine 10 mg q3 prn








(2) Flash pulmonary edema


Current Visit: Yes   Status: Acute   


Plan to address problem: 


IV lasix


ECHO for EF








(3) Malnutrition


Current Visit: Yes   Status: Chronic   


Qualifiers: 


   Protein-calorie malnutrition severity: severe 


Plan to address problem: 


Dietitian consult








(4) IDDM (insulin dependent diabetes mellitus)


Current Visit: Yes   Status: Chronic   


Plan to address problem: 


Cont home insulin and coverage


Check A1c and adjust Insulin dosage








(5) CAD (coronary artery disease)


Current Visit: Yes   Status: Chronic   


Qualifiers: 


   Coronary Disease-Associated Artery/Lesion type: native artery   Native vs. 

transplanted heart: native heart 


Plan to address problem: 


Cont Plavix








(6) Glaucoma


Current Visit: Yes   Status: Chronic   


Qualifiers: 


   Glaucoma type: unspecified 


Plan to address problem: 


Cont eye drops








(7) DVT prophylaxis


Current Visit: No   Status: Acute   


Plan to address problem: 


On Heparin and GI prophylaxis

## 2019-11-25 LAB
ALBUMIN SERPL-MCNC: 3 G/DL (ref 3.9–5)
ALT SERPL-CCNC: 20 UNITS/L (ref 7–56)
BASOPHILS # (AUTO): 0 K/MM3 (ref 0–0.1)
BASOPHILS NFR BLD AUTO: 0.3 % (ref 0–1.8)
BUN SERPL-MCNC: 49 MG/DL (ref 7–17)
BUN/CREAT SERPL: 17 %
CALCIUM SERPL-MCNC: 8.6 MG/DL (ref 8.4–10.2)
EOSINOPHIL # BLD AUTO: 0 K/MM3 (ref 0–0.4)
EOSINOPHIL NFR BLD AUTO: 0 % (ref 0–4.3)
HCT VFR BLD CALC: 26.6 % (ref 30.3–42.9)
HEMOLYSIS INDEX: 4
HGB BLD-MCNC: 8.6 GM/DL (ref 10.1–14.3)
LYMPHOCYTES # BLD AUTO: 1.7 K/MM3 (ref 1.2–5.4)
LYMPHOCYTES NFR BLD AUTO: 10.9 % (ref 13.4–35)
MCHC RBC AUTO-ENTMCNC: 32 % (ref 30–34)
MCV RBC AUTO: 92 FL (ref 79–97)
MONOCYTES # (AUTO): 0.6 K/MM3 (ref 0–0.8)
MONOCYTES % (AUTO): 4.1 % (ref 0–7.3)
PLATELET # BLD: 317 K/MM3 (ref 140–440)
RBC # BLD AUTO: 2.88 M/MM3 (ref 3.65–5.03)

## 2019-11-25 RX ADMIN — INSULIN LISPRO SCH: 100 INJECTION, SOLUTION INTRAVENOUS; SUBCUTANEOUS at 13:38

## 2019-11-25 RX ADMIN — VALSARTAN SCH MG: 160 TABLET, FILM COATED ORAL at 21:30

## 2019-11-25 RX ADMIN — CLOPIDOGREL BISULFATE SCH MG: 75 TABLET ORAL at 10:06

## 2019-11-25 RX ADMIN — ACETAMINOPHEN PRN MG: 325 TABLET ORAL at 18:30

## 2019-11-25 RX ADMIN — CYCLOBENZAPRINE PRN MG: 10 TABLET, FILM COATED ORAL at 22:52

## 2019-11-25 RX ADMIN — BRIMONIDINE TARTRATE, TIMOLOL MALEATE SCH DROPS: 2; 5 SOLUTION/ DROPS OPHTHALMIC at 12:48

## 2019-11-25 RX ADMIN — INSULIN GLARGINE SCH UNITS: 100 INJECTION, SOLUTION SUBCUTANEOUS at 21:32

## 2019-11-25 RX ADMIN — BRIMONIDINE TARTRATE, TIMOLOL MALEATE SCH DROPS: 2; 5 SOLUTION/ DROPS OPHTHALMIC at 21:56

## 2019-11-25 RX ADMIN — FUROSEMIDE SCH MG: 40 TABLET ORAL at 18:30

## 2019-11-25 RX ADMIN — CITALOPRAM HYDROBROMIDE SCH MG: 10 TABLET ORAL at 12:30

## 2019-11-25 RX ADMIN — HEPARIN SODIUM SCH UNIT: 5000 INJECTION, SOLUTION INTRAVENOUS; SUBCUTANEOUS at 10:06

## 2019-11-25 RX ADMIN — NIFEDIPINE SCH MG: 30 TABLET, FILM COATED, EXTENDED RELEASE ORAL at 10:05

## 2019-11-25 RX ADMIN — ASPIRIN SCH MG: 81 TABLET, COATED ORAL at 10:06

## 2019-11-25 RX ADMIN — VALSARTAN SCH MG: 160 TABLET, FILM COATED ORAL at 10:05

## 2019-11-25 RX ADMIN — ACETAMINOPHEN PRN MG: 325 TABLET ORAL at 12:30

## 2019-11-25 RX ADMIN — INSULIN LISPRO SCH: 100 INJECTION, SOLUTION INTRAVENOUS; SUBCUTANEOUS at 13:39

## 2019-11-25 RX ADMIN — HEPARIN SODIUM SCH UNIT: 5000 INJECTION, SOLUTION INTRAVENOUS; SUBCUTANEOUS at 21:32

## 2019-11-25 RX ADMIN — CEFTRIAXONE SODIUM SCH MLS/HR: 2 INJECTION, POWDER, FOR SOLUTION INTRAMUSCULAR; INTRAVENOUS at 12:34

## 2019-11-25 RX ADMIN — INSULIN LISPRO SCH: 100 INJECTION, SOLUTION INTRAVENOUS; SUBCUTANEOUS at 22:31

## 2019-11-25 NOTE — XRAY REPORT
CHEST 1 VIEW 



INDICATION:  Shortness of breath.



COMPARISON:  11/23/2019



FINDINGS:

Support devices: None. 



Heart: Stable mild cardiomegaly. 

Lungs/Pleura: No significant change in mild bilateral pulmonary venous congestion and small pleural e
ffusions, left greater than right. No pneumothorax. 



Additional findings: None.



IMPRESSION:

 No significant change. Mild CHF or volume overload is suggested.



Signer Name: Magnus Amezquita Jr, MD 

Signed: 11/25/2019 11:17 AM

 Workstation Name: [id number]

## 2019-11-25 NOTE — PROGRESS NOTE
Assessment and Plan





- Patient Problems


(1) SHA (acute kidney injury)


Current Visit: Yes   Status: Acute   


Plan to address problem: 


Patient with acute on chronic kidney disease.  Chronic most likely secondary to 

hypertensive renal disease.  Nephrology consult for further recommendations.  

Low-salt diet.  Continue all renal toxic medications. 








(2) Flash pulmonary edema


Current Visit: Yes   Status: Acute   


Plan to address problem: 


Patient flash pulmonary edema this was reason for acute respiratory failure 

resolving.








(3) Malignant hypertensive urgency


Current Visit: Yes   Status: Acute   


Plan to address problem: 


Patient's blood pressure has begin to go back up on day 2 after being weaned 

from nitroglycerin drip.  We'll increase Procardia to 60 mg daily and continue 

treatment with hydralazine as deep when necessary and will titrate accordingly.








(4) CAD (coronary artery disease)


Current Visit: Yes   Status: Chronic   


Qualifiers: 


   Coronary Disease-Associated Artery/Lesion type: native artery   Native vs. 

transplanted heart: native heart 


Plan to address problem: 


Patient is not having chest pain shortness of breath at this particular time 

stable coronary artery disease.








(5) IDDM (insulin dependent diabetes mellitus)


Current Visit: Yes   Status: Chronic   


Plan to address problem: 


Diabetes remains uncontrolled we'll increase Lantus to 20 units daily at bedtime

and increase to moderate dose sliding scale.








(6) History of CVA with residual deficit


Current Visit: No   Status: Chronic   


Plan to address problem: 


Patient just has residual weakness.  We'll continue to treat with aggressive 

antilipid's and antiplatelet therapy.  Along with beta blocker when blood 

pressure permits.








(7) Acute respiratory failure


Current Visit: Yes   Status: Acute   


Plan to address problem: 


Initially secondary to flash pulmonary edema with underlying etiology of 

hypertensive crisis.  Resolving after blood pressure improved.








History


Interval history: 





She name.  Resting comfortably today distress.  States she has a slight headache

otherwise feels better.  Difficult to understand because of dysphagia.





Hospitalist Physical





- Constitutional


Vitals: 


                                        











Temp Pulse Resp BP Pulse Ox


 


 97.9 F   87   18   191/120   94 


 


 11/25/19 08:46  11/25/19 10:05  11/25/19 08:46  11/25/19 10:05  11/25/19 08:46











General appearance: Present: no acute distress, well-nourished





- EENT


Eyes: Present: PERRL, EOM intact


ENT: hearing intact, clear oral mucosa, dentition normal





- Neck


Neck: Present: supple, normal ROM





- Respiratory


Respiratory: bilateral: rhonchi





- Cardiovascular


Heart Sounds: Present: S1 & S2





- Extremities


Extremities: no ischemia, pulses intact, pulses symmetrical, No edema


Peripheral Pulses: within normal limits





- Abdominal


General gastrointestinal: soft, non-tender, non-distended, normal bowel sounds, 

no hepatomegaly, no splenomegaly





- Integumentary


Integumentary: Present: clear, warm, dry





- Psychiatric


Psychiatric: appropriate mood/affect, intact judgment & insight, memory intact





- Neurologic


Neurologic: CNII-XII intact, moves all extremities





Results





- Labs


CBC & Chem 7: 


                                 11/25/19 09:01





                                 11/25/19 09:01


Labs: 


                             Laboratory Last Values











WBC  15.2 K/mm3 (4.5-11.0)  H  11/25/19  09:01    


 


RBC  2.88 M/mm3 (3.65-5.03)  L  11/25/19  09:01    


 


Hgb  8.6 gm/dl (10.1-14.3)  L  11/25/19  09:01    


 


Hct  26.6 % (30.3-42.9)  L  11/25/19  09:01    


 


MCV  92 fl (79-97)   11/25/19  09:01    


 


MCH  30 pg (28-32)   11/25/19  09:01    


 


MCHC  32 % (30-34)   11/25/19  09:01    


 


RDW  13.7 % (13.2-15.2)   11/25/19  09:01    


 


Plt Count  317 K/mm3 (140-440)   11/25/19  09:01    


 


Lymph % (Auto)  10.9 % (13.4-35.0)  L  11/25/19  09:01    


 


Mono % (Auto)  4.1 % (0.0-7.3)   11/25/19  09:01    


 


Eos % (Auto)  0.0 % (0.0-4.3)   11/25/19  09:01    


 


Baso % (Auto)  0.3 % (0.0-1.8)   11/25/19  09:01    


 


Lymph #  1.7 K/mm3 (1.2-5.4)   11/25/19  09:01    


 


Mono #  0.6 K/mm3 (0.0-0.8)   11/25/19  09:01    


 


Eos #  0.0 K/mm3 (0.0-0.4)   11/25/19  09:01    


 


Baso #  0.0 K/mm3 (0.0-0.1)   11/25/19  09:01    


 


Add Manual Diff  Complete   11/23/19  18:40    


 


Total Counted  100   11/23/19  18:40    


 


Seg Neutrophils %  84.7 % (40.0-70.0)  H  11/25/19  09:01    


 


Seg Neuts % (Manual)  93.0 % (40.0-70.0)  H  11/23/19  18:40    


 


Band Neutrophils %  0 %  11/23/19  18:40    


 


Lymphocytes % (Manual)  4.0 % (13.4-35.0)  L  11/23/19  18:40    


 


Reactive Lymphs % (Man)  0 %  11/23/19  18:40    


 


Monocytes % (Manual)  2.0 % (0.0-7.3)   11/23/19  18:40    


 


Eosinophils % (Manual)  1.0 % (0.0-4.3)   11/23/19  18:40    


 


Basophils % (Manual)  0 % (0.0-1.8)   11/23/19  18:40    


 


Metamyelocytes %  0 %  11/23/19  18:40    


 


Myelocytes %  0 %  11/23/19  18:40    


 


Promyelocytes %  0 %  11/23/19  18:40    


 


Blast Cells %  0 %  11/23/19  18:40    


 


Nucleated RBC %  Not Reportable   11/23/19  18:40    


 


Seg Neutrophils #  12.9 K/mm3 (1.8-7.7)  H  11/25/19  09:01    


 


Seg Neutrophils # Man  12.8 K/mm3 (1.8-7.7)  H  11/23/19  18:40    


 


Band Neutrophils #  0.0 K/mm3  11/23/19  18:40    


 


Lymphocytes # (Manual)  0.6 K/mm3 (1.2-5.4)  L  11/23/19  18:40    


 


Abs React Lymphs (Man)  0.0 K/mm3  11/23/19  18:40    


 


Monocytes # (Manual)  0.3 K/mm3 (0.0-0.8)   11/23/19  18:40    


 


Eosinophils # (Manual)  0.1 K/mm3 (0.0-0.4)   11/23/19  18:40    


 


Basophils # (Manual)  0.0 K/mm3 (0.0-0.1)   11/23/19  18:40    


 


Metamyelocytes #  0.0 K/mm3  11/23/19  18:40    


 


Myelocytes #  0.0 K/mm3  11/23/19  18:40    


 


Promyelocytes #  0.0 K/mm3  11/23/19  18:40    


 


Blast Cells #  0.0 K/mm3  11/23/19  18:40    


 


WBC Morphology  Not Reportable   11/23/19  18:40    


 


Hypersegmented Neuts  Not Reportable   11/23/19  18:40    


 


Hyposegmented Neuts  Not Reportable   11/23/19  18:40    


 


Hypogranular Neuts  Not Reportable   11/23/19  18:40    


 


Smudge Cells  Not Reportable   11/23/19  18:40    


 


Toxic Granulation  Not Reportable   11/23/19  18:40    


 


Toxic Vacuolation  Not Reportable   11/23/19  18:40    


 


Dohle Bodies  Not Reportable   11/23/19  18:40    


 


Pelger-Huet Anomaly  Not Reportable   11/23/19  18:40    


 


Yuly Rods  Not Reportable   11/23/19  18:40    


 


Platelet Estimate  Consistent w auto   11/23/19  18:40    


 


Clumped Platelets  Not Reportable   11/23/19  18:40    


 


Plt Clumps, EDTA  Not Reportable   11/23/19  18:40    


 


Large Platelets  1+   11/23/19  18:40    


 


Giant Platelets  Not Reportable   11/23/19  18:40    


 


Platelet Satelliting  Not Reportable   11/23/19  18:40    


 


Plt Morphology Comment  Not Reportable   11/23/19  18:40    


 


RBC Morphology  Normal   11/23/19  18:40    


 


Dimorphic RBCs  Not Reportable   11/23/19  18:40    


 


Polychromasia  Not Reportable   11/23/19  18:40    


 


Hypochromasia  Not Reportable   11/23/19  18:40    


 


Poikilocytosis  Not Reportable   11/23/19  18:40    


 


Anisocytosis  Not Reportable   11/23/19  18:40    


 


Microcytosis  Not Reportable   11/23/19  18:40    


 


Macrocytosis  Not Reportable   11/23/19  18:40    


 


Spherocytes  Not Reportable   11/23/19  18:40    


 


Pappenheimer Bodies  Not Reportable   11/23/19  18:40    


 


Sickle Cells  Not Reportable   11/23/19  18:40    


 


Target Cells  Not Reportable   11/23/19  18:40    


 


Tear Drop Cells  Not Reportable   11/23/19  18:40    


 


Ovalocytes  Not Reportable   11/23/19  18:40    


 


Helmet Cells  Not Reportable   11/23/19  18:40    


 


[place] Bodies  Not Reportable   11/23/19  18:40    


 


Cabot Rings  Not Reportable   11/23/19  18:40    


 


Marco Cells  Not Reportable   11/23/19  18:40    


 


Bite Cells  Not Reportable   11/23/19  18:40    


 


Crenated Cell  Not Reportable   11/23/19  18:40    


 


Elliptocytes  Not Reportable   11/23/19  18:40    


 


Acanthocytes (Spur)  Not Reportable   11/23/19  18:40    


 


Rouleaux  Not Reportable   11/23/19  18:40    


 


Hemoglobin C Crystals  Not Reportable   11/23/19  18:40    


 


Schistocytes  Not Reportable   11/23/19  18:40    


 


Malaria parasites  Not Reportable   11/23/19  18:40    


 


Juliano Bodies  Not Reportable   11/23/19  18:40    


 


Hem Pathologist Commnt  No   11/23/19  18:40    


 


PT  14.2 Sec. (12.2-14.9)   11/23/19  18:40    


 


INR  1.11  (0.87-1.13)   11/23/19  18:40    


 


APTT  29.0 Sec. (24.2-36.6)   11/23/19  18:40    


 


POC ABG pH  7.336  (7.35-7.45)  L  11/23/19  18:34    


 


ABG pH  7.378 pH Units (7.350-7.450)   11/23/19  18:40    


 


POC ABG pCO2  44.2  (35-45)   11/23/19  18:34    


 


ABG pCO2  37.7 mm Hg  11/23/19  18:40    


 


POC ABG pO2  147  ()  H  11/23/19  18:34    


 


ABG pO2  82.9 mm Hg (80.0-90.0)   11/23/19  18:40    


 


POC ABG HCO3  23.6  (22-26 mml/L)   11/23/19  18:34    


 


ABG HCO3  21.7 mmol/L (20.0-26.0)   11/23/19  18:40    


 


POC ABG Total CO2  25  (23-27mmol/L)   11/23/19  18:34    


 


POC ABG O2 Sat  99   11/23/19  18:34    


 


ABG O2 Saturation  96.8 % (95.0-99.0)   11/23/19  18:40    


 


ABG O2 Content  12.0  (0.0-44)   11/23/19  18:40    


 


POC ABG Base Excess  -2  ((-2) - (+3)mmol/L)   11/23/19  18:34    


 


ABG Base Excess  -3.1 mmol/L (-2.0-3.0)  L  11/23/19  18:40    


 


ABG Hemoglobin  9.2 gm/dl (12.0-16.0)  L  11/23/19  18:40    


 


ABG Carboxyhemoglobin  4.4 % (0.0-5.0)   11/23/19  18:40    


 


ABG Methemoglobin  0.3 % (0.0-1.5)   11/23/19  18:40    


 


VBG pH  7.378  (7.320-7.420)   11/23/19  18:40    


 


Oxyhemoglobin  92.2 % (95.0-99.0)  L  11/23/19  18:40    


 


FiO2  21 %  11/23/19  18:40    


 


Sodium  134 mmol/L (137-145)  L  11/25/19  09:01    


 


Potassium  4.2 mmol/L (3.6-5.0)   11/25/19  09:01    


 


Chloride  99.0 mmol/L ()   11/25/19  09:01    


 


Carbon Dioxide  18 mmol/L (22-30)  L  11/25/19  09:01    


 


Anion Gap  21 mmol/L  11/25/19  09:01    


 


BUN  49 mg/dL (7-17)  H  11/25/19  09:01    


 


Creatinine  2.9 mg/dL (0.7-1.2)  H  11/25/19  09:01    


 


Estimated GFR  22 ml/min  11/25/19  09:01    


 


BUN/Creatinine Ratio  17 %  11/25/19  09:01    


 


Glucose  159 mg/dL ()  H  11/25/19  09:01    


 


POC Glucose  167  ()  H  11/25/19  12:15    


 


Calcium  8.6 mg/dL (8.4-10.2)   11/25/19  09:01    


 


Magnesium  2.00 mg/dL (1.7-2.3)   11/23/19  18:40    


 


Total Bilirubin  0.20 mg/dL (0.1-1.2)   11/25/19  09:01    


 


AST  19 units/L (5-40)   11/25/19  09:01    


 


ALT  20 units/L (7-56)   11/25/19  09:01    


 


Alkaline Phosphatase  96 units/L ()   11/25/19  09:01    


 


Total Creatine Kinase  64 units/L ()   11/23/19  18:40    


 


Troponin T  0.034 ng/mL (0.00-0.029)  H  11/23/19  18:40    


 


NT-Pro-B Natriuret Pep  77897 pg/mL (0-450)  H  11/23/19  18:40    


 


Total Protein  7.4 g/dL (6.3-8.2)   11/25/19  09:01    


 


Albumin  3.0 g/dL (3.9-5)  L  11/25/19  09:01    


 


Albumin/Globulin Ratio  0.7 %  11/25/19  09:01    


 


Triglycerides  149 mg/dL (2-149)   11/23/19  18:40    


 


Cholesterol  268 mg/dL ()  H  11/23/19  18:40    


 


LDL Cholesterol Direct  176 mg/dL ()  H  11/23/19  18:40    


 


HDL Cholesterol  74 mg/dL (40-59)  H  11/23/19  18:40    


 


Cholesterol/HDL Ratio  3.62 %  11/23/19  18:40    


 


Urine Color  Straw  (Yellow)   11/23/19  18:46    


 


Urine Turbidity  Clear  (Clear)   11/23/19  18:46    


 


Urine pH  7.0  (5.0-7.0)   11/23/19  18:46    


 


Ur Specific Gravity  1.013  (1.003-1.030)   11/23/19  18:46    


 


Urine Protein  >500 mg/dL (Negative)   11/23/19  18:46    


 


Urine Glucose (UA)  >=500 mg/dL (Negative)   11/23/19  18:46    


 


Urine Ketones  Neg mg/dL (Negative)   11/23/19  18:46    


 


Urine Blood  Sm  (Negative)   11/23/19  18:46    


 


Urine Nitrite  Neg  (Negative)   11/23/19  18:46    


 


Urine Bilirubin  Neg  (Negative)   11/23/19  18:46    


 


Urine Urobilinogen  < 2.0 mg/dL (<2.0)   11/23/19  18:46    


 


Ur Leukocyte Esterase  Mod  (Negative)   11/23/19  18:46    


 


Urine WBC (Auto)  20.0 /HPF (0.0-6.0)  H  11/23/19  18:46    


 


Urine RBC (Auto)  28.0 /HPF (0.0-6.0)   11/23/19  18:46    


 


U Epithel Cells (Auto)  2.0 /HPF (0-13.0)   11/23/19  18:46    


 


Urine Bacteria (Auto)  1+ /HPF (Negative)   11/23/19  18:46    


 


Hyaline Casts  2 /LPF  11/23/19  18:46    


 


Urine Yeast (Budding)  Few /HPF  11/23/19  18:46    














Active Medications





- Current Medications


Current Medications: 














Generic Name Dose Route Start Last Admin





  Trade Name Freq  PRN Reason Stop Dose Admin


 


Acetaminophen  325 mg  11/23/19 23:00  11/25/19 12:30





  Tylenol  PO   325 mg





  Q4H PRN   Administration





  Pain MILD(1-3)/Fever >100.5/HA  


 


Lipase/Protease/Amylase  1 each  11/23/19 23:00 





  Pancreaze Dr 10,500 Unit  FEEDTUBE  





  PRN PRN  





  For Clogged Feeding Tube  


 


Aspirin  81 mg  11/24/19 10:00  11/25/19 10:06





  Halfprin Ec  PO   81 mg





  DAILY VANDA   Administration


 


Atorvastatin Calcium  80 mg  11/24/19 22:00  11/24/19 22:25





  Lipitor  PO   80 mg





  QHS VANDA   Administration


 


Bisacodyl  10 mg  11/23/19 23:00 





  Dulcolax  IL  





  QDAY PRN  





  Constipation  


 


Brimonidine/Timolol  1 drops  11/24/19 10:00  11/25/19 12:48





  Combigan 0.2-0.5%  OU   1 drops





  Q12HR VANDA   Administration


 


Citalopram Hydrobromide  10 mg  11/24/19 10:00  11/25/19 12:30





  Celexa  PO   10 mg





  QDAY VANDA   Administration


 


Clopidogrel Bisulfate  75 mg  11/24/19 10:00  11/25/19 10:06





  Plavix  PO   75 mg





  QDAY VANDA   Administration


 


Cyclobenzaprine HCl  10 mg  11/23/19 23:00 





  Flexeril  PO  





  TID PRN  





  Muscle Spasm  


 


Heparin Sodium (Porcine)  5,000 unit  11/24/19 10:00  11/25/19 10:06





  Heparin  SUB-Q   5,000 unit





  Q12HR VANDA   Administration


 


Hydralazine HCl  10 mg  11/23/19 23:06 





  Apresoline  IV  





  Q30MIN PRN  





  Blood Pressure  


 


Ceftriaxone Sodium  2 gm in 100 mls @ 200 mls/hr  11/24/19 10:00  11/25/19 12:34





  Rocephin/Ns 2 Gm/100 Ml  IV   200 mls/hr





  Q24HR VANDA   Administration





  Protocol  


 


Insulin Glargine  20 units  11/25/19 22:00 





  Lantus  SUB-Q  





  QHS VANDA  


 


Insulin Human Lispro  0 unit  11/24/19 09:00  11/24/19 22:25





  Humalog  SUB-Q   6 unit





  ACHS VANDA   Administration





  Protocol  


 


Magnesium Hydroxide  30 ml  11/23/19 23:00  11/25/19 10:06





  Milk Of Magnesia  PO   30 ml





  Q4H PRN   Administration





  Constipation  


 


Nifedipine  30 mg  11/24/19 16:00  11/25/19 10:05





  Procardia Xl  PO   30 mg





  QDAY VANDA   Administration


 


Simple Syrup  15 ml  11/23/19 23:00 





  Simple Syrup  FEEDTUBE  





  PRN PRN  





  Hypoglycemia  


 


Simple Syrup  30 ml  11/23/19 23:00 





  Simple Syrup  FEEDTUBE  





  PRN PRN  





  Hypoglycemia  


 


Sodium Bicarbonate  325 mg  11/23/19 23:00 





  Sodium Bicarbonate  FEEDTUBE  





  PRN PRN  





  For Clogged Feeding Tube  


 


Valsartan  160 mg  11/23/19 23:45  11/25/19 10:05





  Diovan  PO   160 mg





  BID VANDA   Administration














Nutrition/Malnutrition Assess





- Dietary Evaluation


Nutrition/Malnutrition Findings: 


                                        





Nutrition Notes                                            Start:  11/24/19 

10:54


Freq:                                                      Status: Active       




Protocol:                                                                       




 Document     11/24/19 11:29  LP  (Rec: 11/24/19 11:31  LP  [id number])


 Nutrition Notes


     Need for Assessment generated from:         RN Referral,MST


     Initial or Follow up                        Brief Note


     Current Diagnosis                           Acute Kidney Injury,Diabetes,


                                                 Hypertension,Stroke


     Current Diet                                Consistent CHO


     Labs/Tests                                  ,439


                                                 BNP 29622


     Subjective/Other Information                Screen for MST. Pt denies wt


                                                 changes and eating well PTA.


                                                 Pt food preferences noted. Pt


                                                 wanting diet education on


                                                 heart and DM.


     Minimum of two criteria                     No physical signs of


                                                 malnutrition


     #1


      Nutrition Diagnosis                        Food and nutrition-related


                                                 knowledge deficit


      Etiology                                   DM and cardiac diet


      As Evidenced by Signs and Symptoms         Pt not following DM and


                                                 cardiac diet at home


 Nutrition Intervention


     Teaching Recipient                          Patient


     Learning Readiness                          Good


     Teaching Methods                            Discussion,Handout


     Response to Teaching                        Verbalize understanding


     Education Handouts Provided                 Consistent CHO and Stroke


                                                 nutrition therapy


     Barriers to Learning                        No Barriers


     RD phone number provided                    Yes


     Patient aware of follow up options          Yes


     Follow-Up By:                               11/25/19


     Additional Comments                         Follow for diet education


                                                 questions











- Malnutrition Assessment


Minimum of two criteria: Yes





- Attestation Statement


I have reviewed and agreed w/ Malnutrition eval & tx plan: Yes

## 2019-11-25 NOTE — PROGRESS NOTE
Assessment and Plan





- Patient Problems


(1) SHA (acute kidney injury)


Current Visit: Yes   Status: Acute   





(2) Flash pulmonary edema


Current Visit: Yes   Status: Acute   





(3) Malignant hypertensive urgency


Current Visit: Yes   Status: Acute   





(4) CAD (coronary artery disease)


Current Visit: Yes   Status: Chronic   


Qualifiers: 


   Coronary Disease-Associated Artery/Lesion type: native artery   Native vs. 

transplanted heart: native heart 





(5) IDDM (insulin dependent diabetes mellitus)


Current Visit: Yes   Status: Chronic   





(6) History of CVA with residual deficit


Current Visit: No   Status: Chronic   





History


Interval history: 





Patient 39-year-old female with a history of CVA left hemiparesis and dysarthria

hypertension seizure disorder and diabetes presents with acute respiratory 

failure placed on BiPAP sats were 88%.  Patient also placed on steroids and beta

agonists.  Was found to have malignant hypertension with systolic blood pressure

greater than 220.  Patient's blood pressure is much better controlled now.  

134/86.  Continue present antihypertensives.  Patient hospital course 

complicated by uncontrolled blood sugar.  Uncontrolled diabetes.  Patient 

however breathing much better.  Follow-up chest x-ray labs pending.





Hospitalist Physical





- Constitutional


Vitals: 


                                        











Temp Pulse Resp BP Pulse Ox


 


 98.4 F   80   20   168/99   94 


 


 11/25/19 05:11  11/25/19 05:11  11/25/19 05:11  11/25/19 05:11  11/25/19 05:11











General appearance: Present: no acute distress, well-nourished





Results





- Labs


CBC & Chem 7: 


                                 11/23/19 18:40





                                 11/23/19 18:40


Labs: 


                             Laboratory Last Values











WBC  13.8 K/mm3 (4.5-11.0)  H  11/23/19  18:40    


 


RBC  2.67 M/mm3 (3.65-5.03)  L  11/23/19  18:40    


 


Hgb  8.0 gm/dl (10.1-14.3)  L  11/23/19  18:40    


 


Hct  24.5 % (30.3-42.9)  L  11/23/19  18:40    


 


MCV  92 fl (79-97)   11/23/19  18:40    


 


MCH  30 pg (28-32)   11/23/19  18:40    


 


MCHC  33 % (30-34)   11/23/19  18:40    


 


RDW  13.2 % (13.2-15.2)   11/23/19  18:40    


 


Plt Count  264 K/mm3 (140-440)   11/23/19  18:40    


 


Add Manual Diff  Complete   11/23/19  18:40    


 


Total Counted  100   11/23/19  18:40    


 


Seg Neutrophils %  Np   11/23/19  18:40    


 


Seg Neuts % (Manual)  93.0 % (40.0-70.0)  H  11/23/19  18:40    


 


Band Neutrophils %  0 %  11/23/19  18:40    


 


Lymphocytes % (Manual)  4.0 % (13.4-35.0)  L  11/23/19  18:40    


 


Reactive Lymphs % (Man)  0 %  11/23/19  18:40    


 


Monocytes % (Manual)  2.0 % (0.0-7.3)   11/23/19  18:40    


 


Eosinophils % (Manual)  1.0 % (0.0-4.3)   11/23/19  18:40    


 


Basophils % (Manual)  0 % (0.0-1.8)   11/23/19  18:40    


 


Metamyelocytes %  0 %  11/23/19  18:40    


 


Myelocytes %  0 %  11/23/19  18:40    


 


Promyelocytes %  0 %  11/23/19  18:40    


 


Blast Cells %  0 %  11/23/19  18:40    


 


Nucleated RBC %  Not Reportable   11/23/19  18:40    


 


Seg Neutrophils # Man  12.8 K/mm3 (1.8-7.7)  H  11/23/19  18:40    


 


Band Neutrophils #  0.0 K/mm3  11/23/19  18:40    


 


Lymphocytes # (Manual)  0.6 K/mm3 (1.2-5.4)  L  11/23/19  18:40    


 


Abs React Lymphs (Man)  0.0 K/mm3  11/23/19  18:40    


 


Monocytes # (Manual)  0.3 K/mm3 (0.0-0.8)   11/23/19  18:40    


 


Eosinophils # (Manual)  0.1 K/mm3 (0.0-0.4)   11/23/19  18:40    


 


Basophils # (Manual)  0.0 K/mm3 (0.0-0.1)   11/23/19  18:40    


 


Metamyelocytes #  0.0 K/mm3  11/23/19  18:40    


 


Myelocytes #  0.0 K/mm3  11/23/19  18:40    


 


Promyelocytes #  0.0 K/mm3  11/23/19  18:40    


 


Blast Cells #  0.0 K/mm3  11/23/19  18:40    


 


WBC Morphology  Not Reportable   11/23/19  18:40    


 


Hypersegmented Neuts  Not Reportable   11/23/19  18:40    


 


Hyposegmented Neuts  Not Reportable   11/23/19  18:40    


 


Hypogranular Neuts  Not Reportable   11/23/19  18:40    


 


Smudge Cells  Not Reportable   11/23/19  18:40    


 


Toxic Granulation  Not Reportable   11/23/19  18:40    


 


Toxic Vacuolation  Not Reportable   11/23/19  18:40    


 


Dohle Bodies  Not Reportable   11/23/19  18:40    


 


Pelger-Huet Anomaly  Not Reportable   11/23/19  18:40    


 


Yuly Rods  Not Reportable   11/23/19  18:40    


 


Platelet Estimate  Consistent w auto   11/23/19  18:40    


 


Clumped Platelets  Not Reportable   11/23/19  18:40    


 


Plt Clumps, EDTA  Not Reportable   11/23/19  18:40    


 


Large Platelets  1+   11/23/19  18:40    


 


Giant Platelets  Not Reportable   11/23/19  18:40    


 


Platelet Satelliting  Not Reportable   11/23/19  18:40    


 


Plt Morphology Comment  Not Reportable   11/23/19  18:40    


 


RBC Morphology  Normal   11/23/19  18:40    


 


Dimorphic RBCs  Not Reportable   11/23/19  18:40    


 


Polychromasia  Not Reportable   11/23/19  18:40    


 


Hypochromasia  Not Reportable   11/23/19  18:40    


 


Poikilocytosis  Not Reportable   11/23/19  18:40    


 


Anisocytosis  Not Reportable   11/23/19  18:40    


 


Microcytosis  Not Reportable   11/23/19  18:40    


 


Macrocytosis  Not Reportable   11/23/19  18:40    


 


Spherocytes  Not Reportable   11/23/19  18:40    


 


Pappenheimer Bodies  Not Reportable   11/23/19  18:40    


 


Sickle Cells  Not Reportable   11/23/19  18:40    


 


Target Cells  Not Reportable   11/23/19  18:40    


 


Tear Drop Cells  Not Reportable   11/23/19  18:40    


 


Ovalocytes  Not Reportable   11/23/19  18:40    


 


Helmet Cells  Not Reportable   11/23/19  18:40    


 


Acosta-Lindenwold Bodies  Not Reportable   11/23/19  18:40    


 


Cabot Rings  Not Reportable   11/23/19  18:40    


 


Marco Cells  Not Reportable   11/23/19  18:40    


 


Bite Cells  Not Reportable   11/23/19  18:40    


 


Crenated Cell  Not Reportable   11/23/19  18:40    


 


Elliptocytes  Not Reportable   11/23/19  18:40    


 


Acanthocytes (Spur)  Not Reportable   11/23/19  18:40    


 


Rouleaux  Not Reportable   11/23/19  18:40    


 


Hemoglobin C Crystals  Not Reportable   11/23/19  18:40    


 


Schistocytes  Not Reportable   11/23/19  18:40    


 


Malaria parasites  Not Reportable   11/23/19  18:40    


 


Juliano Bodies  Not Reportable   11/23/19  18:40    


 


Hem Pathologist Commnt  No   11/23/19  18:40    


 


PT  14.2 Sec. (12.2-14.9)   11/23/19  18:40    


 


INR  1.11  (0.87-1.13)   11/23/19  18:40    


 


APTT  29.0 Sec. (24.2-36.6)   11/23/19  18:40    


 


POC ABG pH  7.336  (7.35-7.45)  L  11/23/19  18:34    


 


ABG pH  7.378 pH Units (7.350-7.450)   11/23/19  18:40    


 


POC ABG pCO2  44.2  (35-45)   11/23/19  18:34    


 


ABG pCO2  37.7 mm Hg  11/23/19  18:40    


 


POC ABG pO2  147  ()  H  11/23/19  18:34    


 


ABG pO2  82.9 mm Hg (80.0-90.0)   11/23/19  18:40    


 


POC ABG HCO3  23.6  (22-26 mml/L)   11/23/19  18:34    


 


ABG HCO3  21.7 mmol/L (20.0-26.0)   11/23/19  18:40    


 


POC ABG Total CO2  25  (23-27mmol/L)   11/23/19  18:34    


 


POC ABG O2 Sat  99   11/23/19  18:34    


 


ABG O2 Saturation  96.8 % (95.0-99.0)   11/23/19  18:40    


 


ABG O2 Content  12.0  (0.0-44)   11/23/19  18:40    


 


POC ABG Base Excess  -2  ((-2) - (+3)mmol/L)   11/23/19  18:34    


 


ABG Base Excess  -3.1 mmol/L (-2.0-3.0)  L  11/23/19  18:40    


 


ABG Hemoglobin  9.2 gm/dl (12.0-16.0)  L  11/23/19  18:40    


 


ABG Carboxyhemoglobin  4.4 % (0.0-5.0)   11/23/19  18:40    


 


ABG Methemoglobin  0.3 % (0.0-1.5)   11/23/19  18:40    


 


VBG pH  7.378  (7.320-7.420)   11/23/19  18:40    


 


Oxyhemoglobin  92.2 % (95.0-99.0)  L  11/23/19  18:40    


 


FiO2  21 %  11/23/19  18:40    


 


Sodium  137 mmol/L (137-145)   11/23/19  18:40    


 


Potassium  4.3 mmol/L (3.6-5.0)   11/23/19  18:40    


 


Chloride  103.1 mmol/L ()   11/23/19  18:40    


 


Carbon Dioxide  19 mmol/L (22-30)  L  11/23/19  18:40    


 


Anion Gap  19 mmol/L  11/23/19  18:40    


 


BUN  30 mg/dL (7-17)  H  11/23/19  18:40    


 


Creatinine  2.5 mg/dL (0.7-1.2)  H  11/23/19  18:40    


 


Estimated GFR  26 ml/min  11/23/19  18:40    


 


BUN/Creatinine Ratio  12 %  11/23/19  18:40    


 


Glucose  474 mg/dL ()  H  11/23/19  18:40    


 


POC Glucose  305  ()  H  11/24/19  21:09    


 


Calcium  8.1 mg/dL (8.4-10.2)  L  11/23/19  18:40    


 


Magnesium  2.00 mg/dL (1.7-2.3)   11/23/19  18:40    


 


Total Bilirubin  0.20 mg/dL (0.1-1.2)   11/23/19  18:40    


 


AST  19 units/L (5-40)   11/23/19  18:40    


 


ALT  16 units/L (7-56)   11/23/19  18:40    


 


Alkaline Phosphatase  109 units/L ()   11/23/19  18:40    


 


Total Creatine Kinase  64 units/L ()   11/23/19  18:40    


 


Troponin T  0.034 ng/mL (0.00-0.029)  H  11/23/19  18:40    


 


NT-Pro-B Natriuret Pep  75384 pg/mL (0-450)  H  11/23/19  18:40    


 


Total Protein  6.6 g/dL (6.3-8.2)   11/23/19  18:40    


 


Albumin  2.8 g/dL (3.9-5)  L  11/23/19  18:40    


 


Albumin/Globulin Ratio  0.7 %  11/23/19  18:40    


 


Triglycerides  149 mg/dL (2-149)   11/23/19  18:40    


 


Cholesterol  268 mg/dL ()  H  11/23/19  18:40    


 


LDL Cholesterol Direct  176 mg/dL ()  H  11/23/19  18:40    


 


HDL Cholesterol  74 mg/dL (40-59)  H  11/23/19  18:40    


 


Cholesterol/HDL Ratio  3.62 %  11/23/19  18:40    


 


Urine Color  Straw  (Yellow)   11/23/19  18:46    


 


Urine Turbidity  Clear  (Clear)   11/23/19  18:46    


 


Urine pH  7.0  (5.0-7.0)   11/23/19  18:46    


 


Ur Specific Gravity  1.013  (1.003-1.030)   11/23/19  18:46    


 


Urine Protein  >500 mg/dL (Negative)   11/23/19  18:46    


 


Urine Glucose (UA)  >=500 mg/dL (Negative)   11/23/19  18:46    


 


Urine Ketones  Neg mg/dL (Negative)   11/23/19  18:46    


 


Urine Blood  Sm  (Negative)   11/23/19  18:46    


 


Urine Nitrite  Neg  (Negative)   11/23/19  18:46    


 


Urine Bilirubin  Neg  (Negative)   11/23/19  18:46    


 


Urine Urobilinogen  < 2.0 mg/dL (<2.0)   11/23/19  18:46    


 


Ur Leukocyte Esterase  Mod  (Negative)   11/23/19  18:46    


 


Urine WBC (Auto)  20.0 /HPF (0.0-6.0)  H  11/23/19  18:46    


 


Urine RBC (Auto)  28.0 /HPF (0.0-6.0)   11/23/19  18:46    


 


U Epithel Cells (Auto)  2.0 /HPF (0-13.0)   11/23/19  18:46    


 


Urine Bacteria (Auto)  1+ /HPF (Negative)   11/23/19  18:46    


 


Hyaline Casts  2 /LPF  11/23/19  18:46    


 


Urine Yeast (Budding)  Few /HPF  11/23/19  18:46    














Active Medications





- Current Medications


Current Medications: 














Generic Name Dose Route Start Last Admin





  Trade Name Freq  PRN Reason Stop Dose Admin


 


Acetaminophen  325 mg  11/23/19 23:00  11/24/19 14:06





  Tylenol  PO   325 mg





  Q4H PRN   Administration





  Pain MILD(1-3)/Fever >100.5/HA  


 


Lipase/Protease/Amylase  1 each  11/23/19 23:00 





  Pancrealethea Tsang 10,500 Unit  FEEDTUBE  





  PRN PRN  





  For Clogged Feeding Tube  


 


Aspirin  81 mg  11/24/19 10:00  11/24/19 09:49





  Halfprin Ec  PO   81 mg





  DAILY VANDA   Administration


 


Atorvastatin Calcium  80 mg  11/24/19 22:00  11/24/19 22:25





  Lipitor  PO   80 mg





  QHS VANDA   Administration


 


Bisacodyl  10 mg  11/23/19 23:00 





  Dulcolax  OR  





  QDAY PRN  





  Constipation  


 


Brimonidine/Timolol  1 drops  11/24/19 10:00  11/24/19 22:45





  Combigan 0.2-0.5%  OU   1 drops





  Q12HR VANDA   Administration


 


Citalopram Hydrobromide  10 mg  11/24/19 10:00  11/24/19 09:49





  Celexa  PO   10 mg





  QDAY VANDA   Administration


 


Clopidogrel Bisulfate  75 mg  11/24/19 10:00  11/24/19 09:49





  Plavix  PO   75 mg





  QDAY VANDA   Administration


 


Cyclobenzaprine HCl  10 mg  11/23/19 23:00 





  Flexeril  PO  





  TID PRN  





  Muscle Spasm  


 


Heparin Sodium (Porcine)  5,000 unit  11/24/19 10:00  11/24/19 22:25





  Heparin  SUB-Q   5,000 unit





  Q12HR VANDA   Administration


 


Hydralazine HCl  10 mg  11/23/19 23:06 





  Apresoline  IV  





  Q30MIN PRN  





  Blood Pressure  


 


Ceftriaxone Sodium  2 gm in 100 mls @ 200 mls/hr  11/24/19 10:00  11/24/19 09:51





  Rocephin/Ns 2 Gm/100 Ml  IV   Not Given





  Q24HR [facility]  





  Protocol  


 


Insulin Glargine  15 units  11/24/19 22:00  11/24/19 22:45





  Lantus  SUB-Q   15 units





  QHS VANDA   Administration


 


Insulin Human Lispro  0 unit  11/24/19 09:00  11/24/19 22:25





  Humalog  SUB-Q   6 unit





  ACHS VANDA   Administration





  Protocol  


 


Magnesium Hydroxide  30 ml  11/23/19 23:00 





  Milk Of Magnesia  PO  





  Q4H PRN  





  Constipation  


 


Nifedipine  30 mg  11/24/19 16:00  11/24/19 16:31





  Procardia Xl  PO   30 mg





  QDAY VANDA   Administration


 


Simple Syrup  15 ml  11/23/19 23:00 





  Simple Syrup  FEEDTUBE  





  PRN PRN  





  Hypoglycemia  


 


Simple Syrup  30 ml  11/23/19 23:00 





  Simple Syrup  FEEDTUBE  





  PRN PRN  





  Hypoglycemia  


 


Sodium Bicarbonate  325 mg  11/23/19 23:00 





  Sodium Bicarbonate  FEEDTUBE  





  PRN PRN  





  For Clogged Feeding Tube  


 


Valsartan  160 mg  11/23/19 23:45  11/24/19 22:25





  Diovan  PO   160 mg





  BID VANDA   Administration














Nutrition/Malnutrition Assess





- Dietary Evaluation


Nutrition/Malnutrition Findings: 


                                        





Nutrition Notes                                            Start:  11/24/19 

10:54


Freq:                                                      Status: Active       




Protocol:                                                                       




 Document     11/24/19 11:29  LP  (Rec: 11/24/19 11:31  LP  [id number])


 Nutrition Notes


     Need for Assessment generated from:         RN Referral,MST


     Initial or Follow up                        Brief Note


     Current Diagnosis                           Acute Kidney Injury,Diabetes,


                                                 Hypertension,Stroke


     Current Diet                                Consistent CHO


     Labs/Tests                                  ,439


                                                 BNP [id number]


     Subjective/Other Information                Screen for MST. Pt denies wt


                                                 changes and eating well PTA.


                                                 Pt food preferences noted. Pt


                                                 wanting diet education on


                                                 heart and DM.


     Minimum of two criteria                     No physical signs of


                                                 malnutrition


     #1


      Nutrition Diagnosis                        Food and nutrition-related


                                                 knowledge deficit


      Etiology                                   DM and cardiac diet


      As Evidenced by Signs and Symptoms         Pt not following DM and


                                                 cardiac diet at home


 Nutrition Intervention


     Teaching Recipient                          Patient


     Learning Readiness                          Good


     Teaching Methods                            Discussion,Handout


     Response to Teaching                        Verbalize understanding


     Education Handouts Provided                 Consistent CHO and Stroke


                                                 nutrition therapy


     Barriers to Learning                        No Barriers


     RD phone number provided                    Yes


     Patient aware of follow up options          Yes


     Follow-Up By:                               11/25/19


     Additional Comments                         Follow for diet education


                                                 questions

## 2019-11-25 NOTE — PROGRESS NOTE
Assessment and Plan





Imp:


1. Accelerated HTN/hypertensive urgency


2. Acute respiratory failure, hypoxia


3. Pulm edema/pleural effusions due to #1


4. CKD


5. Normocytic anemia, probably due to renal disease





Rec:


1. Cr has been 2.1 or > since 7/2019 in the [place] system; await renal consult


2. Echo 8/2019 showed normal LVEF, + diastolic dysfunction and severely dilated 

LA; resume home dose of Lasix 40mg daily


3. Resume home Coreg 25mg BID, Nifedipine long-acting 60mg daily, and 

Hydralazine 100mg BID


4. F/u CXR periodically although doing okay on RA currently





Plan of care reviewed with patient, she understands/agrees





Subjective


Date of service: 11/25/19


Principal diagnosis: pulm edema


Interval history: 





Feeling better. SOB better. BP still high. No chest pain. On RA.





Active Medications





Acetaminophen (Tylenol)  325 mg PO Q4H PRN


   PRN Reason: Pain MILD(1-3)/Fever >100.5/HA


   Last Admin: 11/25/19 12:30 Dose:  325 mg


   Documented by: 


Lipase/Protease/Amylase (Pancreaze Dr 10,500 Unit)  1 each FEEDTUBE PRN PRN


   PRN Reason: For Clogged Feeding Tube


Aspirin (Halfprin Ec)  81 mg PO DAILY [facility]


   Last Admin: 11/25/19 10:06 Dose:  81 mg


   Documented by: 


Atorvastatin Calcium (Lipitor)  80 mg PO QHS [facility]


   Last Admin: 11/24/19 22:25 Dose:  80 mg


   Documented by: 


Bisacodyl (Dulcolax)  10 mg AL QDAY PRN


   PRN Reason: Constipation


Brimonidine/Timolol (Combigan 0.2-0.5%)  1 drops OU Q12HR [facility]


   Last Admin: 11/25/19 12:48 Dose:  1 drops


   Documented by: 


Carvedilol (Coreg)  25 mg PO BID [facility]


Citalopram Hydrobromide (Celexa)  10 mg PO QDAY [facility]


   Last Admin: 11/25/19 12:30 Dose:  10 mg


   Documented by: 


Clopidogrel Bisulfate (Plavix)  75 mg PO QDAY [facility]


   Last Admin: 11/25/19 10:06 Dose:  75 mg


   Documented by: 


Cyclobenzaprine HCl (Flexeril)  10 mg PO TID PRN


   PRN Reason: Muscle Spasm


Furosemide (Lasix)  40 mg PO QDAY [facility]


Heparin Sodium (Porcine) (Heparin)  5,000 unit SUB-Q Q12HR [facility]


   Last Admin: 11/25/19 10:06 Dose:  5,000 unit


   Documented by: 


Hydralazine HCl (Apresoline)  10 mg IV Q30MIN PRN


   PRN Reason: Blood Pressure


Hydralazine HCl (Apresoline)  100 mg PO BID [facility]


Ceftriaxone Sodium (Rocephin/Ns 2 Gm/100 Ml)  2 gm in 100 mls @ 200 mls/hr IV 

Q24HR [facility]; Protocol


   Last Admin: 11/25/19 12:34 Dose:  200 mls/hr


   Documented by: 


Insulin Glargine (Lantus)  20 units SUB-Q QHS [facility]


Insulin Human Lispro (Humalog)  0 unit SUB-Q ACHS [facility]; Protocol


   Last Admin: 11/25/19 13:39 Dose:  Not Given


   Documented by: 


Magnesium Hydroxide (Milk Of Magnesia)  30 ml PO Q4H PRN


   PRN Reason: Constipation


   Last Admin: 11/25/19 10:06 Dose:  30 ml


   Documented by: 


Nifedipine (Procardia Xl)  60 mg PO QDAY [facility]


Simple Syrup (Simple Syrup)  15 ml FEEDTUBE PRN PRN


   PRN Reason: Hypoglycemia


Simple Syrup (Simple Syrup)  30 ml FEEDTUBE PRN PRN


   PRN Reason: Hypoglycemia


Sodium Bicarbonate (Sodium Bicarbonate)  325 mg FEEDTUBE PRN PRN


   PRN Reason: For Clogged Feeding Tube


Valsartan (Diovan)  160 mg PO BID [facility]


   Last Admin: 11/25/19 10:05 Dose:  160 mg


   Documented by: 











Objective


                               Vital Signs - 12hr











  11/25/19 11/25/19 11/25/19





  08:42 08:46 10:05


 


Temperature  97.9 F 


 


Pulse Rate 76 86 87


 


Pulse Rate [ 87  





From Monitor]   


 


Respiratory  18 





Rate   


 


Blood Pressure  190/121 191/120


 


O2 Sat by Pulse  94 





Oximetry   














  11/25/19





  13:51


 


Temperature 97.7 F


 


Pulse Rate 88


 


Pulse Rate [ 





From Monitor] 


 


Respiratory 18





Rate 


 


Blood Pressure 201/123


 


O2 Sat by Pulse 94





Oximetry 











Constitutional: no acute distress, alert


Eyes: non-icteric


ENT: oropharynx moist


Neck: supple


Effort: normal


Ascultation: Bilateral: diminished breath sounds (bases)


Cardiovascular: regular rate and rhythm (no mrg)


Gastrointestinal: normoactive bowel sounds, soft, non-tender, non-distended


Integumentary: normal


Extremities: no cyanosis, no edema, pink and warm


Neurologic: normal mental status, other (LUE weakness, dysarthria)


Psychiatric: mood appropriate, affect normal


CBC and BMP: 


                                 11/25/19 09:01





                                 11/25/19 09:01


ABG, PT/INR, D-dimer: 


ABG











POC ABG pH  7.336  (7.35-7.45)  L  11/23/19  18:34    


 


ABG pH  7.378 pH Units (7.350-7.450)   11/23/19  18:40    


 


POC ABG pCO2  44.2  (35-45)   11/23/19  18:34    


 


ABG pCO2  37.7 mm Hg  11/23/19  18:40    


 


POC ABG pO2  147  ()  H  11/23/19  18:34    


 


ABG pO2  82.9 mm Hg (80.0-90.0)   11/23/19  18:40    


 


POC ABG HCO3  23.6  (22-26 mml/L)   11/23/19  18:34    


 


POC ABG Total CO2  25  (23-27mmol/L)   11/23/19  18:34    


 


POC ABG O2 Sat  99   11/23/19  18:34    


 


ABG O2 Saturation  96.8 % (95.0-99.0)   11/23/19  18:40    





PT/INR, D-dimer











PT  14.2 Sec. (12.2-14.9)   11/23/19  18:40    


 


INR  1.11  (0.87-1.13)   11/23/19  18:40    








Abnormal lab findings: 


                                  Abnormal Labs











  11/23/19 11/23/19 11/23/19





  18:34 18:40 18:40


 


WBC   13.8 H 


 


RBC   2.67 L 


 


Hgb   8.0 L 


 


Hct   24.5 L 


 


Lymph % (Auto)   


 


Seg Neutrophils %   


 


Seg Neuts % (Manual)   93.0 H 


 


Lymphocytes % (Manual)   4.0 L 


 


Seg Neutrophils #   


 


Seg Neutrophils # Man   12.8 H 


 


Lymphocytes # (Manual)   0.6 L 


 


POC ABG pH  7.336 L  


 


POC ABG pO2  147 H  


 


ABG Base Excess   


 


ABG Hemoglobin   


 


Oxyhemoglobin   


 


Sodium   


 


Carbon Dioxide    19 L


 


BUN    30 H


 


Creatinine    2.5 H


 


Glucose    474 H


 


POC Glucose   


 


Calcium    8.1 L


 


Troponin T    0.034 H


 


NT-Pro-B Natriuret Pep   


 


Albumin    2.8 L


 


Cholesterol    268 H


 


LDL Cholesterol Direct    176 H


 


HDL Cholesterol    74 H


 


Urine WBC (Auto)   














  11/23/19 11/23/19 11/23/19





  18:40 18:40 18:46


 


WBC   


 


RBC   


 


Hgb   


 


Hct   


 


Lymph % (Auto)   


 


Seg Neutrophils %   


 


Seg Neuts % (Manual)   


 


Lymphocytes % (Manual)   


 


Seg Neutrophils #   


 


Seg Neutrophils # Man   


 


Lymphocytes # (Manual)   


 


POC ABG pH   


 


POC ABG pO2   


 


ABG Base Excess  -3.1 L  


 


ABG Hemoglobin  9.2 L  


 


Oxyhemoglobin  92.2 L  


 


Sodium   


 


Carbon Dioxide   


 


BUN   


 


Creatinine   


 


Glucose   


 


POC Glucose   


 


Calcium   


 


Troponin T   


 


NT-Pro-B Natriuret Pep   76420 H 


 


Albumin   


 


Cholesterol   


 


LDL Cholesterol Direct   


 


HDL Cholesterol   


 


Urine WBC (Auto)    20.0 H














  11/23/19 11/23/19 11/24/19





  19:50 21:27 00:15


 


WBC   


 


RBC   


 


Hgb   


 


Hct   


 


Lymph % (Auto)   


 


Seg Neutrophils %   


 


Seg Neuts % (Manual)   


 


Lymphocytes % (Manual)   


 


Seg Neutrophils #   


 


Seg Neutrophils # Man   


 


Lymphocytes # (Manual)   


 


POC ABG pH   


 


POC ABG pO2   


 


ABG Base Excess   


 


ABG Hemoglobin   


 


Oxyhemoglobin   


 


Sodium   


 


Carbon Dioxide   


 


BUN   


 


Creatinine   


 


Glucose   


 


POC Glucose  473 H  329 H  325 H


 


Calcium   


 


Troponin T   


 


NT-Pro-B Natriuret Pep   


 


Albumin   


 


Cholesterol   


 


LDL Cholesterol Direct   


 


HDL Cholesterol   


 


Urine WBC (Auto)   














  11/24/19 11/24/19 11/24/19





  06:33 08:52 13:05


 


WBC   


 


RBC   


 


Hgb   


 


Hct   


 


Lymph % (Auto)   


 


Seg Neutrophils %   


 


Seg Neuts % (Manual)   


 


Lymphocytes % (Manual)   


 


Seg Neutrophils #   


 


Seg Neutrophils # Man   


 


Lymphocytes # (Manual)   


 


POC ABG pH   


 


POC ABG pO2   


 


ABG Base Excess   


 


ABG Hemoglobin   


 


Oxyhemoglobin   


 


Sodium   


 


Carbon Dioxide   


 


BUN   


 


Creatinine   


 


Glucose   


 


POC Glucose  439 H  424 H  398 H


 


Calcium   


 


Troponin T   


 


NT-Pro-B Natriuret Pep   


 


Albumin   


 


Cholesterol   


 


LDL Cholesterol Direct   


 


HDL Cholesterol   


 


Urine WBC (Auto)   














  11/24/19 11/24/19 11/25/19





  16:46 21:09 09:01


 


WBC    15.2 H


 


RBC    2.88 L


 


Hgb    8.6 L


 


Hct    26.6 L


 


Lymph % (Auto)    10.9 L


 


Seg Neutrophils %    84.7 H


 


Seg Neuts % (Manual)   


 


Lymphocytes % (Manual)   


 


Seg Neutrophils #    12.9 H


 


Seg Neutrophils # Man   


 


Lymphocytes # (Manual)   


 


POC ABG pH   


 


POC ABG pO2   


 


ABG Base Excess   


 


ABG Hemoglobin   


 


Oxyhemoglobin   


 


Sodium   


 


Carbon Dioxide   


 


BUN   


 


Creatinine   


 


Glucose   


 


POC Glucose  349 H  305 H 


 


Calcium   


 


Troponin T   


 


NT-Pro-B Natriuret Pep   


 


Albumin   


 


Cholesterol   


 


LDL Cholesterol Direct   


 


HDL Cholesterol   


 


Urine WBC (Auto)   














  11/25/19 11/25/19 11/25/19





  09:01 09:08 12:15


 


WBC   


 


RBC   


 


Hgb   


 


Hct   


 


Lymph % (Auto)   


 


Seg Neutrophils %   


 


Seg Neuts % (Manual)   


 


Lymphocytes % (Manual)   


 


Seg Neutrophils #   


 


Seg Neutrophils # Man   


 


Lymphocytes # (Manual)   


 


POC ABG pH   


 


POC ABG pO2   


 


ABG Base Excess   


 


ABG Hemoglobin   


 


Oxyhemoglobin   


 


Sodium  134 L  


 


Carbon Dioxide  18 L  


 


BUN  49 H  


 


Creatinine  2.9 H  


 


Glucose  159 H  


 


POC Glucose   179 H  167 H


 


Calcium   


 


Troponin T   


 


NT-Pro-B Natriuret Pep   


 


Albumin  3.0 L  


 


Cholesterol   


 


LDL Cholesterol Direct   


 


HDL Cholesterol   


 


Urine WBC (Auto)   














  11/25/19





  17:40


 


WBC 


 


RBC 


 


Hgb 


 


Hct 


 


Lymph % (Auto) 


 


Seg Neutrophils % 


 


Seg Neuts % (Manual) 


 


Lymphocytes % (Manual) 


 


Seg Neutrophils # 


 


Seg Neutrophils # Man 


 


Lymphocytes # (Manual) 


 


POC ABG pH 


 


POC ABG pO2 


 


ABG Base Excess 


 


ABG Hemoglobin 


 


Oxyhemoglobin 


 


Sodium 


 


Carbon Dioxide 


 


BUN 


 


Creatinine 


 


Glucose 


 


POC Glucose  124 H


 


Calcium 


 


Troponin T 


 


NT-Pro-B Natriuret Pep 


 


Albumin 


 


Cholesterol 


 


LDL Cholesterol Direct 


 


HDL Cholesterol 


 


Urine WBC (Auto) 











Chest x-ray: report reviewed, image reviewed (bilateral effusions/edema 

unchanged)

## 2019-11-26 RX ADMIN — INSULIN LISPRO SCH: 100 INJECTION, SOLUTION INTRAVENOUS; SUBCUTANEOUS at 16:30

## 2019-11-26 RX ADMIN — HEPARIN SODIUM SCH UNIT: 5000 INJECTION, SOLUTION INTRAVENOUS; SUBCUTANEOUS at 21:47

## 2019-11-26 RX ADMIN — ASPIRIN SCH MG: 81 TABLET, COATED ORAL at 10:09

## 2019-11-26 RX ADMIN — CYCLOBENZAPRINE PRN MG: 10 TABLET, FILM COATED ORAL at 10:23

## 2019-11-26 RX ADMIN — FUROSEMIDE SCH MG: 40 TABLET ORAL at 10:10

## 2019-11-26 RX ADMIN — BRIMONIDINE TARTRATE, TIMOLOL MALEATE SCH DROPS: 2; 5 SOLUTION/ DROPS OPHTHALMIC at 10:36

## 2019-11-26 RX ADMIN — INSULIN LISPRO SCH: 100 INJECTION, SOLUTION INTRAVENOUS; SUBCUTANEOUS at 08:00

## 2019-11-26 RX ADMIN — HYDRALAZINE HYDROCHLORIDE PRN MG: 20 INJECTION INTRAMUSCULAR; INTRAVENOUS at 08:36

## 2019-11-26 RX ADMIN — CEFTRIAXONE SODIUM SCH MLS/HR: 2 INJECTION, POWDER, FOR SOLUTION INTRAMUSCULAR; INTRAVENOUS at 10:08

## 2019-11-26 RX ADMIN — HYDRALAZINE HYDROCHLORIDE PRN MG: 20 INJECTION INTRAMUSCULAR; INTRAVENOUS at 04:22

## 2019-11-26 RX ADMIN — INSULIN LISPRO SCH UNIT: 100 INJECTION, SOLUTION INTRAVENOUS; SUBCUTANEOUS at 12:00

## 2019-11-26 RX ADMIN — INSULIN LISPRO SCH UNIT: 100 INJECTION, SOLUTION INTRAVENOUS; SUBCUTANEOUS at 22:25

## 2019-11-26 RX ADMIN — VALSARTAN SCH MG: 160 TABLET, FILM COATED ORAL at 10:09

## 2019-11-26 RX ADMIN — HEPARIN SODIUM SCH UNIT: 5000 INJECTION, SOLUTION INTRAVENOUS; SUBCUTANEOUS at 10:10

## 2019-11-26 RX ADMIN — INSULIN GLARGINE SCH UNITS: 100 INJECTION, SOLUTION SUBCUTANEOUS at 22:25

## 2019-11-26 RX ADMIN — CITALOPRAM HYDROBROMIDE SCH MG: 10 TABLET ORAL at 10:09

## 2019-11-26 RX ADMIN — CLOPIDOGREL BISULFATE SCH MG: 75 TABLET ORAL at 10:09

## 2019-11-26 RX ADMIN — NIFEDIPINE SCH MG: 30 TABLET, FILM COATED, EXTENDED RELEASE ORAL at 10:09

## 2019-11-26 RX ADMIN — VALSARTAN SCH MG: 160 TABLET, FILM COATED ORAL at 21:54

## 2019-11-26 RX ADMIN — CYCLOBENZAPRINE PRN MG: 10 TABLET, FILM COATED ORAL at 21:53

## 2019-11-26 NOTE — PROGRESS NOTE
Assessment and Plan





Imp:


1. Accelerated HTN/hypertensive urgency


2. Acute respiratory failure, hypoxia


3. Pulm edema/pleural effusions due to #1


4. CKD


5. Normocytic anemia, probably due to renal disease





Rec:


1. Cr has been 2.1 or > since 7/2019 in the [place] system; F/u renal consult


2. Echo 8/2019 showed normal LVEF, + diastolic dysfunction and severely dilated 

LA; resume home dose of Lasix 40mg daily


3. Resumed home Coreg 25mg BID, Nifedipine long-acting 60mg daily, and 

Hydralazine 100mg BID; defer further optimization to renal/IMS


4. F/u CXR periodically although doing okay on RA currently





Plan of care reviewed with patient, she understands/agrees





Subjective


Date of service: 11/26/19


Principal diagnosis: pulm edema


Interval history: 





Feeling better. SOB better. BP still high. No chest pain. On RA.





Active Medications





Acetaminophen (Tylenol)  325 mg PO Q4H PRN


   PRN Reason: Pain MILD(1-3)/Fever >100.5/HA


   Last Admin: 11/25/19 18:30 Dose:  325 mg


   Documented by: 


Lipase/Protease/Amylase (Pancreaze Dr 10,500 Unit)  1 each FEEDTUBE PRN PRN


   PRN Reason: For Clogged Feeding Tube


Aspirin (Halfprin Ec)  81 mg PO DAILY [facility]


   Last Admin: 11/26/19 10:09 Dose:  81 mg


   Documented by: 


Atorvastatin Calcium (Lipitor)  80 mg PO QHS [facility]


   Last Admin: 11/25/19 21:30 Dose:  80 mg


   Documented by: 


Bisacodyl (Dulcolax)  10 mg [place] QDAY PRN


   PRN Reason: Constipation


Brimonidine/Timolol (Combigan 0.2-0.5%)  1 drops OU Q12HR [facility]


   Last Admin: 11/26/19 10:36 Dose:  1 drops


   Documented by: 


Carvedilol (Coreg)  25 mg PO BID [facility]


   Last Admin: 11/26/19 10:10 Dose:  25 mg


   Documented by: 


Citalopram Hydrobromide (Celexa)  10 mg PO QDAY [facility]


   Last Admin: 11/26/19 10:09 Dose:  10 mg


   Documented by: 


Clopidogrel Bisulfate (Plavix)  75 mg PO QDAY [facility]


   Last Admin: 11/26/19 10:09 Dose:  75 mg


   Documented by: 


Cyclobenzaprine HCl (Flexeril)  10 mg PO TID PRN


   PRN Reason: Muscle Spasm


   Last Admin: 11/26/19 10:23 Dose:  10 mg


   Documented by: 


Furosemide (Lasix)  40 mg PO QDAY [facility]


   Last Admin: 11/26/19 10:10 Dose:  40 mg


   Documented by: 


Heparin Sodium (Porcine) (Heparin)  5,000 unit SUB-Q Q12HR [facility]


   Last Admin: 11/26/19 10:10 Dose:  5,000 unit


   Documented by: 


Hydralazine HCl (Apresoline)  10 mg IV Q30MIN PRN


   PRN Reason: Blood Pressure


   Last Admin: 11/26/19 08:36 Dose:  10 mg


   Documented by: 


Hydralazine HCl (Apresoline)  100 mg PO BID [facility]


   Last Admin: 11/26/19 10:09 Dose:  100 mg


   Documented by: 


Ceftriaxone Sodium (Rocephin/Ns 2 Gm/100 Ml)  2 gm in 100 mls @ 200 mls/hr IV 

Q24HR [facility]; Protocol


   Stop: 11/28/19 10:29


   Last Admin: 11/26/19 10:08 Dose:  200 mls/hr


   Documented by: 


Insulin Glargine (Lantus)  20 units SUB-Q QHS [facility]


   Last Admin: 11/25/19 21:32 Dose:  20 units


   Documented by: 


Insulin Human Lispro (Humalog)  0 unit SUB-Q ACHS [facility]; Protocol


   Last Admin: 11/26/19 08:00 Dose:  Not Given


   Documented by: 


Magnesium Hydroxide (Milk Of Magnesia)  30 ml PO Q4H PRN


   PRN Reason: Constipation


   Last Admin: 11/25/19 10:06 Dose:  30 ml


   Documented by: 


Nifedipine (Procardia Xl)  60 mg PO QDAY [facility]


   Last Admin: 11/26/19 10:09 Dose:  60 mg


   Documented by: 


Simple Syrup (Simple Syrup)  15 ml FEEDTUBE PRN PRN


   PRN Reason: Hypoglycemia


Simple Syrup (Simple Syrup)  30 ml FEEDTUBE PRN PRN


   PRN Reason: Hypoglycemia


Sodium Bicarbonate (Sodium Bicarbonate)  325 mg FEEDTUBE PRN PRN


   PRN Reason: For Clogged Feeding Tube


Valsartan (Diovan)  160 mg PO BID [facility]


   Last Admin: 11/26/19 10:09 Dose:  160 mg


   Documented by: 











Objective


                               Vital Signs - 12hr











  11/26/19 11/26/19 11/26/19





  04:07 04:08 04:22


 


Temperature  97.5 F L 


 


Pulse Rate 86  86


 


Respiratory 18  





Rate   


 


Blood Pressure 196/114  196/115


 


O2 Sat by Pulse 94  





Oximetry   














  11/26/19 11/26/19 11/26/19





  08:34 08:36 10:00


 


Temperature 98.0 F  


 


Pulse Rate 88 88 92 H


 


Respiratory 18  20





Rate   


 


Blood Pressure 214/131 214/120 


 


O2 Sat by Pulse 97  99





Oximetry   














  11/26/19 11/26/19 11/26/19





  10:09 10:10 10:56


 


Temperature   97.9 F


 


Pulse Rate 90 90 88


 


Respiratory   18





Rate   


 


Blood Pressure 190/115 190/115 130/77


 


O2 Sat by Pulse   95





Oximetry   











Constitutional: no acute distress, alert


Eyes: non-icteric


ENT: oropharynx moist


Neck: supple


Effort: normal


Ascultation: Bilateral: diminished breath sounds (bases)


Cardiovascular: regular rate and rhythm (no mrg)


Gastrointestinal: normoactive bowel sounds, soft, non-tender, non-distended


Integumentary: normal


Extremities: no cyanosis, no edema, pink and warm


Neurologic: normal mental status, other (LUE weakness, dysarthria)


Psychiatric: mood appropriate, affect normal


CBC and BMP: 


                                 11/25/19 09:01





                                 11/25/19 09:01


ABG, PT/INR, D-dimer: 


ABG











POC ABG pH  7.336  (7.35-7.45)  L  11/23/19  18:34    


 


ABG pH  7.378 pH Units (7.350-7.450)   11/23/19  18:40    


 


POC ABG pCO2  44.2  (35-45)   11/23/19  18:34    


 


ABG pCO2  37.7 mm Hg  11/23/19  18:40    


 


POC ABG pO2  147  ()  H  11/23/19  18:34    


 


ABG pO2  82.9 mm Hg (80.0-90.0)   11/23/19  18:40    


 


POC ABG HCO3  23.6  (22-26 mml/L)   11/23/19  18:34    


 


POC ABG Total CO2  25  (23-27mmol/L)   11/23/19  18:34    


 


POC ABG O2 Sat  99   11/23/19  18:34    


 


ABG O2 Saturation  96.8 % (95.0-99.0)   11/23/19  18:40    





PT/INR, D-dimer











PT  14.2 Sec. (12.2-14.9)   11/23/19  18:40    


 


INR  1.11  (0.87-1.13)   11/23/19  18:40    








Abnormal lab findings: 


                                  Abnormal Labs











  11/23/19 11/23/19 11/23/19





  18:34 18:40 18:40


 


WBC   13.8 H 


 


RBC   2.67 L 


 


Hgb   8.0 L 


 


Hct   24.5 L 


 


Lymph % (Auto)   


 


Seg Neutrophils %   


 


Seg Neuts % (Manual)   93.0 H 


 


Lymphocytes % (Manual)   4.0 L 


 


Seg Neutrophils #   


 


Seg Neutrophils # Man   12.8 H 


 


Lymphocytes # (Manual)   0.6 L 


 


POC ABG pH  7.336 L  


 


POC ABG pO2  147 H  


 


ABG Base Excess   


 


ABG Hemoglobin   


 


Oxyhemoglobin   


 


Sodium   


 


Carbon Dioxide    19 L


 


BUN    30 H


 


Creatinine    2.5 H


 


Glucose    474 H


 


POC Glucose   


 


Calcium    8.1 L


 


Troponin T    0.034 H


 


NT-Pro-B Natriuret Pep   


 


Albumin    2.8 L


 


Cholesterol    268 H


 


LDL Cholesterol Direct    176 H


 


HDL Cholesterol    74 H


 


Urine WBC (Auto)   














  11/23/19 11/23/19 11/23/19





  18:40 18:40 18:46


 


WBC   


 


RBC   


 


Hgb   


 


Hct   


 


Lymph % (Auto)   


 


Seg Neutrophils %   


 


Seg Neuts % (Manual)   


 


Lymphocytes % (Manual)   


 


Seg Neutrophils #   


 


Seg Neutrophils # Man   


 


Lymphocytes # (Manual)   


 


POC ABG pH   


 


POC ABG pO2   


 


ABG Base Excess  -3.1 L  


 


ABG Hemoglobin  9.2 L  


 


Oxyhemoglobin  92.2 L  


 


Sodium   


 


Carbon Dioxide   


 


BUN   


 


Creatinine   


 


Glucose   


 


POC Glucose   


 


Calcium   


 


Troponin T   


 


NT-Pro-B Natriuret Pep   22823 H 


 


Albumin   


 


Cholesterol   


 


LDL Cholesterol Direct   


 


HDL Cholesterol   


 


Urine WBC (Auto)    20.0 H














  11/23/19 11/23/19 11/24/19





  19:50 21:27 00:15


 


WBC   


 


RBC   


 


Hgb   


 


Hct   


 


Lymph % (Auto)   


 


Seg Neutrophils %   


 


Seg Neuts % (Manual)   


 


Lymphocytes % (Manual)   


 


Seg Neutrophils #   


 


Seg Neutrophils # Man   


 


Lymphocytes # (Manual)   


 


POC ABG pH   


 


POC ABG pO2   


 


ABG Base Excess   


 


ABG Hemoglobin   


 


Oxyhemoglobin   


 


Sodium   


 


Carbon Dioxide   


 


BUN   


 


Creatinine   


 


Glucose   


 


POC Glucose  473 H  329 H  325 H


 


Calcium   


 


Troponin T   


 


NT-Pro-B Natriuret Pep   


 


Albumin   


 


Cholesterol   


 


LDL Cholesterol Direct   


 


HDL Cholesterol   


 


Urine WBC (Auto)   














  11/24/19 11/24/19 11/24/19





  06:33 08:52 13:05


 


WBC   


 


RBC   


 


Hgb   


 


Hct   


 


Lymph % (Auto)   


 


Seg Neutrophils %   


 


Seg Neuts % (Manual)   


 


Lymphocytes % (Manual)   


 


Seg Neutrophils #   


 


Seg Neutrophils # Man   


 


Lymphocytes # (Manual)   


 


POC ABG pH   


 


POC ABG pO2   


 


ABG Base Excess   


 


ABG Hemoglobin   


 


Oxyhemoglobin   


 


Sodium   


 


Carbon Dioxide   


 


BUN   


 


Creatinine   


 


Glucose   


 


POC Glucose  439 H  424 H  398 H


 


Calcium   


 


Troponin T   


 


NT-Pro-B Natriuret Pep   


 


Albumin   


 


Cholesterol   


 


LDL Cholesterol Direct   


 


HDL Cholesterol   


 


Urine WBC (Auto)   














  11/24/19 11/24/19 11/25/19





  16:46 21:09 09:01


 


WBC    15.2 H


 


RBC    2.88 L


 


Hgb    8.6 L


 


Hct    26.6 L


 


Lymph % (Auto)    10.9 L


 


Seg Neutrophils %    84.7 H


 


Seg Neuts % (Manual)   


 


Lymphocytes % (Manual)   


 


Seg Neutrophils #    12.9 H


 


Seg Neutrophils # Man   


 


Lymphocytes # (Manual)   


 


POC ABG pH   


 


POC ABG pO2   


 


ABG Base Excess   


 


ABG Hemoglobin   


 


Oxyhemoglobin   


 


Sodium   


 


Carbon Dioxide   


 


BUN   


 


Creatinine   


 


Glucose   


 


POC Glucose  349 H  305 H 


 


Calcium   


 


Troponin T   


 


NT-Pro-B Natriuret Pep   


 


Albumin   


 


Cholesterol   


 


LDL Cholesterol Direct   


 


HDL Cholesterol   


 


Urine WBC (Auto)   














  11/25/19 11/25/19 11/25/19





  09:01 09:08 12:15


 


WBC   


 


RBC   


 


Hgb   


 


Hct   


 


Lymph % (Auto)   


 


Seg Neutrophils %   


 


Seg Neuts % (Manual)   


 


Lymphocytes % (Manual)   


 


Seg Neutrophils #   


 


Seg Neutrophils # Man   


 


Lymphocytes # (Manual)   


 


POC ABG pH   


 


POC ABG pO2   


 


ABG Base Excess   


 


ABG Hemoglobin   


 


Oxyhemoglobin   


 


Sodium  134 L  


 


Carbon Dioxide  18 L  


 


BUN  49 H  


 


Creatinine  2.9 H  


 


Glucose  159 H  


 


POC Glucose   179 H  167 H


 


Calcium   


 


Troponin T   


 


NT-Pro-B Natriuret Pep   


 


Albumin  3.0 L  


 


Cholesterol   


 


LDL Cholesterol Direct   


 


HDL Cholesterol   


 


Urine WBC (Auto)   














  11/25/19 11/25/19 11/26/19





  17:40 21:17 08:35


 


WBC   


 


RBC   


 


Hgb   


 


Hct   


 


Lymph % (Auto)   


 


Seg Neutrophils %   


 


Seg Neuts % (Manual)   


 


Lymphocytes % (Manual)   


 


Seg Neutrophils #   


 


Seg Neutrophils # Man   


 


Lymphocytes # (Manual)   


 


POC ABG pH   


 


POC ABG pO2   


 


ABG Base Excess   


 


ABG Hemoglobin   


 


Oxyhemoglobin   


 


Sodium   


 


Carbon Dioxide   


 


BUN   


 


Creatinine   


 


Glucose   


 


POC Glucose  124 H  114 H  116 H


 


Calcium   


 


Troponin T   


 


NT-Pro-B Natriuret Pep   


 


Albumin   


 


Cholesterol   


 


LDL Cholesterol Direct   


 


HDL Cholesterol   


 


Urine WBC (Auto)   














  11/26/19





  11:00


 


WBC 


 


RBC 


 


Hgb 


 


Hct 


 


Lymph % (Auto) 


 


Seg Neutrophils % 


 


Seg Neuts % (Manual) 


 


Lymphocytes % (Manual) 


 


Seg Neutrophils # 


 


Seg Neutrophils # Man 


 


Lymphocytes # (Manual) 


 


POC ABG pH 


 


POC ABG pO2 


 


ABG Base Excess 


 


ABG Hemoglobin 


 


Oxyhemoglobin 


 


Sodium 


 


Carbon Dioxide 


 


BUN 


 


Creatinine 


 


Glucose 


 


POC Glucose  218 H


 


Calcium 


 


Troponin T 


 


NT-Pro-B Natriuret Pep 


 


Albumin 


 


Cholesterol 


 


LDL Cholesterol Direct 


 


HDL Cholesterol 


 


Urine WBC (Auto) 











Chest x-ray: report reviewed, image reviewed

## 2019-11-26 NOTE — ULTRASOUND REPORT
Renal ultrasound. 11/26/2019.



HISTORY: Acute renal insufficiency.



FINDINGS: Right kidney measures 9.7 x 4.4 x 5.5 cm. Cortex measures 8 mm.



Left kidney measures 9.5 x 4.5 x 5.1 cm. Cortex measures 9 mm.



Negative for mass or obstruction. Cortical echogenicity is increased on the right.



The bladder contains moderate urine.



IMPRESSION:

1. Mild increased right cortical echogenicity.

2. Mild cortical thinning bilaterally.



Signer Name: Parag Haney MD 

Signed: 11/26/2019 5:05 PM

 Workstation Name: [id number]

## 2019-11-26 NOTE — PROGRESS NOTE
Assessment and Plan


Assessment and plan: 


39-year-old female with  history of stroke, left-sided hemiparesis, 

hypertension, seizure, diabetes brought to the hospital by emergency medical 

services for acute respiratory distress.  Upon arrival, patient on BiPAP, 

saturating 88% on BiPAP therapy, in moderate to severe respiratory distress.  

Emergency medical services gave steroids, albuterol, and Lasix prior to arrival.

 Upon initial evaluation, patient is awake, protecting airway, in moderate to 

severe respiratory distress, with cool clammy skin, diaphoresis, and markedly 

high  BP--- systolic blood pressure in the 220s.





Patient Now off OF BIPAP


Patient not able to describe exacerbating or relieving factors.


Patient has a PEG tube sec to Dysphagia caused by CVA





- Patient Problems


(1) SHA (acute kidney injury) SECONDARY TO ATN AND POSSIBLE UNDERLYING 

HYPTERNSIVE RENAL DISEASE


Current Visit: Yes   Status: Acute   


Plan to address problem: 


Patient with acute on chronic kidney disease.  Chronic most likely secondary to 

hypertensive renal disease.  Nephrology consult for further recommendations.  

Low-salt diet.  Continue all renal toxic medications.


she appears to be close to her baseline in renal 








(2) Flash pulmonary edema


Current Visit: Yes   Status: Acute   


Plan to address problem: 


Patient flash pulmonary edema this was reason for acute respiratory failure 

resolving.








(3) Malignant hypertensive urgency


Current Visit: Yes   Status: Acute   


Plan to address problem: 


Patient's blood pressure has begin to go back up on day 2 after being weaned f

rom nitroglycerin drip.  


Home medications restarted, improvement in BP noed








(4) CAD (coronary artery disease)


Current Visit: Yes   Status: Chronic   


Qualifiers: 


   Coronary Disease-Associated Artery/Lesion type: native artery   Native vs. 

transplanted heart: native heart 


Plan to address problem: 


Patient is not having chest pain shortness of breath at this particular time 

stable coronary artery disease.








(5) IDDM (insulin dependent diabetes mellitus)


Current Visit: Yes   Status: Chronic   


Plan to address problem: 


Diabetes remains uncontrolled we'll increase Lantus to 20 units daily at bedtime

and increase to moderate dose sliding scale.








(6) History of CVA with residual deficit


Current Visit: No   Status: Chronic   


Plan to address problem: 


Patient just has residual weakness.  We'll continue to treat with aggressive 

antilipid's and antiplatelet therapy.  Along with beta blocker when blood 

pressure permits.








(7) Acute respiratory failure


Current Visit: Yes   Status: Acute   


Plan to address problem: 


Initially secondary to flash pulmonary edema with underlying etiology of 

hypertensive crisis.  Resolving after blood pressure improved.








(8) Acute cystitis; monitor culture. continue abx





anticipae discharge in am if patients BP remains stable





History


Interval history: 


Patient seen and examined, asked when she can go home. No new complaints at this

time. BP still elevated but showing some improvement








Hospitalist Physical





- Physical exam


Narrative exam: 


General appearance: Present: no acute distress, well-nourished





- EENT


Eyes: Present: PERRL, EOM intact


ENT: hearing intact, clear oral mucosa, dentition normal





- Neck


Neck: Present: supple, normal ROM





- Respiratory


Respiratory: bilateral: rhonchi





- Cardiovascular


Heart Sounds: Present: S1 & S2





- Extremities


Extremities: no ischemia, pulses intact, pulses symmetrical, No edema


Peripheral Pulses: within normal limits





- Abdominal


General gastrointestinal: soft, non-tender, non-distended, normal bowel sounds, 

no hepatomegaly, no splenomegaly





- Integumentary


Integumentary: Present: clear, warm, dry





- Psychiatric


Psychiatric: appropriate mood/affect, intact judgment & insight, memory intact, 

dysartheria





- Neurologic


Neurologic: CNII-XII intact, moves all extremities








- Constitutional


Vitals: 


                                        











Temp Pulse Resp BP Pulse Ox


 


 97.9 F   88   18   130/77   95 


 


 11/26/19 10:56  11/26/19 10:56  11/26/19 10:56  11/26/19 10:56  11/26/19 10:56











General appearance: Present: no acute distress, well-nourished





Results





- Labs


CBC & Chem 7: 


                                 11/25/19 09:01





                                 11/25/19 09:01


Labs: 


                             Laboratory Last Values











WBC  15.2 K/mm3 (4.5-11.0)  H  11/25/19  09:01    


 


RBC  2.88 M/mm3 (3.65-5.03)  L  11/25/19  09:01    


 


Hgb  8.6 gm/dl (10.1-14.3)  L  11/25/19  09:01    


 


Hct  26.6 % (30.3-42.9)  L  11/25/19  09:01    


 


MCV  92 fl (79-97)   11/25/19  09:01    


 


MCH  30 pg (28-32)   11/25/19  09:01    


 


MCHC  32 % (30-34)   11/25/19  09:01    


 


RDW  13.7 % (13.2-15.2)   11/25/19  09:01    


 


Plt Count  317 K/mm3 (140-440)   11/25/19  09:01    


 


Lymph % (Auto)  10.9 % (13.4-35.0)  L  11/25/19  09:01    


 


Mono % (Auto)  4.1 % (0.0-7.3)   11/25/19  09:01    


 


Eos % (Auto)  0.0 % (0.0-4.3)   11/25/19  09:01    


 


Baso % (Auto)  0.3 % (0.0-1.8)   11/25/19  09:01    


 


Lymph #  1.7 K/mm3 (1.2-5.4)   11/25/19  09:01    


 


Mono #  0.6 K/mm3 (0.0-0.8)   11/25/19  09:01    


 


Eos #  0.0 K/mm3 (0.0-0.4)   11/25/19  09:01    


 


Baso #  0.0 K/mm3 (0.0-0.1)   11/25/19  09:01    


 


Add Manual Diff  Complete   11/23/19  18:40    


 


Total Counted  100   11/23/19  18:40    


 


Seg Neutrophils %  84.7 % (40.0-70.0)  H  11/25/19  09:01    


 


Seg Neuts % (Manual)  93.0 % (40.0-70.0)  H  11/23/19  18:40    


 


Band Neutrophils %  0 %  11/23/19  18:40    


 


Lymphocytes % (Manual)  4.0 % (13.4-35.0)  L  11/23/19  18:40    


 


Reactive Lymphs % (Man)  0 %  11/23/19  18:40    


 


Monocytes % (Manual)  2.0 % (0.0-7.3)   11/23/19  18:40    


 


Eosinophils % (Manual)  1.0 % (0.0-4.3)   11/23/19  18:40    


 


Basophils % (Manual)  0 % (0.0-1.8)   11/23/19  18:40    


 


Metamyelocytes %  0 %  11/23/19  18:40    


 


Myelocytes %  0 %  11/23/19  18:40    


 


Promyelocytes %  0 %  11/23/19  18:40    


 


Blast Cells %  0 %  11/23/19  18:40    


 


Nucleated RBC %  Not Reportable   11/23/19  18:40    


 


Seg Neutrophils #  12.9 K/mm3 (1.8-7.7)  H  11/25/19  09:01    


 


Seg Neutrophils # Man  12.8 K/mm3 (1.8-7.7)  H  11/23/19  18:40    


 


Band Neutrophils #  0.0 K/mm3  11/23/19  18:40    


 


Lymphocytes # (Manual)  0.6 K/mm3 (1.2-5.4)  L  11/23/19  18:40    


 


Abs React Lymphs (Man)  0.0 K/mm3  11/23/19  18:40    


 


Monocytes # (Manual)  0.3 K/mm3 (0.0-0.8)   11/23/19  18:40    


 


Eosinophils # (Manual)  0.1 K/mm3 (0.0-0.4)   11/23/19  18:40    


 


Basophils # (Manual)  0.0 K/mm3 (0.0-0.1)   11/23/19  18:40    


 


Metamyelocytes #  0.0 K/mm3  11/23/19  18:40    


 


Myelocytes #  0.0 K/mm3  11/23/19  18:40    


 


Promyelocytes #  0.0 K/mm3  11/23/19  18:40    


 


Blast Cells #  0.0 K/mm3  11/23/19  18:40    


 


WBC Morphology  Not Reportable   11/23/19  18:40    


 


Hypersegmented Neuts  Not Reportable   11/23/19  18:40    


 


Hyposegmented Neuts  Not Reportable   11/23/19  18:40    


 


Hypogranular Neuts  Not Reportable   11/23/19  18:40    


 


Smudge Cells  Not Reportable   11/23/19  18:40    


 


Toxic Granulation  Not Reportable   11/23/19  18:40    


 


Toxic Vacuolation  Not Reportable   11/23/19  18:40    


 


Dohle Bodies  Not Reportable   11/23/19  18:40    


 


Pelger-Huet Anomaly  Not Reportable   11/23/19  18:40    


 


Yuly Rods  Not Reportable   11/23/19  18:40    


 


Platelet Estimate  Consistent w auto   11/23/19  18:40    


 


Clumped Platelets  Not Reportable   11/23/19  18:40    


 


Plt Clumps, EDTA  Not Reportable   11/23/19  18:40    


 


Large Platelets  1+   11/23/19  18:40    


 


Giant Platelets  Not Reportable   11/23/19  18:40    


 


Platelet Satelliting  Not Reportable   11/23/19  18:40    


 


Plt Morphology Comment  Not Reportable   11/23/19  18:40    


 


RBC Morphology  Normal   11/23/19  18:40    


 


Dimorphic RBCs  Not Reportable   11/23/19  18:40    


 


Polychromasia  Not Reportable   11/23/19  18:40    


 


Hypochromasia  Not Reportable   11/23/19  18:40    


 


Poikilocytosis  Not Reportable   11/23/19  18:40    


 


Anisocytosis  Not Reportable   11/23/19  18:40    


 


Microcytosis  Not Reportable   11/23/19  18:40    


 


Macrocytosis  Not Reportable   11/23/19  18:40    


 


Spherocytes  Not Reportable   11/23/19  18:40    


 


Pappenheimer Bodies  Not Reportable   11/23/19  18:40    


 


Sickle Cells  Not Reportable   11/23/19  18:40    


 


Target Cells  Not Reportable   11/23/19  18:40    


 


Tear Drop Cells  Not Reportable   11/23/19  18:40    


 


Ovalocytes  Not Reportable   11/23/19  18:40    


 


Helmet Cells  Not Reportable   11/23/19  18:40    


 


Acosta-Gerald Bodies  Not Reportable   11/23/19  18:40    


 


Cabot Rings  Not Reportable   11/23/19  18:40    


 


Bertha Cells  Not Reportable   11/23/19  18:40    


 


Bite Cells  Not Reportable   11/23/19  18:40    


 


Crenated Cell  Not Reportable   11/23/19  18:40    


 


Elliptocytes  Not Reportable   11/23/19  18:40    


 


Acanthocytes (Spur)  Not Reportable   11/23/19  18:40    


 


Rouleaux  Not Reportable   11/23/19  18:40    


 


Hemoglobin C Crystals  Not Reportable   11/23/19  18:40    


 


Schistocytes  Not Reportable   11/23/19  18:40    


 


Malaria parasites  Not Reportable   11/23/19  18:40    


 


Juliano Bodies  Not Reportable   11/23/19  18:40    


 


Hem Pathologist Commnt  No   11/23/19  18:40    


 


PT  14.2 Sec. (12.2-14.9)   11/23/19  18:40    


 


INR  1.11  (0.87-1.13)   11/23/19  18:40    


 


APTT  29.0 Sec. (24.2-36.6)   11/23/19  18:40    


 


POC ABG pH  7.336  (7.35-7.45)  L  11/23/19  18:34    


 


ABG pH  7.378 pH Units (7.350-7.450)   11/23/19  18:40    


 


POC ABG pCO2  44.2  (35-45)   11/23/19  18:34    


 


ABG pCO2  37.7 mm Hg  11/23/19  18:40    


 


POC ABG pO2  147  ()  H  11/23/19  18:34    


 


ABG pO2  82.9 mm Hg (80.0-90.0)   11/23/19  18:40    


 


POC ABG HCO3  23.6  (22-26 mml/L)   11/23/19  18:34    


 


ABG HCO3  21.7 mmol/L (20.0-26.0)   11/23/19  18:40    


 


POC ABG Total CO2  25  (23-27mmol/L)   11/23/19  18:34    


 


POC ABG O2 Sat  99   11/23/19  18:34    


 


ABG O2 Saturation  96.8 % (95.0-99.0)   11/23/19  18:40    


 


ABG O2 Content  12.0  (0.0-44)   11/23/19  18:40    


 


POC ABG Base Excess  -2  ((-2) - (+3)mmol/L)   11/23/19  18:34    


 


ABG Base Excess  -3.1 mmol/L (-2.0-3.0)  L  11/23/19  18:40    


 


ABG Hemoglobin  9.2 gm/dl (12.0-16.0)  L  11/23/19  18:40    


 


ABG Carboxyhemoglobin  4.4 % (0.0-5.0)   11/23/19  18:40    


 


ABG Methemoglobin  0.3 % (0.0-1.5)   11/23/19  18:40    


 


VBG pH  7.378  (7.320-7.420)   11/23/19  18:40    


 


Oxyhemoglobin  92.2 % (95.0-99.0)  L  11/23/19  18:40    


 


FiO2  21 %  11/23/19  18:40    


 


Sodium  134 mmol/L (137-145)  L  11/25/19  09:01    


 


Potassium  4.2 mmol/L (3.6-5.0)   11/25/19  09:01    


 


Chloride  99.0 mmol/L ()   11/25/19  09:01    


 


Carbon Dioxide  18 mmol/L (22-30)  L  11/25/19  09:01    


 


Anion Gap  21 mmol/L  11/25/19  09:01    


 


BUN  49 mg/dL (7-17)  H  11/25/19  09:01    


 


Creatinine  2.9 mg/dL (0.7-1.2)  H  11/25/19  09:01    


 


Estimated GFR  22 ml/min  11/25/19  09:01    


 


BUN/Creatinine Ratio  17 %  11/25/19  09:01    


 


Glucose  159 mg/dL ()  H  11/25/19  09:01    


 


POC Glucose  218  ()  H  11/26/19  11:00    


 


Calcium  8.6 mg/dL (8.4-10.2)   11/25/19  09:01    


 


Magnesium  2.00 mg/dL (1.7-2.3)   11/23/19  18:40    


 


Total Bilirubin  0.20 mg/dL (0.1-1.2)   11/25/19  09:01    


 


AST  19 units/L (5-40)   11/25/19  09:01    


 


ALT  20 units/L (7-56)   11/25/19  09:01    


 


Alkaline Phosphatase  96 units/L ()   11/25/19  09:01    


 


Total Creatine Kinase  64 units/L ()   11/23/19  18:40    


 


Troponin T  0.034 ng/mL (0.00-0.029)  H  11/23/19  18:40    


 


NT-Pro-B Natriuret Pep  44091 pg/mL (0-450)  H  11/23/19  18:40    


 


Total Protein  7.4 g/dL (6.3-8.2)   11/25/19  09:01    


 


Albumin  3.0 g/dL (3.9-5)  L  11/25/19  09:01    


 


Albumin/Globulin Ratio  0.7 %  11/25/19  09:01    


 


Triglycerides  149 mg/dL (2-149)   11/23/19  18:40    


 


Cholesterol  268 mg/dL ()  H  11/23/19  18:40    


 


LDL Cholesterol Direct  176 mg/dL ()  H  11/23/19  18:40    


 


HDL Cholesterol  74 mg/dL (40-59)  H  11/23/19  18:40    


 


Cholesterol/HDL Ratio  3.62 %  11/23/19  18:40    


 


Urine Color  Straw  (Yellow)   11/23/19  18:46    


 


Urine Turbidity  Clear  (Clear)   11/23/19  18:46    


 


Urine pH  7.0  (5.0-7.0)   11/23/19  18:46    


 


Ur Specific Gravity  1.013  (1.003-1.030)   11/23/19  18:46    


 


Urine Protein  >500 mg/dL (Negative)   11/23/19  18:46    


 


Urine Glucose (UA)  >=500 mg/dL (Negative)   11/23/19  18:46    


 


Urine Ketones  Neg mg/dL (Negative)   11/23/19  18:46    


 


Urine Blood  Sm  (Negative)   11/23/19  18:46    


 


Urine Nitrite  Neg  (Negative)   11/23/19  18:46    


 


Urine Bilirubin  Neg  (Negative)   11/23/19  18:46    


 


Urine Urobilinogen  < 2.0 mg/dL (<2.0)   11/23/19  18:46    


 


Ur Leukocyte Esterase  Mod  (Negative)   11/23/19  18:46    


 


Urine WBC (Auto)  20.0 /HPF (0.0-6.0)  H  11/23/19  18:46    


 


Urine RBC (Auto)  28.0 /HPF (0.0-6.0)   11/23/19  18:46    


 


U Epithel Cells (Auto)  2.0 /HPF (0-13.0)   11/23/19  18:46    


 


Urine Bacteria (Auto)  1+ /HPF (Negative)   11/23/19  18:46    


 


Hyaline Casts  2 /LPF  11/23/19  18:46    


 


Urine Yeast (Budding)  Few /HPF  11/23/19  18:46    














Active Medications





- Current Medications


Current Medications: 














Generic Name Dose Route Start Last Admin





  Trade Name Freq  PRN Reason Stop Dose Admin


 


Acetaminophen  325 mg  11/23/19 23:00  11/25/19 18:30





  Tylenol  PO   325 mg





  Q4H PRN   Administration





  Pain MILD(1-3)/Fever >100.5/HA  


 


Lipase/Protease/Amylase  1 each  11/23/19 23:00 





  Pancreaze Dr 10,500 Unit  FEEDTUBE  





  PRN PRN  





  For Clogged Feeding Tube  


 


Aspirin  81 mg  11/24/19 10:00  11/26/19 10:09





  Halfprin Ec  PO   81 mg





  DAILY VANDA   Administration


 


Atorvastatin Calcium  80 mg  11/24/19 22:00  11/25/19 21:30





  Lipitor  PO   80 mg





  QHS VANDA   Administration


 


Bisacodyl  10 mg  11/23/19 23:00 





  Dulcolax  AL  





  QDAY PRN  





  Constipation  


 


Brimonidine/Timolol  1 drops  11/24/19 10:00  11/26/19 10:36





  Combigan 0.2-0.5%  OU   1 drops





  Q12HR VANDA   Administration


 


Carvedilol  25 mg  11/25/19 22:00  11/26/19 10:10





  Coreg  PO   25 mg





  BID VANDA   Administration


 


Citalopram Hydrobromide  10 mg  11/24/19 10:00  11/26/19 10:09





  Celexa  PO   10 mg





  QDAY VANDA   Administration


 


Clopidogrel Bisulfate  75 mg  11/24/19 10:00  11/26/19 10:09





  Plavix  PO   75 mg





  QDAY VANDA   Administration


 


Cyclobenzaprine HCl  10 mg  11/23/19 23:00  11/26/19 10:23





  Flexeril  PO   10 mg





  TID PRN   Administration





  Muscle Spasm  


 


Furosemide  40 mg  11/25/19 19:00  11/26/19 10:10





  Lasix  PO   40 mg





  QDAY VANDA   Administration


 


Heparin Sodium (Porcine)  5,000 unit  11/24/19 10:00  11/26/19 10:10





  Heparin  SUB-Q   5,000 unit





  Q12HR VANDA   Administration


 


Hydralazine HCl  10 mg  11/23/19 23:06  11/26/19 08:36





  Apresoline  IV   10 mg





  Q30MIN PRN   Administration





  Blood Pressure  


 


Hydralazine HCl  100 mg  11/25/19 22:00  11/26/19 10:09





  Apresoline  PO   100 mg





  BID VANDA   Administration


 


Ceftriaxone Sodium  2 gm in 100 mls @ 200 mls/hr  11/24/19 10:00  11/26/19 10:08





  Rocephin/Ns 2 Gm/100 Ml  IV  11/28/19 10:29  200 mls/hr





  Q24HR VANDA   Administration





  Protocol  


 


Insulin Glargine  20 units  11/25/19 22:00  11/25/19 21:32





  Lantus  SUB-Q   20 units





  QHS VANDA   Administration


 


Insulin Human Lispro  0 unit  11/24/19 09:00  11/26/19 08:00





  Humalog  SUB-Q   Not Given





  ACHS VANDA  





  Protocol  


 


Magnesium Hydroxide  30 ml  11/23/19 23:00  11/25/19 10:06





  Milk Of Magnesia  PO   30 ml





  Q4H PRN   Administration





  Constipation  


 


Nifedipine  60 mg  11/25/19 18:03  11/26/19 10:09





  Procardia Xl  PO   60 mg





  QDAY VANDA   Administration


 


Simple Syrup  15 ml  11/23/19 23:00 





  Simple Syrup  FEEDTUBE  





  PRN PRN  





  Hypoglycemia  


 


Simple Syrup  30 ml  11/23/19 23:00 





  Simple Syrup  FEEDTUBE  





  PRN PRN  





  Hypoglycemia  


 


Sodium Bicarbonate  325 mg  11/23/19 23:00 





  Sodium Bicarbonate  FEEDTUBE  





  PRN PRN  





  For Clogged Feeding Tube  


 


Valsartan  160 mg  11/23/19 23:45  11/26/19 10:09





  Diovan  PO   160 mg





  BID VANDA   Administration














Nutrition/Malnutrition Assess





- Dietary Evaluation


Nutrition/Malnutrition Findings: 


                                        





Nutrition Notes                                            Start:  11/24/19 

10:54


Freq:                                                      Status: Active       




Protocol:                                                                       




 Document     11/25/19 13:39  LP  (Rec: 11/25/19 13:39  LP  [id number])


 Nutrition Notes


     Initial or Follow up                        Brief Note


     Subjective/Other Information                Pt eating good. Pt food


                                                 preferences updated and has no


                                                 other diet education


                                                 questions.


 Nutrition Intervention


     Revisit per MD consult or patient           Sign Off


      request:

## 2019-11-26 NOTE — CONSULTATION
History of Present Illness





- Reason for Consult


Consult date: 11/26/19


acute renal failure, chronic renal failure, proteinuria





- History of Present Illness


This is 40 yo AAF with  history of CVA with residual left-sided hemiparesis, 

hypertension, seizure d/o, Type 2 DM, uncontrolled, who presented to the [facility] ER

[facility] complaints of acute respiratory distress.  Upon arrival, patient on 

BiPAP, saturating 88% on BiPAP therapy, in moderate to severe respiratory 

distress.  Emergency medical services gave steroids, albuterol, and Lasix prior 

to arrival.  BP on admission was as high as 210/110mmHg, initially requiring 

cardene gtt. Labs showed elevated BUN/Cr at 49/2.9mg/dl for which renal consult 

is requested. Pt denies recent NSAIDS use or IV contrast exposure, pt is not 

aware of underlying renal disease. previous Cr was 0.6mg/dl in 1/2018. Pt was 

found to have uncontrolled DM along with significant proteinuria on UA. 





Past History


Past Medical History: hypertension, hyperlipidemia





Medications and Allergies


                                    Allergies











Allergy/AdvReac Type Severity Reaction Status Date / Time


 


No Known Allergies Allergy   Unverified 01/10/15 03:04











                                Home Medications











 Medication  Instructions  Recorded  Confirmed  Last Taken  Type


 


Acetaminophen [Acetaminophen TAB] 325 mg PO Q4H PRN #30 tablet 01/09/18 01/11/18

Unknown Rx


 


Aspirin [Aspirin EC] 81 mg PO DAILY #30 01/09/18 01/11/18 1 Day Ago Rx





    ~01/10/18 


 


AtorvaSTATin [Lipitor] 80 mg PO QHS #30 day 01/09/18 01/11/18 1 Day Ago Rx





    ~01/10/18 


 


Bisacodyl [Dulcolax suppos] 10 mg NM QDAY PRN #7 supp.rect 01/09/18 01/11/18 

Unknown Rx


 


Brimonidine/Timolol 0.2-0.5% 1 drops OU Q12HR #1 bottle 01/09/18 01/11/18 1 Day 

Ago Rx





[Combigan 0.2-0.5%]    ~01/10/18 


 


Citalopram [celeXA] 10 mg PO QDAY #30 01/09/18 01/11/18 1 Day Ago Rx





    ~01/10/18 


 


Clopidogrel [Plavix] 75 mg PO QDAY #30 01/09/18 01/11/18 1 Day Ago Rx





    ~01/10/18 


 


Cyclobenzaprine [Flexeril 10 MG 10 mg PO TID PRN #30 day 01/09/18 01/11/18 

Unknown Rx





TAB]     


 


Lipase/Protease/Amylase [Pancreaze 1 each FEEDTUBE PRN PRN #30 capsule 01/09/18 01/11/18 Unknown Rx





 10,500 Unit]     


 


Magnesium Hydroxide [Milk of 30 ml PO Q4H PRN #30 oral.liqd 01/09/18 01/11/18 

Unknown Rx





Magnesia]     


 


Petrolatum,White [Vaseline Lip 1 applic TP AS DIRECT PRN #30 tube 01/09/18 01/11/18 Unknown Rx





Therapy]     


 


Simple Syrup 15 ml FEEDTUBE PRN PRN #30 01/09/18 01/11/18 Unknown Rx





 oral.liqd    


 


Simple Syrup 30 ml FEEDTUBE PRN PRN #30 01/09/18 01/11/18 Unknown Rx





 oral.liqd    


 


Sodium Bicarbonate 325 mg FEEDTUBE PRN PRN #30 tablet 01/09/18 01/11/18 Unknown 

Rx


 


amLODIPine 5 mg PO QDAY #30 tablet 01/09/18 01/11/18 1 Day Ago Rx





    ~01/10/18 


 


NIFEdipine [Nifedipine ER] 10 mg PO TID 01/11/18 01/11/18 1 Day Ago History





    ~01/10/18 


 


Detemir (Nf) [Levemir (Nf)] 10 units SUB-Q QHS  units 01/18/18  Unknown Rx











Active Meds: 


Active Medications





Acetaminophen (Tylenol)  325 mg PO Q4H PRN


   PRN Reason: Pain MILD(1-3)/Fever >100.5/HA


   Last Admin: 11/25/19 18:30 Dose:  325 mg


   Documented by: 


Lipase/Protease/Amylase (Pancreaze Dr 10,500 Unit)  1 each FEEDTUBE PRN PRN


   PRN Reason: For Clogged Feeding Tube


Aspirin (Halfprin Ec)  81 mg PO DAILY [facility]


   Last Admin: 11/25/19 10:06 Dose:  81 mg


   Documented by: 


Atorvastatin Calcium (Lipitor)  80 mg PO QHS [facility]


   Last Admin: 11/25/19 21:30 Dose:  80 mg


   Documented by: 


Bisacodyl (Dulcolax)  10 mg NM QDAY PRN


   PRN Reason: Constipation


Brimonidine/Timolol (Combigan 0.2-0.5%)  1 drops OU Q12HR [facility]


   Last Admin: 11/25/19 21:56 Dose:  1 drops


   Documented by: 


Carvedilol (Coreg)  25 mg PO BID [facility]


   Last Admin: 11/25/19 21:29 Dose:  25 mg


   Documented by: 


Citalopram Hydrobromide (Celexa)  10 mg PO QDAY [facility]


   Last Admin: 11/25/19 12:30 Dose:  10 mg


   Documented by: 


Clopidogrel Bisulfate (Plavix)  75 mg PO QDAY [facility]


   Last Admin: 11/25/19 10:06 Dose:  75 mg


   Documented by: 


Cyclobenzaprine HCl (Flexeril)  10 mg PO TID PRN


   PRN Reason: Muscle Spasm


   Last Admin: 11/25/19 22:52 Dose:  10 mg


   Documented by: 


Furosemide (Lasix)  40 mg PO QDAY [facility]


   Last Admin: 11/25/19 18:30 Dose:  40 mg


   Documented by: 


Heparin Sodium (Porcine) (Heparin)  5,000 unit SUB-Q Q12HR [facility]


   Last Admin: 11/25/19 21:32 Dose:  5,000 unit


   Documented by: 


Hydralazine HCl (Apresoline)  10 mg IV Q30MIN PRN


   PRN Reason: Blood Pressure


   Last Admin: 11/26/19 08:36 Dose:  10 mg


   Documented by: 


Hydralazine HCl (Apresoline)  100 mg PO BID [facility]


   Last Admin: 11/25/19 21:29 Dose:  100 mg


   Documented by: 


Ceftriaxone Sodium (Rocephin/Ns 2 Gm/100 Ml)  2 gm in 100 mls @ 200 mls/hr IV 

Q24HR [facility]; Protocol


   Stop: 11/28/19 10:29


   Last Admin: 11/25/19 12:34 Dose:  200 mls/hr


   Documented by: 


Insulin Glargine (Lantus)  20 units SUB-Q QHS [facility]


   Last Admin: 11/25/19 21:32 Dose:  20 units


   Documented by: 


Insulin Human Lispro (Humalog)  0 unit SUB-Q [facility]; Protocol


   Last Admin: 11/26/19 08:00 Dose:  Not Given


   Documented by: 


Magnesium Hydroxide (Milk Of Magnesia)  30 ml PO Q4H PRN


   PRN Reason: Constipation


   Last Admin: 11/25/19 10:06 Dose:  30 ml


   Documented by: 


Nifedipine (Procardia Xl)  60 mg PO QDAY [facility]


Simple Syrup (Simple Syrup)  15 ml FEEDTUBE PRN PRN


   PRN Reason: Hypoglycemia


Simple Syrup (Simple Syrup)  30 ml FEEDTUBE PRN PRN


   PRN Reason: Hypoglycemia


Sodium Bicarbonate (Sodium Bicarbonate)  325 mg FEEDTUBE PRN PRN


   PRN Reason: For Clogged Feeding Tube


Valsartan (Diovan)  160 mg PO BID [facility]


   Last Admin: 11/25/19 21:30 Dose:  160 mg


   Documented by: 











Review of Systems


All systems: negative


Constitutional: weakness, malaise


Cardiovascular: shortness of breath, dyspnea on exertion





Exam





- Vital Signs


Vital signs: 


                                   Vital Signs











Pulse Resp BP Pulse Ox


 


 117 H  25 H  221/135   100 


 


 11/23/19 17:57  11/23/19 17:57  11/23/19 17:57  11/23/19 17:57














- General Appearance


General appearance: well-developed, well-nourished, appears stated age


EENT: ATNC, PERRL, mucous membranes moist


Neck: Present: neck supple


Respiratory: Clear to Ascultation


Heart: regular, S1S2


Gastrointestinal: Present: normoactive bowel sounds


Integumentary: no rash, other (no edema )


Neurologic: no focal deficit, alert and oriented x3, strength 5/5


Psychiatric: mood/affect appropriate, cooperative





Results





- Lab Results





                                 11/25/19 09:01





                                 11/25/19 09:01


                             Most recent lab results











ABG pH  7.378 pH Units (7.350-7.450)   11/23/19  18:40    


 


ABG pCO2  37.7 mm Hg  11/23/19  18:40    


 


ABG pO2  82.9 mm Hg (80.0-90.0)   11/23/19  18:40    


 


ABG HCO3  21.7 mmol/L (20.0-26.0)   11/23/19  18:40    


 


ABG O2 Saturation  96.8 % (95.0-99.0)   11/23/19  18:40    


 


Calcium  8.6 mg/dL (8.4-10.2)   11/25/19  09:01    


 


Magnesium  2.00 mg/dL (1.7-2.3)   11/23/19  18:40    














Assessment and Plan





- Patient Problems


(1) SHA (acute kidney injury)


Current Visit: Yes   Status: Acute   


Plan to address problem: 


SHA most likely secondary to malignant HTN, superimposed on CKD. Given also 

evidence of significant glucosuria/proteinuria in the setting of uncontrolled 

T2DM, suspect underlying diabetic nephropathy along with hypertensive 

nephrosclerosis as cause of underlying CKD. will check urine lytes/ protein/cr 

ratio. Will also check C3/4, MANUELITO, ANCA, Anti GBM Ab given e/o mild hematuria 

along with significant proteinuria, hypertension, pulmonary edema to rule out 

acute vasculitic event. If renal function continues to decline will consider 

renal biopsy. Avoid nephrotoxins, NSAIDs, IV contrast. 








(2) Flash pulmonary edema


Current Visit: Yes   Status: Acute   


Plan to address problem: 


improved with BP control and diuresis with lasix. cont lasix 40mg po qd 








(3) Malignant hypertensive urgency


Current Visit: Yes   Status: Acute   


Plan to address problem: 


 BP improved. montor BP on current meds 








(4) IDDM (insulin dependent diabetes mellitus)


Current Visit: Yes   Status: Chronic   


Plan to address problem: 


DM management as per primary attending 








(5) Anemia in chronic illness


Current Visit: Yes   Status: Acute   


Plan to address problem: 


check irons stores. consider LATOSHA if iron store is replete

## 2019-11-27 VITALS — SYSTOLIC BLOOD PRESSURE: 142 MMHG | DIASTOLIC BLOOD PRESSURE: 87 MMHG

## 2019-11-27 LAB
BUN SERPL-MCNC: 44 MG/DL (ref 7–17)
BUN/CREAT SERPL: 16 %
CALCIUM SERPL-MCNC: 7.7 MG/DL (ref 8.4–10.2)
CREATININE,URINE: 30.6 MG/DL (ref 0.1–20)
HCT VFR BLD CALC: 28 % (ref 30.3–42.9)
HEMOLYSIS INDEX: 3
HGB BLD-MCNC: 9.2 GM/DL (ref 10.1–14.3)
MCHC RBC AUTO-ENTMCNC: 33 % (ref 30–34)
MCV RBC AUTO: 92 FL (ref 79–97)
PLATELET # BLD: 327 K/MM3 (ref 140–440)
RBC # BLD AUTO: 3.05 M/MM3 (ref 3.65–5.03)

## 2019-11-27 RX ADMIN — CEFTRIAXONE SODIUM SCH MLS/HR: 2 INJECTION, POWDER, FOR SOLUTION INTRAMUSCULAR; INTRAVENOUS at 09:21

## 2019-11-27 RX ADMIN — CITALOPRAM HYDROBROMIDE SCH MG: 10 TABLET ORAL at 09:22

## 2019-11-27 RX ADMIN — HYDRALAZINE HYDROCHLORIDE PRN MG: 20 INJECTION INTRAMUSCULAR; INTRAVENOUS at 05:36

## 2019-11-27 RX ADMIN — INSULIN LISPRO SCH: 100 INJECTION, SOLUTION INTRAVENOUS; SUBCUTANEOUS at 17:11

## 2019-11-27 RX ADMIN — NIFEDIPINE SCH MG: 30 TABLET, FILM COATED, EXTENDED RELEASE ORAL at 09:21

## 2019-11-27 RX ADMIN — INSULIN LISPRO SCH: 100 INJECTION, SOLUTION INTRAVENOUS; SUBCUTANEOUS at 10:05

## 2019-11-27 RX ADMIN — INSULIN LISPRO SCH: 100 INJECTION, SOLUTION INTRAVENOUS; SUBCUTANEOUS at 11:30

## 2019-11-27 RX ADMIN — HEPARIN SODIUM SCH UNIT: 5000 INJECTION, SOLUTION INTRAVENOUS; SUBCUTANEOUS at 09:34

## 2019-11-27 RX ADMIN — BRIMONIDINE TARTRATE, TIMOLOL MALEATE SCH: 2; 5 SOLUTION/ DROPS OPHTHALMIC at 00:53

## 2019-11-27 RX ADMIN — FUROSEMIDE SCH MG: 40 TABLET ORAL at 09:22

## 2019-11-27 RX ADMIN — CLOPIDOGREL BISULFATE SCH MG: 75 TABLET ORAL at 09:21

## 2019-11-27 RX ADMIN — HYDRALAZINE HYDROCHLORIDE PRN MG: 20 INJECTION INTRAMUSCULAR; INTRAVENOUS at 09:22

## 2019-11-27 RX ADMIN — HYDRALAZINE HYDROCHLORIDE PRN MG: 20 INJECTION INTRAMUSCULAR; INTRAVENOUS at 00:51

## 2019-11-27 RX ADMIN — VALSARTAN SCH MG: 160 TABLET, FILM COATED ORAL at 09:22

## 2019-11-27 RX ADMIN — ACETAMINOPHEN PRN MG: 325 TABLET ORAL at 14:18

## 2019-11-27 RX ADMIN — BRIMONIDINE TARTRATE, TIMOLOL MALEATE SCH DROPS: 2; 5 SOLUTION/ DROPS OPHTHALMIC at 09:26

## 2019-11-27 RX ADMIN — ASPIRIN SCH MG: 81 TABLET, COATED ORAL at 09:22

## 2019-11-27 NOTE — PROGRESS NOTE
Assessment and Plan





- Patient Problems


(1) Acute respiratory failure


Current Visit: Yes   Status: Acute   





(2) Anemia in chronic illness


Current Visit: Yes   Status: Acute   





(3) Flash pulmonary edema


Current Visit: Yes   Status: Acute   





(4) CAD (coronary artery disease)


Current Visit: Yes   Status: Chronic   


Qualifiers: 


   Coronary Disease-Associated Artery/Lesion type: native artery   Native vs. 

transplanted heart: native heart 





(5) Encephalopathy acute


Current Visit: No   Status: Acute   





Subjective


Principal diagnosis: pulm edema


Interval history: 





no sob reported





Objective


                               Vital Signs - 12hr











  11/27/19 11/27/19 11/27/19





  00:06 00:51 04:25


 


Temperature 98.6 F  98.2 F


 


Pulse Rate 89 92 H 81


 


Respiratory 18  18





Rate   


 


Blood Pressure 181/97 181/97 177/102


 


O2 Sat by Pulse 99  97





Oximetry   














  11/27/19 11/27/19 11/27/19





  05:36 08:25 09:22


 


Temperature  98.0 F 


 


Pulse Rate 85 84 84


 


Respiratory  18 





Rate   


 


Blood Pressure 177/102 205/122 120/110


 


O2 Sat by Pulse  97 





Oximetry   














  11/27/19 11/27/19 11/27/19





  09:23 09:24 10:00


 


Temperature   


 


Pulse Rate 84 84 80


 


Respiratory   





Rate   


 


Blood Pressure 200/110 200/110 


 


O2 Sat by Pulse   





Oximetry   











Constitutional: no acute distress, alert


Eyes: non-icteric


ENT: oropharynx moist


Neck: supple


Effort: normal


Ascultation: Bilateral: clear


Percussion: Bilateral: not dull


Cardiovascular: regular rate and rhythm (no mrg)


Gastrointestinal: normoactive bowel sounds, soft, non-tender, non-distended


Integumentary: normal


Extremities: no cyanosis, no edema, pink and warm


Neurologic: normal mental status, other (LUE weakness, dysarthria)


Psychiatric: mood appropriate, affect normal


CBC and BMP: 


                                 11/27/19 03:53





                                 11/27/19 03:53


ABG, PT/INR, D-dimer: 


ABG











POC ABG pH  7.336  (7.35-7.45)  L  11/23/19  18:34    


 


ABG pH  7.378 pH Units (7.350-7.450)   11/23/19  18:40    


 


POC ABG pCO2  44.2  (35-45)   11/23/19  18:34    


 


ABG pCO2  37.7 mm Hg  11/23/19  18:40    


 


POC ABG pO2  147  ()  H  11/23/19  18:34    


 


ABG pO2  82.9 mm Hg (80.0-90.0)   11/23/19  18:40    


 


POC ABG HCO3  23.6  (22-26 mml/L)   11/23/19  18:34    


 


POC ABG Total CO2  25  (23-27mmol/L)   11/23/19  18:34    


 


POC ABG O2 Sat  99   11/23/19  18:34    


 


ABG O2 Saturation  96.8 % (95.0-99.0)   11/23/19  18:40    





PT/INR, D-dimer











PT  14.2 Sec. (12.2-14.9)   11/23/19  18:40    


 


INR  1.11  (0.87-1.13)   11/23/19  18:40    








Abnormal lab findings: 


                                  Abnormal Labs











  11/23/19 11/23/19 11/23/19





  18:34 18:40 18:40


 


WBC   13.8 H 


 


RBC   2.67 L 


 


Hgb   8.0 L 


 


Hct   24.5 L 


 


Lymph % (Auto)   


 


Seg Neutrophils %   


 


Seg Neuts % (Manual)   93.0 H 


 


Lymphocytes % (Manual)   4.0 L 


 


Seg Neutrophils #   


 


Seg Neutrophils # Man   12.8 H 


 


Lymphocytes # (Manual)   0.6 L 


 


POC ABG pH  7.336 L  


 


POC ABG pO2  147 H  


 


ABG Base Excess   


 


ABG Hemoglobin   


 


Oxyhemoglobin   


 


Sodium   


 


Potassium   


 


Carbon Dioxide    19 L


 


BUN    30 H


 


Creatinine    2.5 H


 


Glucose    474 H


 


POC Glucose   


 


Calcium    8.1 L


 


Troponin T    0.034 H


 


NT-Pro-B Natriuret Pep   


 


Albumin    2.8 L


 


Cholesterol    268 H


 


LDL Cholesterol Direct    176 H


 


HDL Cholesterol    74 H


 


Urine WBC (Auto)   


 


Urine Creatinine   


 


Urine Total Protein   














  11/23/19 11/23/19 11/23/19





  18:40 18:40 18:46


 


WBC   


 


RBC   


 


Hgb   


 


Hct   


 


Lymph % (Auto)   


 


Seg Neutrophils %   


 


Seg Neuts % (Manual)   


 


Lymphocytes % (Manual)   


 


Seg Neutrophils #   


 


Seg Neutrophils # Man   


 


Lymphocytes # (Manual)   


 


POC ABG pH   


 


POC ABG pO2   


 


ABG Base Excess  -3.1 L  


 


ABG Hemoglobin  9.2 L  


 


Oxyhemoglobin  92.2 L  


 


Sodium   


 


Potassium   


 


Carbon Dioxide   


 


BUN   


 


Creatinine   


 


Glucose   


 


POC Glucose   


 


Calcium   


 


Troponin T   


 


NT-Pro-B Natriuret Pep   33298 H 


 


Albumin   


 


Cholesterol   


 


LDL Cholesterol Direct   


 


HDL Cholesterol   


 


Urine WBC (Auto)    20.0 H


 


Urine Creatinine   


 


Urine Total Protein   














  11/23/19 11/23/19 11/24/19





  19:50 21:27 00:15


 


WBC   


 


RBC   


 


Hgb   


 


Hct   


 


Lymph % (Auto)   


 


Seg Neutrophils %   


 


Seg Neuts % (Manual)   


 


Lymphocytes % (Manual)   


 


Seg Neutrophils #   


 


Seg Neutrophils # Man   


 


Lymphocytes # (Manual)   


 


POC ABG pH   


 


POC ABG pO2   


 


ABG Base Excess   


 


ABG Hemoglobin   


 


Oxyhemoglobin   


 


Sodium   


 


Potassium   


 


Carbon Dioxide   


 


BUN   


 


Creatinine   


 


Glucose   


 


POC Glucose  473 H  329 H  325 H


 


Calcium   


 


Troponin T   


 


NT-Pro-B Natriuret Pep   


 


Albumin   


 


Cholesterol   


 


LDL Cholesterol Direct   


 


HDL Cholesterol   


 


Urine WBC (Auto)   


 


Urine Creatinine   


 


Urine Total Protein   














  11/24/19 11/24/19 11/24/19





  06:33 08:52 13:05


 


WBC   


 


RBC   


 


Hgb   


 


Hct   


 


Lymph % (Auto)   


 


Seg Neutrophils %   


 


Seg Neuts % (Manual)   


 


Lymphocytes % (Manual)   


 


Seg Neutrophils #   


 


Seg Neutrophils # Man   


 


Lymphocytes # (Manual)   


 


POC ABG pH   


 


POC ABG pO2   


 


ABG Base Excess   


 


ABG Hemoglobin   


 


Oxyhemoglobin   


 


Sodium   


 


Potassium   


 


Carbon Dioxide   


 


BUN   


 


Creatinine   


 


Glucose   


 


POC Glucose  439 H  424 H  398 H


 


Calcium   


 


Troponin T   


 


NT-Pro-B Natriuret Pep   


 


Albumin   


 


Cholesterol   


 


LDL Cholesterol Direct   


 


HDL Cholesterol   


 


Urine WBC (Auto)   


 


Urine Creatinine   


 


Urine Total Protein   














  11/24/19 11/24/19 11/25/19





  16:46 21:09 09:01


 


WBC    15.2 H


 


RBC    2.88 L


 


Hgb    8.6 L


 


Hct    26.6 L


 


Lymph % (Auto)    10.9 L


 


Seg Neutrophils %    84.7 H


 


Seg Neuts % (Manual)   


 


Lymphocytes % (Manual)   


 


Seg Neutrophils #    12.9 H


 


Seg Neutrophils # Man   


 


Lymphocytes # (Manual)   


 


POC ABG pH   


 


POC ABG pO2   


 


ABG Base Excess   


 


ABG Hemoglobin   


 


Oxyhemoglobin   


 


Sodium   


 


Potassium   


 


Carbon Dioxide   


 


BUN   


 


Creatinine   


 


Glucose   


 


POC Glucose  349 H  305 H 


 


Calcium   


 


Troponin T   


 


NT-Pro-B Natriuret Pep   


 


Albumin   


 


Cholesterol   


 


LDL Cholesterol Direct   


 


HDL Cholesterol   


 


Urine WBC (Auto)   


 


Urine Creatinine   


 


Urine Total Protein   














  11/25/19 11/25/19 11/25/19





  09:01 09:08 12:15


 


WBC   


 


RBC   


 


Hgb   


 


Hct   


 


Lymph % (Auto)   


 


Seg Neutrophils %   


 


Seg Neuts % (Manual)   


 


Lymphocytes % (Manual)   


 


Seg Neutrophils #   


 


Seg Neutrophils # Man   


 


Lymphocytes # (Manual)   


 


POC ABG pH   


 


POC ABG pO2   


 


ABG Base Excess   


 


ABG Hemoglobin   


 


Oxyhemoglobin   


 


Sodium  134 L  


 


Potassium   


 


Carbon Dioxide  18 L  


 


BUN  49 H  


 


Creatinine  2.9 H  


 


Glucose  159 H  


 


POC Glucose   179 H  167 H


 


Calcium   


 


Troponin T   


 


NT-Pro-B Natriuret Pep   


 


Albumin  3.0 L  


 


Cholesterol   


 


LDL Cholesterol Direct   


 


HDL Cholesterol   


 


Urine WBC (Auto)   


 


Urine Creatinine   


 


Urine Total Protein   














  11/25/19 11/25/19 11/26/19





  17:40 21:17 08:35


 


WBC   


 


RBC   


 


Hgb   


 


Hct   


 


Lymph % (Auto)   


 


Seg Neutrophils %   


 


Seg Neuts % (Manual)   


 


Lymphocytes % (Manual)   


 


Seg Neutrophils #   


 


Seg Neutrophils # Man   


 


Lymphocytes # (Manual)   


 


POC ABG pH   


 


POC ABG pO2   


 


ABG Base Excess   


 


ABG Hemoglobin   


 


Oxyhemoglobin   


 


Sodium   


 


Potassium   


 


Carbon Dioxide   


 


BUN   


 


Creatinine   


 


Glucose   


 


POC Glucose  124 H  114 H  116 H


 


Calcium   


 


Troponin T   


 


NT-Pro-B Natriuret Pep   


 


Albumin   


 


Cholesterol   


 


LDL Cholesterol Direct   


 


HDL Cholesterol   


 


Urine WBC (Auto)   


 


Urine Creatinine   


 


Urine Total Protein   














  11/26/19 11/26/19 11/26/19





  11:00 22:26 Unknown


 


WBC   


 


RBC   


 


Hgb   


 


Hct   


 


Lymph % (Auto)   


 


Seg Neutrophils %   


 


Seg Neuts % (Manual)   


 


Lymphocytes % (Manual)   


 


Seg Neutrophils #   


 


Seg Neutrophils # Man   


 


Lymphocytes # (Manual)   


 


POC ABG pH   


 


POC ABG pO2   


 


ABG Base Excess   


 


ABG Hemoglobin   


 


Oxyhemoglobin   


 


Sodium   


 


Potassium   


 


Carbon Dioxide   


 


BUN   


 


Creatinine   


 


Glucose   


 


POC Glucose  218 H  338 H 


 


Calcium   


 


Troponin T   


 


NT-Pro-B Natriuret Pep   


 


Albumin   


 


Cholesterol   


 


LDL Cholesterol Direct   


 


HDL Cholesterol   


 


Urine WBC (Auto)   


 


Urine Creatinine    30.6 H


 


Urine Total Protein    283 H














  11/27/19 11/27/19





  03:53 03:53


 


WBC  


 


RBC  3.05 L 


 


Hgb  9.2 L 


 


Hct  28.0 L 


 


Lymph % (Auto)  


 


Seg Neutrophils %  


 


Seg Neuts % (Manual)  


 


Lymphocytes % (Manual)  


 


Seg Neutrophils #  


 


Seg Neutrophils # Man  


 


Lymphocytes # (Manual)  


 


POC ABG pH  


 


POC ABG pO2  


 


ABG Base Excess  


 


ABG Hemoglobin  


 


Oxyhemoglobin  


 


Sodium  


 


Potassium   3.1 L D


 


Carbon Dioxide   19 L


 


BUN   44 H


 


Creatinine   2.8 H


 


Glucose  


 


POC Glucose  


 


Calcium   7.7 L


 


Troponin T  


 


NT-Pro-B Natriuret Pep  


 


Albumin  


 


Cholesterol  


 


LDL Cholesterol Direct  


 


HDL Cholesterol  


 


Urine WBC (Auto)  


 


Urine Creatinine  


 


Urine Total Protein

## 2019-11-27 NOTE — PROGRESS NOTE
Assessment and Plan





- Patient Problems


(1) SHA (acute kidney injury)


Current Visit: Yes   Status: Acute   


Plan to address problem: 


SHA most likely secondary to malignant HTN, superimposed on CKD, baseline Cr was

around 2.3-2.6mg/dl in 8/2019. Given also evidence of significant 

glucosuria/proteinuria in the setting of uncontrolled T2DM, suspect underlying 

diabetic nephropathy along with hypertensive nephrosclerosis as cause of 

underlying CKD. will check urine lytes/ protein/cr ratio. Will also check C3/4, 

MANUELITO, ANCA, Anti GBM Ab given e/o mild hematuria along with significant 

proteinuria, hypertension, pulmonary edema to rule out acute vasculitic event. 

Pt otherwise stable for discharge from renal stand point with close outpatient 

CKD f/u. If renal function continues to decline will consider renal biopsy as 

outpatient, since pt is on plavix currently. Avoid nephrotoxins, NSAIDs, IV 

contrast. 








(2) Flash pulmonary edema


Current Visit: Yes   Status: Acute   


Plan to address problem: 


improved with BP control and diuresis with lasix. cont lasix 40mg po qd 








(3) Malignant hypertensive urgency


Current Visit: Yes   Status: Acute   


Plan to address problem: 


 BP improved. montor BP on current meds 








(4) IDDM (insulin dependent diabetes mellitus)


Current Visit: Yes   Status: Chronic   


Plan to address problem: 


DM management as per primary attending 








(5) Anemia in chronic illness


Current Visit: Yes   Status: Acute   


Plan to address problem: 


check irons stores. consider LATOSHA if iron store is replete 








Subjective


Date of service: 11/27/19


Principal diagnosis: pulm edema


Interval history: 





Pt awake, alert, in NAD, denies fever, chills, n/v/d, CP, SOB. 





Objective





- Vital Signs


Vital signs: 


                               Vital Signs - 12hr











  11/26/19 11/26/19 11/26/19





  21:46 21:54 22:00


 


Temperature   


 


Pulse Rate 88 88 84


 


Respiratory   20





Rate   


 


Blood Pressure 193/101 193/101 


 


O2 Sat by Pulse   98





Oximetry   














  11/27/19 11/27/19 11/27/19





  00:06 00:51 04:25


 


Temperature 98.6 F  98.2 F


 


Pulse Rate 89 92 H 81


 


Respiratory 18  18





Rate   


 


Blood Pressure 181/97 181/97 177/102


 


O2 Sat by Pulse 99  97





Oximetry   














  11/27/19 11/27/19 11/27/19





  05:36 08:25 09:22


 


Temperature  98.0 F 


 


Pulse Rate 85 84 84


 


Respiratory  18 





Rate   


 


Blood Pressure 177/102 205/122 120/110


 


O2 Sat by Pulse  97 





Oximetry   














  11/27/19 11/27/19





  09:23 09:24


 


Temperature  


 


Pulse Rate 84 84


 


Respiratory  





Rate  


 


Blood Pressure 200/110 200/110


 


O2 Sat by Pulse  





Oximetry  














- General Appearance


General appearance: well-developed, well-nourished, appears stated age


EENT: ATNC, PERRL, mucous membranes moist


Neck: no JVD


Respiratory: Present: Clear to Ascultation


Cardiology: regular, S1S2


Gastrointestinal: normoactive bowel sounds


Integumentary: no rash, other (no edeam )


Neurologic: no focal deficit, alert and oriented x3, strength 5/5, CN 3-12 

intact


Psychiatric: mood/affect appropriate, cooperative





- Lab





                                 11/27/19 03:53





                                 11/27/19 03:53


                             Most recent lab results











ABG pH  7.378 pH Units (7.350-7.450)   11/23/19  18:40    


 


ABG pCO2  37.7 mm Hg  11/23/19  18:40    


 


ABG pO2  82.9 mm Hg (80.0-90.0)   11/23/19  18:40    


 


ABG HCO3  21.7 mmol/L (20.0-26.0)   11/23/19  18:40    


 


ABG O2 Saturation  96.8 % (95.0-99.0)   11/23/19  18:40    


 


Calcium  7.7 mg/dL (8.4-10.2)  L  11/27/19  03:53    


 


Magnesium  2.00 mg/dL (1.7-2.3)   11/23/19  18:40    


 


Urine Creatinine  30.6 mg/dL (0.1-20.0)  H  11/26/19  Unknown


 


Urine Sodium  101 mmol/L  11/26/19  Unknown


 


Urine Total Protein  283 mg/dL (5-11.8)  H  11/26/19  Unknown














Medications & Allergies





- Medications


Allergies/Adverse Reactions: 


                                    Allergies





No Known Allergies Allergy (Unverified 01/10/15 03:04)


   








Home Medications: 


                                Home Medications











 Medication  Instructions  Recorded  Confirmed  Last Taken  Type


 


Acetaminophen [Acetaminophen TAB] 325 mg PO Q4H PRN #30 tablet 01/09/18 01/11/18

Unknown Rx


 


Aspirin [Aspirin EC] 81 mg PO DAILY #30 01/09/18 01/11/18 1 Day Ago Rx





    ~01/10/18 


 


AtorvaSTATin [Lipitor] 80 mg PO QHS #30 day 01/09/18 01/11/18 1 Day Ago Rx





    ~01/10/18 


 


Bisacodyl [Dulcolax suppos] 10 mg [place] QDAY PRN #7 supp.rect 01/09/18 01/11/18 

Unknown Rx


 


Brimonidine/Timolol 0.2-0.5% 1 drops OU Q12HR #1 bottle 01/09/18 01/11/18 1 Day 

Ago Rx





[Combigan 0.2-0.5%]    ~01/10/18 


 


Citalopram [celeXA] 10 mg PO QDAY #30 01/09/18 01/11/18 1 Day Ago Rx





    ~01/10/18 


 


Clopidogrel [Plavix] 75 mg PO QDAY #30 01/09/18 01/11/18 1 Day Ago Rx





    ~01/10/18 


 


Cyclobenzaprine [Flexeril 10 MG 10 mg PO TID PRN #30 day 01/09/18 01/11/18 

Unknown Rx





TAB]     


 


Lipase/Protease/Amylase [Pancreaze 1 each FEEDTUBE PRN PRN #30 capsule 01/09/18 01/11/18 Unknown Rx





Dr 10,500 Unit]     


 


Magnesium Hydroxide [Milk of 30 ml PO Q4H PRN #30 oral.liqd 01/09/18 01/11/18 

Unknown Rx





Magnesia]     


 


Petrolatum,White [Vaseline Lip 1 applic TP AS DIRECT PRN #30 tube 01/09/18 01/11/18 Unknown Rx





Therapy]     


 


Simple Syrup 15 ml FEEDTUBE PRN PRN #30 01/09/18 01/11/18 Unknown Rx





 oral.liqd    


 


Simple Syrup 30 ml FEEDTUBE PRN PRN #30 01/09/18 01/11/18 Unknown Rx





 oral.liqd    


 


Sodium Bicarbonate 325 mg FEEDTUBE PRN PRN #30 tablet 01/09/18 01/11/18 Unknown 

Rx


 


amLODIPine 5 mg PO QDAY #30 tablet 01/09/18 01/11/18 1 Day Ago Rx





    ~01/10/18 


 


NIFEdipine [Nifedipine ER] 10 mg PO TID 01/11/18 01/11/18 1 Day Ago History





    ~01/10/18 


 


Detemir (Nf) [Levemir (Nf)] 10 units SUB-Q QHS  units 01/18/18  Unknown Rx











Active Medications: 














Generic Name Dose Route Start Last Admin





  Trade Name Freq  PRN Reason Stop Dose Admin


 


Acetaminophen  325 mg  11/23/19 23:00  11/25/19 18:30





  Tylenol  PO   325 mg





  Q4H PRN   Administration





  Pain MILD(1-3)/Fever >100.5/HA  


 


Lipase/Protease/Amylase  1 each  11/23/19 23:00 





  Pancreaze Dr 10,500 Unit  FEEDTUBE  





  PRN PRN  





  For Clogged Feeding Tube  


 


Aspirin  81 mg  11/24/19 10:00  11/27/19 09:22





  Halfprin Ec  PO   81 mg





  DAILY VANDA   Administration


 


Atorvastatin Calcium  80 mg  11/24/19 22:00  11/26/19 21:46





  Lipitor  PO   80 mg





  QHS VANDA   Administration


 


Bisacodyl  10 mg  11/23/19 23:00 





  Dulcolax  [place]  





  QDAY PRN  





  Constipation  


 


Brimonidine/Timolol  1 drops  11/24/19 10:00  11/27/19 09:26





  Combigan 0.2-0.5%  OU   1 drops





  Q12HR VANDA   Administration


 


Carvedilol  25 mg  11/25/19 22:00  11/27/19 09:24





  Coreg  PO   25 mg





  BID VANDA   Administration


 


Citalopram Hydrobromide  10 mg  11/24/19 10:00  11/27/19 09:22





  Celexa  PO   10 mg





  QDAY VANDA   Administration


 


Clopidogrel Bisulfate  75 mg  11/24/19 10:00  11/27/19 09:21





  Plavix  PO   75 mg





  QDAY VANDA   Administration


 


Cyclobenzaprine HCl  10 mg  11/23/19 23:00  11/26/19 21:53





  Flexeril  PO   10 mg





  TID PRN   Administration





  Muscle Spasm  


 


Furosemide  40 mg  11/25/19 19:00  11/27/19 09:22





  Lasix  PO   40 mg





  QDAY VANDA   Administration


 


Heparin Sodium (Porcine)  5,000 unit  11/24/19 10:00  11/26/19 21:47





  Heparin  SUB-Q   5,000 unit





  Q12HR VANDA   Administration


 


Hydralazine HCl  10 mg  11/23/19 23:06  11/27/19 09:22





  Apresoline  IV   10 mg





  Q30MIN PRN   Administration





  Blood Pressure  


 


Hydralazine HCl  100 mg  11/25/19 22:00  11/27/19 09:23





  Apresoline  PO   100 mg





  BID VANDA   Administration


 


Ceftriaxone Sodium  2 gm in 100 mls @ 200 mls/hr  11/24/19 10:00  11/27/19 09:21





  Rocephin/Ns 2 Gm/100 Ml  IV  11/28/19 10:29  200 mls/hr





  Q24HR VANDA   Administration





  Protocol  


 


Insulin Glargine  20 units  11/25/19 22:00  11/26/19 22:25





  Lantus  SUB-Q   20 units





  QHS VANDA   Administration


 


Insulin Human Lispro  0 unit  11/24/19 09:00  11/26/19 22:25





  Humalog  SUB-Q   8 unit





  ACHS VANDA   Administration





  Protocol  


 


Magnesium Hydroxide  30 ml  11/23/19 23:00  11/25/19 10:06





  Milk Of Magnesia  PO   30 ml





  Q4H PRN   Administration





  Constipation  


 


Nifedipine  60 mg  11/25/19 18:03  11/27/19 09:21





  Procardia Xl  PO   60 mg





  QDAY VANDA   Administration


 


Simple Syrup  15 ml  11/23/19 23:00 





  Simple Syrup  FEEDTUBE  





  PRN PRN  





  Hypoglycemia  


 


Simple Syrup  30 ml  11/23/19 23:00 





  Simple Syrup  FEEDTUBE  





  PRN PRN  





  Hypoglycemia  


 


Sodium Bicarbonate  325 mg  11/23/19 23:00 





  Sodium Bicarbonate  FEEDTUBE  





  PRN PRN  





  For Clogged Feeding Tube  


 


Valsartan  160 mg  11/23/19 23:45  11/27/19 09:22





  Diovan  PO   160 mg





  BID VANDA   Administration

## 2019-11-27 NOTE — PROGRESS NOTE
Assessment and Plan


Assessment and plan: 


39-year-old female with  history of stroke, left-sided hemiparesis, 

hypertension, seizure, diabetes brought to the hospital by emergency medical 

services for acute respiratory distress.  Upon arrival, patient on BiPAP, 

saturating 88% on BiPAP therapy, in moderate to severe respiratory distress.  

Emergency medical services gave steroids, albuterol, and Lasix prior to arrival.

 Upon initial evaluation, patient is awake, protecting airway, in moderate to 

severe respiratory distress, with cool clammy skin, diaphoresis, and markedly 

high  BP--- systolic blood pressure in the 220s.





Patient Now off OF BIPAP


Patient not able to describe exacerbating or relieving factors.


Patient has a PEG tube sec to Dysphagia caused by CVA





- Patient Problems


(1) SHA (acute kidney injury) SECONDARY TO ATN AND POSSIBLE UNDERLYING 

HYPTERNSIVE RENAL DISEASE


Current Visit: Yes   Status: Acute   


Plan to address problem: 


Patient with acute on chronic kidney disease.  Chronic most likely secondary to 

hypertensive renal disease.  Nephrology consult for further recommendations.  

Low-salt diet.  Continue all renal toxic medications.


she appears to be close to her baseline in renal 








(2) Flash pulmonary edema


Current Visit: Yes   Status: Acute   


Plan to address problem: 


Patient flash pulmonary edema this was reason for acute respiratory failure 

resolving.








(3) Malignant hypertensive urgency


Current Visit: Yes   Status: Acute   


Plan to address problem: 


Patient's blood pressure has begin to go back up on day 2 after being weaned 

from nitroglycerin drip.  


Bp went back up despite medications, will change coreg to labatelol for better 

control and monitor. 


Discused with Nursing staff


Home medications restarted, improvement in BP noted








(4) CAD (coronary artery disease)


Current Visit: Yes   Status: Chronic   


Qualifiers: 


   Coronary Disease-Associated Artery/Lesion type: native artery   Native vs. 

transplanted heart: native heart 


Plan to address problem: 


Patient is not having chest pain shortness of breath at this particular time 

stable coronary artery disease.








(5) IDDM (insulin dependent diabetes mellitus)


Current Visit: Yes   Status: Chronic   


Plan to address problem: 


Diabetes remains uncontrolled we'll increase Lantus to 20 units daily at bedtime

and increase to moderate dose sliding scale.








(6) History of CVA with residual deficit


Current Visit: No   Status: Chronic   


Plan to address problem: 


Patient just has residual weakness.  We'll continue to treat with aggressive 

antilipid's and antiplatelet therapy.  Along with beta blocker when blood 

pressure permits.








(7) Acute respiratory failure


Current Visit: Yes   Status: Acute   


Plan to address problem: 


Initially secondary to flash pulmonary edema with underlying etiology of 

hypertensive crisis.  Resolving after blood pressure improved.








(8) Acute cystitis; monitor culture. continue abx





anticipae discharge in am if patients BP remains stable





Hospitalist Physical





- Constitutional


Vitals: 


                                        











Temp Pulse Resp BP Pulse Ox


 


 98.0 F   80   20   200/110   99 


 


 11/27/19 08:25  11/27/19 10:00  11/27/19 10:00  11/27/19 09:24  11/27/19 10:00











General appearance: Present: no acute distress, well-nourished





Results





- Labs


CBC & Chem 7: 


                                 11/27/19 03:53





                                 11/27/19 03:53


Labs: 


                             Laboratory Last Values











WBC  7.8 K/mm3 (4.5-11.0)   11/27/19  03:53    


 


RBC  3.05 M/mm3 (3.65-5.03)  L  11/27/19  03:53    


 


Hgb  9.2 gm/dl (10.1-14.3)  L  11/27/19  03:53    


 


Hct  28.0 % (30.3-42.9)  L  11/27/19  03:53    


 


MCV  92 fl (79-97)   11/27/19  03:53    


 


MCH  30 pg (28-32)   11/27/19  03:53    


 


MCHC  33 % (30-34)   11/27/19  03:53    


 


RDW  13.8 % (13.2-15.2)   11/27/19  03:53    


 


Plt Count  327 K/mm3 (140-440)   11/27/19  03:53    


 


Lymph % (Auto)  10.9 % (13.4-35.0)  L  11/25/19  09:01    


 


Mono % (Auto)  4.1 % (0.0-7.3)   11/25/19  09:01    


 


Eos % (Auto)  0.0 % (0.0-4.3)   11/25/19  09:01    


 


Baso % (Auto)  0.3 % (0.0-1.8)   11/25/19  09:01    


 


Lymph #  1.7 K/mm3 (1.2-5.4)   11/25/19  09:01    


 


Mono #  0.6 K/mm3 (0.0-0.8)   11/25/19  09:01    


 


Eos #  0.0 K/mm3 (0.0-0.4)   11/25/19  09:01    


 


Baso #  0.0 K/mm3 (0.0-0.1)   11/25/19  09:01    


 


Add Manual Diff  Complete   11/23/19  18:40    


 


Total Counted  100   11/23/19  18:40    


 


Seg Neutrophils %  84.7 % (40.0-70.0)  H  11/25/19  09:01    


 


Seg Neuts % (Manual)  93.0 % (40.0-70.0)  H  11/23/19  18:40    


 


Band Neutrophils %  0 %  11/23/19  18:40    


 


Lymphocytes % (Manual)  4.0 % (13.4-35.0)  L  11/23/19  18:40    


 


Reactive Lymphs % (Man)  0 %  11/23/19  18:40    


 


Monocytes % (Manual)  2.0 % (0.0-7.3)   11/23/19  18:40    


 


Eosinophils % (Manual)  1.0 % (0.0-4.3)   11/23/19  18:40    


 


Basophils % (Manual)  0 % (0.0-1.8)   11/23/19  18:40    


 


Metamyelocytes %  0 %  11/23/19  18:40    


 


Myelocytes %  0 %  11/23/19  18:40    


 


Promyelocytes %  0 %  11/23/19  18:40    


 


Blast Cells %  0 %  11/23/19  18:40    


 


Nucleated RBC %  Not Reportable   11/23/19  18:40    


 


Seg Neutrophils #  12.9 K/mm3 (1.8-7.7)  H  11/25/19  09:01    


 


Seg Neutrophils # Man  12.8 K/mm3 (1.8-7.7)  H  11/23/19  18:40    


 


Band Neutrophils #  0.0 K/mm3  11/23/19  18:40    


 


Lymphocytes # (Manual)  0.6 K/mm3 (1.2-5.4)  L  11/23/19  18:40    


 


Abs React Lymphs (Man)  0.0 K/mm3  11/23/19  18:40    


 


Monocytes # (Manual)  0.3 K/mm3 (0.0-0.8)   11/23/19  18:40    


 


Eosinophils # (Manual)  0.1 K/mm3 (0.0-0.4)   11/23/19  18:40    


 


Basophils # (Manual)  0.0 K/mm3 (0.0-0.1)   11/23/19  18:40    


 


Metamyelocytes #  0.0 K/mm3  11/23/19  18:40    


 


Myelocytes #  0.0 K/mm3  11/23/19  18:40    


 


Promyelocytes #  0.0 K/mm3  11/23/19  18:40    


 


Blast Cells #  0.0 K/mm3  11/23/19  18:40    


 


WBC Morphology  Not Reportable   11/23/19  18:40    


 


Hypersegmented Neuts  Not Reportable   11/23/19  18:40    


 


Hyposegmented Neuts  Not Reportable   11/23/19  18:40    


 


Hypogranular Neuts  Not Reportable   11/23/19  18:40    


 


Smudge Cells  Not Reportable   11/23/19  18:40    


 


Toxic Granulation  Not Reportable   11/23/19  18:40    


 


Toxic Vacuolation  Not Reportable   11/23/19  18:40    


 


Dohle Bodies  Not Reportable   11/23/19  18:40    


 


Pelger-Huet Anomaly  Not Reportable   11/23/19  18:40    


 


Yuly Rods  Not Reportable   11/23/19  18:40    


 


Platelet Estimate  Consistent w auto   11/23/19  18:40    


 


Clumped Platelets  Not Reportable   11/23/19  18:40    


 


Plt Clumps, EDTA  Not Reportable   11/23/19  18:40    


 


Large Platelets  1+   11/23/19  18:40    


 


Giant Platelets  Not Reportable   11/23/19  18:40    


 


Platelet Satelliting  Not Reportable   11/23/19  18:40    


 


Plt Morphology Comment  Not Reportable   11/23/19  18:40    


 


RBC Morphology  Normal   11/23/19  18:40    


 


Dimorphic RBCs  Not Reportable   11/23/19  18:40    


 


Polychromasia  Not Reportable   11/23/19  18:40    


 


Hypochromasia  Not Reportable   11/23/19  18:40    


 


Poikilocytosis  Not Reportable   11/23/19  18:40    


 


Anisocytosis  Not Reportable   11/23/19  18:40    


 


Microcytosis  Not Reportable   11/23/19  18:40    


 


Macrocytosis  Not Reportable   11/23/19  18:40    


 


Spherocytes  Not Reportable   11/23/19  18:40    


 


Pappenheimer Bodies  Not Reportable   11/23/19  18:40    


 


Sickle Cells  Not Reportable   11/23/19  18:40    


 


Target Cells  Not Reportable   11/23/19  18:40    


 


Tear Drop Cells  Not Reportable   11/23/19  18:40    


 


Ovalocytes  Not Reportable   11/23/19  18:40    


 


Helmet Cells  Not Reportable   11/23/19  18:40    


 


[place] Bodies  Not Reportable   11/23/19  18:40    


 


Cabot Rings  Not Reportable   11/23/19  18:40    


 


Marco Cells  Not Reportable   11/23/19  18:40    


 


Bite Cells  Not Reportable   11/23/19  18:40    


 


Crenated Cell  Not Reportable   11/23/19  18:40    


 


Elliptocytes  Not Reportable   11/23/19  18:40    


 


Acanthocytes (Spur)  Not Reportable   11/23/19  18:40    


 


Rouleaux  Not Reportable   11/23/19  18:40    


 


Hemoglobin C Crystals  Not Reportable   11/23/19  18:40    


 


Schistocytes  Not Reportable   11/23/19  18:40    


 


Malaria parasites  Not Reportable   11/23/19  18:40    


 


Juliano Bodies  Not Reportable   11/23/19  18:40    


 


Hem Pathologist Commnt  No   11/23/19  18:40    


 


PT  14.2 Sec. (12.2-14.9)   11/23/19  18:40    


 


INR  1.11  (0.87-1.13)   11/23/19  18:40    


 


APTT  29.0 Sec. (24.2-36.6)   11/23/19  18:40    


 


POC ABG pH  7.336  (7.35-7.45)  L  11/23/19  18:34    


 


ABG pH  7.378 pH Units (7.350-7.450)   11/23/19  18:40    


 


POC ABG pCO2  44.2  (35-45)   11/23/19  18:34    


 


ABG pCO2  37.7 mm Hg  11/23/19  18:40    


 


POC ABG pO2  147  ()  H  11/23/19  18:34    


 


ABG pO2  82.9 mm Hg (80.0-90.0)   11/23/19  18:40    


 


POC ABG HCO3  23.6  (22-26 mml/L)   11/23/19  18:34    


 


ABG HCO3  21.7 mmol/L (20.0-26.0)   11/23/19  18:40    


 


POC ABG Total CO2  25  (23-27mmol/L)   11/23/19  18:34    


 


POC ABG O2 Sat  99   11/23/19  18:34    


 


ABG O2 Saturation  96.8 % (95.0-99.0)   11/23/19  18:40    


 


ABG O2 Content  12.0  (0.0-44)   11/23/19  18:40    


 


POC ABG Base Excess  -2  ((-2) - (+3)mmol/L)   11/23/19  18:34    


 


ABG Base Excess  -3.1 mmol/L (-2.0-3.0)  L  11/23/19  18:40    


 


ABG Hemoglobin  9.2 gm/dl (12.0-16.0)  L  11/23/19  18:40    


 


ABG Carboxyhemoglobin  4.4 % (0.0-5.0)   11/23/19  18:40    


 


ABG Methemoglobin  0.3 % (0.0-1.5)   11/23/19  18:40    


 


VBG pH  7.378  (7.320-7.420)   11/23/19  18:40    


 


Oxyhemoglobin  92.2 % (95.0-99.0)  L  11/23/19  18:40    


 


FiO2  21 %  11/23/19  18:40    


 


Sodium  138 mmol/L (137-145)   11/27/19  03:53    


 


Potassium  3.1 mmol/L (3.6-5.0)  L D 11/27/19  03:53    


 


Chloride  104.4 mmol/L ()   11/27/19  03:53    


 


Carbon Dioxide  19 mmol/L (22-30)  L  11/27/19  03:53    


 


Anion Gap  18 mmol/L  11/27/19  03:53    


 


BUN  44 mg/dL (7-17)  H  11/27/19  03:53    


 


Creatinine  2.8 mg/dL (0.7-1.2)  H  11/27/19  03:53    


 


Estimated GFR  23 ml/min  11/27/19  03:53    


 


BUN/Creatinine Ratio  16 %  11/27/19  03:53    


 


Glucose  93 mg/dL ()   11/27/19  03:53    


 


POC Glucose  338  ()  H  11/26/19  22:26    


 


Calcium  7.7 mg/dL (8.4-10.2)  L  11/27/19  03:53    


 


Magnesium  2.00 mg/dL (1.7-2.3)   11/23/19  18:40    


 


Total Bilirubin  0.20 mg/dL (0.1-1.2)   11/25/19  09:01    


 


AST  19 units/L (5-40)   11/25/19  09:01    


 


ALT  20 units/L (7-56)   11/25/19  09:01    


 


Alkaline Phosphatase  96 units/L ()   11/25/19  09:01    


 


Total Creatine Kinase  64 units/L ()   11/23/19  18:40    


 


Troponin T  0.034 ng/mL (0.00-0.029)  H  11/23/19  18:40    


 


NT-Pro-B Natriuret Pep  42250 pg/mL (0-450)  H  11/23/19  18:40    


 


Total Protein  7.4 g/dL (6.3-8.2)   11/25/19  09:01    


 


Albumin  3.0 g/dL (3.9-5)  L  11/25/19  09:01    


 


Albumin/Globulin Ratio  0.7 %  11/25/19  09:01    


 


Triglycerides  149 mg/dL (2-149)   11/23/19  18:40    


 


Cholesterol  268 mg/dL ()  H  11/23/19  18:40    


 


LDL Cholesterol Direct  176 mg/dL ()  H  11/23/19  18:40    


 


HDL Cholesterol  74 mg/dL (40-59)  H  11/23/19  18:40    


 


Cholesterol/HDL Ratio  3.62 %  11/23/19  18:40    


 


Urine Color  Straw  (Yellow)   11/23/19  18:46    


 


Urine Turbidity  Clear  (Clear)   11/23/19  18:46    


 


Urine pH  7.0  (5.0-7.0)   11/23/19  18:46    


 


Ur Specific Gravity  1.013  (1.003-1.030)   11/23/19  18:46    


 


Urine Protein  >500 mg/dL (Negative)   11/23/19  18:46    


 


Urine Glucose (UA)  >=500 mg/dL (Negative)   11/23/19  18:46    


 


Urine Ketones  Neg mg/dL (Negative)   11/23/19  18:46    


 


Urine Blood  Sm  (Negative)   11/23/19  18:46    


 


Urine Nitrite  Neg  (Negative)   11/23/19  18:46    


 


Urine Bilirubin  Neg  (Negative)   11/23/19  18:46    


 


Urine Urobilinogen  < 2.0 mg/dL (<2.0)   11/23/19  18:46    


 


Ur Leukocyte Esterase  Mod  (Negative)   11/23/19  18:46    


 


Urine WBC (Auto)  20.0 /HPF (0.0-6.0)  H  11/23/19  18:46    


 


Urine RBC (Auto)  28.0 /HPF (0.0-6.0)   11/23/19  18:46    


 


U Epithel Cells (Auto)  2.0 /HPF (0-13.0)   11/23/19  18:46    


 


Urine Bacteria (Auto)  1+ /HPF (Negative)   11/23/19  18:46    


 


Hyaline Casts  2 /LPF  11/23/19  18:46    


 


Urine Yeast (Budding)  Few /HPF  11/23/19  18:46    


 


Urine Creatinine  30.6 mg/dL (0.1-20.0)  H  11/26/19  Unknown


 


Urine Sodium  101 mmol/L  11/26/19  Unknown


 


Urine Total Protein  283 mg/dL (5-11.8)  H  11/26/19  Unknown














Active Medications





- Current Medications


Current Medications: 














Generic Name Dose Route Start Last Admin





  Trade Name Roberto  PRN Reason Stop Dose Admin


 


Acetaminophen  325 mg  11/23/19 23:00  11/25/19 18:30





  Tylenol  PO   325 mg





  Q4H PRN   Administration





  Pain MILD(1-3)/Fever >100.5/HA  


 


Lipase/Protease/Amylase  1 each  11/23/19 23:00 





  Pancrealethea Tsang 10,500 Unit  FEEDTUBE  





  PRN PRN  





  For Clogged Feeding Tube  


 


Aspirin  81 mg  11/24/19 10:00  11/27/19 09:22





  Halfprin Ec  PO   81 mg





  DAILY VANDA   Administration


 


Atorvastatin Calcium  80 mg  11/24/19 22:00  11/26/19 21:46





  Lipitor  PO   80 mg





  QHS VANDA   Administration


 


Bisacodyl  10 mg  11/23/19 23:00 





  Dulcolax  OK  





  QDAY PRN  





  Constipation  


 


Brimonidine/Timolol  1 drops  11/24/19 10:00  11/27/19 09:26





  Combigan 0.2-0.5%  OU   1 drops





  Q12HR VANDA   Administration


 


Carvedilol  25 mg  11/25/19 22:00  11/27/19 09:24





  Coreg  PO   25 mg





  BID VANDA   Administration


 


Citalopram Hydrobromide  10 mg  11/24/19 10:00  11/27/19 09:22





  Celexa  PO   10 mg





  QDAY VANDA   Administration


 


Clopidogrel Bisulfate  75 mg  11/24/19 10:00  11/27/19 09:21





  Plavix  PO   75 mg





  QDAY VANDA   Administration


 


Cyclobenzaprine HCl  10 mg  11/23/19 23:00  11/26/19 21:53





  Flexeril  PO   10 mg





  TID PRN   Administration





  Muscle Spasm  


 


Furosemide  40 mg  11/25/19 19:00  11/27/19 09:22





  Lasix  PO   40 mg





  QDAY VANDA   Administration


 


Heparin Sodium (Porcine)  5,000 unit  11/24/19 10:00  11/27/19 09:34





  Heparin  SUB-Q   5,000 unit





  Q12HR VANDA   Administration


 


Hydralazine HCl  10 mg  11/23/19 23:06  11/27/19 09:22





  Apresoline  IV   10 mg





  Q30MIN PRN   Administration





  Blood Pressure  


 


Hydralazine HCl  100 mg  11/25/19 22:00  11/27/19 09:23





  Apresoline  PO   100 mg





  BID VANDA   Administration


 


Ceftriaxone Sodium  2 gm in 100 mls @ 200 mls/hr  11/24/19 10:00  11/27/19 09:21





  Rocephin/Ns 2 Gm/100 Ml  IV  11/28/19 10:29  200 mls/hr





  Q24HR VANDA   Administration





  Protocol  


 


Insulin Glargine  20 units  11/25/19 22:00  11/26/19 22:25





  Lantus  SUB-Q   20 units





  QHS VANDA   Administration


 


Insulin Human Lispro  0 unit  11/24/19 09:00  11/27/19 10:05





  Humalog  SUB-Q   Not Given





  [facility]  





  Protocol  


 


Magnesium Hydroxide  30 ml  11/23/19 23:00  11/25/19 10:06





  Milk Of Magnesia  PO   30 ml





  Q4H PRN   Administration





  Constipation  


 


Nifedipine  60 mg  11/25/19 18:03  11/27/19 09:21





  Procardia Xl  PO   60 mg





  QDAY VANDA   Administration


 


Simple Syrup  15 ml  11/23/19 23:00 





  Simple Syrup  FEEDTUBE  





  PRN PRN  





  Hypoglycemia  


 


Simple Syrup  30 ml  11/23/19 23:00 





  Simple Syrup  FEEDTUBE  





  PRN PRN  





  Hypoglycemia  


 


Sodium Bicarbonate  325 mg  11/23/19 23:00 





  Sodium Bicarbonate  FEEDTUBE  





  PRN PRN  





  For Clogged Feeding Tube  


 


Valsartan  160 mg  11/23/19 23:45  11/27/19 09:22





  Diovan  PO   160 mg





  BID VANDA   Administration














Nutrition/Malnutrition Assess





- Dietary Evaluation


Nutrition/Malnutrition Findings: 


                                        





Nutrition Notes                                            Start:  11/24/19 

10:54


Freq:                                                      Status: Active       




Protocol:                                                                       




 Document     11/25/19 13:39  LP  (Rec: 11/25/19 13:39  LP  [id number])


 Nutrition Notes


     Initial or Follow up                        Brief Note


     Subjective/Other Information                Pt eating good. Pt food


                                                 preferences updated and has no


                                                 other diet education


                                                 questions.


 Nutrition Intervention


     Revisit per MD consult or patient           Sign Off


      request:

## 2019-11-27 NOTE — DISCHARGE SUMMARY
Providers





- Providers


Date of Admission: 


11/23/19 20:29





Attending physician: 


CHELSI JACOB MD





                                        





11/23/19 23:05


Consult to Physician [CONS] Routine 


   Comment: 


   Consulting Provider: SUGEY ANGLIN


   Physician Instructions: 


   Reason For Exam: acute respiratory failure





11/25/19 13:09


Consult to Physician [CONS] Routine 


   Comment: 


   Consulting Provider: RODRIGO FRANKS


   Physician Instructions: 


   Reason For Exam: sha





11/27/19 10:55


Physical Therapy Evaluation and Treat [CONS] Routine 


   Comment: 


   Reason For Exam: CONTRACTION











Primary care physician: 


PRIMARY CARE MD








Hospitalization


Reason for admission: respiratory failure


Condition: Stable


Hospital course: 


39-year-old female with  history of stroke, left-sided hemiparesis, 

hypertension, seizure, diabetes brought to the hospital by emergency medical 

services for acute respiratory distress.  Upon arrival, patient on BiPAP, 

saturating 88% on BiPAP therapy, in moderate to severe respiratory distress.  

Emergency medical services gave steroids, albuterol, and Lasix prior to arrival.

  Upon initial evaluation, patient is awake, protecting airway, in moderate to 

severe respiratory distress, with cool clammy skin, diaphoresis, and markedly 

high  BP--- systolic blood pressure in the 220s.





Patient Now off OF BIPAP


Patient not able to describe exacerbating or relieving factors.


Patient has a PEG tube sec to Dysphagia caused by CVA





- Patient Problems


(1) SHA (acute kidney injury) SECONDARY TO ATN AND POSSIBLE UNDERLYING 

HYPTERNSIVE RENAL DISEASE


Current Visit: Yes   Status: Acute   


Plan to address problem: 


Patient with acute on chronic kidney disease.  Chronic most likely secondary to 

hypertensive renal disease.  Nephrology consult for further recommendations.  

Low-salt diet.  Continue all renal toxic medications.


she appears to be close to her baseline in renal and sable for discharge. 

Patient was treated with control of BP and some fluids








(2) Flash pulmonary edema


Current Visit: Yes   Status: Acute   


Plan to address problem: 


Patient flash pulmonary edema this was reason for acute respiratory failure 

resolving.








(3) Malignant hypertensive urgency


Current Visit: Yes   Status: Acute   


Plan to address problem: 


Patient's blood pressure has begin to go back up on day 2 after being weaned 

from nitroglycerin drip.  


bp IMPROVED ON CURRENT MEDS, WILL PROCEED WITH DISCHARGE ON LABATELOL


Discused with Nursing staff


Home medications restarted, improvement in BP noted








(4) CAD (coronary artery disease)


Current Visit: Yes   Status: Chronic   


Qualifiers: 


   Coronary Disease-Associated Artery/Lesion type: native artery   Native vs. 

transplanted heart: native heart 


Plan to address problem: 


Patient is not having chest pain shortness of breath at this particular time 

stable coronary artery disease.








(5) IDDM (insulin dependent diabetes mellitus)


Current Visit: Yes   Status: Chronic   


Plan to address problem: 


Diabetes remains uncontrolled we'll increase Lantus to 20 units daily at bedtime

 and increase to moderate dose sliding scale.








(6) History of CVA with residual deficit


Current Visit: No   Status: Chronic   


Plan to address problem: 


Patient just has residual weakness.  We'll continue to treat with aggressive 

antilipid's and antiplatelet therapy.  Along with beta blocker when blood 

pressure permits.








(7) Acute respiratory failure


Current Visit: Yes   Status: Acute   


Plan to address problem: 


Initially secondary to flash pulmonary edema with underlying etiology of hyp

ertensive crisis.  Resolving after blood pressure improved.








(8) Acute cystitis; monitor culture. continue abx








Disposition: DC/TX-06 HOME UNDER HOME [facility]


Time spent for discharge: 35 MINS





Core Measure Documentation





- Palliative Care


Palliative Care/ Comfort Measures: Not Applicable





- Core Measures


Any of the following diagnoses?: none





Exam





- Physical Exam


Narrative exam: 


General appearance: Present: no acute distress, well-nourished





- EENT


Eyes: Present: PERRL, EOM intact


ENT: hearing intact, clear oral mucosa, dentition normal





- Neck


Neck: Present: supple, normal ROM





- Respiratory


Respiratory: bilateral: rhonchi





- Cardiovascular


Heart Sounds: Present: S1 & S2





- Extremities


Extremities: no ischemia, pulses intact, pulses symmetrical, No edema


Peripheral Pulses: within normal limits





- Abdominal


General gastrointestinal: soft, non-tender, non-distended, normal bowel sounds, 

no hepatomegaly, no splenomegaly





- Integumentary


Integumentary: Present: clear, warm, dry





- Psychiatric


Psychiatric: appropriate mood/affect, intact judgment & insight, memory intact, 

dysartheria





- Neurologic


Neurologic: CNII-XII intact, moves all extremities








- Constitutional


Vitals: 


                                        











Temp Pulse Resp BP Pulse Ox


 


 98.0 F   76   18   142/87   99 


 


 11/27/19 12:06  11/27/19 16:33  11/27/19 16:33  11/27/19 16:33  11/27/19 16:33














Plan


Activity: advance as tolerated, fall precautions


Diet: low fat, diabetic


Special Instructions: record daily weights, record daily BP diary, record blood 

sugar diary


Follow up with: 


PRIMARY CARE,MD [Primary Care Provider] - 7 Days


RODRIGO FRANKS MD [Staff Physician] - 7 Days


FABIANA BAUM MD [Staff Physician] - 7 Days


Prescriptions: 


hydrALAZINE [Apresoline TAB] 100 mg PO TID #90 tab


Valsartan [Diovan] 160 mg PO BID #60 tablet


labetaloL [Labetalol 100mg TAB] 100 mg PO Q8HR #90 tablet


Furosemide [Lasix TAB] 40 mg PO QDAY #30 tablet


NIFEdipine XL [Procardia Xl] 90 mg PO QDAY #30 tablet


Other Discharge Orders: 


Glucometer  (Amb) Location: None Selected


Glucometer supplies[Amb] Location: None Selected

## 2020-08-24 ENCOUNTER — HOSPITAL ENCOUNTER (EMERGENCY)
Dept: HOSPITAL 5 - ED | Age: 40
End: 2020-08-24
Payer: MEDICARE

## 2020-08-24 DIAGNOSIS — I46.9: Primary | ICD-10-CM

## 2020-08-24 PROCEDURE — 92950 HEART/LUNG RESUSCITATION CPR: CPT

## 2020-08-24 NOTE — EMERGENCY DEPARTMENT REPORT
HPI





- General


Time Seen by Provider: 20 18:33





- HPI


HPI: 





This is a 40-year-old -American female presents to the emergency 

department via EMS from home in cardiac arrest.  Apparently the patient was 

interacting with her family and they went back and 5 minutes later and the 

patient was unresponsive.  Family did not start CPR but did call EMS.  They 

arrived about 5 to 6 minutes later and began ACLS protocol.  The patient had 

bilateral lower extremity intraosseous lines placed and a Adriano airway placed 

while she received chest compressions.  She was found to have a critical low 

Accu-Chek and was given D50.  She was given Narcan.  She had a total of 5 rounds

of ACLS in route to the hospital including 5 doses of epinephrine and the 

patient remained pulseless and in asystole.  She has a history of CVA x3, 

insulin-dependent diabetes, coronary artery disease, seizures.





ED Past Medical Hx





- Past Medical History


Hx Hypertension: Yes (ECHO  EF 50-55%)


Hx CVA: Yes (left side)


Hx Heart Attack/AMI: No


Hx Congestive Heart Failure: No


Hx Diabetes: Yes


Hx Deep Vein Thrombosis: No


Hx GERD: No


Hx Liver Disease: No


Hx Renal Disease: No


Hx Sickle Cell Disease: No


Hx Headaches / Migraines: No


Hx Seizures: Yes


Hx Kidney Stones: No


Hx Asthma: No


Hx COPD: No


Hx Dementia: No


Hx HIV: No





- Surgical History


Hx Coronary Stent: No


Hx Open Heart Surgery: No


Hx Pacemaker: No


Hx Internal Defibrillator: No


Hx Cholecystectomy: No


Hx Appendectomy: No


Hx Breast Surgery: No


Additional Surgical History: Spinal surgery, neck/artery surgery





- Social History


Smoking Status: Unknown if ever smoked


Substance Use Type: None





- Medications


Home Medications: 


                                Home Medications











 Medication  Instructions  Recorded  Confirmed  Last Taken  Type


 


Acetaminophen [Acetaminophen TAB] 325 mg PO Q4H PRN #30 tablet 18

Unknown Rx


 


Aspirin [Aspirin EC] 81 mg PO DAILY #30 18 1 Day Ago Rx





    ~01/10/18 


 


AtorvaSTATin [Lipitor] 80 mg PO QHS #30 day 18 1 Day Ago Rx





    ~01/10/18 


 


Brimonidine/Timolol 0.2-0.5% 1 drops OU Q12HR #1 bottle 18 1 Day 

Ago Rx





[Combigan 0.2-0.5%]    ~01/10/18 


 


Citalopram [celeXA] 10 mg PO QDAY #30 18 1 Day Ago Rx





    ~01/10/18 


 


Clopidogrel [Plavix] 75 mg PO QDAY #30 18 1 Day Ago Rx





    ~01/10/18 


 


Cyclobenzaprine [Flexeril 10 MG 10 mg PO TID PRN #30 day 18 

Unknown Rx





TAB]     


 


Lipase/Protease/Amylase [Pancreaze 1 each FEEDTUBE PRN PRN #30 capsule 18 Unknown Rx





 10,500 Unit]     


 


Magnesium Hydroxide [Milk of 30 ml PO Q4H PRN #30 oral.liqd 18 

Unknown Rx





Magnesia]     


 


Petrolatum,White [Vaseline Lip 1 applic TP AS DIRECT PRN #30 tube 18 Unknown Rx





Therapy]     


 


Simple Syrup 15 ml FEEDTUBE PRN PRN #30 18 Unknown Rx





 oral.liqd    


 


Sodium Bicarbonate 325 mg FEEDTUBE PRN PRN #30 tablet 18 Unknown 

Rx


 


bisacodyL [Dulcolax suppos] 10 mg [place] QDAY PRN #7 supp.rect 18 

Unknown Rx


 


Detemir (Nf) [Levemir (Nf)] 10 units SUB-Q QHS  units 18 Unknown 

Rx


 


Furosemide [Lasix TAB] 40 mg PO QDAY #30 tablet 19 Unknown Rx


 


Valsartan [Diovan] 160 mg PO BID #60 tablet 19 Unknown Rx


 


hydrALAZINE [Apresoline TAB] 100 mg PO TID #90 tab 19 Unknown Rx


 


labetaloL [Labetalol 100mg TAB] 100 mg PO Q8HR #90 tablet 19 

Unknown Rx


 


amLODIPine 10 mg PO DAILY #30 tablet 20  Unknown Rx


 


levoFLOXacin [Levaquin] 250 mg PO QDAY #3 tablet 20  Unknown Rx














ED Review of Systems


ROS: 


Stated complaint: CARDIAC ARREST


Other details as noted in HPI





Comment: Unobtainable due to pts medical conditions





Physical Exam





- Physical Exam


Physical Exam: 





GENERAL: Patient is ill-appearing and unresponsive.


HENT: Normocephalic.  Atraumatic.   Adriano airway in place.


EYES: Pupils are fixed and dilated.


NECK: Supple.  Trachea appears midline.


CHEST/LUNGS: There are no spontaneous respirations.


HEART/CARDIOVASCULAR: There are no spontaneous heart sounds.


ABDOMEN: Abdomen is soft.   There is no abdominal distention.


SKIN: Skin is cool but dry.


NEURO: Unresponsive.  Does not withdraw to painful stimuli.  Does not follow any

 commands.


MUSCULOSKELETAL: There is no obvious deformity.  There is no evidence of acute 

injury.  No palpable femoral or radial pulses.





ED Medical Decision Making





- Medical Decision Making





Patient presents in cardiac arrest with a Adriano airway in place into bilateral 

lower extremity intraosseous lines.  She had already received 5 rounds of ACLS 

protocol with bag valve ventilation, 5 rounds of epinephrine, D50, Narcan, chest

 compressions and the patient remained in asystole.  She arrives still pulseless

 and in asystole and was moved to the [facility] in room 21 where we continued 

chest compressions and ACLS protocol.  The patient had another 3 rounds of ACLS 

with epinephrine, sodium bicarbonate and another round of D50, as well as the 

bag valve ventilations and chest compressions.  Each time the patient was 

pulseless and in asystole.  Pupils are fixed and dilated.  There are no 

spontaneous heart or breath sounds.  At this point time of death was called at 

1831.  The patient sister was notified of the patient's expiration.


Critical Care Time: Yes


Critical care time in (mins) excluding proc time.: 20


Critical care attestation.: 


If time is entered above; I have spent that time in minutes in the direct care 

of this critically ill patient, excluding procedure time.  Critical care time 

was spent on this patient in doing her initial evaluation, supervision of ACLS 

protocol and discussion of expiration with the patient's family.





Critical Care Time: 





20 minutes





ED Disposition


Clinical Impression: 


 Cardiac arrest





Acute respiratory failure


Qualifiers:


 Respiratory failure complication: unspecified whether with hypoxia or 

hypercapnia Qualified Code(s): J96.00 - Acute respiratory failure, unspecified 

whether with hypoxia or hypercapnia





Disposition: DC-20 


Is pt being admited?: No


Time of Disposition: 19:42